# Patient Record
Sex: MALE | Race: WHITE | NOT HISPANIC OR LATINO | Employment: OTHER | URBAN - METROPOLITAN AREA
[De-identification: names, ages, dates, MRNs, and addresses within clinical notes are randomized per-mention and may not be internally consistent; named-entity substitution may affect disease eponyms.]

---

## 2023-03-01 ENCOUNTER — DOCUMENTATION (OUTPATIENT)
Dept: PRIMARY CARE | Facility: CLINIC | Age: 68
End: 2023-03-01
Payer: COMMERCIAL

## 2023-03-29 ENCOUNTER — DOCUMENTATION (OUTPATIENT)
Dept: PRIMARY CARE | Facility: CLINIC | Age: 68
End: 2023-03-29
Payer: COMMERCIAL

## 2023-03-29 NOTE — PROGRESS NOTES
Patient has met target of no readmission for 30 days post (hospital, SNF, rehab) discharge and is graduated from Transitional Care Management program at this time.

## 2023-05-24 DIAGNOSIS — I10 ESSENTIAL HYPERTENSION, BENIGN: ICD-10-CM

## 2023-05-24 RX ORDER — METOPROLOL SUCCINATE 25 MG/1
25 TABLET, EXTENDED RELEASE ORAL DAILY
COMMUNITY
End: 2023-05-24 | Stop reason: SDUPTHER

## 2023-05-24 RX ORDER — TRIAMTERENE/HYDROCHLOROTHIAZID 37.5-25 MG
1 TABLET ORAL DAILY
Qty: 90 TABLET | Refills: 1 | Status: SHIPPED | OUTPATIENT
Start: 2023-05-24 | End: 2023-08-21 | Stop reason: DRUGHIGH

## 2023-05-24 RX ORDER — TRIAMTERENE/HYDROCHLOROTHIAZID 37.5-25 MG
1 TABLET ORAL DAILY
COMMUNITY
Start: 2022-09-20 | End: 2023-05-24 | Stop reason: SDUPTHER

## 2023-05-24 RX ORDER — METOPROLOL SUCCINATE 25 MG/1
25 TABLET, EXTENDED RELEASE ORAL DAILY
Qty: 90 TABLET | Refills: 1 | Status: SHIPPED | OUTPATIENT
Start: 2023-05-24 | End: 2023-06-02

## 2023-05-31 DIAGNOSIS — I10 ESSENTIAL HYPERTENSION, BENIGN: ICD-10-CM

## 2023-06-02 RX ORDER — METOPROLOL SUCCINATE 25 MG/1
TABLET, EXTENDED RELEASE ORAL
Qty: 90 TABLET | Refills: 3 | Status: SHIPPED | OUTPATIENT
Start: 2023-06-02 | End: 2024-02-28 | Stop reason: SDUPTHER

## 2023-06-20 ENCOUNTER — APPOINTMENT (OUTPATIENT)
Dept: PRIMARY CARE | Facility: CLINIC | Age: 68
End: 2023-06-20
Payer: MEDICARE

## 2023-06-27 DIAGNOSIS — I10 ESSENTIAL (PRIMARY) HYPERTENSION: ICD-10-CM

## 2023-06-27 RX ORDER — ICOSAPENT ETHYL 1 G/1
CAPSULE ORAL
Qty: 360 CAPSULE | Refills: 1 | Status: SHIPPED | OUTPATIENT
Start: 2023-06-27 | End: 2023-12-11 | Stop reason: SDUPTHER

## 2023-07-06 LAB
APPEARANCE, URINE: CLEAR
BILIRUBIN, URINE: NEGATIVE
BLOOD, URINE: NEGATIVE
CHOLESTEROL (MG/DL) IN SER/PLAS: 135 MG/DL (ref 0–199)
CHOLESTEROL IN HDL (MG/DL) IN SER/PLAS: 28.3 MG/DL
CHOLESTEROL/HDL RATIO: 4.8
COLOR, URINE: YELLOW
CREATININE (MG/DL) IN SER/PLAS: 1.56 MG/DL (ref 0.5–1.3)
ESTIMATED AVERAGE GLUCOSE FOR HBA1C: 131 MG/DL
GFR MALE: 48 ML/MIN/1.73M2
GLUCOSE, URINE: NEGATIVE MG/DL
HEMOGLOBIN A1C/HEMOGLOBIN TOTAL IN BLOOD: 6.2 %
KETONES, URINE: NEGATIVE MG/DL
LDL: 27 MG/DL (ref 0–99)
LEUKOCYTE ESTERASE, URINE: NEGATIVE
MUCUS, URINE: NORMAL /LPF
NITRITE, URINE: NEGATIVE
NON HDL CHOLESTEROL: 107 MG/DL
PH, URINE: 5 (ref 5–8)
PROSTATE SPECIFIC AG (NG/ML) IN SER/PLAS: 0.37 NG/ML (ref 0–4)
PROTEIN, URINE: ABNORMAL MG/DL
RBC, URINE: <1 /HPF (ref 0–5)
SPECIFIC GRAVITY, URINE: 1.02 (ref 1–1.03)
SQUAMOUS EPITHELIAL CELLS, URINE: 1 /HPF
TRIGLYCERIDE (MG/DL) IN SER/PLAS: 400 MG/DL (ref 0–149)
UROBILINOGEN, URINE: <2 MG/DL (ref 0–1.9)
VLDL: 80 MG/DL (ref 0–40)
WBC, URINE: 2 /HPF (ref 0–5)

## 2023-07-07 LAB — URINE CULTURE: NORMAL

## 2023-08-21 ENCOUNTER — OFFICE VISIT (OUTPATIENT)
Dept: PRIMARY CARE | Facility: CLINIC | Age: 68
End: 2023-08-21
Payer: MEDICARE

## 2023-08-21 VITALS
HEIGHT: 73 IN | WEIGHT: 257 LBS | SYSTOLIC BLOOD PRESSURE: 131 MMHG | BODY MASS INDEX: 34.06 KG/M2 | HEART RATE: 67 BPM | DIASTOLIC BLOOD PRESSURE: 73 MMHG | TEMPERATURE: 96 F

## 2023-08-21 DIAGNOSIS — M79.604 PAIN OF RIGHT LOWER EXTREMITY: Primary | ICD-10-CM

## 2023-08-21 DIAGNOSIS — I10 ESSENTIAL HYPERTENSION, BENIGN: ICD-10-CM

## 2023-08-21 PROBLEM — G47.33 OBSTRUCTIVE SLEEP APNEA SYNDROME: Status: ACTIVE | Noted: 2023-08-21

## 2023-08-21 PROBLEM — N18.30 STAGE 3 CHRONIC KIDNEY DISEASE (MULTI): Status: ACTIVE | Noted: 2023-08-21

## 2023-08-21 PROBLEM — Z98.61 CAD S/P PERCUTANEOUS CORONARY ANGIOPLASTY: Status: ACTIVE | Noted: 2023-08-21

## 2023-08-21 PROBLEM — E11.9 DIABETES MELLITUS (MULTI): Status: ACTIVE | Noted: 2023-08-21

## 2023-08-21 PROBLEM — N13.8 BENIGN PROSTATIC HYPERPLASIA WITH URINARY OBSTRUCTION: Status: ACTIVE | Noted: 2023-08-21

## 2023-08-21 PROBLEM — I25.10 CAD S/P PERCUTANEOUS CORONARY ANGIOPLASTY: Status: ACTIVE | Noted: 2023-08-21

## 2023-08-21 PROBLEM — E78.1 ESSENTIAL HYPERTRIGLYCERIDEMIA: Status: ACTIVE | Noted: 2023-08-21

## 2023-08-21 PROBLEM — N40.1 BENIGN PROSTATIC HYPERPLASIA WITH URINARY OBSTRUCTION: Status: ACTIVE | Noted: 2023-08-21

## 2023-08-21 PROCEDURE — 99213 OFFICE O/P EST LOW 20 MIN: CPT | Performed by: INTERNAL MEDICINE

## 2023-08-21 PROCEDURE — 1160F RVW MEDS BY RX/DR IN RCRD: CPT | Performed by: INTERNAL MEDICINE

## 2023-08-21 PROCEDURE — 1036F TOBACCO NON-USER: CPT | Performed by: INTERNAL MEDICINE

## 2023-08-21 PROCEDURE — 3044F HG A1C LEVEL LT 7.0%: CPT | Performed by: INTERNAL MEDICINE

## 2023-08-21 PROCEDURE — 3008F BODY MASS INDEX DOCD: CPT | Performed by: INTERNAL MEDICINE

## 2023-08-21 PROCEDURE — 3066F NEPHROPATHY DOC TX: CPT | Performed by: INTERNAL MEDICINE

## 2023-08-21 PROCEDURE — 3078F DIAST BP <80 MM HG: CPT | Performed by: INTERNAL MEDICINE

## 2023-08-21 PROCEDURE — 1159F MED LIST DOCD IN RCRD: CPT | Performed by: INTERNAL MEDICINE

## 2023-08-21 PROCEDURE — 1125F AMNT PAIN NOTED PAIN PRSNT: CPT | Performed by: INTERNAL MEDICINE

## 2023-08-21 PROCEDURE — 3075F SYST BP GE 130 - 139MM HG: CPT | Performed by: INTERNAL MEDICINE

## 2023-08-21 RX ORDER — INSULIN DEGLUDEC 200 U/ML
INJECTION, SOLUTION SUBCUTANEOUS
COMMUNITY
Start: 2019-05-19 | End: 2024-05-29 | Stop reason: DRUGHIGH

## 2023-08-21 RX ORDER — FINASTERIDE 5 MG/1
5 TABLET, FILM COATED ORAL DAILY
COMMUNITY

## 2023-08-21 RX ORDER — NAPROXEN SODIUM 220 MG/1
TABLET, FILM COATED ORAL
COMMUNITY

## 2023-08-21 RX ORDER — NITROGLYCERIN 0.4 MG/1
0.4 TABLET SUBLINGUAL EVERY 5 MIN PRN
COMMUNITY
End: 2024-03-13 | Stop reason: SDUPTHER

## 2023-08-21 RX ORDER — ALBUTEROL SULFATE 90 UG/1
2 AEROSOL, METERED RESPIRATORY (INHALATION) EVERY 4 HOURS PRN
COMMUNITY
Start: 2019-12-30 | End: 2024-03-13 | Stop reason: WASHOUT

## 2023-08-21 RX ORDER — TAMSULOSIN HYDROCHLORIDE 0.4 MG/1
0.4 CAPSULE ORAL DAILY
COMMUNITY

## 2023-08-21 RX ORDER — RANOLAZINE 500 MG/1
500 TABLET, EXTENDED RELEASE ORAL EVERY 12 HOURS
COMMUNITY
End: 2023-10-12

## 2023-08-21 RX ORDER — METOPROLOL TARTRATE 25 MG/1
TABLET, FILM COATED ORAL
COMMUNITY
End: 2023-08-21 | Stop reason: SDUPTHER

## 2023-08-21 RX ORDER — AMLODIPINE BESYLATE 5 MG/1
5 TABLET ORAL DAILY
COMMUNITY
End: 2023-09-12

## 2023-08-21 RX ORDER — POTASSIUM CHLORIDE 1500 MG/1
TABLET, EXTENDED RELEASE ORAL 2 TIMES DAILY
COMMUNITY
End: 2023-10-09 | Stop reason: ALTCHOICE

## 2023-08-21 RX ORDER — TRIAMTERENE/HYDROCHLOROTHIAZID 37.5-25 MG
TABLET ORAL
Qty: 90 TABLET | Refills: 1 | Status: SHIPPED
Start: 2023-08-21 | End: 2023-09-12

## 2023-08-21 RX ORDER — ATORVASTATIN CALCIUM 40 MG/1
40 TABLET, FILM COATED ORAL NIGHTLY
COMMUNITY
End: 2024-05-21

## 2023-08-21 RX ORDER — INSULIN ASPART 100 [IU]/ML
INJECTION, SOLUTION INTRAVENOUS; SUBCUTANEOUS
COMMUNITY
Start: 2021-02-22

## 2023-08-21 RX ORDER — CLOPIDOGREL BISULFATE 75 MG/1
75 TABLET ORAL DAILY
COMMUNITY
End: 2024-03-06

## 2023-08-21 ASSESSMENT — ENCOUNTER SYMPTOMS
HYPERTENSION: 1
EYES NEGATIVE: 1
BOWEL INCONTINENCE: 0
DEPRESSION: 0
LEG PAIN: 1
LOSS OF SENSATION IN FEET: 0
CONSTITUTIONAL NEGATIVE: 1
RESPIRATORY NEGATIVE: 1
BACK PAIN: 1
PARESTHESIAS: 0
ABDOMINAL PAIN: 0
OCCASIONAL FEELINGS OF UNSTEADINESS: 0
CARDIOVASCULAR NEGATIVE: 1
GASTROINTESTINAL NEGATIVE: 1
NUMBNESS: 0
NEUROLOGICAL NEGATIVE: 1

## 2023-08-21 NOTE — PROGRESS NOTES
"Subjective   Patient ID: Manny Reveles is a 68 y.o. male who presents for Back Pain (Back and right leg pain since Saturday).    Back Pain  This is a new problem. The current episode started in the past 7 days. The problem occurs constantly. The problem is unchanged. The pain is present in the gluteal. The pain radiates to the right thigh. The pain is at a severity of 4/10. The pain is moderate. The symptoms are aggravated by bending, position and standing. Stiffness is present All day. Associated symptoms include leg pain. Pertinent negatives include no abdominal pain, bowel incontinence, numbness or paresthesias. He has tried nothing for the symptoms. The treatment provided no relief.   Hypertension  This is a chronic problem. The current episode started more than 1 year ago. The problem has been waxing and waning since onset. The problem is controlled. Pertinent negatives include no anxiety. Risk factors for coronary artery disease include diabetes mellitus, obesity and male gender. The current treatment provides significant improvement. Hypertensive end-organ damage includes kidney disease. Identifiable causes of hypertension include chronic renal disease.        Review of Systems   Constitutional: Negative.    HENT: Negative.     Eyes: Negative.    Respiratory: Negative.     Cardiovascular: Negative.    Gastrointestinal: Negative.  Negative for abdominal pain and bowel incontinence.   Musculoskeletal:  Positive for back pain. Negative for gait problem.   Skin: Negative.    Neurological: Negative.  Negative for numbness and paresthesias.       Objective   /73 (BP Location: Right arm, Patient Position: Sitting, BP Cuff Size: Adult)   Pulse 67   Temp 35.6 °C (96 °F) (Temporal)   Ht 1.842 m (6' 0.5\")   Wt 117 kg (257 lb)   BMI 34.38 kg/m²     Physical Exam  Vitals reviewed.   Constitutional:       Appearance: Normal appearance. He is normal weight.   HENT:      Head: Normocephalic and atraumatic. "   Eyes:      Conjunctiva/sclera: Conjunctivae normal.   Cardiovascular:      Rate and Rhythm: Normal rate and regular rhythm.      Pulses: Normal pulses.   Pulmonary:      Effort: Pulmonary effort is normal.      Breath sounds: Normal breath sounds.   Abdominal:      Palpations: Abdomen is soft.   Musculoskeletal:         General: Tenderness present. No swelling.      Cervical back: Normal range of motion.      Right lower leg: No edema.      Left lower leg: No edema.   Skin:     General: Skin is warm and dry.   Neurological:      General: No focal deficit present.       Assessment/Plan   Problem List Items Addressed This Visit    None  Visit Diagnoses       Pain of right lower extremity    -  Primary    Essential hypertension, benign        Relevant Medications    triamterene-hydrochlorothiazid (Maxzide-25) 37.5-25 mg tablet        He was lifting some heavy weight on Saturday, started having pain and discomfort from the right gluteal region to the posterior aspect of right thigh up to the knee.  He was tender in the hamstrings in the lower gluteal region, straight leg raising was slightly limited on right side, there was no motor deficit, we will start conservative treatment with physical therapy with this pain and discomfort of acute onset and less likely to be lumbar to colopathy to me.  Meanwhile he missed his appointment although he canceled it because of some family situation, his laboratories were reviewed with him, he could have came with his spouse, triglycerides are fluctuating, no angina, no new kidney stone, no change in the weight, creatinine has been monitored by nephrologist, recent endocrine evaluation was reviewed, he will be seen at a new schedule appointment and if this pain does not go away then we will consider imaging studies.

## 2023-08-24 PROBLEM — I87.2 CHRONIC VENOUS INSUFFICIENCY: Status: ACTIVE | Noted: 2023-07-26

## 2023-08-24 PROBLEM — E78.2 MIXED HYPERLIPIDEMIA: Status: ACTIVE | Noted: 2023-08-24

## 2023-08-24 PROBLEM — E66.811 CLASS 1 OBESITY WITH BODY MASS INDEX (BMI) OF 34.0 TO 34.9 IN ADULT: Status: ACTIVE | Noted: 2023-08-24

## 2023-08-24 PROBLEM — M79.606 LOWER EXTREMITY PAIN: Status: ACTIVE | Noted: 2023-08-24

## 2023-08-24 PROBLEM — L57.9 SKIN CHANGES DUE TO CHRONIC EXPOSURE TO NONIONIZING RADIATION, UNSPECIFIED: Status: ACTIVE | Noted: 2023-07-26

## 2023-08-24 PROBLEM — N20.1 URETERAL CALCULUS: Status: ACTIVE | Noted: 2023-08-24

## 2023-08-24 PROBLEM — Z85.828 PERSONAL HISTORY OF OTHER MALIGNANT NEOPLASM OF SKIN: Status: ACTIVE | Noted: 2023-07-26

## 2023-08-24 PROBLEM — D48.5 NEOPLASM OF UNCERTAIN BEHAVIOR OF SKIN: Status: ACTIVE | Noted: 2023-07-26

## 2023-08-24 PROBLEM — M54.9 BACK PAIN: Status: ACTIVE | Noted: 2023-08-24

## 2023-08-24 PROBLEM — E87.6 HYPOKALEMIA: Status: ACTIVE | Noted: 2023-08-24

## 2023-08-24 PROBLEM — E66.9 CLASS 1 OBESITY WITH BODY MASS INDEX (BMI) OF 34.0 TO 34.9 IN ADULT: Status: ACTIVE | Noted: 2023-08-24

## 2023-08-24 PROBLEM — L82.0 INFLAMED SEBORRHEIC KERATOSIS: Status: ACTIVE | Noted: 2023-07-26

## 2023-08-24 PROBLEM — B35.3 TINEA PEDIS: Status: ACTIVE | Noted: 2023-07-26

## 2023-08-24 PROBLEM — I12.9 CHRONIC KIDNEY DISEASE DUE TO HYPERTENSION: Status: ACTIVE | Noted: 2023-08-24

## 2023-08-24 PROBLEM — E55.9 VITAMIN D DEFICIENCY: Status: ACTIVE | Noted: 2023-08-24

## 2023-08-24 PROBLEM — L21.9 SEBORRHEIC DERMATITIS, UNSPECIFIED: Status: ACTIVE | Noted: 2023-07-26

## 2023-08-24 PROBLEM — N20.0 RENAL CALCULUS, RIGHT: Status: ACTIVE | Noted: 2023-08-24

## 2023-08-24 PROBLEM — L81.4 OTHER MELANIN HYPERPIGMENTATION: Status: ACTIVE | Noted: 2023-07-26

## 2023-08-24 PROBLEM — E11.21 DIABETIC RENAL DISEASE (MULTI): Status: ACTIVE | Noted: 2023-08-24

## 2023-08-24 PROBLEM — C44.329 SQUAMOUS CELL CARCINOMA OF SKIN OF OTHER PARTS OF FACE: Status: ACTIVE | Noted: 2023-07-26

## 2023-08-24 PROBLEM — D18.01 HEMANGIOMA OF SKIN AND SUBCUTANEOUS TISSUE: Status: ACTIVE | Noted: 2023-07-26

## 2023-08-24 PROBLEM — L57.0 ACTINIC KERATOSIS: Status: ACTIVE | Noted: 2023-07-26

## 2023-08-24 PROBLEM — L90.5 SCAR CONDITION AND FIBROSIS OF SKIN: Status: ACTIVE | Noted: 2023-07-26

## 2023-08-24 RX ORDER — METFORMIN HYDROCHLORIDE 500 MG/1
TABLET ORAL
COMMUNITY
End: 2023-10-09 | Stop reason: ALTCHOICE

## 2023-08-24 RX ORDER — INSULIN DEGLUDEC 100 U/ML
90 INJECTION, SOLUTION SUBCUTANEOUS
COMMUNITY
End: 2023-10-09 | Stop reason: ALTCHOICE

## 2023-08-24 RX ORDER — HYDROCHLOROTHIAZIDE 25 MG/1
TABLET ORAL EVERY 24 HOURS
COMMUNITY
End: 2023-11-29 | Stop reason: DRUGHIGH

## 2023-08-24 RX ORDER — DOCUSATE SODIUM 100 MG/1
100 CAPSULE, LIQUID FILLED ORAL 2 TIMES DAILY
COMMUNITY
Start: 2010-04-05

## 2023-08-24 RX ORDER — ACETAMINOPHEN AND CODEINE PHOSPHATE 300; 30 MG/1; MG/1
1 TABLET ORAL
COMMUNITY
Start: 2023-02-09 | End: 2023-10-09 | Stop reason: ALTCHOICE

## 2023-08-24 RX ORDER — TRIAMCINOLONE ACETONIDE 0.25 MG/G
1 CREAM TOPICAL
COMMUNITY
Start: 2022-08-26 | End: 2023-10-09 | Stop reason: ALTCHOICE

## 2023-08-24 RX ORDER — LORATADINE 10 MG/1
TABLET ORAL
COMMUNITY
End: 2023-10-09 | Stop reason: ALTCHOICE

## 2023-08-24 RX ORDER — KETOCONAZOLE 20 MG/G
1 CREAM TOPICAL
COMMUNITY
Start: 2021-10-14 | End: 2023-11-29 | Stop reason: ALTCHOICE

## 2023-08-24 RX ORDER — POTASSIUM CHLORIDE 20 MEQ/1
20 TABLET, EXTENDED RELEASE ORAL DAILY
COMMUNITY
End: 2023-10-08

## 2023-08-24 RX ORDER — MUPIROCIN CALCIUM 20 MG/G
15 CREAM TOPICAL EVERY 12 HOURS
COMMUNITY
Start: 2020-05-19 | End: 2023-11-29 | Stop reason: ALTCHOICE

## 2023-08-24 RX ORDER — TRIAMCINOLONE ACETONIDE 5 MG/G
1 OINTMENT TOPICAL 2 TIMES DAILY
COMMUNITY
Start: 2021-08-18 | End: 2023-11-29 | Stop reason: ALTCHOICE

## 2023-08-24 RX ORDER — FENOFIBRATE 160 MG/1
160 TABLET ORAL DAILY
COMMUNITY
Start: 2010-04-05 | End: 2023-12-11 | Stop reason: ALTCHOICE

## 2023-08-24 RX ORDER — CETIRIZINE HYDROCHLORIDE 10 MG/1
10 TABLET ORAL DAILY
COMMUNITY
Start: 2022-01-31

## 2023-08-24 RX ORDER — VALSARTAN 80 MG/1
80 TABLET ORAL EVERY 24 HOURS
COMMUNITY
Start: 2019-08-21 | End: 2023-11-29 | Stop reason: ALTCHOICE

## 2023-08-24 RX ORDER — ERGOCALCIFEROL 1.25 MG/1
CAPSULE ORAL
COMMUNITY
End: 2023-10-09 | Stop reason: ALTCHOICE

## 2023-08-24 RX ORDER — HYDROCORTISONE 25 MG/G
CREAM TOPICAL
COMMUNITY
Start: 2010-04-05 | End: 2023-11-29 | Stop reason: ALTCHOICE

## 2023-08-24 RX ORDER — ASPIRIN 325 MG
1 TABLET, DELAYED RELEASE (ENTERIC COATED) ORAL
COMMUNITY
Start: 2022-11-28 | End: 2023-10-09 | Stop reason: SDUPTHER

## 2023-08-24 RX ORDER — ACETAMINOPHEN 500 MG
TABLET ORAL
COMMUNITY
End: 2023-10-09 | Stop reason: ALTCHOICE

## 2023-08-24 RX ORDER — OMEGA-3-ACID ETHYL ESTERS 1 G/1
2 CAPSULE, LIQUID FILLED ORAL 2 TIMES DAILY
COMMUNITY
End: 2023-10-09 | Stop reason: ALTCHOICE

## 2023-08-24 RX ORDER — TELMISARTAN 20 MG/1
TABLET ORAL EVERY 24 HOURS
COMMUNITY
Start: 2020-05-21 | End: 2023-12-11 | Stop reason: SDUPTHER

## 2023-10-04 ENCOUNTER — APPOINTMENT (OUTPATIENT)
Dept: ENDOCRINOLOGY | Facility: CLINIC | Age: 68
End: 2023-10-04
Payer: MEDICARE

## 2023-10-08 DIAGNOSIS — Z98.61 CORONARY ANGIOPLASTY STATUS: ICD-10-CM

## 2023-10-08 DIAGNOSIS — E78.2 MIXED HYPERLIPIDEMIA: ICD-10-CM

## 2023-10-08 DIAGNOSIS — I25.10 ATHEROSCLEROTIC HEART DISEASE OF NATIVE CORONARY ARTERY WITHOUT ANGINA PECTORIS: ICD-10-CM

## 2023-10-08 DIAGNOSIS — E87.6 HYPOKALEMIA: ICD-10-CM

## 2023-10-08 DIAGNOSIS — E78.1 ESSENTIAL HYPERTRIGLYCERIDEMIA: ICD-10-CM

## 2023-10-08 RX ORDER — POTASSIUM CHLORIDE 20 MEQ/1
20 TABLET, EXTENDED RELEASE ORAL DAILY
Qty: 90 TABLET | Refills: 3 | Status: SHIPPED | OUTPATIENT
Start: 2023-10-08 | End: 2023-10-09 | Stop reason: SDUPTHER

## 2023-10-09 ENCOUNTER — OFFICE VISIT (OUTPATIENT)
Dept: ENDOCRINOLOGY | Facility: CLINIC | Age: 68
End: 2023-10-09
Payer: MEDICARE

## 2023-10-09 VITALS
DIASTOLIC BLOOD PRESSURE: 68 MMHG | SYSTOLIC BLOOD PRESSURE: 114 MMHG | HEIGHT: 72 IN | BODY MASS INDEX: 35.76 KG/M2 | WEIGHT: 264 LBS

## 2023-10-09 DIAGNOSIS — E11.9 TYPE 2 DIABETES MELLITUS WITHOUT RETINOPATHY (MULTI): ICD-10-CM

## 2023-10-09 DIAGNOSIS — Z97.8 USES SELF-APPLIED CONTINUOUS GLUCOSE MONITORING DEVICE: ICD-10-CM

## 2023-10-09 DIAGNOSIS — E78.2 MIXED HYPERLIPIDEMIA: ICD-10-CM

## 2023-10-09 DIAGNOSIS — E55.9 VITAMIN D DEFICIENCY: Primary | ICD-10-CM

## 2023-10-09 LAB
POC FINGERSTICK BLOOD GLUCOSE: 110 MG/DL (ref 70–100)
POC HEMOGLOBIN A1C: 6.7 % (ref 4.2–6.5)

## 2023-10-09 PROCEDURE — 3044F HG A1C LEVEL LT 7.0%: CPT | Performed by: HOSPITALIST

## 2023-10-09 PROCEDURE — 3078F DIAST BP <80 MM HG: CPT | Performed by: HOSPITALIST

## 2023-10-09 PROCEDURE — 83036 HEMOGLOBIN GLYCOSYLATED A1C: CPT | Performed by: HOSPITALIST

## 2023-10-09 PROCEDURE — 1160F RVW MEDS BY RX/DR IN RCRD: CPT | Performed by: HOSPITALIST

## 2023-10-09 PROCEDURE — 4010F ACE/ARB THERAPY RXD/TAKEN: CPT | Performed by: HOSPITALIST

## 2023-10-09 PROCEDURE — 3008F BODY MASS INDEX DOCD: CPT | Performed by: HOSPITALIST

## 2023-10-09 PROCEDURE — 99214 OFFICE O/P EST MOD 30 MIN: CPT | Performed by: HOSPITALIST

## 2023-10-09 PROCEDURE — 1159F MED LIST DOCD IN RCRD: CPT | Performed by: HOSPITALIST

## 2023-10-09 PROCEDURE — 3074F SYST BP LT 130 MM HG: CPT | Performed by: HOSPITALIST

## 2023-10-09 PROCEDURE — 82962 GLUCOSE BLOOD TEST: CPT | Performed by: HOSPITALIST

## 2023-10-09 PROCEDURE — 3066F NEPHROPATHY DOC TX: CPT | Performed by: HOSPITALIST

## 2023-10-09 PROCEDURE — 1125F AMNT PAIN NOTED PAIN PRSNT: CPT | Performed by: HOSPITALIST

## 2023-10-09 PROCEDURE — 1036F TOBACCO NON-USER: CPT | Performed by: HOSPITALIST

## 2023-10-09 PROCEDURE — 95251 CONT GLUC MNTR ANALYSIS I&R: CPT | Performed by: HOSPITALIST

## 2023-10-09 RX ORDER — ASPIRIN 325 MG
50000 TABLET, DELAYED RELEASE (ENTERIC COATED) ORAL
Qty: 12 CAPSULE | Refills: 2 | Status: SHIPPED | OUTPATIENT
Start: 2023-10-09 | End: 2024-05-24

## 2023-10-09 ASSESSMENT — ENCOUNTER SYMPTOMS
CONSTITUTIONAL NEGATIVE: 1
EYE ITCHING: 0
SHORTNESS OF BREATH: 0
TROUBLE SWALLOWING: 0
LIGHT-HEADEDNESS: 0
ABDOMINAL DISTENTION: 0
PHOTOPHOBIA: 0
VOMITING: 0
TREMORS: 0
CHEST TIGHTNESS: 0
SORE THROAT: 0
AGITATION: 0
FREQUENCY: 0
SLEEP DISTURBANCE: 0
NAUSEA: 0
NERVOUS/ANXIOUS: 0
HEADACHES: 0
VOICE CHANGE: 0
ARTHRALGIAS: 0
DYSURIA: 0
PALPITATIONS: 0
CONSTIPATION: 0
ABDOMINAL PAIN: 0
DIARRHEA: 0

## 2023-10-09 NOTE — PROGRESS NOTES
Subjective   Patient ID: Manny Reveles is a 68 y.o. male who presents for Diabetes (Patient here with wife/PCP: Dr. Mayo/podiatry: does not see one/eye exam: yearly /Patient testing glucose 4 times daily with freestyle hugh 2 READER/Due to fluctuating blood glucose, hypoglycemia and 4 insulin injections daily /).  HPI  Please see HPI details in AP     Review of Systems   Constitutional: Negative.    HENT:  Negative for sore throat, trouble swallowing and voice change.    Eyes:  Negative for photophobia, itching and visual disturbance.   Respiratory:  Negative for chest tightness and shortness of breath.    Cardiovascular:  Negative for chest pain and palpitations.   Gastrointestinal:  Negative for abdominal distention, abdominal pain, constipation, diarrhea, nausea and vomiting.   Endocrine: Negative for cold intolerance, heat intolerance and polyuria.   Genitourinary:  Negative for dysuria and frequency.   Musculoskeletal:  Negative for arthralgias.   Skin:  Negative for pallor.   Allergic/Immunologic: Negative for environmental allergies.   Neurological:  Negative for tremors, light-headedness and headaches.   Psychiatric/Behavioral:  Negative for agitation and sleep disturbance. The patient is not nervous/anxious.        Objective   Physical Exam  Constitutional:       Appearance: Normal appearance.   HENT:      Head: Normocephalic.      Nose: Nose normal.      Mouth/Throat:      Mouth: Mucous membranes are moist.   Eyes:      Extraocular Movements: Extraocular movements intact.   Cardiovascular:      Rate and Rhythm: Normal rate.   Pulmonary:      Effort: Pulmonary effort is normal. No respiratory distress.   Abdominal:      General: There is no distension.   Musculoskeletal:         General: Normal range of motion.      Cervical back: Normal range of motion and neck supple.      Right lower leg: Edema present.      Left lower leg: Edema present.   Skin:     General: Skin is warm and dry.   Neurological:       Mental Status: He is alert and oriented to person, place, and time.   Psychiatric:         Mood and Affect: Mood normal.         Assessment/Plan   Diagnoses and all orders for this visit:  Vitamin D deficiency  -     cholecalciferol (Vitamin D-3) 1,250 mcg (50,000 unit) capsule; Take 1 capsule (50,000 Units) by mouth 1 (one) time per week.  Type 2 diabetes mellitus without retinopathy (CMS/HCC)  -     POCT glucose manually resulted  -     POCT glycosylated hemoglobin (Hb A1C) manually resulted  Mixed hyperlipidemia  Uses self-applied continuous glucose monitoring device    69 yo man with h/o CAd s/p stents, CKD came for follow up   he does have h/o CAD s/p PCI in 2019  Dx ~ 10 yrs ago. Used to follow Dr. Doherty as OP.     Current regimen :   Tresiba 98-0-0-0   Novolog 20-20-20-0 ,ssi 2: 50 > 150 ( takes 2-4 times a day )  for snacks takes 18 units occ.   Rybelsus 14 mg  ( denies any SE)     HE has Freestyle Maira 2 which was  downloaded - reviewed it and last 14days active time 85 %, TIR 41 % low < 1 %   Occ ovn and pre dinner low BG   Take insulin occ after eating      avoids artificial sweetners given his CKD    A1c  slightly worse but still at goal    He mentions in the past he as on metformin which was stopped due to his CKD  He has h/o multiple UTIs in past, no h/o pancreatitis in past     PLAN :    - continue tresiba to 98-0-0-0   - change novolog to 20-18-25-0 , same SSI   - snack with carbs > 30 g- take 10 units novolog before  snack.   - take novolog 15 min before eating.   - continue rybelsus  - his GFR > 45- it was 48 , will hold off adding metformin at this time     -given he has multiple UTIs in past and urine incontinence rarely with hesitancy occ- would avoid SGLT2 inh  - continue statin vascepa, TG improved, LDL at goal   - continue ARB, BP at goal   - annual eye exam    off note rybelsus covered with [facility] patient assisstance. he will benefit from continuing the GLP 1 agonist given his h/o CAD      RTC 4m    SH- daughter and GS moved in,. GS he is~ 2 yr old NETTE and he has been helping taking care of him . he is more active now.  He takes care of him 2 times a week.   he is in [place]. daughter had kidney failure? also on remicaid

## 2023-10-11 ENCOUNTER — TREATMENT (OUTPATIENT)
Dept: PHYSICAL THERAPY | Facility: CLINIC | Age: 68
End: 2023-10-11
Payer: MEDICARE

## 2023-10-11 DIAGNOSIS — M54.50 LOW BACK PAIN, UNSPECIFIED BACK PAIN LATERALITY, UNSPECIFIED CHRONICITY, UNSPECIFIED WHETHER SCIATICA PRESENT: ICD-10-CM

## 2023-10-11 DIAGNOSIS — M79.604 LOWER EXTREMITY PAIN, POSTERIOR, RIGHT: Primary | ICD-10-CM

## 2023-10-11 PROCEDURE — 97110 THERAPEUTIC EXERCISES: CPT | Mod: GP | Performed by: PHYSICAL THERAPIST

## 2023-10-11 ASSESSMENT — PAIN - FUNCTIONAL ASSESSMENT: PAIN_FUNCTIONAL_ASSESSMENT: 0-10

## 2023-10-11 ASSESSMENT — PAIN SCALES - GENERAL: PAINLEVEL_OUTOF10: 1

## 2023-10-11 NOTE — PATIENT INSTRUCTIONS
Access Code: [email or web address]  URL: [email or web address]  Date: 10/11/2023  Prepared by: Hugo Baker    Exercises  - Seated Piriformis Stretch  - 2 x daily - 7 x weekly - 1 sets - 3 reps - 30 sec hold  - Seated Piriformis Stretch  - 2 x daily - 7 x weekly - 1 sets - 3 reps - 30 sec hold  - Long Sitting Calf Stretch with Strap  - 2 x daily - 7 x weekly - 1 sets - 3 reps - 30 sec hold  - Seated Table Hamstring Stretch  - 2 x daily - 7 x weekly - 1 sets - 3 reps - 30 sec hold

## 2023-10-11 NOTE — PROGRESS NOTES
Physical Therapy    Physical Therapy Discharge    Patient Name: Manny Reveles  MRN: [id number]  Today's Date: 10/11/2023  Time Calculation  Start Time: 0845  Stop Time: 0927  Time Calculation (min): 42 min  Visit number: 9   Approved number of visits: 10   Medicare/[employer]ARP  100% UCR  Unlimited visits/year; KX p 20th         Assessment:  Patient has completed 9 therapy visits up to this point, and have met 7/8 established therapy goals. The patient has progressed well, and has shown improvements in muscle flexibility, pain intensity, strength, and overall mobility. At this time, the patient remains below functional baseline with intermittent pain in the low back and right lower extremity. PT anticipates additional progression towards this area with continuation of HEP. Patient is discharged from formal physical therapy at this time, with instructions to continue with HEP, and follow up with physician should their status change.    PT Assessment  Evaluation/Treatment Tolerance: Patient tolerated treatment well  Assessment Comment: Discharge to HEP    Plan:  OP PT Plan  PT Plan: No Additional PT interventions required at this time  Plan of Care Agreement: Patient  Discharge to HEP  Current Problem  1. Lower extremity pain, posterior, right        2. Low back pain, unspecified back pain laterality, unspecified chronicity, unspecified whether sciatica present            Subjective   Pt reports that the pain in the back of the leg has been feeling good over the past two weeks. He reports that he has been dealing with some soreness in the right calf, but also notes that he was a little more active over the past few days around the house. Pain is 1/10 at rest, and 2/10 with activity. He notes that he can now walk for approximately 1 hour before having to take a break.   Precautions  Precautions  STEADI Fall Risk Score (The score of 4 or more indicates an increased risk of falling): 0    Pain  Pain Assessment: 0-10  Pain  "Score: 1  Pain Location: Leg  Pain Orientation: Right    Objective   Hip flexion R 4+/5, L 5/5  Hip abd R 4+/5, L 4+/5  Hip extension R 4+/5, L 4+/5  Knee flexion R 5/5, L 5/5  Knee extension R 5/5, L 5/5    HS length (90/90):  R -19  L -19    Stairs: able to reciprocally negotiate one flight of stairs without increased pain  Lifting: able to lift 10lb from the floor 5x consecutively with mild increase in pain in the gluteal region 2/10  Sit to stands: able to perform 15 sit to stand transfers without UE support and pain 0/10  Outcome Measures:  Other Measures  Lower Extremity Funtional Score (LEFS): 72/80    Treatments:  Obj measures and goals review   B HS stretch with stool 3x30\"   Calf stretch with strap 3x30\"  B hip flexor stretch in door low pec 3x30\" NT  Cross body glut stretch 3x30\" NT  ppt 10x5\" NT  DLS march L2 x20  PPT with SLR x20 B   clamshells RTB x20  ALT pull downs BTB x20  Paloff press BTB x15 ea   Hip hikes off step x12 ea B    (39 min)    OP EDUCATION:  Education  Individual(s) Educated: Patient  Education Provided: Home Exercise Program  HEP:  - Seated Piriformis Stretch  - 2 x daily - 7 x weekly - 1 sets - 3 reps - 30 sec hold  - Seated Piriformis Stretch  - 2 x daily - 7 x weekly - 1 sets - 3 reps - 30 sec hold  - Long Sitting Calf Stretch with Strap  - 2 x daily - 7 x weekly - 1 sets - 3 reps - 30 sec hold  - Seated Table Hamstring Stretch  - 2 x daily - 7 x weekly - 1 sets - 3 reps - 30 sec hold    Goals:  By discharge:  1. Patient will report and demonstrate independence with current HEP MET  2. Patient will demonstrate the ability to bend and lift 10lb from the floor to waist height 5x consecutively without any increase in pain PARTIALLY MET  3. Patient will demonstrate the ability to ascend/descend one flight of stairs reciprocally and without an increase in pain to improve function within the home and the community MET  4. Patient will demonstrate gross hip and knee strength >/= 4+/5 to " improve the ability to ambulate, squat, and lift without restrictions MET  5. Patient will demonstrate an increase in Lower Extremity Functional Scale score by 15 points to 45/80 to meet established MCID (baseline 8/23/23: 30/80) MET  6. Patient will report ability to ambulate x45 minutes without having to take a break d/t pain to improve mobility within the home and the community MET  7. Patient will demonstrate the ability to perform 15 sit to stand transfers from a normal chair height without upper extremity support and without pain exceeding 2/10 MET  8. Patient will report pain </= 1/10 at rest and no greater than 3/10 with any activity including standing, walking, stairs, and squats MET

## 2023-10-12 RX ORDER — RANOLAZINE 500 MG/1
500 TABLET, EXTENDED RELEASE ORAL EVERY 12 HOURS
Qty: 180 TABLET | Refills: 3 | Status: SHIPPED | OUTPATIENT
Start: 2023-10-12

## 2023-11-22 ENCOUNTER — TELEPHONE (OUTPATIENT)
Dept: PRIMARY CARE | Facility: CLINIC | Age: 68
End: 2023-11-22
Payer: COMMERCIAL

## 2023-11-29 ENCOUNTER — OFFICE VISIT (OUTPATIENT)
Dept: PRIMARY CARE | Facility: CLINIC | Age: 68
End: 2023-11-29
Payer: MEDICARE

## 2023-11-29 VITALS
HEIGHT: 72 IN | BODY MASS INDEX: 35.76 KG/M2 | TEMPERATURE: 96.9 F | DIASTOLIC BLOOD PRESSURE: 80 MMHG | WEIGHT: 264 LBS | HEART RATE: 70 BPM | SYSTOLIC BLOOD PRESSURE: 142 MMHG

## 2023-11-29 DIAGNOSIS — Z00.00 ROUTINE GENERAL MEDICAL EXAMINATION AT HEALTH CARE FACILITY: Primary | ICD-10-CM

## 2023-11-29 DIAGNOSIS — E11.9 TYPE 2 DIABETES MELLITUS WITHOUT RETINOPATHY (MULTI): ICD-10-CM

## 2023-11-29 DIAGNOSIS — E66.01 CLASS 2 SEVERE OBESITY DUE TO EXCESS CALORIES WITH SERIOUS COMORBIDITY AND BODY MASS INDEX (BMI) OF 35.0 TO 35.9 IN ADULT (MULTI): ICD-10-CM

## 2023-11-29 PROCEDURE — 3079F DIAST BP 80-89 MM HG: CPT | Performed by: INTERNAL MEDICINE

## 2023-11-29 PROCEDURE — 3008F BODY MASS INDEX DOCD: CPT | Performed by: INTERNAL MEDICINE

## 2023-11-29 PROCEDURE — 1036F TOBACCO NON-USER: CPT | Performed by: INTERNAL MEDICINE

## 2023-11-29 PROCEDURE — 1160F RVW MEDS BY RX/DR IN RCRD: CPT | Performed by: INTERNAL MEDICINE

## 2023-11-29 PROCEDURE — G0447 BEHAVIOR COUNSEL OBESITY 15M: HCPCS | Performed by: INTERNAL MEDICINE

## 2023-11-29 PROCEDURE — 3066F NEPHROPATHY DOC TX: CPT | Performed by: INTERNAL MEDICINE

## 2023-11-29 PROCEDURE — 1170F FXNL STATUS ASSESSED: CPT | Performed by: INTERNAL MEDICINE

## 2023-11-29 PROCEDURE — G0439 PPPS, SUBSEQ VISIT: HCPCS | Performed by: INTERNAL MEDICINE

## 2023-11-29 PROCEDURE — 4010F ACE/ARB THERAPY RXD/TAKEN: CPT | Performed by: INTERNAL MEDICINE

## 2023-11-29 PROCEDURE — 3077F SYST BP >= 140 MM HG: CPT | Performed by: INTERNAL MEDICINE

## 2023-11-29 PROCEDURE — 1159F MED LIST DOCD IN RCRD: CPT | Performed by: INTERNAL MEDICINE

## 2023-11-29 PROCEDURE — 1125F AMNT PAIN NOTED PAIN PRSNT: CPT | Performed by: INTERNAL MEDICINE

## 2023-11-29 PROCEDURE — 3044F HG A1C LEVEL LT 7.0%: CPT | Performed by: INTERNAL MEDICINE

## 2023-11-29 RX ORDER — HYDROCHLOROTHIAZIDE 25 MG/1
25 TABLET ORAL EVERY 24 HOURS
Status: SHIPPED
Start: 2023-11-29 | End: 2023-12-11 | Stop reason: ALTCHOICE

## 2023-11-29 ASSESSMENT — ANXIETY QUESTIONNAIRES
1. FEELING NERVOUS, ANXIOUS, OR ON EDGE: NOT AT ALL
3. WORRYING TOO MUCH ABOUT DIFFERENT THINGS: NOT AT ALL
2. NOT BEING ABLE TO STOP OR CONTROL WORRYING: NOT AT ALL
4. TROUBLE RELAXING: NOT AT ALL
6. BECOMING EASILY ANNOYED OR IRRITABLE: NOT AT ALL
5. BEING SO RESTLESS THAT IT IS HARD TO SIT STILL: NOT AT ALL
GAD7 TOTAL SCORE: 0
7. FEELING AFRAID AS IF SOMETHING AWFUL MIGHT HAPPEN: NOT AT ALL

## 2023-11-29 ASSESSMENT — ACTIVITIES OF DAILY LIVING (ADL)
PATIENT'S MEMORY ADEQUATE TO SAFELY COMPLETE DAILY ACTIVITIES?: YES
HEARING - LEFT EAR: FUNCTIONAL
TOILETING: INDEPENDENT
BATHING: INDEPENDENT
DRESSING YOURSELF: INDEPENDENT
FEEDING YOURSELF: INDEPENDENT
HEARING - RIGHT EAR: FUNCTIONAL
GROOMING: INDEPENDENT
WALKS IN HOME: INDEPENDENT
JUDGMENT_ADEQUATE_SAFELY_COMPLETE_DAILY_ACTIVITIES: YES
ADEQUATE_TO_COMPLETE_ADL: YES

## 2023-11-29 ASSESSMENT — COLUMBIA-SUICIDE SEVERITY RATING SCALE - C-SSRS
2. HAVE YOU ACTUALLY HAD ANY THOUGHTS OF KILLING YOURSELF?: NO
1. IN THE PAST MONTH, HAVE YOU WISHED YOU WERE DEAD OR WISHED YOU COULD GO TO SLEEP AND NOT WAKE UP?: NO

## 2023-11-29 ASSESSMENT — PATIENT HEALTH QUESTIONNAIRE - PHQ9
1. LITTLE INTEREST OR PLEASURE IN DOING THINGS: NOT AT ALL
2. FEELING DOWN, DEPRESSED OR HOPELESS: NOT AT ALL
SUM OF ALL RESPONSES TO PHQ9 QUESTIONS 1 AND 2: 0

## 2023-11-29 ASSESSMENT — ENCOUNTER SYMPTOMS
LOSS OF SENSATION IN FEET: 0
OCCASIONAL FEELINGS OF UNSTEADINESS: 0
DEPRESSION: 0

## 2023-11-29 NOTE — PROGRESS NOTES
Subjective   Reason for Visit: Manny Reveles is an 68 y.o. male here for a Medicare Wellness visit.     Past Medical, Surgical, and Family History reviewed and updated in chart.    Reviewed all medications by prescribing practitioner or clinical pharmacist (such as prescriptions, OTCs, herbal therapies and supplements) and documented in the medical record.    68-year-old male patient was seen for wellness exam, patient is very particular about his physical health, he has a longstanding history of diabetes well managed, remains on Tresiba, Premeal insulin, oral semaglutide.  He has a chronic kidney disease creatinine was reviewed, he has a diabetic nephropathy.  He has at least 7-8 PCI's, he has not have any obvious significant angina lately.  He remains on Ranexa, beta-blockers.  He has a flu vaccine, he is up to date living will, he has been seen by ophthalmology, he has a hypertriglyceridemia, he is compliant with CPAP mask, his triglycerides are fluctuating around 300s, recently he has a episode of nephrolithiasis, stone was passed by itself, he can go on a hydrochlorothiazide to 25 mg once a day he is already on potassium.  He has a cystoscopy done, in the beginning of the year he has a colonoscopy done in preparation was poor it was not happy phenomenon for patient and myself both.        Patient Care Team:  Sherwin Mayo MD as PCP - General  Sherwin Mayo MD as PCP - [facility]  Marielle Rudolph MA as Care Manager (Case Management)     Review of Systems  Constitutional: Negative.    HENT: Negative.     Eyes: Negative.    Respiratory: Negative.     Cardiovascular: Negative.    Gastrointestinal: Negative.  Negative for abdominal pain and bowel incontinence.   Musculoskeletal:  Positive for back pain. Negative for gait problem.   Skin: Negative.    Neurological: Negative.  Negative for numbness and paresthesias.   Objective   Vitals:  /80 (BP Location: Left arm, Patient Position:  Sitting, BP Cuff Size: Adult)   Pulse 70   Temp 36.1 °C (96.9 °F) (Temporal)   Ht 1.829 m (6')   Wt 120 kg (264 lb)   BMI 35.80 kg/m²       Physical Exam  Constitutional:       Appearance: Normal appearance. He is obese.   HENT:      Head: Normocephalic.   Eyes:      Conjunctiva/sclera: Conjunctivae normal.   Cardiovascular:      Rate and Rhythm: Normal rate and regular rhythm.      Pulses: Normal pulses.      Heart sounds: Normal heart sounds.   Pulmonary:      Effort: Pulmonary effort is normal.      Breath sounds: Normal breath sounds.   Abdominal:      Palpations: Abdomen is soft.   Musculoskeletal:         General: Tenderness present. No swelling.      Cervical back: Neck supple.   Skin:     General: Skin is warm and dry.   Neurological:      General: No focal deficit present.      Mental Status: He is oriented to person, place, and time. Mental status is at baseline.   Psychiatric:         Mood and Affect: Mood normal.         Judgment: Judgment normal.         Assessment/Plan   Problem List Items Addressed This Visit    None  Visit Diagnoses       Routine general medical examination at health care facility    -  Primary    Class 2 severe obesity due to excess calories with serious comorbidity and body mass index (BMI) of 35.0 to 35.9 in adult (CMS/HCC)            I spent <15 minutes face to face with individual providing recommendations for nutrition choices and exercise plan to help achieve weight reduction goals. Obesity is systemic disorder and it can bring devastating morbidities in furture. It is a matter of calorie gain and loss, keeping bodybank in negative calorie balance mode is the way to sustain weight loss.Diet has a big role in reducing excess body wt. Scheduled and well planned meals and food intake with watchfulness and understanding of calorie portion and distribution is key to understand. more comorbidities with morbid obesity. Weigh yourself twice a week to understand and follow wt loss  goals.   Wellness exam was completed, medications are reviewed and updated list of medication was provided, skin cancer is a squamous cell carcinoma it is in the surveillance.  Laboratories will be done next time, vaccinations are reviewed and COVID-19 monovalent vaccine was suggested rest of the vaccinations are up to date.  Foot care was reviewed, periodically has been followed by nephrology, weight is unchanged or does not fluctuate remains morbidly obese.  Very particular about his health and medications so we will continue medications with the increased dose of HCTZ because of slightly high systolic blood pressure, laboratories  , Follow-up in 3 months.  He is compliant with CPAP mask.

## 2023-12-11 ENCOUNTER — OFFICE VISIT (OUTPATIENT)
Dept: PRIMARY CARE | Facility: CLINIC | Age: 68
End: 2023-12-11
Payer: MEDICARE

## 2023-12-11 ENCOUNTER — LAB (OUTPATIENT)
Dept: LAB | Facility: LAB | Age: 68
End: 2023-12-11
Payer: MEDICARE

## 2023-12-11 VITALS
TEMPERATURE: 97.5 F | HEART RATE: 79 BPM | DIASTOLIC BLOOD PRESSURE: 78 MMHG | HEIGHT: 72 IN | WEIGHT: 265 LBS | BODY MASS INDEX: 35.89 KG/M2 | SYSTOLIC BLOOD PRESSURE: 142 MMHG

## 2023-12-11 DIAGNOSIS — I10 ESSENTIAL (PRIMARY) HYPERTENSION: ICD-10-CM

## 2023-12-11 DIAGNOSIS — L72.3 INFECTED SEBACEOUS CYST: Primary | ICD-10-CM

## 2023-12-11 DIAGNOSIS — L08.9 INFECTED SEBACEOUS CYST: Primary | ICD-10-CM

## 2023-12-11 LAB
ALBUMIN SERPL BCP-MCNC: 4.4 G/DL (ref 3.4–5)
ALP SERPL-CCNC: 72 U/L (ref 33–136)
ALT SERPL W P-5'-P-CCNC: 16 U/L (ref 10–52)
ANION GAP SERPL CALC-SCNC: 12 MMOL/L (ref 10–20)
AST SERPL W P-5'-P-CCNC: 13 U/L (ref 9–39)
BILIRUB SERPL-MCNC: 2.3 MG/DL (ref 0–1.2)
BUN SERPL-MCNC: 16 MG/DL (ref 6–23)
CALCIUM SERPL-MCNC: 9.6 MG/DL (ref 8.6–10.3)
CHLORIDE SERPL-SCNC: 102 MMOL/L (ref 98–107)
CO2 SERPL-SCNC: 30 MMOL/L (ref 21–32)
CREAT SERPL-MCNC: 1.36 MG/DL (ref 0.5–1.3)
GFR SERPL CREATININE-BSD FRML MDRD: 57 ML/MIN/1.73M*2
GLUCOSE SERPL-MCNC: 142 MG/DL (ref 74–99)
POTASSIUM SERPL-SCNC: 4.1 MMOL/L (ref 3.5–5.3)
PROT SERPL-MCNC: 7.2 G/DL (ref 6.4–8.2)
SODIUM SERPL-SCNC: 140 MMOL/L (ref 136–145)

## 2023-12-11 PROCEDURE — 3078F DIAST BP <80 MM HG: CPT | Performed by: INTERNAL MEDICINE

## 2023-12-11 PROCEDURE — 4010F ACE/ARB THERAPY RXD/TAKEN: CPT | Performed by: INTERNAL MEDICINE

## 2023-12-11 PROCEDURE — 1125F AMNT PAIN NOTED PAIN PRSNT: CPT | Performed by: INTERNAL MEDICINE

## 2023-12-11 PROCEDURE — 3008F BODY MASS INDEX DOCD: CPT | Performed by: INTERNAL MEDICINE

## 2023-12-11 PROCEDURE — 3066F NEPHROPATHY DOC TX: CPT | Performed by: INTERNAL MEDICINE

## 2023-12-11 PROCEDURE — 1159F MED LIST DOCD IN RCRD: CPT | Performed by: INTERNAL MEDICINE

## 2023-12-11 PROCEDURE — 80053 COMPREHEN METABOLIC PANEL: CPT

## 2023-12-11 PROCEDURE — 1036F TOBACCO NON-USER: CPT | Performed by: INTERNAL MEDICINE

## 2023-12-11 PROCEDURE — 3077F SYST BP >= 140 MM HG: CPT | Performed by: INTERNAL MEDICINE

## 2023-12-11 PROCEDURE — 99213 OFFICE O/P EST LOW 20 MIN: CPT | Performed by: INTERNAL MEDICINE

## 2023-12-11 PROCEDURE — 1160F RVW MEDS BY RX/DR IN RCRD: CPT | Performed by: INTERNAL MEDICINE

## 2023-12-11 PROCEDURE — 36415 COLL VENOUS BLD VENIPUNCTURE: CPT

## 2023-12-11 PROCEDURE — 3044F HG A1C LEVEL LT 7.0%: CPT | Performed by: INTERNAL MEDICINE

## 2023-12-11 RX ORDER — DOXYCYCLINE 100 MG/1
100 CAPSULE ORAL 2 TIMES DAILY
Qty: 14 CAPSULE | Refills: 0 | Status: SHIPPED | OUTPATIENT
Start: 2023-12-11 | End: 2023-12-18

## 2023-12-11 RX ORDER — TRIAMTERENE/HYDROCHLOROTHIAZID 37.5-25 MG
TABLET ORAL
Qty: 30 TABLET | Refills: 5
Start: 2023-12-11 | End: 2024-03-15 | Stop reason: SDUPTHER

## 2023-12-11 RX ORDER — ICOSAPENT ETHYL 1 G/1
2 CAPSULE ORAL 2 TIMES DAILY
Qty: 360 CAPSULE | Refills: 3 | Status: SHIPPED | OUTPATIENT
Start: 2023-12-11

## 2023-12-11 RX ORDER — TELMISARTAN 20 MG/1
20 TABLET ORAL EVERY 24 HOURS
Qty: 90 TABLET | Refills: 2 | Status: SHIPPED | OUTPATIENT
Start: 2023-12-11 | End: 2024-02-28 | Stop reason: SDUPTHER

## 2023-12-11 ASSESSMENT — ENCOUNTER SYMPTOMS
NEUROLOGICAL NEGATIVE: 1
GASTROINTESTINAL NEGATIVE: 1
ROS SKIN COMMENTS: CYST
CONSTITUTIONAL NEGATIVE: 1
CARDIOVASCULAR NEGATIVE: 1
WOUND: 1
SHORTNESS OF BREATH: 0
COUGH: 0

## 2023-12-11 NOTE — PROGRESS NOTES
Subjective   Patient ID: Manny Reveles is a 68 y.o. male who presents for Recurrent Skin Infections (Boil on back x 1 week).    HPI   Abscess: Patient presents for evaluation of a cutaneous abscess. Lesion is located in the  back area . Onset was 7 days ago. Symptoms have stabilized. Abscess has associated symptoms of spontaneous drainage, pain. Patient does not have previous history of cutaneous abscesses. Patient doesHypertension  This is a chronic problem. The current episode started more than 1 year ago. The problem has been waxing and waning since onset. The problem is controlled. Pertinent negatives include no anxiety. Risk factors for coronary artery disease include diabetes mellitus, obesity and male gender. The current treatment provides significant improvement. Hypertensive end-organ damage includes kidney disease. Identifiable causes of hypertension include chronic renal disease.  have diabetes.  Review of Systems   Constitutional: Negative.    Respiratory:  Negative for cough and shortness of breath.    Cardiovascular: Negative.    Gastrointestinal: Negative.    Skin:  Positive for wound.        cyst   Neurological: Negative.      Objective   /78 (BP Location: Left arm, Patient Position: Sitting, BP Cuff Size: Adult)   Pulse 79   Temp 36.4 °C (97.5 °F) (Temporal)   Ht 1.829 m (6')   Wt 120 kg (265 lb)   BMI 35.94 kg/m²     Physical Exam  Vitals reviewed.   Constitutional:       Appearance: Normal appearance. He is normal weight.   HENT:      Head: Normocephalic and atraumatic.   Eyes:      Conjunctiva/sclera: Conjunctivae normal.   Cardiovascular:      Rate and Rhythm: Normal rate and regular rhythm.      Pulses: Normal pulses.   Pulmonary:      Effort: Pulmonary effort is normal.      Breath sounds: Normal breath sounds.   Abdominal:      Palpations: Abdomen is soft.   Musculoskeletal:         General: . No swelling.      Cervical back: Normal range of motion.      Right lower leg: No  edema.      Left lower leg: No edema.   Skin:     General: Skin is warm and dry. Thick secretiobns from sebaceous cyst from back area    Objective   /78 (BP Location: Left arm, Patient P  Assessment/Plan   Problem List Items Addressed This Visit    None  Visit Diagnoses         Codes    Infected sebaceous cyst    -  Primary L72.3, L08.9    Essential (primary) hypertension     I10    Relevant Medications    icosapent ethyL (Vascepa) 1 gram capsule    triamterene-hydrochlorothiazid (Maxzide-25) 37.5-25 mg tablet    telmisartan (MIcarDIS) 20 mg tablet        Came because he has this infected sebaceous cyst on the back, patient's spouse tried to squeeze out the exudative material thick chalky exudative material came out, today I expressed the remainder of thick chalky exudative material, this is a common phenomenon for obese patients I told him, sebaceous cyst has been drained completely, being diabetic doxycycline will be given for 7 days, he will be taking triamterene hydrochlorothiazide half tablet every day no need for HCTZ on top of that and he should be on telmisartan 20 mg every day because of chronic kidney disease and being diabetic, rest of the problems remains unchanged he will do his potassium and creatinine today, he will keep his next appointment.  No need to apply any topical ointment just to keep area clean and dry.

## 2024-01-05 DIAGNOSIS — E87.6 HYPOKALEMIA: ICD-10-CM

## 2024-01-05 RX ORDER — POTASSIUM CHLORIDE 20 MEQ/1
20 TABLET, EXTENDED RELEASE ORAL DAILY
Qty: 90 TABLET | Refills: 3 | Status: SHIPPED | OUTPATIENT
Start: 2024-01-05 | End: 2024-02-28 | Stop reason: SDUPTHER

## 2024-01-31 ENCOUNTER — OFFICE VISIT (OUTPATIENT)
Dept: DERMATOLOGY | Facility: CLINIC | Age: 69
End: 2024-01-31
Payer: MEDICARE

## 2024-01-31 DIAGNOSIS — L57.0 ACTINIC KERATOSIS: ICD-10-CM

## 2024-01-31 DIAGNOSIS — L73.8 SEBACEOUS HYPERPLASIA OF FACE: ICD-10-CM

## 2024-01-31 DIAGNOSIS — Z85.828 PERSONAL HISTORY OF SKIN CANCER: ICD-10-CM

## 2024-01-31 DIAGNOSIS — L81.4 LENTIGO: ICD-10-CM

## 2024-01-31 DIAGNOSIS — L82.1 SEBORRHEIC KERATOSIS: ICD-10-CM

## 2024-01-31 DIAGNOSIS — L21.9 SEBORRHEIC DERMATITIS: Primary | ICD-10-CM

## 2024-01-31 DIAGNOSIS — D22.9 MULTIPLE BENIGN NEVI: ICD-10-CM

## 2024-01-31 DIAGNOSIS — D18.01 HEMANGIOMA OF SKIN: ICD-10-CM

## 2024-01-31 PROCEDURE — 3066F NEPHROPATHY DOC TX: CPT | Performed by: STUDENT IN AN ORGANIZED HEALTH CARE EDUCATION/TRAINING PROGRAM

## 2024-01-31 PROCEDURE — 17000 DESTRUCT PREMALG LESION: CPT | Performed by: STUDENT IN AN ORGANIZED HEALTH CARE EDUCATION/TRAINING PROGRAM

## 2024-01-31 PROCEDURE — 4010F ACE/ARB THERAPY RXD/TAKEN: CPT | Performed by: STUDENT IN AN ORGANIZED HEALTH CARE EDUCATION/TRAINING PROGRAM

## 2024-01-31 PROCEDURE — 1036F TOBACCO NON-USER: CPT | Performed by: STUDENT IN AN ORGANIZED HEALTH CARE EDUCATION/TRAINING PROGRAM

## 2024-01-31 PROCEDURE — 17003 DESTRUCT PREMALG LES 2-14: CPT | Performed by: STUDENT IN AN ORGANIZED HEALTH CARE EDUCATION/TRAINING PROGRAM

## 2024-01-31 PROCEDURE — 3008F BODY MASS INDEX DOCD: CPT | Performed by: STUDENT IN AN ORGANIZED HEALTH CARE EDUCATION/TRAINING PROGRAM

## 2024-01-31 PROCEDURE — 1125F AMNT PAIN NOTED PAIN PRSNT: CPT | Performed by: STUDENT IN AN ORGANIZED HEALTH CARE EDUCATION/TRAINING PROGRAM

## 2024-01-31 PROCEDURE — 1159F MED LIST DOCD IN RCRD: CPT | Performed by: STUDENT IN AN ORGANIZED HEALTH CARE EDUCATION/TRAINING PROGRAM

## 2024-01-31 PROCEDURE — 99214 OFFICE O/P EST MOD 30 MIN: CPT | Performed by: STUDENT IN AN ORGANIZED HEALTH CARE EDUCATION/TRAINING PROGRAM

## 2024-01-31 RX ORDER — KETOCONAZOLE 20 MG/ML
SHAMPOO, SUSPENSION TOPICAL 2 TIMES WEEKLY
Qty: 120 ML | Refills: 3 | Status: SHIPPED | OUTPATIENT
Start: 2024-02-01 | End: 2025-01-31

## 2024-01-31 NOTE — PROGRESS NOTES
Subjective   Manny Reveles is a 68 y.o. male who presents for the following: Skin Check (LV: 7/26/23 FBSE. No lesions of concern today.), Seborrheic Dermatitis (Notes flaking in scalp for ~1 year.  No itching or burning notes.  Currently using an anti-dandruff shampoo daily with little to no improvement.), and Chronic Venous Insufficiency (Using Eucerin moisturizer daily and compression stockings most days.)    Skin Cancer History  SCC - left central cheek s/p Mohs 10/27/21  AK's    Family History of Skin Cancer  None       The following portions of the chart were reviewed this encounter and updated as appropriate:         Review of Systems: No other skin or systemic complaints.    Objective   Well appearing patient in no apparent distress; mood and affect are within normal limits.    A full examination was performed including scalp, head, eyes, ears, nose, lips, neck, chest, axillae, abdomen, back, buttocks, bilateral upper extremities, bilateral lower extremities, hands, feet, fingers, toes, fingernails, and toenails. All findings within normal limits unless otherwise noted below.    Well healed scar(s) at site(s) of prior treatment    Scattered cherry-red papule(s).    Scattered tan macules in sun-exposed areas.    Stuck on verrucous, tan-brown papules and plaques.      Scattered, uniform and benign-appearing, regular brown melanocytic papules and macules.    Small yellow, lobulated papules with a central dell.    Scalp  Erythema with overlying greasy scale.    Head - Anterior (Face), Left Breast, Neck - Anterior, Right Breast  Erythematous macules with gritty scale.      Assessment/Plan   Seborrheic dermatitis  Scalp    Reassured of benign nature of condition  Discussed relationship with seborrhea and overgrowth/reaction to yeast that normally lives on the skin   Advised treatment with ketconazole 2% shampoo 2 times per week until clear, then may decrease to 1 time per week for maintenance. Advised to rub  directly onto scalp, lather, leave on for 5 min then rinse off.      Related Medications  ketoconazole (NIZOral) 2 % shampoo  Apply topically 2 times a week. Apply to scalp in shower. Lather, leave on 5 minutes then rinse    Actinic keratosis (4)  Head - Anterior (Face); Neck - Anterior; Left Breast; Right Breast    Discussed precancerous nature of condition and relationship with sun-exposure.   Recommended treatment today with LN2. Discussed r/b of procedure including risk of pain, temporary redness, and dyspigmentation. Patient verbalized understanding and agreement with plan for treatment today.    Destr of lesion - Head - Anterior (Face), Left Breast, Neck - Anterior, Right Breast  Complexity: simple    Destruction method: cryotherapy    Informed consent: discussed and consent obtained    Lesion destroyed using liquid nitrogen: Yes    Cryotherapy cycles:  1  Outcome: patient tolerated procedure well with no complications    Post-procedure details: wound care instructions given      Personal history of skin cancer    No evidence of recurrence at site(s) of prior treatment  Continue photoprotection with sun-protective clothing and sunscreen SPF 30+ daily  Continue routine self-skin examination  Continue to follow up every 6 months for routine FBSE or earlier prn for new/changing/concerning lesions       Hemangioma of skin    Reassured of benign nature of lesions    Lentigo    Reassured of benign nature of lesions    Seborrheic keratosis    Reassured of benign nature of lesions    Multiple benign nevi    Reassured of benign nature of lesions    Sebaceous hyperplasia of face    Reassured of benign nature of lesions        Scribe Attestation  By signing my name below, IRachelle LPN , Scribe   attest that this documentation has been prepared under the direction and in the presence of Tan Mchugh MD.

## 2024-02-21 ENCOUNTER — LAB (OUTPATIENT)
Dept: LAB | Facility: LAB | Age: 69
End: 2024-02-21
Payer: MEDICARE

## 2024-02-21 DIAGNOSIS — Z00.00 ROUTINE GENERAL MEDICAL EXAMINATION AT HEALTH CARE FACILITY: ICD-10-CM

## 2024-02-21 LAB
ALBUMIN SERPL BCP-MCNC: 4.3 G/DL (ref 3.4–5)
ALP SERPL-CCNC: 62 U/L (ref 33–136)
ALT SERPL W P-5'-P-CCNC: 14 U/L (ref 10–52)
ANION GAP SERPL CALC-SCNC: 13 MMOL/L (ref 10–20)
AST SERPL W P-5'-P-CCNC: 13 U/L (ref 9–39)
BILIRUB SERPL-MCNC: 1.9 MG/DL (ref 0–1.2)
BUN SERPL-MCNC: 25 MG/DL (ref 6–23)
CALCIUM SERPL-MCNC: 9.8 MG/DL (ref 8.6–10.3)
CHLORIDE SERPL-SCNC: 103 MMOL/L (ref 98–107)
CHOLEST SERPL-MCNC: 132 MG/DL (ref 0–199)
CHOLESTEROL/HDL RATIO: 4.6
CO2 SERPL-SCNC: 26 MMOL/L (ref 21–32)
CREAT SERPL-MCNC: 1.6 MG/DL (ref 0.5–1.3)
EGFRCR SERPLBLD CKD-EPI 2021: 47 ML/MIN/1.73M*2
EST. AVERAGE GLUCOSE BLD GHB EST-MCNC: 166 MG/DL
GLUCOSE SERPL-MCNC: 197 MG/DL (ref 74–99)
HBA1C MFR BLD: 7.4 %
HCV AB SER QL: NONREACTIVE
HDLC SERPL-MCNC: 28.9 MG/DL
LDLC SERPL CALC-MCNC: 47 MG/DL
NON HDL CHOLESTEROL: 103 MG/DL (ref 0–149)
POTASSIUM SERPL-SCNC: 4.6 MMOL/L (ref 3.5–5.3)
PROT SERPL-MCNC: 6.9 G/DL (ref 6.4–8.2)
SODIUM SERPL-SCNC: 137 MMOL/L (ref 136–145)
TRIGL SERPL-MCNC: 279 MG/DL (ref 0–149)
VLDL: 56 MG/DL (ref 0–40)

## 2024-02-21 PROCEDURE — 80053 COMPREHEN METABOLIC PANEL: CPT

## 2024-02-21 PROCEDURE — 80061 LIPID PANEL: CPT

## 2024-02-21 PROCEDURE — 83036 HEMOGLOBIN GLYCOSYLATED A1C: CPT

## 2024-02-21 PROCEDURE — 86803 HEPATITIS C AB TEST: CPT

## 2024-02-21 PROCEDURE — 36415 COLL VENOUS BLD VENIPUNCTURE: CPT

## 2024-02-28 ENCOUNTER — OFFICE VISIT (OUTPATIENT)
Dept: PRIMARY CARE | Facility: CLINIC | Age: 69
End: 2024-02-28
Payer: MEDICARE

## 2024-02-28 VITALS
HEART RATE: 68 BPM | SYSTOLIC BLOOD PRESSURE: 127 MMHG | TEMPERATURE: 98 F | HEIGHT: 73 IN | BODY MASS INDEX: 35.39 KG/M2 | WEIGHT: 267 LBS | DIASTOLIC BLOOD PRESSURE: 78 MMHG

## 2024-02-28 DIAGNOSIS — I10 ESSENTIAL HYPERTENSION, BENIGN: ICD-10-CM

## 2024-02-28 DIAGNOSIS — E78.1 ESSENTIAL HYPERTRIGLYCERIDEMIA: Primary | ICD-10-CM

## 2024-02-28 DIAGNOSIS — E66.01 CLASS 2 SEVERE OBESITY DUE TO EXCESS CALORIES WITH SERIOUS COMORBIDITY AND BODY MASS INDEX (BMI) OF 35.0 TO 35.9 IN ADULT (MULTI): ICD-10-CM

## 2024-02-28 DIAGNOSIS — Z98.61 CAD S/P PERCUTANEOUS CORONARY ANGIOPLASTY: ICD-10-CM

## 2024-02-28 DIAGNOSIS — E11.9 TYPE 2 DIABETES MELLITUS WITHOUT COMPLICATIONS (MULTI): ICD-10-CM

## 2024-02-28 DIAGNOSIS — E11.9 TYPE 2 DIABETES MELLITUS WITHOUT RETINOPATHY (MULTI): ICD-10-CM

## 2024-02-28 DIAGNOSIS — I25.10 CAD S/P PERCUTANEOUS CORONARY ANGIOPLASTY: ICD-10-CM

## 2024-02-28 DIAGNOSIS — G47.33 OBSTRUCTIVE SLEEP APNEA SYNDROME: ICD-10-CM

## 2024-02-28 DIAGNOSIS — I10 ESSENTIAL (PRIMARY) HYPERTENSION: ICD-10-CM

## 2024-02-28 DIAGNOSIS — N18.31 STAGE 3A CHRONIC KIDNEY DISEASE (MULTI): ICD-10-CM

## 2024-02-28 DIAGNOSIS — E87.6 HYPOKALEMIA: ICD-10-CM

## 2024-02-28 PROBLEM — E66.812 CLASS 2 SEVERE OBESITY DUE TO EXCESS CALORIES WITH SERIOUS COMORBIDITY AND BODY MASS INDEX (BMI) OF 35.0 TO 35.9 IN ADULT: Status: ACTIVE | Noted: 2023-08-24

## 2024-02-28 PROCEDURE — 3008F BODY MASS INDEX DOCD: CPT | Performed by: INTERNAL MEDICINE

## 2024-02-28 PROCEDURE — 1125F AMNT PAIN NOTED PAIN PRSNT: CPT | Performed by: INTERNAL MEDICINE

## 2024-02-28 PROCEDURE — 3074F SYST BP LT 130 MM HG: CPT | Performed by: INTERNAL MEDICINE

## 2024-02-28 PROCEDURE — 1159F MED LIST DOCD IN RCRD: CPT | Performed by: INTERNAL MEDICINE

## 2024-02-28 PROCEDURE — 3078F DIAST BP <80 MM HG: CPT | Performed by: INTERNAL MEDICINE

## 2024-02-28 PROCEDURE — 1036F TOBACCO NON-USER: CPT | Performed by: INTERNAL MEDICINE

## 2024-02-28 PROCEDURE — 1160F RVW MEDS BY RX/DR IN RCRD: CPT | Performed by: INTERNAL MEDICINE

## 2024-02-28 PROCEDURE — 99213 OFFICE O/P EST LOW 20 MIN: CPT | Performed by: INTERNAL MEDICINE

## 2024-02-28 PROCEDURE — 3051F HG A1C>EQUAL 7.0%<8.0%: CPT | Performed by: INTERNAL MEDICINE

## 2024-02-28 PROCEDURE — 4010F ACE/ARB THERAPY RXD/TAKEN: CPT | Performed by: INTERNAL MEDICINE

## 2024-02-28 PROCEDURE — 3048F LDL-C <100 MG/DL: CPT | Performed by: INTERNAL MEDICINE

## 2024-02-28 RX ORDER — TELMISARTAN 20 MG/1
20 TABLET ORAL EVERY 24 HOURS
Qty: 90 TABLET | Refills: 2 | Status: SHIPPED | OUTPATIENT
Start: 2024-02-28

## 2024-02-28 RX ORDER — POTASSIUM CHLORIDE 20 MEQ/1
20 TABLET, EXTENDED RELEASE ORAL DAILY
Qty: 90 TABLET | Refills: 3 | Status: SHIPPED | OUTPATIENT
Start: 2024-02-28

## 2024-02-28 RX ORDER — AMLODIPINE BESYLATE 5 MG/1
5 TABLET ORAL DAILY
Qty: 90 TABLET | Refills: 0 | Status: SHIPPED | OUTPATIENT
Start: 2024-02-28 | End: 2024-05-21

## 2024-02-28 RX ORDER — METOPROLOL SUCCINATE 25 MG/1
25 TABLET, EXTENDED RELEASE ORAL DAILY
Qty: 90 TABLET | Refills: 3 | Status: SHIPPED | OUTPATIENT
Start: 2024-02-28 | End: 2024-04-02 | Stop reason: SDUPTHER

## 2024-02-28 ASSESSMENT — ENCOUNTER SYMPTOMS
MYALGIAS: 0
CARDIOVASCULAR NEGATIVE: 1
WEAKNESS: 0
NUMBNESS: 0
MUSCULOSKELETAL NEGATIVE: 1
NEUROLOGICAL NEGATIVE: 1
HYPERTENSION: 1
VERTIGO: 0
RESPIRATORY NEGATIVE: 1
BLURRED VISION: 0
CONSTITUTIONAL NEGATIVE: 1
FATIGUE: 0
CHILLS: 0
WOUND: 0
GASTROINTESTINAL NEGATIVE: 1

## 2024-02-28 NOTE — PROGRESS NOTES
Subjective   Patient ID: Manny Reveles is a 68 y.o. male who presents for Follow-up.  Diabetes  He presents for his follow-up diabetic visit. He has type 2 diabetes mellitus. His disease course has been stable. There are no hypoglycemic associated symptoms. Pertinent negatives for diabetes include no blurred vision, no chest pain, no fatigue and no weakness. There are no hypoglycemic complications. Symptoms are stable. Diabetic complications include nephropathy. Pertinent negatives for diabetic complications include no autonomic neuropathy or heart disease. Risk factors for coronary artery disease include diabetes mellitus, male sex, obesity, stress and sedentary lifestyle. He is compliant with treatment all of the time. He is following a diabetic and generally healthy diet. He rarely participates in exercise. His home blood glucose trend is fluctuating minimally. An ACE inhibitor/angiotensin II receptor blocker is being taken. He does not see a podiatrist.Eye exam is current.   Heart Problem  This is a chronic problem. The current episode started more than 1 year ago. The problem occurs rarely. Pertinent negatives include no chest pain, chills, congestion, fatigue, myalgias, numbness, vertigo or weakness. The treatment provided significant relief.   Hypertension  This is a chronic problem. The current episode started more than 1 year ago. The problem has been resolved since onset. The problem is controlled. Pertinent negatives include no blurred vision or chest pain. Risk factors for coronary artery disease include sedentary lifestyle, dyslipidemia and diabetes mellitus. Past treatments include angiotensin blockers, beta blockers, calcium channel blockers and diuretics. The current treatment provides significant improvement. Hypertensive end-organ damage includes kidney disease and CAD/MI. Identifiable causes of hypertension include chronic renal disease.       Past Medical History  History reviewed. No  pertinent past medical history.    Social History  Social History     Tobacco Use    Smoking status: Never    Smokeless tobacco: Never   Vaping Use    Vaping Use: Never used   Substance Use Topics    Alcohol use: Not Currently    Drug use: Never       Family History     Family History   Problem Relation Name Age of Onset    Other (bone/cartilage disorder) Mother      Diabetes Mother      Gallbladder disease Mother      Diabetes Father      Other (cardiac disorder) Father      Nephrolithiasis Father      Prostate cancer Father      Other (bladder cancer) Father      Heart attack Father      Rheum arthritis Father      Skin cancer Father      Kidney nephrosis Father      Diabetes Paternal Grandfather      Skin cancer Paternal Grandfather         Allergies:  Allergies   Allergen Reactions    Adhesive Tape-Silicones Hives and Itching    Latex Other    Meperidine Other    Penicillins Other    Promethazine Other    Sulfa (Sulfonamide Antibiotics) Other        Outpatient Medications:  Current Outpatient Medications   Medication Instructions    albuterol (Ventolin HFA) 90 mcg/actuation inhaler 2 puffs, inhalation, Every 4 hours PRN    amLODIPine (NORVASC) 5 mg, oral, Daily, as directed    aspirin 81 mg chewable tablet oral    atorvastatin (LIPITOR) 40 mg, oral, Nightly    cetirizine (ZYRTEC) 10 mg, oral, Daily    cholecalciferol (VITAMIN D-3) 50,000 Units, oral, Weekly    clopidogrel (PLAVIX) 75 mg, oral, Daily    CRANBERRY ORAL 2 tablets, oral, Daily    docusate sodium (COLACE) 100 mg, oral, Daily    finasteride (PROSCAR) 5 mg, oral, Daily    icosapent ethyL (VASCEPA) 2 g, oral, 2 times daily    insulin aspart (NovoLOG FlexPen U-100 Insulin) 100 unit/mL (3 mL) pen subcutaneous, Inject 20 units plus coverage for snacks     insulin degludec (Tresiba FlexTouch U-200) 200 unit/mL (3 mL) injection subcutaneous    ketoconazole (NIZOral) 2 % shampoo Topical, 2 times weekly, Apply to scalp in shower. Lather, leave on 5 minutes  "then rinse    metoprolol succinate XL (Toprol-XL) 25 mg 24 hr tablet TAKE 1 TABLET BY MOUTH EVERY DAY    nitroglycerin (NITROSTAT) 0.4 mg, sublingual, Every 5 min PRN    potassium chloride CR (Klor-Con M20) 20 mEq ER tablet 20 mEq, oral, Daily    ranolazine (RANEXA) 500 mg, oral, Every 12 hours    semaglutide (Rybelsus) 14 mg tablet tablet 1 tablet, oral, Daily    tamsulosin (FLOMAX) 0.4 mg, oral, Daily    telmisartan (MICARDIS) 20 mg, oral, Every 24 hours    triamterene-hydrochlorothiazid (Maxzide-25) 37.5-25 mg tablet Take half tablet po daily        Review of Systems   Constitutional: Negative.  Negative for chills and fatigue.   HENT:  Negative for congestion.    Eyes:  Negative for blurred vision.   Respiratory: Negative.     Cardiovascular: Negative.  Negative for chest pain.   Gastrointestinal: Negative.    Musculoskeletal: Negative.  Negative for myalgias.   Skin:  Negative for wound.   Neurological: Negative.  Negative for vertigo, weakness and numbness.         Objective       Physical Exam  Vitals reviewed.   Constitutional:       Appearance: Normal appearance. He is obese.   HENT:      Head: Normocephalic and atraumatic.   Eyes:      Conjunctiva/sclera: Conjunctivae normal.   Cardiovascular:      Rate and Rhythm: Normal rate and regular rhythm.      Pulses: Normal pulses.   Pulmonary:      Effort: Pulmonary effort is normal.      Breath sounds: Normal breath sounds.   Abdominal:      General: Abdomen is protuberant.      Palpations: Abdomen is soft.   Musculoskeletal:         General: Normal range of motion.      Cervical back: Normal range of motion.   Skin:     General: Skin is warm and dry.   Neurological:      General: No focal deficit present.   Psychiatric:         Mood and Affect: Mood normal.     /78 (BP Location: Right arm, Patient Position: Sitting, BP Cuff Size: Adult)   Pulse 68   Temp 36.7 °C (98 °F) (Temporal)   Ht 1.842 m (6' 0.5\")   Wt 121 kg (267 lb)   BMI 35.71 kg/m²  "     Assessment/Plan   Problem List Items Addressed This Visit       Stage 3 chronic kidney disease (CMS/[facility])    Obstructive sleep apnea syndrome    Essential hypertriglyceridemia - Primary    CAD S/P percutaneous coronary angioplasty    Benign essential hypertension    Type 2 diabetes mellitus without retinopathy (CMS/[facility])    Class 2 severe obesity due to excess calories with serious comorbidity and body mass index (BMI) of 35.0 to 35.9 in adult (CMS/[facility])   Pt has PAZ, usage and compliance of CPAP mask reviewed, pt was advised to use it and stay acclimatized to usage of CPAP mask, appropriate and proper sleep related hygiene reviewed and discussed, avoid sedatives and alcohol, avoid supine sleeping. Weight loo always helps to reduce risk and dangers of untreated sleep apnea. PAZ is wide spread and undiagnosed. If not able to tolerate mask then inform us and may try alternate masks or proper fitting masks or nasal pillows. Always have humidity for air flow.    Diabetes is chronic disease, it does not get cured but it can be controlled, in modern medicine there are variety of measures taken to control DM. Usually we want to preserve beta cell functions. Please understand the disease and how our life style can affect control of glycemia. Diabetes leads to macro and microvascular complications and they could be devastating. It is important to have annual eye check and frequent foot inspection and foot inspection. Kidney dysfunction including dialysis, foot amputations, neuropathy, foot ulcers and accelerated atherosclerotic vascular disease are known complications of uncontrolled DM, pt was educated and explained.  Things are same, time in range was only 42% on continuous glucose monitoring device and I told that that was not the correct number that has to be improved even though hemoglobin A1c is not that out of ordinary time in range is very important.  He is to understand what foodstuffs or food substances raise his  blood sugar and he has to modify diet accordingly.  You already have a coronary artery disease with for PCI's, the already on nephropathy.  He is followed by endocrinologist though I do not alter his insulin regimen he is followed by nephrology CKD persist creatinine is largely unchanged triglycerides are somewhat better, no angina, BP readings are stable.  He is compliant with his CPAP mask.  All the diagnosis has been listed twice HCC raft was done.  He remains on potassium supplement, rest of medications are reviewed, hemoglobin A1c will be checked next time, dermatologic follow-up was reviewed, follow-up in 3 months.

## 2024-03-05 DIAGNOSIS — I25.10 ATHEROSCLEROTIC HEART DISEASE OF NATIVE CORONARY ARTERY WITHOUT ANGINA PECTORIS: ICD-10-CM

## 2024-03-05 DIAGNOSIS — Z98.61 CORONARY ANGIOPLASTY STATUS: ICD-10-CM

## 2024-03-06 RX ORDER — CLOPIDOGREL BISULFATE 75 MG/1
75 TABLET ORAL DAILY
Qty: 90 TABLET | Refills: 3 | Status: SHIPPED | OUTPATIENT
Start: 2024-03-06 | End: 2024-03-13 | Stop reason: SDUPTHER

## 2024-03-13 ENCOUNTER — LAB (OUTPATIENT)
Dept: LAB | Facility: LAB | Age: 69
End: 2024-03-13
Payer: MEDICARE

## 2024-03-13 ENCOUNTER — HOSPITAL ENCOUNTER (OUTPATIENT)
Dept: RADIOLOGY | Facility: HOSPITAL | Age: 69
Discharge: HOME | End: 2024-03-13
Payer: MEDICARE

## 2024-03-13 ENCOUNTER — OFFICE VISIT (OUTPATIENT)
Dept: CARDIOLOGY | Facility: CLINIC | Age: 69
End: 2024-03-13
Payer: MEDICARE

## 2024-03-13 VITALS
DIASTOLIC BLOOD PRESSURE: 64 MMHG | HEART RATE: 68 BPM | BODY MASS INDEX: 35.33 KG/M2 | SYSTOLIC BLOOD PRESSURE: 118 MMHG | WEIGHT: 260.8 LBS | HEIGHT: 72 IN

## 2024-03-13 DIAGNOSIS — Z98.61 CORONARY ANGIOPLASTY STATUS: ICD-10-CM

## 2024-03-13 DIAGNOSIS — R05.1 ACUTE COUGH: ICD-10-CM

## 2024-03-13 DIAGNOSIS — E78.1 ESSENTIAL HYPERTRIGLYCERIDEMIA: ICD-10-CM

## 2024-03-13 DIAGNOSIS — I25.10 CAD S/P PERCUTANEOUS CORONARY ANGIOPLASTY: ICD-10-CM

## 2024-03-13 DIAGNOSIS — R06.09 DOE (DYSPNEA ON EXERTION): ICD-10-CM

## 2024-03-13 DIAGNOSIS — I10 BENIGN ESSENTIAL HYPERTENSION: ICD-10-CM

## 2024-03-13 DIAGNOSIS — Z98.61 CAD S/P PERCUTANEOUS CORONARY ANGIOPLASTY: ICD-10-CM

## 2024-03-13 DIAGNOSIS — N18.32 STAGE 3B CHRONIC KIDNEY DISEASE (MULTI): ICD-10-CM

## 2024-03-13 DIAGNOSIS — Z78.9 NEVER SMOKED TOBACCO: ICD-10-CM

## 2024-03-13 DIAGNOSIS — I25.10 ATHEROSCLEROTIC HEART DISEASE OF NATIVE CORONARY ARTERY WITHOUT ANGINA PECTORIS: ICD-10-CM

## 2024-03-13 DIAGNOSIS — E66.01 CLASS 2 SEVERE OBESITY DUE TO EXCESS CALORIES WITH SERIOUS COMORBIDITY AND BODY MASS INDEX (BMI) OF 35.0 TO 35.9 IN ADULT (MULTI): ICD-10-CM

## 2024-03-13 DIAGNOSIS — E11.9 TYPE 2 DIABETES MELLITUS WITHOUT RETINOPATHY (MULTI): ICD-10-CM

## 2024-03-13 LAB — BNP SERPL-MCNC: 7 PG/ML (ref 0–99)

## 2024-03-13 PROCEDURE — 1036F TOBACCO NON-USER: CPT | Performed by: INTERNAL MEDICINE

## 2024-03-13 PROCEDURE — 1160F RVW MEDS BY RX/DR IN RCRD: CPT | Performed by: INTERNAL MEDICINE

## 2024-03-13 PROCEDURE — 99214 OFFICE O/P EST MOD 30 MIN: CPT | Performed by: INTERNAL MEDICINE

## 2024-03-13 PROCEDURE — 3074F SYST BP LT 130 MM HG: CPT | Performed by: INTERNAL MEDICINE

## 2024-03-13 PROCEDURE — 36415 COLL VENOUS BLD VENIPUNCTURE: CPT

## 2024-03-13 PROCEDURE — 83880 ASSAY OF NATRIURETIC PEPTIDE: CPT

## 2024-03-13 PROCEDURE — 3008F BODY MASS INDEX DOCD: CPT | Performed by: INTERNAL MEDICINE

## 2024-03-13 PROCEDURE — 71046 X-RAY EXAM CHEST 2 VIEWS: CPT

## 2024-03-13 PROCEDURE — 4010F ACE/ARB THERAPY RXD/TAKEN: CPT | Performed by: INTERNAL MEDICINE

## 2024-03-13 PROCEDURE — 83036 HEMOGLOBIN GLYCOSYLATED A1C: CPT

## 2024-03-13 PROCEDURE — 3078F DIAST BP <80 MM HG: CPT | Performed by: INTERNAL MEDICINE

## 2024-03-13 PROCEDURE — 3048F LDL-C <100 MG/DL: CPT | Performed by: INTERNAL MEDICINE

## 2024-03-13 PROCEDURE — 3051F HG A1C>EQUAL 7.0%<8.0%: CPT | Performed by: INTERNAL MEDICINE

## 2024-03-13 PROCEDURE — 1159F MED LIST DOCD IN RCRD: CPT | Performed by: INTERNAL MEDICINE

## 2024-03-13 PROCEDURE — 71046 X-RAY EXAM CHEST 2 VIEWS: CPT | Performed by: RADIOLOGY

## 2024-03-13 RX ORDER — NITROGLYCERIN 0.4 MG/1
0.4 TABLET SUBLINGUAL EVERY 5 MIN PRN
Qty: 25 TABLET | Refills: 5 | Status: SHIPPED | OUTPATIENT
Start: 2024-03-13 | End: 2025-03-13

## 2024-03-13 RX ORDER — CLOPIDOGREL BISULFATE 75 MG/1
75 TABLET ORAL DAILY
Qty: 90 TABLET | Refills: 3 | Status: SHIPPED | OUTPATIENT
Start: 2024-03-13 | End: 2025-03-13

## 2024-03-13 NOTE — PATIENT INSTRUCTIONS
Patient to follow up in 1 year with Dr. Armando Castañeda MD      Patient to complete chest xray and lab work in near future- orders in system. Will call with results.     No other changes today.   Continue same medications and treatments.   Patient educated on proper medication use.   Patient educated on risk factor modification.   Please bring any lab results from other providers / physicians to your next appointment.     Please bring all medicines, vitamins, and herbal supplements with you when you come to the office.     Prescriptions will not be filled unless you are compliant with your follow up appointments or have a follow up appointment scheduled as per instruction of your physician. Refills should be requested at the time of your visit.    IFernando RN am scribing for and in the presence of Dr. Armando Antonio MD

## 2024-03-13 NOTE — PROGRESS NOTES
CARDIOLOGY OFFICE VISIT      CHIEF COMPLAINT      HISTORY OF PRESENT ILLNESS  The patient states she is basically doing well.  On rare occasion he does have some mild anterior chest discomfort.  On 1 occasion there was some Radiostol his left shoulder and left jaw.  The discomfort was not bad enough to take nitroglycerin.  He thinks it was probably just due to stress.  In any event these episodes are very mild and infrequent.  The patient does have some shortness of breath activities particular climbing up stairs.  He states he has not been doing much in the way of physical activities especially this winter.  I encouraged him and his wife to start walking regularly.  He denies palpitations and syncope.  His wife states that he seems to have a nonproductive cough or clearing his throat frequently.  She is concerned he might have congestive heart failure.  I told her we would obtain a BNP and chest x-ray and call her with the results.  He denies any problem with his current medications.    IMPRESSION:   1. Chronic kidney disease, stage III, followed by Dr Weller  2. Coronary artery disease, no angina.  3. Multivessel PCI, most recently drug-eluting stent of right coronary artery on 2/7/2019  4. Essential hypertension.  5. Mixed hyperlipidemia.  6. Diabetes mellitus type 2.  7. Obesity.     Please excuse any errors in grammar or translation related to this dictation. Voice recognition software was utilized to prepare this document.       Past Medical History  Past Medical History:   Diagnosis Date    Chronic kidney disease     Coronary artery disease     Diabetes mellitus (CMS/[facility])     Hyperlipidemia     Hypertension        Social History  Social History     Tobacco Use    Smoking status: Never    Smokeless tobacco: Never   Vaping Use    Vaping Use: Never used   Substance Use Topics    Alcohol use: Not Currently    Drug use: Never       Family History     Family History   Problem Relation Name Age of Onset    Other  (bone/cartilage disorder) Mother      Diabetes Mother      Gallbladder disease Mother      Diabetes Father      Other (cardiac disorder) Father      Nephrolithiasis Father      Prostate cancer Father      Other (bladder cancer) Father      Heart attack Father      Rheum arthritis Father      Skin cancer Father      Kidney nephrosis Father      Diabetes Paternal Grandfather      Skin cancer Paternal Grandfather          Allergies:  Allergies   Allergen Reactions    Adhesive Tape-Silicones Hives and Itching    Latex Other    Meperidine Other    Penicillins Other    Promethazine Other    Sulfa (Sulfonamide Antibiotics) Other        Outpatient Medications:  Current Outpatient Medications   Medication Instructions    amLODIPine (NORVASC) 5 mg, oral, Daily, as directed    aspirin 81 mg chewable tablet oral    atorvastatin (LIPITOR) 40 mg, oral, Nightly    cetirizine (ZYRTEC) 10 mg, oral, Daily    cholecalciferol (VITAMIN D-3) 50,000 Units, oral, Weekly    clopidogrel (PLAVIX) 75 mg, oral, Daily    CRANBERRY ORAL 2 tablets, oral, Daily    docusate sodium (COLACE) 100 mg, oral, 2 times daily    finasteride (PROSCAR) 5 mg, oral, Daily    icosapent ethyL (VASCEPA) 2 g, oral, 2 times daily    insulin aspart (NovoLOG FlexPen U-100 Insulin) 100 unit/mL (3 mL) pen subcutaneous, Inject 20 units plus coverage for snacks     insulin degludec (Tresiba FlexTouch U-200) 200 unit/mL (3 mL) injection subcutaneous    ketoconazole (NIZOral) 2 % shampoo Topical, 2 times weekly, Apply to scalp in shower. Lather, leave on 5 minutes then rinse    metoprolol succinate XL (TOPROL-XL) 25 mg, oral, Daily, Do not crush or chew.    nitroglycerin (NITROSTAT) 0.4 mg, sublingual, Every 5 min PRN    potassium chloride CR (Klor-Con M20) 20 mEq ER tablet 20 mEq, oral, Daily    ranolazine (RANEXA) 500 mg, oral, Every 12 hours    semaglutide (Rybelsus) 14 mg tablet tablet 1 tablet, oral, Daily    tamsulosin (FLOMAX) 0.4 mg, oral, Daily    telmisartan  (MICARDIS) 20 mg, oral, Every 24 hours    triamterene-hydrochlorothiazid (Maxzide-25) 37.5-25 mg tablet Take half tablet po daily          REVIEW OF SYSTEMS  Review of Systems   All other systems reviewed and are negative.        VITALS  Vitals:    03/13/24 1518   BP: 118/64   Pulse: 68       PHYSICAL EXAM  Vitals reviewed.   Constitutional:       Appearance: Normal and healthy appearance. Well-developed and not in distress.   Eyes:      Conjunctiva/sclera: Conjunctivae normal.      Pupils: Pupils are equal, round, and reactive to light.   Neck:      Vascular: No JVR. JVD normal.   Pulmonary:      Effort: Pulmonary effort is normal.      Breath sounds: Normal breath sounds. No wheezing. No rhonchi. No rales.   Chest:      Chest wall: Not tender to palpatation.   Cardiovascular:      PMI at left midclavicular line. Normal rate. Regular rhythm. Normal S1. Normal S2.       Murmurs: There is no murmur.      No gallop.  No click. No rub.   Pulses:     Intact distal pulses.   Edema:     Peripheral edema absent.   Abdominal:      Tenderness: There is no abdominal tenderness.   Musculoskeletal: Normal range of motion.         General: No tenderness.      Cervical back: Normal range of motion. Skin:     General: Skin is warm and dry.   Neurological:      General: No focal deficit present.      Mental Status: Alert and oriented to person, place and time.   Psychiatric:         Behavior: Behavior is cooperative.           ASSESSMENT AND PLAN  Diagnoses and all orders for this visit:  CAD S/P percutaneous coronary angioplasty  Stage 3b chronic kidney disease (CMS/[facility])  Benign essential hypertension  Essential hypertriglyceridemia  Type 2 diabetes mellitus without retinopathy (CMS/[facility])  Class 2 severe obesity due to excess calories with serious comorbidity and body mass index (BMI) of 35.0 to 35.9 in adult (CMS/[facility])  Never smoked tobacco  Acute cough  Atherosclerotic heart disease of native coronary artery without angina  pectoris  Coronary angioplasty status      [unfilled]

## 2024-03-14 ENCOUNTER — TELEPHONE (OUTPATIENT)
Dept: CARDIOLOGY | Facility: CLINIC | Age: 69
End: 2024-03-14
Payer: MEDICARE

## 2024-03-14 LAB
EST. AVERAGE GLUCOSE BLD GHB EST-MCNC: 148 MG/DL
HBA1C MFR BLD: 6.8 %

## 2024-03-14 NOTE — TELEPHONE ENCOUNTER
----- Message from Melani Quinn LPN sent at 3/14/2024 11:33 AM EDT -----    ----- Message -----  From: Armando Antonio MD  Sent: 3/14/2024  10:26 AM EDT  To: Melani Quinn LPN    BNP normal, no suggestion of CHF  ----- Message -----  From: Lab, Background User  Sent: 3/13/2024   9:49 PM EDT  To: Armando Antonio MD

## 2024-03-15 DIAGNOSIS — I10 ESSENTIAL (PRIMARY) HYPERTENSION: ICD-10-CM

## 2024-03-15 RX ORDER — TRIAMTERENE/HYDROCHLOROTHIAZID 37.5-25 MG
TABLET ORAL
Qty: 30 TABLET | Refills: 5 | Status: SHIPPED | OUTPATIENT
Start: 2024-03-15

## 2024-04-02 DIAGNOSIS — I10 ESSENTIAL HYPERTENSION, BENIGN: Primary | ICD-10-CM

## 2024-04-02 RX ORDER — METOPROLOL SUCCINATE 25 MG/1
25 TABLET, EXTENDED RELEASE ORAL DAILY
Qty: 90 TABLET | Refills: 1 | Status: SHIPPED | OUTPATIENT
Start: 2024-04-02

## 2024-04-03 ENCOUNTER — OFFICE VISIT (OUTPATIENT)
Dept: ENDOCRINOLOGY | Facility: CLINIC | Age: 69
End: 2024-04-03
Payer: MEDICARE

## 2024-04-03 VITALS
DIASTOLIC BLOOD PRESSURE: 60 MMHG | HEIGHT: 72 IN | BODY MASS INDEX: 36.03 KG/M2 | WEIGHT: 266 LBS | SYSTOLIC BLOOD PRESSURE: 120 MMHG

## 2024-04-03 DIAGNOSIS — E66.01 CLASS 2 SEVERE OBESITY DUE TO EXCESS CALORIES WITH SERIOUS COMORBIDITY AND BODY MASS INDEX (BMI) OF 35.0 TO 35.9 IN ADULT (MULTI): ICD-10-CM

## 2024-04-03 DIAGNOSIS — E11.9 TYPE 2 DIABETES MELLITUS WITHOUT RETINOPATHY (MULTI): Primary | ICD-10-CM

## 2024-04-03 DIAGNOSIS — I10 BENIGN ESSENTIAL HYPERTENSION: ICD-10-CM

## 2024-04-03 PROCEDURE — 99214 OFFICE O/P EST MOD 30 MIN: CPT | Performed by: HOSPITALIST

## 2024-04-03 PROCEDURE — 4010F ACE/ARB THERAPY RXD/TAKEN: CPT | Performed by: HOSPITALIST

## 2024-04-03 PROCEDURE — 1160F RVW MEDS BY RX/DR IN RCRD: CPT | Performed by: HOSPITALIST

## 2024-04-03 PROCEDURE — 3078F DIAST BP <80 MM HG: CPT | Performed by: HOSPITALIST

## 2024-04-03 PROCEDURE — 1159F MED LIST DOCD IN RCRD: CPT | Performed by: HOSPITALIST

## 2024-04-03 PROCEDURE — 3048F LDL-C <100 MG/DL: CPT | Performed by: HOSPITALIST

## 2024-04-03 PROCEDURE — 3008F BODY MASS INDEX DOCD: CPT | Performed by: HOSPITALIST

## 2024-04-03 PROCEDURE — 95251 CONT GLUC MNTR ANALYSIS I&R: CPT | Performed by: HOSPITALIST

## 2024-04-03 PROCEDURE — 3044F HG A1C LEVEL LT 7.0%: CPT | Performed by: HOSPITALIST

## 2024-04-03 PROCEDURE — 3074F SYST BP LT 130 MM HG: CPT | Performed by: HOSPITALIST

## 2024-04-03 ASSESSMENT — ENCOUNTER SYMPTOMS
NERVOUS/ANXIOUS: 0
NAUSEA: 0
CONSTITUTIONAL NEGATIVE: 1
AGITATION: 0
SHORTNESS OF BREATH: 0
LIGHT-HEADEDNESS: 0
HEADACHES: 0
CONSTIPATION: 1
PHOTOPHOBIA: 0
TROUBLE SWALLOWING: 0
ARTHRALGIAS: 0
SLEEP DISTURBANCE: 0
EYE ITCHING: 0
TREMORS: 0
SORE THROAT: 0
DYSURIA: 0
BACK PAIN: 1
ABDOMINAL PAIN: 0
CHEST TIGHTNESS: 0
ABDOMINAL DISTENTION: 0
PALPITATIONS: 0
FREQUENCY: 0
VOMITING: 0
VOICE CHANGE: 0
DIARRHEA: 0

## 2024-04-03 NOTE — PROGRESS NOTES
Subjective   Patient ID: Manny Reveles is a 68 y.o. male who presents for Diabetes (Patient ID: Manny Reveles is a 68 y.o. male who presents for Diabetes (Patient here with wife/PCP: Dr. Mayo/podiatry: does not see one/eye exam: 2 x/ year /Patient testing glucose 4 times daily with freestyle hugh 2 READER/Due to fluctuating blood glucose, hypoglycemia and 4 insulin injections daily /). 3/13/24 A1C= 6.8).  HPI    Review of Systems   Constitutional: Negative.    HENT:  Negative for sore throat, trouble swallowing and voice change.    Eyes:  Negative for photophobia, itching and visual disturbance.   Respiratory:  Negative for chest tightness and shortness of breath.    Cardiovascular:  Negative for chest pain and palpitations.   Gastrointestinal:  Positive for constipation. Negative for abdominal distention, abdominal pain, diarrhea, nausea and vomiting.   Endocrine: Negative for cold intolerance, heat intolerance and polyuria.   Genitourinary:  Negative for dysuria and frequency.   Musculoskeletal:  Positive for back pain. Negative for arthralgias.   Skin:  Negative for pallor.   Allergic/Immunologic: Negative for environmental allergies.   Neurological:  Negative for tremors, light-headedness and headaches.   Psychiatric/Behavioral:  Negative for agitation and sleep disturbance. The patient is not nervous/anxious.        Objective   Physical Exam  Constitutional:       Appearance: Normal appearance.   HENT:      Head: Normocephalic.      Nose: Nose normal.      Mouth/Throat:      Mouth: Mucous membranes are moist.   Eyes:      Extraocular Movements: Extraocular movements intact.   Cardiovascular:      Rate and Rhythm: Normal rate.   Pulmonary:      Effort: Pulmonary effort is normal. No respiratory distress.   Abdominal:      General: There is no distension.   Musculoskeletal:         General: Normal range of motion.      Cervical back: Normal range of motion and neck supple.   Skin:     General: Skin is  warm and dry.   Neurological:      Mental Status: He is alert and oriented to person, place, and time.   Psychiatric:         Mood and Affect: Mood normal.    Blood pressure 120/60, height 1.829 m (6'), weight 121 kg (266 lb).      Assessment/Plan   Diagnoses and all orders for this visit:  Type 2 diabetes mellitus without retinopathy (CMS/[facility])  Class 2 severe obesity due to excess calories with serious comorbidity and body mass index (BMI) of 35.0 to 35.9 in adult (CMS/[facility])  Benign essential hypertension     67 yo man with h/o CAd s/p stents, CKD came for follow up   he does have h/o CAD s/p PCI in 2019  Dx ~ 10 yrs ago. Used to follow Dr. Doherty as OP.      Current regimen :   Tresiba U 200 ) 98-0-0-0   Novolog 20-18-25-0 ,ssi 2: 50 > 150 ( takes 2-4 times a day )  for snacks takes 18 units occ.   Rybelsus 14 mg  (  constipation - had issues in past before rybelsus too, takes 2 colace and fiber pill a day as well       HE has Freestyle Maira 2 which was  downloaded - reviewed it and last 14days active time 82 %, TIR 38 % low < 1 %   Occ ovn and pre dinner low BG   Take insulin occ after eating    Ate candies around easter       avoids artificial sweetners given his CKD     A1c  slightly worse but still at goal    He mentions in the past he as on metformin which was stopped due to his CKD  He has h/o multiple UTIs in past, no h/o pancreatitis in past      PLAN :    Avoid candy    - change tresiba to 104-0-0-0 ( can give 52 units + 52 units at different site due to volume issues )   - continue novolog to 20-18-25-0 , same SSI   - in past discussed snack with carbs > 30 g- take 10 units novolog before  snack.   - take novolog 15 min before eating.   - continue rybelsus- has constipation - colace BID daily, fibre pills in morning - ADD miralax daily- if continues to have severe constipation then decrease rybelsus to 7 mg daily   - has CKD ,  his GFR > 45,  will hold off adding metformin at this time      -given he has  multiple UTIs in past and urine incontinence rarely with hesitancy occ- would avoid SGLT2 inh  - continue statin vascepa, TG improved, LDL at goal   - continue ARB, BP at goal   - annual eye exam     off note rybelsus covered with [facility] patient assisstance. he will benefit from continuing the GLP 1 agonist given his h/o CAD      RTC 4m     SH- daughter and GS moved in,. GS he is~ 2.5 yr old NETTE and he has been helping taking care of him . he is more active now.  He takes care of him 2 times a week.   he is in [place]. daughter had kidney failure? also on remicaid

## 2024-04-08 ENCOUNTER — APPOINTMENT (OUTPATIENT)
Dept: ENDOCRINOLOGY | Facility: CLINIC | Age: 69
End: 2024-04-08
Payer: MEDICARE

## 2024-05-20 ENCOUNTER — OFFICE VISIT (OUTPATIENT)
Dept: PRIMARY CARE | Facility: CLINIC | Age: 69
End: 2024-05-20
Payer: MEDICARE

## 2024-05-20 VITALS
SYSTOLIC BLOOD PRESSURE: 120 MMHG | TEMPERATURE: 96.4 F | HEART RATE: 77 BPM | DIASTOLIC BLOOD PRESSURE: 75 MMHG | BODY MASS INDEX: 36.03 KG/M2 | WEIGHT: 266 LBS | HEIGHT: 72 IN

## 2024-05-20 DIAGNOSIS — J40 BRONCHITIS: ICD-10-CM

## 2024-05-20 DIAGNOSIS — R05.1 ACUTE COUGH: Primary | ICD-10-CM

## 2024-05-20 LAB — RSV RNA RESP QL NAA+PROBE: NOT DETECTED

## 2024-05-20 PROCEDURE — 4010F ACE/ARB THERAPY RXD/TAKEN: CPT | Performed by: INTERNAL MEDICINE

## 2024-05-20 PROCEDURE — 1159F MED LIST DOCD IN RCRD: CPT | Performed by: INTERNAL MEDICINE

## 2024-05-20 PROCEDURE — 87635 SARS-COV-2 COVID-19 AMP PRB: CPT

## 2024-05-20 PROCEDURE — 3048F LDL-C <100 MG/DL: CPT | Performed by: INTERNAL MEDICINE

## 2024-05-20 PROCEDURE — 87634 RSV DNA/RNA AMP PROBE: CPT

## 2024-05-20 PROCEDURE — 3044F HG A1C LEVEL LT 7.0%: CPT | Performed by: INTERNAL MEDICINE

## 2024-05-20 PROCEDURE — 1036F TOBACCO NON-USER: CPT | Performed by: INTERNAL MEDICINE

## 2024-05-20 PROCEDURE — 3074F SYST BP LT 130 MM HG: CPT | Performed by: INTERNAL MEDICINE

## 2024-05-20 PROCEDURE — 99213 OFFICE O/P EST LOW 20 MIN: CPT | Performed by: INTERNAL MEDICINE

## 2024-05-20 PROCEDURE — 1160F RVW MEDS BY RX/DR IN RCRD: CPT | Performed by: INTERNAL MEDICINE

## 2024-05-20 PROCEDURE — 3008F BODY MASS INDEX DOCD: CPT | Performed by: INTERNAL MEDICINE

## 2024-05-20 PROCEDURE — 3078F DIAST BP <80 MM HG: CPT | Performed by: INTERNAL MEDICINE

## 2024-05-20 ASSESSMENT — ENCOUNTER SYMPTOMS
SHORTNESS OF BREATH: 0
FEVER: 0
CARDIOVASCULAR NEGATIVE: 1
MYALGIAS: 0
SORE THROAT: 1
CONSTITUTIONAL NEGATIVE: 1
SWEATS: 0
GASTROINTESTINAL NEGATIVE: 1
COUGH: 1
HEADACHES: 0
CHILLS: 0
EYES NEGATIVE: 1

## 2024-05-20 NOTE — PROGRESS NOTES
Subjective   Patient ID: Manny Reveles is a 68 y.o. male who presents for Cough (Cough, congestion, sore throat x 2-3 days, and spot on back).    Cough  This is a new problem. The current episode started in the past 7 days. The problem has been unchanged. The problem occurs hourly. The cough is Non-productive. Associated symptoms include ear congestion, nasal congestion and a sore throat. Pertinent negatives include no chest pain, chills, ear pain, fever, headaches, myalgias, shortness of breath or sweats. He has tried nothing for the symptoms. The treatment provided no relief.        Review of Systems   Constitutional: Negative.  Negative for chills and fever.   HENT:  Positive for sore throat. Negative for ear pain.    Eyes: Negative.    Respiratory:  Positive for cough. Negative for shortness of breath.    Cardiovascular: Negative.  Negative for chest pain.   Gastrointestinal: Negative.    Musculoskeletal:  Negative for myalgias.   Skin: Negative.    Neurological:  Negative for headaches.       Objective   /75 (BP Location: Left arm, Patient Position: Sitting, BP Cuff Size: Adult)   Pulse 77   Temp 35.8 °C (96.4 °F) (Temporal)   Ht 1.829 m (6')   Wt 121 kg (266 lb)   BMI 36.08 kg/m²     Physical Exam  Vitals reviewed.   Constitutional:       Appearance: Normal appearance. He is obese.   HENT:      Head: Normocephalic and atraumatic.   Eyes:      Conjunctiva/sclera: Conjunctivae normal.   Cardiovascular:      Rate and Rhythm: Normal rate and regular rhythm.   Pulmonary:      Effort: Pulmonary effort is normal.      Breath sounds: Normal breath sounds. No wheezing or rhonchi.   Abdominal:      Palpations: Abdomen is soft.   Musculoskeletal:         General: Tenderness present.      Cervical back: Neck supple.   Skin:     General: Skin is warm and dry.   Neurological:      General: No focal deficit present.         Assessment/Plan   Problem List Items Addressed This Visit    None  Visit Diagnoses          Codes    Acute cough    -  Primary R05.1    Bronchitis     J40        Came because since last Saturday that is 2 days from now he has been having the symptoms of cold, congestion, stuffiness, cough he did not have any chills or fevers, because of being diabetic and multiple comorbidities he was concerned, he called and seen by me today, little bit harsh breath sounds, there is no wheezing, slightly erythematous posterior pharynx, afebrile, not hypoxic.  We will do COVID and RSV smears, most likely allergies or viral but depends upon this viral PCR we can recommend something to him depends upon his symptoms and resolution or evaluation by tomorrow.  Viral smears will be sent and we will check with him tomorrow depends upon the reports that how he is doing and we can decide further.

## 2024-05-21 DIAGNOSIS — E55.9 VITAMIN D DEFICIENCY: ICD-10-CM

## 2024-05-21 DIAGNOSIS — E11.9 TYPE 2 DIABETES MELLITUS WITHOUT COMPLICATIONS (MULTI): ICD-10-CM

## 2024-05-21 DIAGNOSIS — E78.1 PURE HYPERGLYCERIDEMIA: ICD-10-CM

## 2024-05-21 DIAGNOSIS — J40 BRONCHITIS: Primary | ICD-10-CM

## 2024-05-21 LAB — SARS-COV-2 RNA RESP QL NAA+PROBE: NOT DETECTED

## 2024-05-21 RX ORDER — LEVOFLOXACIN 500 MG/1
500 TABLET, FILM COATED ORAL DAILY
Qty: 5 TABLET | Refills: 0 | Status: SHIPPED | OUTPATIENT
Start: 2024-05-21 | End: 2024-05-29 | Stop reason: WASHOUT

## 2024-05-21 RX ORDER — AMLODIPINE BESYLATE 5 MG/1
5 TABLET ORAL DAILY
Qty: 90 TABLET | Refills: 3 | Status: SHIPPED | OUTPATIENT
Start: 2024-05-21

## 2024-05-21 RX ORDER — CODEINE PHOSPHATE AND GUAIFENESIN 10; 100 MG/5ML; MG/5ML
5 SOLUTION ORAL EVERY 8 HOURS PRN
Qty: 118 ML | Refills: 0 | Status: SHIPPED | OUTPATIENT
Start: 2024-05-21 | End: 2024-05-29 | Stop reason: WASHOUT

## 2024-05-21 RX ORDER — ATORVASTATIN CALCIUM 40 MG/1
40 TABLET, FILM COATED ORAL NIGHTLY
Qty: 90 TABLET | Refills: 3 | Status: SHIPPED | OUTPATIENT
Start: 2024-05-21

## 2024-05-21 NOTE — TELEPHONE ENCOUNTER
Received request for prescription refills for patient.   Patient follows with Dr. Armando Antonio MD     Request is for refill  Is patient currently on medication yes    Last OV 3/13/24  Next OV 3/12/25    Pended for signing and sent to provider

## 2024-05-24 RX ORDER — ASPIRIN 325 MG
50000 TABLET, DELAYED RELEASE (ENTERIC COATED) ORAL
Qty: 12 CAPSULE | Refills: 2 | Status: SHIPPED | OUTPATIENT
Start: 2024-05-26

## 2024-05-29 ENCOUNTER — OFFICE VISIT (OUTPATIENT)
Dept: PRIMARY CARE | Facility: CLINIC | Age: 69
End: 2024-05-29
Payer: MEDICARE

## 2024-05-29 VITALS
WEIGHT: 262 LBS | HEIGHT: 72 IN | DIASTOLIC BLOOD PRESSURE: 73 MMHG | TEMPERATURE: 98 F | HEART RATE: 67 BPM | BODY MASS INDEX: 35.49 KG/M2 | SYSTOLIC BLOOD PRESSURE: 120 MMHG

## 2024-05-29 DIAGNOSIS — I10 BENIGN ESSENTIAL HYPERTENSION: ICD-10-CM

## 2024-05-29 DIAGNOSIS — Z98.61 CAD S/P PERCUTANEOUS CORONARY ANGIOPLASTY: ICD-10-CM

## 2024-05-29 DIAGNOSIS — G47.33 OBSTRUCTIVE SLEEP APNEA SYNDROME: ICD-10-CM

## 2024-05-29 DIAGNOSIS — N18.32 STAGE 3B CHRONIC KIDNEY DISEASE (MULTI): ICD-10-CM

## 2024-05-29 DIAGNOSIS — E11.9 TYPE 2 DIABETES MELLITUS WITHOUT RETINOPATHY (MULTI): ICD-10-CM

## 2024-05-29 DIAGNOSIS — E78.1 ESSENTIAL HYPERTRIGLYCERIDEMIA: Primary | ICD-10-CM

## 2024-05-29 DIAGNOSIS — E87.6 HYPOKALEMIA: ICD-10-CM

## 2024-05-29 DIAGNOSIS — E78.1 ESSENTIAL HYPERTRIGLYCERIDEMIA: ICD-10-CM

## 2024-05-29 DIAGNOSIS — I25.10 CAD S/P PERCUTANEOUS CORONARY ANGIOPLASTY: ICD-10-CM

## 2024-05-29 PROCEDURE — 3044F HG A1C LEVEL LT 7.0%: CPT | Performed by: INTERNAL MEDICINE

## 2024-05-29 PROCEDURE — 3078F DIAST BP <80 MM HG: CPT | Performed by: INTERNAL MEDICINE

## 2024-05-29 PROCEDURE — 3008F BODY MASS INDEX DOCD: CPT | Performed by: INTERNAL MEDICINE

## 2024-05-29 PROCEDURE — 4010F ACE/ARB THERAPY RXD/TAKEN: CPT | Performed by: INTERNAL MEDICINE

## 2024-05-29 PROCEDURE — 1036F TOBACCO NON-USER: CPT | Performed by: INTERNAL MEDICINE

## 2024-05-29 PROCEDURE — 1160F RVW MEDS BY RX/DR IN RCRD: CPT | Performed by: INTERNAL MEDICINE

## 2024-05-29 PROCEDURE — 99213 OFFICE O/P EST LOW 20 MIN: CPT | Performed by: INTERNAL MEDICINE

## 2024-05-29 PROCEDURE — 3074F SYST BP LT 130 MM HG: CPT | Performed by: INTERNAL MEDICINE

## 2024-05-29 PROCEDURE — 3048F LDL-C <100 MG/DL: CPT | Performed by: INTERNAL MEDICINE

## 2024-05-29 PROCEDURE — 1159F MED LIST DOCD IN RCRD: CPT | Performed by: INTERNAL MEDICINE

## 2024-05-29 RX ORDER — INSULIN DEGLUDEC 200 U/ML
104 INJECTION, SOLUTION SUBCUTANEOUS NIGHTLY
Status: SHIPPED
Start: 2024-05-29

## 2024-05-29 ASSESSMENT — ENCOUNTER SYMPTOMS
GASTROINTESTINAL NEGATIVE: 1
CONSTITUTIONAL NEGATIVE: 1
DIZZINESS: 0
CARDIOVASCULAR NEGATIVE: 1
WEAKNESS: 0
ARTHRALGIAS: 1
RESPIRATORY NEGATIVE: 1

## 2024-05-29 NOTE — PROGRESS NOTES
Subjective   Patient ID: Manny Reveles is a 68 y.o. male who presents for Follow-up (3 mo fu).  Hyperlipidemia  This is a chronic problem. The current episode started more than 1 year ago. The problem is resistant. Recent lipid tests were reviewed and are variable. Exacerbating diseases include chronic renal disease, diabetes and obesity. Current antihyperlipidemic treatment includes statins and fibric acid derivatives.     Diabetes  He presents for his follow-up diabetic visit. He has type 2 diabetes mellitus. His disease course has been stable. There are no hypoglycemic associated symptoms. Pertinent negatives for diabetes include no blurred vision, no chest pain, no fatigue and no weakness. There are no hypoglycemic complications. Symptoms are stable. Diabetic complications include nephropathy. Pertinent negatives for diabetic complications include no autonomic neuropathy or heart disease. Risk factors for coronary artery disease include diabetes mellitus, male sex, obesity, stress and sedentary lifestyle. He is compliant with treatment all of the time. He is following a diabetic and generally healthy diet. He rarely participates in exercise. His home blood glucose trend is fluctuating minimally. An ACE inhibitor/angiotensin II receptor blocker is being taken. He does not see a podiatrist.Eye exam is current.   Heart Problem  This is a chronic problem. The current episode started more than 1 year ago. The problem occurs rarely. Pertinent negatives include no chest pain, chills, congestion, fatigue, myalgias, numbness, vertigo or weakness. The treatment provided significant relief.   Hypertension  This is a chronic problem. The current episode started more than 1 year ago. The problem has been resolved since onset. The problem is controlled. Pertinent negatives include no blurred vision or chest pain. Risk factors for coronary artery disease include sedentary lifestyle, dyslipidemia and diabetes mellitus. Past  treatments include angiotensin blockers, beta blockers, calcium channel blockers and diuretics. The current treatment provides significant improvement. Hypertensive end-organ damage includes kidney disease and CAD/MI. Identifiable causes of hypertension include chronic renal disease.   Past Medical History  History reviewed. No pertinent past medical history.    Social History  Social History     Tobacco Use    Smoking status: Never    Smokeless tobacco: Never   Vaping Use    Vaping status: Never Used   Substance Use Topics    Alcohol use: Not Currently    Drug use: Never       Family History     Family History   Problem Relation Name Age of Onset    Other (bone/cartilage disorder) Mother      Diabetes Mother      Gallbladder disease Mother      Diabetes Father      Other (cardiac disorder) Father      Nephrolithiasis Father      Prostate cancer Father      Other (bladder cancer) Father      Heart attack Father      Rheum arthritis Father      Skin cancer Father      Kidney nephrosis Father      Diabetes Paternal Grandfather      Skin cancer Paternal Grandfather         Allergies:  Allergies   Allergen Reactions    Adhesive Tape-Silicones Hives and Itching    Latex Other    Meperidine Other    Penicillins Other    Promethazine Other    Sulfa (Sulfonamide Antibiotics) Other        Outpatient Medications:  Current Outpatient Medications   Medication Instructions    amLODIPine (NORVASC) 5 mg, oral, Daily, as directed    aspirin 81 mg chewable tablet oral    atorvastatin (LIPITOR) 40 mg, oral, Nightly    cetirizine (ZYRTEC) 10 mg, oral, Daily    cholecalciferol (VITAMIN D-3) 50,000 Units, oral, Once Weekly    clopidogrel (PLAVIX) 75 mg, oral, Daily    CRANBERRY ORAL 2 tablets, oral, Daily    docusate sodium (COLACE) 100 mg, oral, 2 times daily    finasteride (PROSCAR) 5 mg, oral, Daily    icosapent ethyL (VASCEPA) 2 g, oral, 2 times daily    insulin aspart (NovoLOG FlexPen U-100 Insulin) 100 unit/mL (3 mL) pen  subcutaneous, Inject 20 units plus coverage for snacks     insulin degludec (TRESIBA FLEXTOUCH U-200) 104 Units, subcutaneous, Nightly    ketoconazole (NIZOral) 2 % shampoo Topical, 2 times weekly, Apply to scalp in shower. Lather, leave on 5 minutes then rinse    metoprolol succinate XL (TOPROL-XL) 25 mg, oral, Daily    nitroglycerin (NITROSTAT) 0.4 mg, sublingual, Every 5 min PRN    potassium chloride CR (Klor-Con M20) 20 mEq ER tablet 20 mEq, oral, Daily    ranolazine (RANEXA) 500 mg, oral, Every 12 hours    semaglutide (Rybelsus) 14 mg tablet tablet 1 tablet, oral, Daily    tamsulosin (FLOMAX) 0.4 mg, oral, Daily    telmisartan (MICARDIS) 20 mg, oral, Every 24 hours    triamterene-hydrochlorothiazid (Maxzide-25) 37.5-25 mg tablet Take half tablet po daily        Review of Systems   Constitutional: Negative.    HENT: Negative.     Respiratory: Negative.     Cardiovascular: Negative.    Gastrointestinal: Negative.    Musculoskeletal:  Positive for arthralgias.   Neurological:  Negative for dizziness and weakness.         Objective       Physical Exam  Vitals reviewed.   Constitutional:       Appearance: Normal appearance. He is obese.   HENT:      Head: Normocephalic.   Eyes:      Conjunctiva/sclera: Conjunctivae normal.   Cardiovascular:      Rate and Rhythm: Normal rate and regular rhythm.   Pulmonary:      Effort: Pulmonary effort is normal.      Breath sounds: Normal breath sounds.   Abdominal:      General: Abdomen is protuberant.      Palpations: Abdomen is soft.   Musculoskeletal:         General: No tenderness. Normal range of motion.      Cervical back: Neck supple.   Skin:     General: Skin is warm and dry.   Neurological:      General: No focal deficit present.   Psychiatric:         Mood and Affect: Mood normal.     /73 (BP Location: Right arm, Patient Position: Sitting, BP Cuff Size: Adult)   Pulse 67   Temp 36.7 °C (98 °F) (Temporal)   Ht 1.829 m (6')   Wt 119 kg (262 lb)   BMI 35.53 kg/m²       Assessment/Plan   Problem List Items Addressed This Visit       Stage 3 chronic kidney disease (Multi)    Obstructive sleep apnea syndrome    Essential hypertriglyceridemia - Primary    CAD S/P percutaneous coronary angioplasty    Benign essential hypertension    Type 2 diabetes mellitus without retinopathy (Multi)   CKD and various stages of CKD and significance explained, CKD is very common and rising diagnosis among [place] adults. Main culprits for CKD etiology needs to be attended well. GFR values explained. Nephrotoxic agents has to be avoided, plenty of water consumption is always beneficial. Avoid NSAIDs, always check with MD about usage of OTC medications or any other Rx given by other providers. GFR less then 10 usually will need renal replacement therapy. Episodic check on renal functions needed. Always ask MD about GFR at next visit. Avoid dehydration.  Symptoms of bronchitis are improving.  Creatinine was reviewed, triglycerides are fluctuating, because of levofloxacin he has a spell of insomnia, it should subside, usually he does not have this much of insomnia.  No angina, blood sugars are fluctuating and target in range is very abnormal and that typically reflects his diet, carbohydrate needs to be restricted, refined sugars needs to be avoided, in spite of taking such a high dose of insulin we have a difficulty getting target sugar in range, continuous glucose monitoring device data were reviewed, creatinine fluctuates, he is compliant with CPAP mask.  Visceral obesity remains, stress remains, screening test up-to-date, PSA has been managed by urology, hemoglobin A1c next time, follow-up in 3 months.

## 2024-06-18 DIAGNOSIS — E78.1 PURE HYPERGLYCERIDEMIA: ICD-10-CM

## 2024-06-18 NOTE — TELEPHONE ENCOUNTER
Received vm request from pt. For for prescription refill to [facility]   Patient follows with Dr. Antonio     Request is for Atorvastatin 40mg QD  Is patient currently on medication yes    Last OV 3/13/24  Next OV 3/12/25    Pended for signing and sent to provider

## 2024-06-19 RX ORDER — ATORVASTATIN CALCIUM 40 MG/1
40 TABLET, FILM COATED ORAL NIGHTLY
Qty: 90 TABLET | Refills: 3 | Status: SHIPPED | OUTPATIENT
Start: 2024-06-19 | End: 2025-06-19

## 2024-06-24 DIAGNOSIS — R89.6 ABNORMAL BLADDER CYTOLOGY: ICD-10-CM

## 2024-06-24 DIAGNOSIS — Z12.5 SCREENING PSA (PROSTATE SPECIFIC ANTIGEN): ICD-10-CM

## 2024-06-24 DIAGNOSIS — N20.0 CALCULUS OF KIDNEY: ICD-10-CM

## 2024-07-02 ENCOUNTER — HOSPITAL ENCOUNTER (OUTPATIENT)
Dept: RADIOLOGY | Facility: CLINIC | Age: 69
Discharge: HOME | End: 2024-07-02
Payer: MEDICARE

## 2024-07-02 ENCOUNTER — OFFICE VISIT (OUTPATIENT)
Dept: ORTHOPEDIC SURGERY | Facility: CLINIC | Age: 69
End: 2024-07-02
Payer: MEDICARE

## 2024-07-02 DIAGNOSIS — S93.401A SPRAIN OF RIGHT ANKLE, UNSPECIFIED LIGAMENT, INITIAL ENCOUNTER: ICD-10-CM

## 2024-07-02 DIAGNOSIS — S93.401A SPRAIN OF RIGHT ANKLE, UNSPECIFIED LIGAMENT, INITIAL ENCOUNTER: Primary | ICD-10-CM

## 2024-07-02 PROCEDURE — 1036F TOBACCO NON-USER: CPT | Performed by: FAMILY MEDICINE

## 2024-07-02 PROCEDURE — L1902 AFO ANKLE GAUNTLET PRE OTS: HCPCS | Performed by: FAMILY MEDICINE

## 2024-07-02 PROCEDURE — 3008F BODY MASS INDEX DOCD: CPT | Performed by: FAMILY MEDICINE

## 2024-07-02 PROCEDURE — 3044F HG A1C LEVEL LT 7.0%: CPT | Performed by: FAMILY MEDICINE

## 2024-07-02 PROCEDURE — 4010F ACE/ARB THERAPY RXD/TAKEN: CPT | Performed by: FAMILY MEDICINE

## 2024-07-02 PROCEDURE — 1160F RVW MEDS BY RX/DR IN RCRD: CPT | Performed by: FAMILY MEDICINE

## 2024-07-02 PROCEDURE — 99203 OFFICE O/P NEW LOW 30 MIN: CPT | Performed by: FAMILY MEDICINE

## 2024-07-02 PROCEDURE — L4361 PNEUMA/VAC WALK BOOT PRE OTS: HCPCS | Performed by: FAMILY MEDICINE

## 2024-07-02 PROCEDURE — 1159F MED LIST DOCD IN RCRD: CPT | Performed by: FAMILY MEDICINE

## 2024-07-02 PROCEDURE — 73610 X-RAY EXAM OF ANKLE: CPT | Mod: RT

## 2024-07-02 PROCEDURE — 73610 X-RAY EXAM OF ANKLE: CPT | Mod: RIGHT SIDE | Performed by: FAMILY MEDICINE

## 2024-07-02 PROCEDURE — 99213 OFFICE O/P EST LOW 20 MIN: CPT | Performed by: FAMILY MEDICINE

## 2024-07-02 PROCEDURE — 3048F LDL-C <100 MG/DL: CPT | Performed by: FAMILY MEDICINE

## 2024-07-02 NOTE — PROGRESS NOTES
Acute Injury New Patient Visit    CC:   Chief Complaint   Patient presents with    Right Ankle - Pain     Rt ankle pain   Xrays today  Twisted ankle getting off truck 6/29       HPI: Manny is a 69 y.o.male who presents today with new complaints of pain and discomfort to the lateral side of the right foot and ankle.  He states he had twisted or rolled it while getting out of his truck bed a few days ago.  He did not think much of it at first however as time went on the discomfort and swelling progressed and worsened.  He has been limping and presents here today for further evaluation.  He denies any numbness tingling or burning.  No history of any significant prior injury or trauma to the right ankle in the past.  He is status post tendon repair of his knee for previous disruption.  He states no pain or discomfort associated with his knee here today.  He denies any calf pain.        Review of Systems   GENERAL: Negative for malaise, significant weight loss, fever  MUSCULOSKELETAL: See HPI  NEURO: Negative for numbness / tingling     Past Medical History  History reviewed. No pertinent past medical history.    Medication review  Medication Documentation Review Audit       Reviewed by Cole C Budinsky, MD (Physician) on 07/02/24 at 1223      Medication Order Taking? Sig Documenting Provider Last Dose Status   amLODIPine (Norvasc) 5 mg tablet [id number]  TAKE 1 TABLET (5 MG) BY MOUTH ONCE DAILY AS DIRECTED Sherwin Mayo MD  Active   aspirin 81 mg chewable tablet [id number] No Chew. Historical Provider, MD Taking Active   atorvastatin (Lipitor) 40 mg tablet [id number]  Take 1 tablet (40 mg) by mouth once daily at bedtime. Armando Antonio MD  Active   cetirizine (ZyrTEC) 10 mg tablet [id number] No Take 1 tablet (10 mg) by mouth once daily. Historical Provider, MD Taking Active   cholecalciferol (Vitamin D-3) 50,000 unit capsule [id number]  TAKE 1 CAPSULE (08084 UNITS) BY MOUTH ONE TIME PER WEEK Stephanie Carney MD   Active   clopidogrel (Plavix) 75 mg tablet [id number] No Take 1 tablet (75 mg) by mouth once daily. Armando Antonio MD Taking Active   CRANBERRY ORAL [id number] No Take 2 tablets by mouth once daily. Historical Provider, MD Taking Active   docusate sodium (Colace) 100 mg capsule [id number] No Take 1 capsule (100 mg) by mouth 2 times a day. Historical Provider, MD Taking Active   finasteride (Proscar) 5 mg tablet [id number] No Take 1 tablet (5 mg) by mouth once daily. Historical Provider, MD Taking Active   icosapent ethyL (Vascepa) 1 gram capsule [id number] No Take 2 capsules (2 g) by mouth 2 times a day. Sherwin Mayo MD Taking Active   insulin aspart (NovoLOG FlexPen U-100 Insulin) 100 unit/mL (3 mL) pen [id number] No Inject under the skin. Inject 20 units plus coverage for snacks Historical Provider, MD Taking Active   insulin degludec (Tresiba FlexTouch U-200) 200 unit/mL (3 mL) injection [id number]  Inject 104 Units under the skin once daily at bedtime. Sherwin Mayo MD  Active   ketoconazole (NIZOral) 2 % shampoo [id number] No Apply topically 2 times a week. Apply to scalp in shower. Lather, leave on 5 minutes then rinse Tan Mchugh MD Taking Active   metoprolol succinate XL (Toprol-XL) 25 mg 24 hr tablet [id number] No TAKE 1 TABLET BY MOUTH EVERY DAY Sherwin Mayo MD Taking Active   nitroglycerin (Nitrostat) 0.4 mg SL tablet [id number] No Place 1 tablet (0.4 mg) under the tongue every 5 minutes if needed for chest pain. Armando Antonio MD Taking Active   potassium chloride CR (Klor-Con M20) 20 mEq ER tablet [id number] No Take 1 tablet (20 mEq) by mouth once daily. Sherwin Mayo MD Taking Active   ranolazine (Ranexa) 500 mg 12 hr tablet [id number] No TAKE 1 TABLET BY MOUTH EVERY 12 HOURS Armando Antonio MD Taking Active   semaglutide (Rybelsus) 14 mg tablet tablet [id number] No Take 1 tablet (14 mg) by mouth once daily. Historical Provider, MD Taking Active   tamsulosin (Flomax) 0.4  mg 24 hr capsule [id number] No Take 1 capsule (0.4 mg) by mouth once daily. Historical Provider, MD Taking Active   telmisartan (MIcarDIS) 20 mg tablet [id number] No Take 1 tablet (20 mg) by mouth once every 24 hours. Sherwin Mayo MD Taking Active   triamterene-hydrochlorothiazid (Maxzide-25) 37.5-25 mg tablet [id number] No Take half tablet po daily Sherwin Mayo MD Taking Active                    Allergies  Allergies   Allergen Reactions    Adhesive Tape-Silicones Hives and Itching    Latex Other    Meperidine Other    Penicillins Other    Promethazine Other    Sulfa (Sulfonamide Antibiotics) Other       Social History  Social History     Socioeconomic History    Marital status:      Spouse name: Not on file    Number of children: Not on file    Years of education: Not on file    Highest education level: Not on file   Occupational History    Not on file   Tobacco Use    Smoking status: Never    Smokeless tobacco: Never   Vaping Use    Vaping status: Never Used   Substance and Sexual Activity    Alcohol use: Not Currently    Drug use: Never    Sexual activity: Not on file   Other Topics Concern    Not on file   Social History Narrative    Not on file     Social Determinants of Health     Financial Resource Strain: Not on file   Food Insecurity: Not on file   Transportation Needs: Not on file   Physical Activity: Not on file   Stress: Not on file   Social Connections: Not on file   Intimate Partner Violence: Not on file   Housing Stability: Not on file       Surgical History  Past Surgical History:   Procedure Laterality Date    OTHER SURGICAL HISTORY  12/12/2018    Cardiac catheterization with stent placement    OTHER SURGICAL HISTORY  12/12/2018    Renal lithotripsy    OTHER SURGICAL HISTORY  12/12/2018    Shoulder surgery    OTHER SURGICAL HISTORY  12/12/2018    Knee reconstruction    OTHER SURGICAL HISTORY  12/12/2018    Hernia repair    OTHER SURGICAL HISTORY  02/28/2022    Colonoscopy    OTHER SURGICAL  HISTORY  02/28/2022    Dental surgery    OTHER SURGICAL HISTORY  02/28/2022    Inguinal hernia repair    OTHER SURGICAL HISTORY  02/28/2022    Percutaneous transluminal coronary angioplasty    OTHER SURGICAL HISTORY  03/09/2022    Dermatological surgery       Physical Exam:  GENERAL:  Patient is awake, alert, and oriented to person place and time.  Patient appears well nourished and well kept.  Affect Calm, Not Acutely Distressed.  HEENT:  Normocephalic, Atraumatic, EOMI  CARDIOVASCULAR:  Hemodynamically stable.  RESPIRATORY:  Normal respirations with unlabored breathing.  NEURO: Gross sensation intact to the lower extremities bilaterally.  Extremity: Right ankle exam: The affected ankle was examined and inspected.  There was evidence of lateral sided soft tissue swelling and fullness with mild bruising noted.  The distal fibula had mild tenderness to palpation, the distal medial malleolus was non-tender.  Tenderness across the anterior joint space and over the soft tissues in the area of the ATFL and CFL ligaments.  Negative Heel Tap and Calcaneal Squeeze, Achilles is non-tender.  Negative Bee´s and Gastelum sign.  Negative Midfoot and distal metatarsal squeeze.  Distal pulses and sensation are intact with good distal cap refill.  Active and passive ROM and strength about the ankle is limited with Dorsiflexion, Plantarflexion, Eversion, and Inversion. Unable to tolerate full weight bearing secondary to pain.      Diagnostics: X-rays today demonstrate no obvious displaced fracture or ankle joint mortise disruption        Procedure: None  Procedures    Assessment:   Problem List Items Addressed This Visit    None  Visit Diagnoses       Sprain of right ankle, unspecified ligament, initial encounter    -  Primary    Relevant Orders    XR ankle right 3+ views    Walking Boot Tall    Ankle Brace, Lace Up or A60             Plan: At this time we discussed the possibility of a small nondisplaced fracture versus more  significant sprain.  Either way, discussed with the patient we treated the same way with a tall boot into a lace up ankle brace given its his driving foot.  He will utilize ice and Tylenol for pain control.  NSAIDs and oral steroids are contraindicated for him given his stage III-IV kidney disease and diabetes.  Plan to repeat x-rays 3 views of the right ankle at follow-up.  Should there be any significant or worsening issues we can consider further advanced imaging.  Will also likely provide him some home exercises depending on the stability of his ankle at follow-up which I anticipate will not be an issue.  Orders Placed This Encounter    Walking Boot Tall    Ankle Brace, Lace Up or A60    XR ankle right 3+ views      At the conclusion of the visit there were no further questions by the patient/family regarding their plan of care.  Patient was instructed to call or return with any issues, questions, or concerns regarding their injury and/or treatment plan described above.     07/02/24 at 12:24 PM - Cole C Budinsky, MD    Office: [phone]    This note was prepared using voice recognition software.  The details of this note are correct and have been reviewed, and corrected to the best of my ability.  Some grammatical errors may persist related to the Dragon software.

## 2024-07-23 ENCOUNTER — LAB (OUTPATIENT)
Dept: LAB | Facility: LAB | Age: 69
End: 2024-07-23
Payer: MEDICARE

## 2024-07-23 ENCOUNTER — HOSPITAL ENCOUNTER (OUTPATIENT)
Dept: RADIOLOGY | Facility: HOSPITAL | Age: 69
Discharge: HOME | End: 2024-07-23
Payer: MEDICARE

## 2024-07-23 DIAGNOSIS — N20.0 CALCULUS OF KIDNEY: ICD-10-CM

## 2024-07-23 DIAGNOSIS — R89.6 ABNORMAL BLADDER CYTOLOGY: ICD-10-CM

## 2024-07-23 DIAGNOSIS — Z12.5 SCREENING PSA (PROSTATE SPECIFIC ANTIGEN): ICD-10-CM

## 2024-07-23 LAB
APPEARANCE UR: CLEAR
BILIRUB UR STRIP.AUTO-MCNC: NEGATIVE MG/DL
COLOR UR: YELLOW
CREAT SERPL-MCNC: 1.7 MG/DL (ref 0.5–1.3)
EGFRCR SERPLBLD CKD-EPI 2021: 43 ML/MIN/1.73M*2
GLUCOSE UR STRIP.AUTO-MCNC: NORMAL MG/DL
KETONES UR STRIP.AUTO-MCNC: NEGATIVE MG/DL
LEUKOCYTE ESTERASE UR QL STRIP.AUTO: NEGATIVE
NITRITE UR QL STRIP.AUTO: NEGATIVE
PH UR STRIP.AUTO: 5.5 [PH]
PROT UR STRIP.AUTO-MCNC: NEGATIVE MG/DL
PSA SERPL-MCNC: 0.32 NG/ML
RBC # UR STRIP.AUTO: NEGATIVE /UL
SP GR UR STRIP.AUTO: 1.02
UROBILINOGEN UR STRIP.AUTO-MCNC: NORMAL MG/DL

## 2024-07-23 PROCEDURE — 88112 CYTOPATH CELL ENHANCE TECH: CPT

## 2024-07-23 PROCEDURE — 87086 URINE CULTURE/COLONY COUNT: CPT

## 2024-07-23 PROCEDURE — 74176 CT ABD & PELVIS W/O CONTRAST: CPT

## 2024-07-23 PROCEDURE — 36415 COLL VENOUS BLD VENIPUNCTURE: CPT

## 2024-07-23 PROCEDURE — 81003 URINALYSIS AUTO W/O SCOPE: CPT

## 2024-07-23 PROCEDURE — G0103 PSA SCREENING: HCPCS

## 2024-07-23 PROCEDURE — 82565 ASSAY OF CREATININE: CPT

## 2024-07-23 PROCEDURE — 74176 CT ABD & PELVIS W/O CONTRAST: CPT | Performed by: RADIOLOGY

## 2024-07-24 LAB
BACTERIA UR CULT: NORMAL
LABORATORY COMMENT REPORT: NORMAL
LABORATORY COMMENT REPORT: NORMAL
PATH REPORT.FINAL DX SPEC: NORMAL
PATH REPORT.GROSS SPEC: NORMAL
PATH REPORT.RELEVANT HX SPEC: NORMAL
PATH REPORT.TOTAL CANCER: NORMAL

## 2024-07-26 ENCOUNTER — OFFICE VISIT (OUTPATIENT)
Dept: ORTHOPEDIC SURGERY | Facility: CLINIC | Age: 69
End: 2024-07-26
Payer: MEDICARE

## 2024-07-26 ENCOUNTER — HOSPITAL ENCOUNTER (OUTPATIENT)
Dept: RADIOLOGY | Facility: CLINIC | Age: 69
Discharge: HOME | End: 2024-07-26
Payer: MEDICARE

## 2024-07-26 DIAGNOSIS — S93.401D SPRAIN OF RIGHT ANKLE, UNSPECIFIED LIGAMENT, SUBSEQUENT ENCOUNTER: ICD-10-CM

## 2024-07-26 PROCEDURE — 99213 OFFICE O/P EST LOW 20 MIN: CPT | Performed by: FAMILY MEDICINE

## 2024-07-26 PROCEDURE — 73610 X-RAY EXAM OF ANKLE: CPT | Mod: RT

## 2024-07-26 NOTE — PROGRESS NOTES
Established Patient Follow-Up Visit    CC:   Chief Complaint   Patient presents with    Right Ankle - Pain     Sprain, s/p walking boot and ankle lace-up brace  DOI: 6/29/24 (3 weeks out)  Repeat x-rays today       HPI:  Manny is a 69 y.o. male returns here today for follow-up visit regarding: Follow-up right ankle sprain contusion.  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  He still has his lace up ankle brace he denies any worsening issues or concerns.  He does feel that there is a little bit of rash or irritation around where the brace is he has been wearing a short sock putting the brace on and then putting a tube sock on top of the brace.          REVIEW OF SYSTEMS:  GENERAL: Negative for malaise, significant weight loss, fever  MUSCULOSKELETAL: See HPI  NEURO: Negative for numbness / tingling       PHYSICAL EXAM:  -Neuro: Gross sensation intact to the lower extremities bilaterally.  -Extremity: Right ankle exam demonstrates skin which is warm pink well-perfused there is some chronic degenerative changes about the skin likely secondary to venous insufficiency and some small punctate red micro hemorrhaging in the soft tissues.  No spreading cellulitis or erythema minimal soft tissue swelling no bony tenderness to the medial or lateral malleolus he has full plantarflexion dorsiflexion limited eversion inversion no laxity with anterior drawer.    IMAGING: Repeat x-rays today demonstrate no obvious presence for fracture or dislocation about the right ankle.      PROCEDURE: None  Procedures     ASSESSMENT:   Follow-up visit for:  Problem List Items Addressed This Visit    None  Visit Diagnoses       Sprain of right ankle, unspecified ligament, subsequent encounter        Relevant Orders    XR ankle right 3+ views             PLAN: At this time we will have patient continue with transitioning into the brace more full-time.  He can always  hop back into the boot if needed for acute pain or discomfort.  He should continue to wean out of the boot into the brace over the next 4 weeks.  By September he should be coming out of the brace entirely.  He was given home exercises to work on range of motion and strength recovery if worse or no better or return repeat injury we can see him back consider further advanced imaging or potential injection with the anti-inflammatory or steroid given his diabetes status.  He should call or return as above.  Orders Placed This Encounter    XR ankle right 3+ views           At the conclusion of the visit there were no further questions by the patient/family regarding their plan of care.  Patient was instructed to call or return with any issues, questions, or concerns regarding their injury and/or treatment plan described above.     07/26/24 at 10:51 AM - Cole C Budinsky, MD    Office: [phone]    This note was prepared using voice recognition software.  The details of this note are correct and have been reviewed, and corrected to the best of my ability.  Some grammatical errors may persist related to the Dragon software.

## 2024-07-31 ENCOUNTER — APPOINTMENT (OUTPATIENT)
Dept: DERMATOLOGY | Facility: CLINIC | Age: 69
End: 2024-07-31
Payer: MEDICARE

## 2024-07-31 DIAGNOSIS — L81.4 LENTIGO: ICD-10-CM

## 2024-07-31 DIAGNOSIS — L57.0 ACTINIC KERATOSIS: ICD-10-CM

## 2024-07-31 DIAGNOSIS — L73.8 SEBACEOUS HYPERPLASIA OF FACE: ICD-10-CM

## 2024-07-31 DIAGNOSIS — D48.5 NEOPLASM OF UNCERTAIN BEHAVIOR OF SKIN: ICD-10-CM

## 2024-07-31 DIAGNOSIS — L21.9 SEBORRHEIC DERMATITIS: ICD-10-CM

## 2024-07-31 DIAGNOSIS — L82.1 SEBORRHEIC KERATOSIS: ICD-10-CM

## 2024-07-31 DIAGNOSIS — Z85.828 PERSONAL HISTORY OF SKIN CANCER: Primary | ICD-10-CM

## 2024-07-31 DIAGNOSIS — D18.01 HEMANGIOMA OF SKIN: ICD-10-CM

## 2024-07-31 DIAGNOSIS — D22.9 MULTIPLE BENIGN NEVI: ICD-10-CM

## 2024-07-31 PROCEDURE — 1159F MED LIST DOCD IN RCRD: CPT | Performed by: STUDENT IN AN ORGANIZED HEALTH CARE EDUCATION/TRAINING PROGRAM

## 2024-07-31 PROCEDURE — 3048F LDL-C <100 MG/DL: CPT | Performed by: STUDENT IN AN ORGANIZED HEALTH CARE EDUCATION/TRAINING PROGRAM

## 2024-07-31 PROCEDURE — 4010F ACE/ARB THERAPY RXD/TAKEN: CPT | Performed by: STUDENT IN AN ORGANIZED HEALTH CARE EDUCATION/TRAINING PROGRAM

## 2024-07-31 PROCEDURE — 17003 DESTRUCT PREMALG LES 2-14: CPT | Performed by: STUDENT IN AN ORGANIZED HEALTH CARE EDUCATION/TRAINING PROGRAM

## 2024-07-31 PROCEDURE — 99213 OFFICE O/P EST LOW 20 MIN: CPT | Performed by: STUDENT IN AN ORGANIZED HEALTH CARE EDUCATION/TRAINING PROGRAM

## 2024-07-31 PROCEDURE — 3044F HG A1C LEVEL LT 7.0%: CPT | Performed by: STUDENT IN AN ORGANIZED HEALTH CARE EDUCATION/TRAINING PROGRAM

## 2024-07-31 PROCEDURE — 11102 TANGNTL BX SKIN SINGLE LES: CPT | Performed by: STUDENT IN AN ORGANIZED HEALTH CARE EDUCATION/TRAINING PROGRAM

## 2024-07-31 PROCEDURE — 17000 DESTRUCT PREMALG LESION: CPT | Performed by: STUDENT IN AN ORGANIZED HEALTH CARE EDUCATION/TRAINING PROGRAM

## 2024-07-31 NOTE — PROGRESS NOTES
Subjective   Manny Reveles is a 69 y.o. male who presents for the following: Skin Check (LV: 1/30/24: FBSE.  Notes lesion on right hand that has been present for months, unsure if there was previous trauma. Asymptomatic.) and Seborrheic Dermatitis (Using ketoconazole shampoo 2-3 times weekly to scalp with some improvement.)    Skin Cancer History  SCC - left central cheek s/p Mohs 10/27/21  AK's    Family History of Skin Cancer  None     The following portions of the chart were reviewed this encounter and updated as appropriate:         Review of Systems: No other skin or systemic complaints.    Objective   Well appearing patient in no apparent distress; mood and affect are within normal limits.    A full examination was performed including scalp, head, eyes, ears, nose, lips, neck, chest, axillae, abdomen, back, buttocks, bilateral upper extremities, bilateral lower extremities, hands, feet, fingers, toes, fingernails, and toenails. All findings within normal limits unless otherwise noted below.    Generalized, Left Central Cheek  Well healed scar(s) at site(s) of prior treatment    Scattered cherry-red papule(s).    Scattered tan macules in sun-exposed areas.    Stuck on verrucous, tan-brown papules and plaques.      Scattered, uniform and benign-appearing, regular brown melanocytic papules and macules.    Small yellow, lobulated papules with a central dell.    Head - Anterior (Face) (4), Left Breast, Left Upper Back (2), Neck - Anterior, Right Breast, Right Hand - Posterior  Erythematous macules with gritty scale.    Scalp  Erythema with overlying greasy scale.    Neck - Anterior  Pink pearly papule                   Assessment/Plan       Personal history of skin cancer (2)  Left Central Cheek; Generalized    No evidence of recurrence at site(s) of prior treatment  Continue photoprotection with sun-protective clothing and sunscreen SPF 30+ daily  Continue routine self-skin examination  Continue to follow up  every 6 months for routine FBSE or earlier prn for new/changing/concerning lesions       Related Procedures  Follow Up In Dermatology - Established Patient    Actinic keratosis (10)  Head - Anterior (Face) (4); Right Hand - Posterior; Neck - Anterior; Left Breast; Right Breast; Left Upper Back (2)    Discussed precancerous nature of condition and relationship with sun-exposure.   Recommended treatment today with LN2. Discussed r/b of procedure including risk of pain, temporary redness, and dyspigmentation. Patient verbalized understanding and agreement with plan for treatment today.    Destr of lesion - Head - Anterior (Face) (3), Left Upper Back (2), Right Hand - Posterior  Complexity: simple    Destruction method: cryotherapy    Informed consent: discussed and consent obtained    Lesion destroyed using liquid nitrogen: Yes    Cryotherapy cycles:  1  Outcome: patient tolerated procedure well with no complications    Post-procedure details: wound care instructions given      Seborrheic dermatitis  Scalp    Reassured of benign nature of condition  Discussed relationship with seborrhea and overgrowth/reaction to yeast that normally lives on the skin   Advised treatment with ketconazole 2% shampoo 2 times per week until clear, then may decrease to 1 time per week for maintenance. Advised to rub directly onto scalp, lather, leave on for 5 min then rinse off.      Related Medications  ketoconazole (NIZOral) 2 % shampoo  Apply topically 2 times a week. Apply to scalp in shower. Lather, leave on 5 minutes then rinse    Hemangioma of skin    Reassured of benign nature of lesions    Lentigo    Reassured of benign nature of lesions    Seborrheic keratosis    Reassured of benign nature of lesions    Multiple benign nevi    Reassured of benign nature of lesions    Sebaceous hyperplasia of face    Reassured of benign nature of lesions    Neoplasm of uncertain behavior of skin  Neck - Anterior    Lesion biopsy  Type of biopsy:  tangential    Informed consent: discussed and consent obtained    Timeout: patient name, date of birth, surgical site, and procedure verified    Procedure prep:  Patient was prepped and draped  Anesthesia: the lesion was anesthetized in a standard fashion    Anesthetic:  1% lidocaine w/ epinephrine 1-100,000 local infiltration  Instrument used: DermaBlade    Hemostasis achieved with: aluminum chloride    Outcome: patient tolerated procedure well    Post-procedure details: sterile dressing applied and wound care instructions given    Dressing type: petrolatum and bandage      Staff Communication: Dermatology Local Anesthesia: 1 % Lidocaine / Epinephrine - Amount: 0.2cc    Specimen 1 - Dermatopathology- DERM LAB  Differential Diagnosis: r/o BCC  Check Margins Yes/No?:    Comments:    Dermpath Lab: Routine Histopathology (formalin-fixed tissue)    -Discussed uncertain diagnosis of lesion and recommended biopsy to help clarify diagnosis. -Discussed R/B of procedure including risk of scar. Patient verbalized understanding and agreement with plan for biopsy today.    RTC for FBSE in 6 months or sooner PRN    Scribe Attestation  By signing my name below, IRachelle LPN , Scribe   attest that this documentation has been prepared under the direction and in the presence of Tan Mchugh MD.

## 2024-08-02 LAB
LABORATORY COMMENT REPORT: NORMAL
PATH REPORT.FINAL DX SPEC: NORMAL
PATH REPORT.GROSS SPEC: NORMAL
PATH REPORT.MICROSCOPIC SPEC OTHER STN: NORMAL
PATH REPORT.RELEVANT HX SPEC: NORMAL
PATH REPORT.TOTAL CANCER: NORMAL

## 2024-08-09 NOTE — RESULT ENCOUNTER NOTE
Spoke with pt and discussed results and plan.  Pt will call back on Monday to schedule ED&C as he is camping currently.  Rachelle Leon LPN

## 2024-08-23 ENCOUNTER — LAB (OUTPATIENT)
Dept: LAB | Facility: LAB | Age: 69
End: 2024-08-23
Payer: MEDICARE

## 2024-08-23 DIAGNOSIS — I10 BENIGN ESSENTIAL HYPERTENSION: ICD-10-CM

## 2024-08-23 DIAGNOSIS — E78.1 ESSENTIAL HYPERTRIGLYCERIDEMIA: ICD-10-CM

## 2024-08-23 LAB
ALBUMIN SERPL BCP-MCNC: 3.9 G/DL (ref 3.4–5)
ALP SERPL-CCNC: 55 U/L (ref 33–136)
ALT SERPL W P-5'-P-CCNC: 17 U/L (ref 10–52)
ANION GAP SERPL CALC-SCNC: 12 MMOL/L (ref 10–20)
AST SERPL W P-5'-P-CCNC: 14 U/L (ref 9–39)
BASOPHILS # BLD AUTO: 0.07 X10*3/UL (ref 0–0.1)
BASOPHILS NFR BLD AUTO: 0.9 %
BILIRUB SERPL-MCNC: 2.1 MG/DL (ref 0–1.2)
BUN SERPL-MCNC: 29 MG/DL (ref 6–23)
CALCIUM SERPL-MCNC: 9.1 MG/DL (ref 8.6–10.3)
CHLORIDE SERPL-SCNC: 106 MMOL/L (ref 98–107)
CHOLEST SERPL-MCNC: 147 MG/DL (ref 0–199)
CHOLESTEROL/HDL RATIO: 5.1
CO2 SERPL-SCNC: 26 MMOL/L (ref 21–32)
CREAT SERPL-MCNC: 1.76 MG/DL (ref 0.5–1.3)
EGFRCR SERPLBLD CKD-EPI 2021: 41 ML/MIN/1.73M*2
EOSINOPHIL # BLD AUTO: 0.15 X10*3/UL (ref 0–0.7)
EOSINOPHIL NFR BLD AUTO: 2 %
ERYTHROCYTE [DISTWIDTH] IN BLOOD BY AUTOMATED COUNT: 12.7 % (ref 11.5–14.5)
GLUCOSE SERPL-MCNC: 101 MG/DL (ref 74–99)
HCT VFR BLD AUTO: 42.3 % (ref 41–52)
HDLC SERPL-MCNC: 28.7 MG/DL
HGB BLD-MCNC: 13.9 G/DL (ref 13.5–17.5)
IMM GRANULOCYTES # BLD AUTO: 0.04 X10*3/UL (ref 0–0.7)
IMM GRANULOCYTES NFR BLD AUTO: 0.5 % (ref 0–0.9)
LDLC SERPL CALC-MCNC: 44 MG/DL
LYMPHOCYTES # BLD AUTO: 1.63 X10*3/UL (ref 1.2–4.8)
LYMPHOCYTES NFR BLD AUTO: 21.4 %
MCH RBC QN AUTO: 31.2 PG (ref 26–34)
MCHC RBC AUTO-ENTMCNC: 32.9 G/DL (ref 32–36)
MCV RBC AUTO: 95 FL (ref 80–100)
MONOCYTES # BLD AUTO: 0.8 X10*3/UL (ref 0.1–1)
MONOCYTES NFR BLD AUTO: 10.5 %
NEUTROPHILS # BLD AUTO: 4.93 X10*3/UL (ref 1.2–7.7)
NEUTROPHILS NFR BLD AUTO: 64.7 %
NON HDL CHOLESTEROL: 118 MG/DL (ref 0–149)
NRBC BLD-RTO: 0 /100 WBCS (ref 0–0)
PLATELET # BLD AUTO: 149 X10*3/UL (ref 150–450)
POTASSIUM SERPL-SCNC: 4.3 MMOL/L (ref 3.5–5.3)
PROT SERPL-MCNC: 6.4 G/DL (ref 6.4–8.2)
RBC # BLD AUTO: 4.45 X10*6/UL (ref 4.5–5.9)
SODIUM SERPL-SCNC: 140 MMOL/L (ref 136–145)
TRIGL SERPL-MCNC: 372 MG/DL (ref 0–149)
VLDL: 74 MG/DL (ref 0–40)
WBC # BLD AUTO: 7.6 X10*3/UL (ref 4.4–11.3)

## 2024-08-23 PROCEDURE — 85025 COMPLETE CBC W/AUTO DIFF WBC: CPT

## 2024-08-23 PROCEDURE — 80061 LIPID PANEL: CPT

## 2024-08-23 PROCEDURE — 80053 COMPREHEN METABOLIC PANEL: CPT

## 2024-08-23 PROCEDURE — 36415 COLL VENOUS BLD VENIPUNCTURE: CPT

## 2024-08-26 DIAGNOSIS — N13.8 BENIGN PROSTATIC HYPERPLASIA WITH URINARY OBSTRUCTION: ICD-10-CM

## 2024-08-26 DIAGNOSIS — N40.1 BENIGN PROSTATIC HYPERPLASIA WITH URINARY OBSTRUCTION: ICD-10-CM

## 2024-08-26 RX ORDER — TAMSULOSIN HYDROCHLORIDE 0.4 MG/1
0.4 CAPSULE ORAL DAILY
Qty: 90 CAPSULE | Refills: 3 | Status: SHIPPED | OUTPATIENT
Start: 2024-08-26

## 2024-08-26 RX ORDER — FINASTERIDE 5 MG/1
5 TABLET, FILM COATED ORAL DAILY
Qty: 90 TABLET | Refills: 3 | Status: SHIPPED | OUTPATIENT
Start: 2024-08-26

## 2024-08-28 ENCOUNTER — APPOINTMENT (OUTPATIENT)
Dept: PRIMARY CARE | Facility: CLINIC | Age: 69
End: 2024-08-28
Payer: MEDICARE

## 2024-08-28 ENCOUNTER — OFFICE VISIT (OUTPATIENT)
Dept: PRIMARY CARE | Facility: CLINIC | Age: 69
End: 2024-08-28
Payer: MEDICARE

## 2024-08-28 VITALS
SYSTOLIC BLOOD PRESSURE: 120 MMHG | TEMPERATURE: 97.3 F | DIASTOLIC BLOOD PRESSURE: 80 MMHG | BODY MASS INDEX: 36.84 KG/M2 | HEIGHT: 72 IN | WEIGHT: 272 LBS | HEART RATE: 56 BPM

## 2024-08-28 DIAGNOSIS — E11.9 TYPE 2 DIABETES MELLITUS WITHOUT RETINOPATHY (MULTI): ICD-10-CM

## 2024-08-28 DIAGNOSIS — N18.32 STAGE 3B CHRONIC KIDNEY DISEASE (MULTI): ICD-10-CM

## 2024-08-28 DIAGNOSIS — G47.33 OBSTRUCTIVE SLEEP APNEA SYNDROME: ICD-10-CM

## 2024-08-28 DIAGNOSIS — I25.10 CAD S/P PERCUTANEOUS CORONARY ANGIOPLASTY: ICD-10-CM

## 2024-08-28 DIAGNOSIS — I10 BENIGN ESSENTIAL HYPERTENSION: ICD-10-CM

## 2024-08-28 DIAGNOSIS — Z98.61 CAD S/P PERCUTANEOUS CORONARY ANGIOPLASTY: ICD-10-CM

## 2024-08-28 DIAGNOSIS — E78.1 ESSENTIAL HYPERTRIGLYCERIDEMIA: Primary | ICD-10-CM

## 2024-08-28 PROCEDURE — 3008F BODY MASS INDEX DOCD: CPT | Performed by: INTERNAL MEDICINE

## 2024-08-28 PROCEDURE — 4010F ACE/ARB THERAPY RXD/TAKEN: CPT | Performed by: INTERNAL MEDICINE

## 2024-08-28 PROCEDURE — 1036F TOBACCO NON-USER: CPT | Performed by: INTERNAL MEDICINE

## 2024-08-28 PROCEDURE — 1159F MED LIST DOCD IN RCRD: CPT | Performed by: INTERNAL MEDICINE

## 2024-08-28 PROCEDURE — 3048F LDL-C <100 MG/DL: CPT | Performed by: INTERNAL MEDICINE

## 2024-08-28 PROCEDURE — 3079F DIAST BP 80-89 MM HG: CPT | Performed by: INTERNAL MEDICINE

## 2024-08-28 PROCEDURE — 99213 OFFICE O/P EST LOW 20 MIN: CPT | Performed by: INTERNAL MEDICINE

## 2024-08-28 PROCEDURE — 3074F SYST BP LT 130 MM HG: CPT | Performed by: INTERNAL MEDICINE

## 2024-08-28 PROCEDURE — 1160F RVW MEDS BY RX/DR IN RCRD: CPT | Performed by: INTERNAL MEDICINE

## 2024-08-28 PROCEDURE — 3044F HG A1C LEVEL LT 7.0%: CPT | Performed by: INTERNAL MEDICINE

## 2024-08-28 ASSESSMENT — ENCOUNTER SYMPTOMS
WOUND: 0
CARDIOVASCULAR NEGATIVE: 1
GASTROINTESTINAL NEGATIVE: 1
RESPIRATORY NEGATIVE: 1
WEAKNESS: 0
CONSTITUTIONAL NEGATIVE: 1
MUSCULOSKELETAL NEGATIVE: 1
NEUROLOGICAL NEGATIVE: 1

## 2024-08-28 NOTE — PROGRESS NOTES
Subjective   Patient ID: Manny Reveles is a 69 y.o. male who presents for Follow-up (3 mo fu).  HPI  Hyperlipidemia  This is a chronic problem. The current episode started more than 1 year ago. The problem is resistant. Recent lipid tests were reviewed and are variable. Exacerbating diseases include chronic renal disease, diabetes and obesity. Current antihyperlipidemic treatment includes statins and fibric acid derivatives.     Diabetes  He presents for his follow-up diabetic visit. He has type 2 diabetes mellitus. His disease course has been stable. There are no hypoglycemic associated symptoms. Pertinent negatives for diabetes include no blurred vision, no chest pain, no fatigue and no weakness. There are no hypoglycemic complications. Symptoms are stable. Diabetic complications include nephropathy. Pertinent negatives for diabetic complications include no autonomic neuropathy or heart disease. Risk factors for coronary artery disease include diabetes mellitus, male sex, obesity, stress and sedentary lifestyle. He is compliant with treatment all of the time. He is following a diabetic and generally healthy diet. He rarely participates in exercise. His home blood glucose trend is fluctuating minimally. An ACE inhibitor/angiotensin II receptor blocker is being taken. He does not see a podiatrist.Eye exam is current.   Heart Problem  This is a chronic problem. The current episode started more than 1 year ago. The problem occurs rarely. Pertinent negatives include no chest pain, chills, congestion, fatigue, myalgias, numbness, vertigo or weakness. The treatment provided significant relief.   Hypertension  This is a chronic problem. The current episode started more than 1 year ago. The problem has been resolved since onset. The problem is controlled. Pertinent negatives include no blurred vision or chest pain. Risk factors for coronary artery disease include sedentary lifestyle, dyslipidemia and diabetes mellitus.  Past treatments include angiotensin blockers, beta blockers, calcium channel blockers and diuretics. The current treatment provides significant improvement. Hypertensive end-organ damage includes kidney disease and CAD/MI. Identifiable causes of hypertension include chronic renal disease.   Past Medical History  History reviewed. No pertinent past medical history.    Social History  Social History     Tobacco Use    Smoking status: Never    Smokeless tobacco: Never   Vaping Use    Vaping status: Never Used   Substance Use Topics    Alcohol use: Not Currently    Drug use: Never       Family History     Family History   Problem Relation Name Age of Onset    Other (bone/cartilage disorder) Mother      Diabetes Mother      Gallbladder disease Mother      Diabetes Father      Other (cardiac disorder) Father      Nephrolithiasis Father      Prostate cancer Father      Other (bladder cancer) Father      Heart attack Father      Rheum arthritis Father      Skin cancer Father      Kidney nephrosis Father      Diabetes Paternal Grandfather      Skin cancer Paternal Grandfather         Allergies:  Allergies   Allergen Reactions    Adhesive Tape-Silicones Hives and Itching    Latex Other    Meperidine Other    Penicillins Other    Promethazine Other    Sulfa (Sulfonamide Antibiotics) Other        Outpatient Medications:  Current Outpatient Medications   Medication Instructions    amLODIPine (NORVASC) 5 mg, oral, Daily, as directed    aspirin 81 mg chewable tablet oral    atorvastatin (LIPITOR) 40 mg, oral, Nightly    cetirizine (ZYRTEC) 10 mg, oral, Daily    cholecalciferol (VITAMIN D-3) 50,000 Units, oral, Once Weekly    clopidogrel (PLAVIX) 75 mg, oral, Daily    CRANBERRY ORAL 2 tablets, oral, Daily    docusate sodium (COLACE) 100 mg, oral, 2 times daily    finasteride (PROSCAR) 5 mg, oral, Daily    icosapent ethyL (VASCEPA) 2 g, oral, 2 times daily    insulin aspart (NovoLOG FlexPen U-100 Insulin) 100 unit/mL (3 mL) pen  subcutaneous, Inject 20 units plus coverage for snacks     insulin degludec (TRESIBA FLEXTOUCH U-200) 104 Units, subcutaneous, Nightly    ketoconazole (NIZOral) 2 % shampoo Topical, 2 times weekly, Apply to scalp in shower. Lather, leave on 5 minutes then rinse    metoprolol succinate XL (TOPROL-XL) 25 mg, oral, Daily    nitroglycerin (NITROSTAT) 0.4 mg, sublingual, Every 5 min PRN    potassium chloride CR (Klor-Con M20) 20 mEq ER tablet 20 mEq, oral, Daily    ranolazine (RANEXA) 500 mg, oral, Every 12 hours    semaglutide (Rybelsus) 14 mg tablet tablet 1 tablet, oral, Daily    tamsulosin (FLOMAX) 0.4 mg, oral, Daily    telmisartan (MICARDIS) 20 mg, oral, Every 24 hours    triamterene-hydrochlorothiazid (Maxzide-25) 37.5-25 mg tablet Take half tablet po daily        Review of Systems   Constitutional: Negative.    Respiratory: Negative.     Cardiovascular: Negative.    Gastrointestinal: Negative.    Musculoskeletal: Negative.    Skin:  Negative for rash and wound.   Neurological: Negative.  Negative for weakness.         Objective       Physical Exam  Vitals reviewed.   Constitutional:       Appearance: Normal appearance. He is obese.   HENT:      Head: Normocephalic and atraumatic.   Eyes:      Conjunctiva/sclera: Conjunctivae normal.   Cardiovascular:      Rate and Rhythm: Normal rate and regular rhythm.      Pulses: Normal pulses.   Pulmonary:      Effort: Pulmonary effort is normal.      Breath sounds: Normal breath sounds.   Abdominal:      Palpations: Abdomen is soft.   Musculoskeletal:         General: Normal range of motion.      Cervical back: Normal range of motion.   Skin:     General: Skin is warm and dry.      Comments: Healing abscess at rt  groin   Neurological:      General: No focal deficit present.   Psychiatric:         Mood and Affect: Mood normal.     /80 (BP Location: Right arm, Patient Position: Sitting, BP Cuff Size: Adult)   Pulse 56   Temp 36.3 °C (97.3 °F) (Temporal)   Ht 1.829 m  (6')   Wt 123 kg (272 lb)   BMI 36.89 kg/m²      Assessment/Plan   Problem List Items Addressed This Visit       Stage 3 chronic kidney disease (Multi)    Obstructive sleep apnea syndrome    Essential hypertriglyceridemia - Primary    CAD S/P percutaneous coronary angioplasty    Benign essential hypertension    Type 2 diabetes mellitus without retinopathy (Multi)   Diabetes is chronic disease, it does not get cured but it can be controlled, in modern medicine there are variety of measures taken to control DM. Usually we want to preserve beta cell functions. Please understand the disease and how our life style can affect control of glycemia. Diabetes leads to macro and microvascular complications and they could be devastating. It is important to have annual eye check and frequent foot inspection and foot inspection. Kidney dysfunction including dialysis, foot amputations, neuropathy, foot ulcers and accelerated atherosclerotic vascular disease are known complications of uncontrolled DM, pt was educated and explained. CKD and various stages of CKD and significance explained, CKD is very common and rising diagnosis among [place] adults. Main culprits for CKD etiology needs to be attended well. GFR values explained. Nephrotoxic agents has to be avoided, plenty of water consumption is always beneficial. Avoid NSAIDs, always check with MD about usage of OTC medications or any other Rx given by other providers. GFR less then 10 usually will need renal replacement therapy. Episodic check on renal functions needed. Always ask MD about GFR at next visit. Avoid dehydration.  He is doing well, he has not seen nephrologist lately, he continued to have a high triglycerides, no angina, CT was reviewed, nonobstructing nephrolithiasis was visible.  Blood sugars are fluctuating, it is a brittle diabetes, endocrine has been managing diabetes with continuous glucose monitoring device.  He is compliant with his CPAP mask, we will continue  to monitor his hemoglobin A1c and triglycerides, no change in therapy, follow-up in 3 months, no ER visits or hospitalization.

## 2024-09-03 NOTE — PROGRESS NOTES
Subjective   Manny Reveles is a 69 y.o. male who presents for the following: ED&C (Electrodesiccation & Curettage) (BCC on the anterior neck (right).)    The following portions of the chart were reviewed this encounter and updated as appropriate:         Review of Systems: No other skin or systemic complaints.    Objective   Well appearing patient in no apparent distress; mood and affect are within normal limits.    A focused examination was performed including head, including the scalp, face, neck, nose, ears, eyelids, and lips and chest, axillae, abdomen, back, and buttocks. All findings within normal limits unless otherwise noted below.    Neck - Anterior  Scar at site of biopsy      Assessment/Plan   Basal cell carcinoma (BCC) of skin of neck  Neck - Anterior    Destr of lesion  Complexity: simple    Destruction method: electrodesiccation and curettage    Informed consent: discussed and consent obtained    Timeout:  patient name, date of birth, surgical site, and procedure verified  Procedure prep:  Patient prepped in sterile fashion  Anesthesia: the lesion was anesthetized in a standard fashion    Anesthetic:  1% lidocaine plain local infiltration  Curettage performed in three different directions: Yes    Electrodesiccation performed over the curetted area: Yes    Curettage cycles:  3  Lesion length (cm):  0.8  Lesion width (cm):  0.8  Margin per side (cm):  0.3  Final wound size (cm):  1.4  Hemostasis achieved with:  electrodesiccation  Outcome: patient tolerated procedure well with no complications    Post-procedure details: sterile dressing applied and wound care instructions given    Dressing type: petrolatum and Telfa pad      Staff Communication: Dermatology Local Anesthesia: 1 % Lidocaine / Epinephrine - Amount: 9 cc    RTC as scheduled for [facility] 2/4/25 or sooner PRN    Scribe Attestation  By signing my name below, IRachelle LPN , Scribe   attest that this documentation has been prepared under  the direction and in the presence of Tan Mchugh MD.

## 2024-09-04 ENCOUNTER — OFFICE VISIT (OUTPATIENT)
Dept: DERMATOLOGY | Facility: CLINIC | Age: 69
End: 2024-09-04
Payer: MEDICARE

## 2024-09-04 DIAGNOSIS — C44.41 BASAL CELL CARCINOMA (BCC) OF SKIN OF NECK: ICD-10-CM

## 2024-09-04 PROCEDURE — 17272 DSTR MAL LES S/N/H/F/G 1.1-2: CPT | Performed by: STUDENT IN AN ORGANIZED HEALTH CARE EDUCATION/TRAINING PROGRAM

## 2024-09-04 PROCEDURE — 4010F ACE/ARB THERAPY RXD/TAKEN: CPT | Performed by: STUDENT IN AN ORGANIZED HEALTH CARE EDUCATION/TRAINING PROGRAM

## 2024-09-04 PROCEDURE — 3044F HG A1C LEVEL LT 7.0%: CPT | Performed by: STUDENT IN AN ORGANIZED HEALTH CARE EDUCATION/TRAINING PROGRAM

## 2024-09-04 PROCEDURE — 1159F MED LIST DOCD IN RCRD: CPT | Performed by: STUDENT IN AN ORGANIZED HEALTH CARE EDUCATION/TRAINING PROGRAM

## 2024-09-04 PROCEDURE — 3048F LDL-C <100 MG/DL: CPT | Performed by: STUDENT IN AN ORGANIZED HEALTH CARE EDUCATION/TRAINING PROGRAM

## 2024-09-04 NOTE — PATIENT INSTRUCTIONS
Department of Dermatology    Daily Care of your Biopsy Site/Excision/ ED&C      If a specimen was sent to pathology, when your biopsy result is ready it will be sent to you and your medical care team at the exact same time. Please give your medical care team up to 3 business days to review the results and communicate the plan to you. If additional scheduling is needed, it may take the  an additional 2-3 weeks to contact you with the appropriate scheduling.     Leave the initial bandage on for 24 hours.  Keep the area dry.  After 24 hours, remove bandage, clean the area gently with mild soap and water in the shower.  Try to remove any crust that may have formed.  Pat dry.    Apply a thin layer of Vaseline ointment and cover with a Band-Aid.   Continue daily until stitches are removed or until the area has healed.     Discomfort: If you experience discomfort, you may take Tylenol (Acetaminophen) or Extra Strength Tylenol. If you don't have any allergies and are able to take Tylenol, take one to two tablets as directed on the bottle.   Bleeding: If bleeding occurs, do not remove the bandage.  Apply continuous firm pressure with gauze for 15 to 20 minutes with no peeking. If the bleeding persists, repeat the pressure for an additional 20 minutes.  If bleeding continues, you should notify us immediately.  If the office is closed, call [phone] and ask to page the dermatology resident doctor on call.  The resident on call will advise you with instructions or determine if you need to go to your nearest emergency room.  PLEASE do not go the emergency room without attempting to contact us first. Please notify us of any bleeding, as you will most likely need to have a bandage change.    Infection: Some soreness and redness is expected. It is normal to develop a red ring around the area and yellow leakage. Do not be alarmed. If the  area becomes very sore, red, and/or hot to the touch, or you have a fever, please notify us.  It is possible the area may be infected.     *If you have any questions or difficulties, please contact us.   Weekdays call [phone]  Evenings and Weekends call [phone] and ask to page the dermatology resident doctor on call.

## 2024-09-09 ENCOUNTER — APPOINTMENT (OUTPATIENT)
Dept: ENDOCRINOLOGY | Facility: CLINIC | Age: 69
End: 2024-09-09
Payer: COMMERCIAL

## 2024-09-09 VITALS
SYSTOLIC BLOOD PRESSURE: 138 MMHG | WEIGHT: 272 LBS | DIASTOLIC BLOOD PRESSURE: 80 MMHG | HEIGHT: 73 IN | BODY MASS INDEX: 36.05 KG/M2

## 2024-09-09 DIAGNOSIS — E11.9 TYPE 2 DIABETES MELLITUS WITHOUT RETINOPATHY (MULTI): Primary | ICD-10-CM

## 2024-09-09 DIAGNOSIS — E78.1 ESSENTIAL HYPERTRIGLYCERIDEMIA: ICD-10-CM

## 2024-09-09 DIAGNOSIS — I10 BENIGN ESSENTIAL HYPERTENSION: ICD-10-CM

## 2024-09-09 LAB
POC FINGERSTICK BLOOD GLUCOSE: 183 MG/DL (ref 70–100)
POC HEMOGLOBIN A1C: 6.7 % (ref 4.2–6.5)

## 2024-09-09 PROCEDURE — 82962 GLUCOSE BLOOD TEST: CPT | Performed by: HOSPITALIST

## 2024-09-09 PROCEDURE — 3048F LDL-C <100 MG/DL: CPT | Performed by: HOSPITALIST

## 2024-09-09 PROCEDURE — 99214 OFFICE O/P EST MOD 30 MIN: CPT | Performed by: HOSPITALIST

## 2024-09-09 PROCEDURE — 4010F ACE/ARB THERAPY RXD/TAKEN: CPT | Performed by: HOSPITALIST

## 2024-09-09 PROCEDURE — 3075F SYST BP GE 130 - 139MM HG: CPT | Performed by: HOSPITALIST

## 2024-09-09 PROCEDURE — 3044F HG A1C LEVEL LT 7.0%: CPT | Performed by: HOSPITALIST

## 2024-09-09 PROCEDURE — 83036 HEMOGLOBIN GLYCOSYLATED A1C: CPT | Performed by: HOSPITALIST

## 2024-09-09 PROCEDURE — 3079F DIAST BP 80-89 MM HG: CPT | Performed by: HOSPITALIST

## 2024-09-09 PROCEDURE — 3008F BODY MASS INDEX DOCD: CPT | Performed by: HOSPITALIST

## 2024-09-09 PROCEDURE — 1159F MED LIST DOCD IN RCRD: CPT | Performed by: HOSPITALIST

## 2024-09-09 PROCEDURE — 95251 CONT GLUC MNTR ANALYSIS I&R: CPT | Performed by: HOSPITALIST

## 2024-09-09 ASSESSMENT — ENCOUNTER SYMPTOMS
SORE THROAT: 0
ABDOMINAL PAIN: 0
VOICE CHANGE: 0
LIGHT-HEADEDNESS: 0
PALPITATIONS: 0
CHEST TIGHTNESS: 0
CONSTITUTIONAL NEGATIVE: 1
PHOTOPHOBIA: 0
CONSTIPATION: 0
SLEEP DISTURBANCE: 0
EYE ITCHING: 0
FREQUENCY: 0
AGITATION: 0
ARTHRALGIAS: 0
SHORTNESS OF BREATH: 0
NERVOUS/ANXIOUS: 0
HEADACHES: 0
DYSURIA: 0
VOMITING: 0
TROUBLE SWALLOWING: 0
DIARRHEA: 0
TREMORS: 0
ABDOMINAL DISTENTION: 0
NAUSEA: 0

## 2024-09-09 NOTE — PROGRESS NOTES
Subjective   Patient ID: Manny Reveles is a 69 y.o. male who presents for Diabetes (PCP: Dr. Mayo/podiatry: does not see one/eye exam: twice a year /Patient testing glucose 4 times daily with freestyle hugh 2 READER/Due to fluctuating blood glucose, hypoglycemia and 4 insulin injections daily. Pt adhering and benefiting from cgm treatment. /3/13/2024 hga1c 6.8%).  Lab Results   Component Value Date    HGBA1C 6.7 (A) 09/09/2024      HPI  See assessment and plan  Review of Systems   Constitutional: Negative.    HENT:  Negative for sore throat, trouble swallowing and voice change.    Eyes:  Negative for photophobia, itching and visual disturbance.   Respiratory:  Negative for chest tightness and shortness of breath.    Cardiovascular:  Negative for chest pain and palpitations.   Gastrointestinal:  Negative for abdominal distention, abdominal pain, constipation, diarrhea, nausea and vomiting.   Endocrine: Negative for cold intolerance, heat intolerance and polyuria.   Genitourinary:  Negative for dysuria and frequency.   Musculoskeletal:  Negative for arthralgias.   Skin:  Negative for pallor.   Allergic/Immunologic: Negative for environmental allergies.   Neurological:  Negative for tremors, light-headedness and headaches.   Psychiatric/Behavioral:  Negative for agitation and sleep disturbance. The patient is not nervous/anxious.        Objective   Physical Exam  Constitutional:       Appearance: Normal appearance.   HENT:      Head: Normocephalic.      Nose: Nose normal.      Mouth/Throat:      Mouth: Mucous membranes are moist.   Eyes:      Extraocular Movements: Extraocular movements intact.   Cardiovascular:      Rate and Rhythm: Normal rate.   Pulmonary:      Effort: Pulmonary effort is normal. No respiratory distress.   Abdominal:      General: There is no distension.   Musculoskeletal:         General: Normal range of motion.      Cervical back: Normal range of motion and neck supple.   Skin:     General:  "Skin is warm and dry.   Neurological:      Mental Status: He is alert and oriented to person, place, and time.   Psychiatric:         Mood and Affect: Mood normal.      Visit Vitals  /80   Ht 1.854 m (6' 1\")   Wt 123 kg (272 lb)   BMI 35.89 kg/m²   Smoking Status Never   BSA 2.52 m²        Assessment/Plan   Diagnoses and all orders for this visit:  Type 2 diabetes mellitus without retinopathy (Multi)  -     POCT glycosylated hemoglobin (Hb A1C) manually resulted  -     POCT glucose manually resulted  Benign essential hypertension  Essential hypertriglyceridemia             68yo man with h/o CAd s/p stents, CKD came for follow up   he does have h/o CAD s/p PCI in 2019  Dx ~ 10 yrs ago. Used to follow Dr. Doherty as OP.      Current regimen :   Tresiba U 200 ) 102-0-0-0   Novolog 20-18-25-0 ,ssi 2: 50 > 150 ( takes 2-4 times a day )  for snacks takes 18 units occ.   Rybelsus 14 mg  (constipation improved after taking Colace twice a day)       HE has Freestyle Maira 2 which was  downloaded - reviewed it and last 14days active time 8952 %, TIR 38 % low < 1 %   Occ ovn and pre dinner low BG   Tries to take NovoLog before every   avoids artificial sweetners given his CKD     A1c at goal   He mentions in the past he as on metformin which was stopped due to his CKD  He has h/o multiple UTIs in past, no h/o pancreatitis in past      PLAN :     Can continue to avoid candy  Continue tresiba to 104-0-0-0 ( can give 52 units + 52 units at different site due to volume issues )   - continue novolog to 20-18-25-0 , same SSI   - in past discussed snack with carbs > 30 g- take 10 units novolog before  snack.   - take novolog 15 min before eating.   - continue rybelsus-continue Colace twice a day  - has CKD ,  his GFR~41,  will hold off adding metformin at this time      -given he has multiple UTIs in past and urine incontinence rarely with hesitancy occ- would avoid SGLT2 inh  - continue statin vascepa, TG improved, LDL at goal "   - continue ARB, BP at goal   - annual eye exam     off note rybelsus covered with [facility] patient assisstance. he will benefit from continuing the GLP 1 agonist given his h/o CAD      RTC 4m     SH- daughter and GS moved in,. GS he is~ 3yr old NETTE.  He started  recently. and he has been helping taking care of him . he is more active now.  He takes care of him 2 times a week.   he is in [place]. daughter had kidney failure?   Wife started on insulin as well

## 2024-09-17 ENCOUNTER — APPOINTMENT (OUTPATIENT)
Dept: DERMATOLOGY | Facility: CLINIC | Age: 69
End: 2024-09-17
Payer: MEDICARE

## 2024-09-19 DIAGNOSIS — I10 ESSENTIAL (PRIMARY) HYPERTENSION: ICD-10-CM

## 2024-09-19 RX ORDER — TELMISARTAN 20 MG/1
20 TABLET ORAL EVERY 24 HOURS
Qty: 90 TABLET | Refills: 2 | Status: SHIPPED | OUTPATIENT
Start: 2024-09-19

## 2024-09-25 ENCOUNTER — APPOINTMENT (OUTPATIENT)
Dept: UROLOGY | Facility: CLINIC | Age: 69
End: 2024-09-25
Payer: MEDICARE

## 2024-09-25 VITALS
HEIGHT: 73 IN | RESPIRATION RATE: 18 BRPM | SYSTOLIC BLOOD PRESSURE: 121 MMHG | DIASTOLIC BLOOD PRESSURE: 72 MMHG | BODY MASS INDEX: 35.53 KG/M2 | HEART RATE: 61 BPM | WEIGHT: 268.08 LBS

## 2024-09-25 DIAGNOSIS — N40.1 BENIGN PROSTATIC HYPERPLASIA WITH URINARY OBSTRUCTION: ICD-10-CM

## 2024-09-25 DIAGNOSIS — N13.8 BENIGN PROSTATIC HYPERPLASIA WITH URINARY OBSTRUCTION: ICD-10-CM

## 2024-09-25 DIAGNOSIS — N20.0 CALCULUS OF KIDNEY: Primary | ICD-10-CM

## 2024-09-25 PROCEDURE — 4010F ACE/ARB THERAPY RXD/TAKEN: CPT | Performed by: UROLOGY

## 2024-09-25 PROCEDURE — 3048F LDL-C <100 MG/DL: CPT | Performed by: UROLOGY

## 2024-09-25 PROCEDURE — 3078F DIAST BP <80 MM HG: CPT | Performed by: UROLOGY

## 2024-09-25 PROCEDURE — 99214 OFFICE O/P EST MOD 30 MIN: CPT | Performed by: UROLOGY

## 2024-09-25 PROCEDURE — 1036F TOBACCO NON-USER: CPT | Performed by: UROLOGY

## 2024-09-25 PROCEDURE — 1159F MED LIST DOCD IN RCRD: CPT | Performed by: UROLOGY

## 2024-09-25 PROCEDURE — 1160F RVW MEDS BY RX/DR IN RCRD: CPT | Performed by: UROLOGY

## 2024-09-25 PROCEDURE — 3074F SYST BP LT 130 MM HG: CPT | Performed by: UROLOGY

## 2024-09-25 PROCEDURE — 3008F BODY MASS INDEX DOCD: CPT | Performed by: UROLOGY

## 2024-09-25 PROCEDURE — 1126F AMNT PAIN NOTED NONE PRSNT: CPT | Performed by: UROLOGY

## 2024-09-25 PROCEDURE — G2211 COMPLEX E/M VISIT ADD ON: HCPCS | Performed by: UROLOGY

## 2024-09-25 PROCEDURE — 3044F HG A1C LEVEL LT 7.0%: CPT | Performed by: UROLOGY

## 2024-09-25 RX ORDER — FINASTERIDE 5 MG/1
5 TABLET, FILM COATED ORAL DAILY
Qty: 90 TABLET | Refills: 3 | Status: ON HOLD | OUTPATIENT
Start: 2024-09-25

## 2024-09-25 RX ORDER — TAMSULOSIN HYDROCHLORIDE 0.4 MG/1
0.4 CAPSULE ORAL DAILY
Qty: 90 CAPSULE | Refills: 3 | Status: ON HOLD | OUTPATIENT
Start: 2024-09-25

## 2024-09-25 ASSESSMENT — ENCOUNTER SYMPTOMS
FREQUENCY: 0
DIFFICULTY URINATING: 0
HEMATURIA: 0
DYSURIA: 0

## 2024-09-25 ASSESSMENT — PAIN SCALES - GENERAL: PAINLEVEL: 0-NO PAIN

## 2024-09-25 NOTE — PATIENT INSTRUCTIONS
Patient Discussion/Summary     It was very nice to see you once again. You stated you are continue to have a good stream and good emptying with your combination of Flomax and Proscar.. Urine cytology is atypical and cannot rule out neoplasm. Therefore today you will undergo a cystoscopy for further evaluation.  PSA, corrected, is normal at 0.64.  Your creatinine continues to rise and it is 1.70.  You have not seen Dr. Weller your nephrologist in over a year.  I have employed you to see him soon.  I personally reviewed your CAT scan images with you and your wife on my computer monitor in the examination room.  You were able to identify several 4 mm left kidney stones and 3 separate 5 mm stones in the right kidney.  He would like to proceed with shockwave lithotripsy, however without stents, following evaluation of your cystoscopy and your appointment with Dr. Weller.  You understand that it most probably will require more than 1 session since you have a large stone burden and you refused the stent.  You will continue on Flomax and Proscar.      This note was created with voice-recognition software and was not corrected for typographical or grammatical errors

## 2024-09-25 NOTE — LETTER
September 25, 2024     Sherwin Mayo MD  [address]  [facility] [address]    Patient: Manny Reveles   YOB: 1955   Date of Visit: 9/25/2024       Dear Dr. Sherwin Mayo MD:    Thank you for referring Manny Reveles to me for evaluation. Below are my notes for this consultation.  If you have questions, please do not hesitate to call me. I look forward to following your patient along with you.       Sincerely,     Brandon Perdue MD      CC: No Recipients  ______________________________________________________________________________________         Provider Impressions     69 year-old white male originally seen 11/10/17 as an inpatient at [facility]. He had a long history of STONE DISEASE and had been treated by Dr. D'Amico in the past. He had a 7 mm STONE at the JUNCTION of the MID and DISTAL URETER. He has NIDDM. He is a manager at [employer]. He has a positive family history for prostate cancer. He chews tobacco.     11/12/17, OR, CYSTOSCOPY, right RETROGRADE, right URETEROSCOPY with STONE REMOVAL.     12/12/18, patient states that he has noticed passing small STONES in over the past year. 24-hour urine does show high OXALATE. He does cheat with peanut butter and raw peanuts. However, he states that this is necessary for his diabetes. PSA is fine at 0.3. Parathyroid hormone normal at 26.6. CREATININE is ELEVATED at 1.38, however it is lower than the value of 1.781 year ago. Renal colic CAT scan shows 6 STONES in the left KIDNEY all less than 3 mm and 4 STONES in the right KIDNEY all less than 3 mm. He will return in 1 year.     February 2019, patient had 2 more CARDIAC STENTS placed by Dr. Antonio     07/01/19, patient has no new complaints. Creatinine is the lowest it has ever been under my care and is now 0.84. PTH is very low at 12.3. PSA 0.5Ã--2 for Proscar equals 1.0. Cytology is normal. CALCIUM OXALATE in the 24-hour urine is high at 2.03. Renal colic CAT scan identifies  STONES in the left kidney up to 3 mm and STONES in the right kidney up to 4 mm. Urinalysis is negative. Urine culture has no growth. He will continue on Flomax and Proscar. He will be scheduled to see Dr. Weller regarding his persistently high oxalate in his 24-hour urine test.     08/11/19, telephone call, positive urine culture for Escherichia coli, treated with Macrobid     06/22/20, emergency room visit. HEMATURIA AND FLANK PAIN. Renal ultrasound identifies bilateral RENAL CALCULI measuring up to 5 mm     06/26/20, telephone call, patient could not obtain an IVP due to a creatinine of 1.59. KUB and upright was performed.      PLAVIX      07/14/20, patient arrives alone. He brings 3 small stones, measuring from 0.2-0.9 cm. These will be sent for analysis. The stones were passed prior to his KUB. He does not have any specific complaints at this time. Corrected PSA is 0.8. Creatinine has risen to 1.59 and was previously 0.841 year. He is seeing his nephrologist Dr. Weller for further evaluation. He does not have obstructing uropathy. His flow rate today was 5 cc/s with a PVR of only 29 cc. Therefore he does not have outflow obstruction and he has intrinsic disease. He has lost significant weight and his blood pressure and diuretic medications have been changed abruptly. Hematocrit is 39%. Cytology was negative. 24-hour urine was excellent with a dramatically lower calcium oxalate Crystal of 0.9 to and an increased volume now of 3025 cc. We will not repeat that test. Urinalysis is negative for blood. Urine culture no growth. KUB does show 3 remaining stones in the kidney on the right side measuring 1.6, 1.0 and 0.3 cm each. We discussed the benefits and risks of pursuing ESWL at this time. He would have to be off the Plavix and we would need Dr. Antonio's permission. He will discuss this with his wife in either be scheduled for July 30 or weight for follow-up visit in 3 months with reevaluation with a KUB,  "creatinine and flow rate PVR. He is very concerned about being in the hospital during our COVID-19 pandemic.     10/10/20, patient arrives alone. He has no new complaints. KUB had suboptimal evaluation of renal stones due to colonic contents. Flow rate 14 cc/s with a PVR of 38 cc. Creatinine down to 1.44. Since he is without pain and is very anxious about having any procedures being performed during the COVID-19 19 pandemic, we will defer for another year when he will return with a renal colic CAT scan.     June 1, 2021, patient arrives alone. He is quite anxious again. He states that he passed 2 small stones approximately 3 to 4 mm each. He passed these over the past couple of weeks. He has no new urologic complaints. He continues on daily Flomax and Proscar. Peak flow rate of 23 cc/s, total volume 407 cc, PVR 11 cc. Renal colic CAT scan shows multiple stones bilaterally in the kidneys which are nonobstructing and no larger than 4 mm. Urine culture no growth. Urinalysis shows 2 red blood cells. Urine cytology was not performed due to technician's discretion. PSA corrected is 0.60.     May 23, 2022, telephone call, renal colic CAT scan identifies multiple right renal calculi measuring up to 6 mm. KUB and upright ordered.     PROSCAR     Emely 10, 2022, patient arrives alone. He does have occasional \"twinges in his right kidney. We discussed his CAT scan results showing stones in each kidney measuring up to 6 mm. The KUB identified stones measuring up to 3 mm. We reviewed the benefits and risks. Obviously if they are smaller stones he has an increased chance of spontaneous passage and if they are larger stones he has a high risk of obstruction. At this time, he would like to wait another year and reevaluate. Presently has no obstruction. Creatinine 1.41 and his nephrologist Dr. Weller is following. Urinalysis shows no blood, urine culture no growth, urine cytology is negative for malignant cells. Corrected PSA 0.80. " He will return in 1 year. He continues on daily Flomax and Proscar.     August 30, 2023, patient arrives alone. He has injured his back and now is suffering from right leg pain. He is undergoing physical therapy. States that he has been passing some stones over the last 8 weeks. Renal colic CAT scan identifies bilateral 3 mm stones in the kidneys which are nonobstructing. Creatinine is 1.44 and he continues to follow with his nephrologist Dr. Ricky Weller. Urinalysis no blood. Urine culture no growth. Urine cytology however it is atypical and cannot rule out neoplasm. Therefore he will be scheduled for a cystoscopy. Corrected PSA is 0.74. He continues on daily Flomax and Proscar.     September 23, 2023, cystoscopy today does not reveal any evidence of stones, lesions or tumors within the bladder to explain the atypical urine cytology, cannot rule out neoplasm. Patient will return in June. He will continue on daily Flomax and Proscar.    [facility]    September 25, 2024, patient arrives with his wife, Nithya.  He has no new urologic complaints.  Urinalysis is negative for blood.  Creatinine has risen to 1.70.  He has not seen Dr. Weller his nephrologist in over a year.  I implored him to make an appointment now.  Corrected PSA 0.64.  Renal colic CAT scan shows several left-sided renal calculi measuring 4 mm and 3 separate 5 mm right renal calculi.  I personally reviewed the CAT scan images with the patient and his wife on my computer monitor in the examination room.  Urine cytology shows atypical cells, origin from urothelial neoplasm cannot be excluded.  He wishes to perform an office cystoscopy first, see Dr. Weller again, and then discuss proceeding with ESWL of his right renal calculi.  He refuses ureteral stents and therefore understands that it most probably would require more than 1 session for his 3 separate 5 mm stones.  O'Kleber would be required to provide cardiac risk stratification.  PVR 16  cc.  He continues on daily Flomax and Proscar.     PLAN:     #1 patient will return for a cystoscopy and a discussion of ESWL    In June 2025 with urine studies, PSA, and creatinine, renal colic CAT scan and PVR     #2 continue with Dr. Weller regarding elevated creatinine     #3 continue Flomax 0.4 mg by mouth daily as well as Proscar 5 mg by mouth daily.    4.  Patient REFUSES ureteral stents      This note was created with voice-recognition software and was not corrected for typographical or grammatical errors

## 2024-09-25 NOTE — PROGRESS NOTES
Patient present with wife, Nithya. Patient denies any pain today. Patient has not had any recent surgeries or hospital admits. Patient denies any concerns about falling or safety. Patient has had occasional left flank pain. Patient taking flomax and Proscar as directed. CV    Review of Systems   Genitourinary:  Positive for enuresis. Negative for decreased urine volume, difficulty urinating, dysuria, frequency, hematuria and urgency.   All other systems reviewed and are negative.

## 2024-09-25 NOTE — PROGRESS NOTES
Provider Impressions     69 year-old white male originally seen 11/10/17 as an inpatient at [facility]. He had a long history of STONE DISEASE and had been treated by Dr. D'Amico in the past. He had a 7 mm STONE at the JUNCTION of the MID and DISTAL URETER. He has NIDDM. 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 He chews tobacco.     11/12/17, OR, CYSTOSCOPY, right RETROGRADE, right URETEROSCOPY with STONE REMOVAL.     12/12/18, patient states that he has noticed passing small STONES in over the past year. 24-hour urine does show high OXALATE. He does cheat with peanut butter and raw peanuts. However, he states that this is necessary for his diabetes. PSA is fine at 0.3. Parathyroid hormone normal at 26.6. CREATININE is ELEVATED at 1.38, however it is lower than the value of 1.781 year ago. Renal colic CAT scan shows 6 STONES in the left KIDNEY all less than 3 mm and 4 STONES in the right KIDNEY all less than 3 mm. He will return in 1 year.     February 2019, patient had 2 more CARDIAC STENTS placed by Dr. Antonio     07/01/19, patient has no new complaints. Creatinine is the lowest it has ever been under my care and is now 0.84. PTH is very low at 12.3. PSA 0.5Ã--2 for Proscar equals 1.0. Cytology is normal. CALCIUM OXALATE in the 24-hour urine is high at 2.03. Renal colic CAT scan identifies STONES in the left kidney up to 3 mm and STONES in the right kidney up to 4 mm. Urinalysis is negative. Urine culture has no growth. He will continue on Flomax and Proscar. He will be scheduled to see Dr. Weller regarding his persistently high oxalate in his 24-hour urine test.     08/11/19, telephone call, positive urine culture for Escherichia coli, treated with Macrobid     06/22/20, emergency room visit. HEMATURIA AND FLANK PAIN. Renal ultrasound identifies bilateral RENAL CALCULI measuring up to 5 mm     06/26/20, telephone call, patient could not obtain an IVP due to a  creatinine of 1.59. KUB and upright was performed.      PLAVIX      07/14/20, patient arrives alone. He brings 3 small stones, measuring from 0.2-0.9 cm. These will be sent for analysis. The stones were passed prior to his KUB. He does not have any specific complaints at this time. Corrected PSA is 0.8. Creatinine has risen to 1.59 and was previously 0.841 year. He is seeing his nephrologist Dr. Weller for further evaluation. He does not have obstructing uropathy. His flow rate today was 5 cc/s with a PVR of only 29 cc. Therefore he does not have outflow obstruction and he has intrinsic disease. He has lost significant weight and his blood pressure and diuretic medications have been changed abruptly. Hematocrit is 39%. Cytology was negative. 24-hour urine was excellent with a dramatically lower calcium oxalate Crystal of 0.9 to and an increased volume now of 3025 cc. We will not repeat that test. Urinalysis is negative for blood. Urine culture no growth. KUB does show 3 remaining stones in the kidney on the right side measuring 1.6, 1.0 and 0.3 cm each. We discussed the benefits and risks of pursuing ESWL at this time. He would have to be off the Plavix and we would need Dr. Antonio's permission. He will discuss this with his wife in either be scheduled for July 30 or weight for follow-up visit in 3 months with reevaluation with a KUB, creatinine and flow rate PVR. He is very concerned about being in the hospital during our COVID-19 pandemic.     10/10/20, patient arrives alone. He has no new complaints. KUB had suboptimal evaluation of renal stones due to colonic contents. Flow rate 14 cc/s with a PVR of 38 cc. Creatinine down to 1.44. Since he is without pain and is very anxious about having any procedures being performed during the COVID-19 19 pandemic, we will defer for another year when he will return with a renal colic CAT scan.     June 1, 2021, patient arrives alone. He is quite anxious again. He  "states that he passed 2 small stones approximately 3 to 4 mm each. He passed these over the past couple of weeks. He has no new urologic complaints. He continues on daily Flomax and Proscar. Peak flow rate of 23 cc/s, total volume 407 cc, PVR 11 cc. Renal colic CAT scan shows multiple stones bilaterally in the kidneys which are nonobstructing and no larger than 4 mm. Urine culture no growth. Urinalysis shows 2 red blood cells. Urine cytology was not performed due to technician's discretion. PSA corrected is 0.60.     May 23, 2022, telephone call, renal colic CAT scan identifies multiple right renal calculi measuring up to 6 mm. KUB and upright ordered.     PROSCAR     Emely 10, 2022, patient arrives alone. He does have occasional \"twinges in his right kidney. We discussed his CAT scan results showing stones in each kidney measuring up to 6 mm. The KUB identified stones measuring up to 3 mm. We reviewed the benefits and risks. Obviously if they are smaller stones he has an increased chance of spontaneous passage and if they are larger stones he has a high risk of obstruction. At this time, he would like to wait another year and reevaluate. Presently has no obstruction. Creatinine 1.41 and his nephrologist Dr. Weller is following. Urinalysis shows no blood, urine culture no growth, urine cytology is negative for malignant cells. Corrected PSA 0.80. He will return in 1 year. He continues on daily Flomax and Proscar.     August 30, 2023, patient arrives alone. He has injured his back and now is suffering from right leg pain. He is undergoing physical therapy. States that he has been passing some stones over the last 8 weeks. Renal colic CAT scan identifies bilateral 3 mm stones in the kidneys which are nonobstructing. Creatinine is 1.44 and he continues to follow with his nephrologist Dr. Ricky Weller. Urinalysis no blood. Urine culture no growth. Urine cytology however it is atypical and cannot rule out neoplasm. " Therefore he will be scheduled for a cystoscopy. Corrected PSA is 0.74. He continues on daily Flomax and Proscar.     September 23, 2023, cystoscopy today does not reveal any evidence of stones, lesions or tumors within the bladder to explain the atypical urine cytology, cannot rule out neoplasm. Patient will return in June. He will continue on daily Flomax and Proscar.    [facility]    September 25, 2024, patient arrives with his wife, Nithya.  He has no new urologic complaints.  Urinalysis is negative for blood.  Creatinine has risen to 1.70.  He has not seen Dr. Weller his nephrologist in over a year.  I implored him to make an appointment now.  Corrected PSA 0.64.  Renal colic CAT scan shows several left-sided renal calculi measuring 4 mm and 3 separate 5 mm right renal calculi.  I personally reviewed the CAT scan images with the patient and his wife on my computer monitor in the examination room.  Urine cytology shows atypical cells, origin from urothelial neoplasm cannot be excluded.  He wishes to perform an office cystoscopy first, see Dr. Weller again, and then discuss proceeding with ESWL of his right renal calculi.  He refuses ureteral stents and therefore understands that it most probably would require more than 1 session for his 3 separate 5 mm stones.  O'Kleber would be required to provide cardiac risk stratification.  PVR 16 cc.  He continues on daily Flomax and Proscar.     PLAN:     #1 patient will return for a cystoscopy and a discussion of ESWL    In June 2025 with urine studies, PSA, and creatinine, renal colic CAT scan and PVR     #2 continue with Dr. Weller regarding elevated creatinine     #3 continue Flomax 0.4 mg by mouth daily as well as Proscar 5 mg by mouth daily.    4.  Patient REFUSES ureteral stents      This note was created with voice-recognition software and was not corrected for typographical or grammatical errors

## 2024-09-28 ENCOUNTER — APPOINTMENT (OUTPATIENT)
Dept: RADIOLOGY | Facility: HOSPITAL | Age: 69
DRG: 322 | End: 2024-09-28
Payer: MEDICARE

## 2024-09-28 ENCOUNTER — APPOINTMENT (OUTPATIENT)
Dept: CARDIOLOGY | Facility: HOSPITAL | Age: 69
DRG: 322 | End: 2024-09-28
Payer: MEDICARE

## 2024-09-28 ENCOUNTER — HOSPITAL ENCOUNTER (INPATIENT)
Facility: HOSPITAL | Age: 69
DRG: 322 | End: 2024-09-28
Attending: STUDENT IN AN ORGANIZED HEALTH CARE EDUCATION/TRAINING PROGRAM | Admitting: INTERNAL MEDICINE
Payer: MEDICARE

## 2024-09-28 DIAGNOSIS — I21.4 NSTEMI (NON-ST ELEVATED MYOCARDIAL INFARCTION) (MULTI): ICD-10-CM

## 2024-09-28 DIAGNOSIS — I20.0 UNSTABLE ANGINA (MULTI): Primary | ICD-10-CM

## 2024-09-28 LAB
ALBUMIN SERPL BCP-MCNC: 4.2 G/DL (ref 3.4–5)
ALP SERPL-CCNC: 60 U/L (ref 33–136)
ALT SERPL W P-5'-P-CCNC: 20 U/L (ref 10–52)
ANION GAP SERPL CALC-SCNC: 11 MMOL/L (ref 10–20)
APTT PPP: 31 SECONDS (ref 27–38)
AST SERPL W P-5'-P-CCNC: 16 U/L (ref 9–39)
ATRIAL RATE: 57 BPM
ATRIAL RATE: 62 BPM
BASOPHILS # BLD AUTO: 0.08 X10*3/UL (ref 0–0.1)
BASOPHILS NFR BLD AUTO: 1 %
BILIRUB SERPL-MCNC: 2 MG/DL (ref 0–1.2)
BUN SERPL-MCNC: 29 MG/DL (ref 6–23)
CALCIUM SERPL-MCNC: 8.9 MG/DL (ref 8.6–10.3)
CARDIAC TROPONIN I PNL SERPL HS: 5 NG/L (ref 0–20)
CARDIAC TROPONIN I PNL SERPL HS: 6 NG/L (ref 0–20)
CARDIAC TROPONIN I PNL SERPL HS: 7 NG/L (ref 0–20)
CHLORIDE SERPL-SCNC: 106 MMOL/L (ref 98–107)
CO2 SERPL-SCNC: 23 MMOL/L (ref 21–32)
CREAT SERPL-MCNC: 1.49 MG/DL (ref 0.5–1.3)
EGFRCR SERPLBLD CKD-EPI 2021: 50 ML/MIN/1.73M*2
EOSINOPHIL # BLD AUTO: 0.07 X10*3/UL (ref 0–0.7)
EOSINOPHIL NFR BLD AUTO: 0.9 %
ERYTHROCYTE [DISTWIDTH] IN BLOOD BY AUTOMATED COUNT: 12.5 % (ref 11.5–14.5)
GLUCOSE BLD MANUAL STRIP-MCNC: 153 MG/DL (ref 74–99)
GLUCOSE SERPL-MCNC: 163 MG/DL (ref 74–99)
HCT VFR BLD AUTO: 41.5 % (ref 41–52)
HGB BLD-MCNC: 14 G/DL (ref 13.5–17.5)
IMM GRANULOCYTES # BLD AUTO: 0.03 X10*3/UL (ref 0–0.7)
IMM GRANULOCYTES NFR BLD AUTO: 0.4 % (ref 0–0.9)
INR PPP: 1 (ref 0.9–1.1)
LIPASE SERPL-CCNC: 29 U/L (ref 9–82)
LYMPHOCYTES # BLD AUTO: 1.7 X10*3/UL (ref 1.2–4.8)
LYMPHOCYTES NFR BLD AUTO: 20.7 %
MCH RBC QN AUTO: 31 PG (ref 26–34)
MCHC RBC AUTO-ENTMCNC: 33.7 G/DL (ref 32–36)
MCV RBC AUTO: 92 FL (ref 80–100)
MONOCYTES # BLD AUTO: 0.73 X10*3/UL (ref 0.1–1)
MONOCYTES NFR BLD AUTO: 8.9 %
NEUTROPHILS # BLD AUTO: 5.59 X10*3/UL (ref 1.2–7.7)
NEUTROPHILS NFR BLD AUTO: 68.1 %
NRBC BLD-RTO: 0 /100 WBCS (ref 0–0)
P AXIS: -2 DEGREES
P AXIS: -7 DEGREES
P OFFSET: 191 MS
P OFFSET: 194 MS
P ONSET: 126 MS
P ONSET: 142 MS
PLATELET # BLD AUTO: 161 X10*3/UL (ref 150–450)
POTASSIUM SERPL-SCNC: 4.3 MMOL/L (ref 3.5–5.3)
PR INTERVAL: 160 MS
PR INTERVAL: 180 MS
PROT SERPL-MCNC: 6.6 G/DL (ref 6.4–8.2)
PROTHROMBIN TIME: 11.4 SECONDS (ref 9.8–12.8)
Q ONSET: 216 MS
Q ONSET: 222 MS
QRS COUNT: 11 BEATS
QRS COUNT: 9 BEATS
QRS DURATION: 84 MS
QRS DURATION: 98 MS
QT INTERVAL: 392 MS
QT INTERVAL: 414 MS
QTC CALCULATION(BAZETT): 397 MS
QTC CALCULATION(BAZETT): 402 MS
QTC FREDERICIA: 396 MS
QTC FREDERICIA: 407 MS
R AXIS: -4 DEGREES
R AXIS: 2 DEGREES
RBC # BLD AUTO: 4.51 X10*6/UL (ref 4.5–5.9)
SODIUM SERPL-SCNC: 136 MMOL/L (ref 136–145)
T AXIS: 13 DEGREES
T AXIS: 8 DEGREES
T OFFSET: 418 MS
T OFFSET: 423 MS
UFH PPP CHRO-ACNC: 0.2 IU/ML
VENTRICULAR RATE: 57 BPM
VENTRICULAR RATE: 62 BPM
WBC # BLD AUTO: 8.2 X10*3/UL (ref 4.4–11.3)

## 2024-09-28 PROCEDURE — 80053 COMPREHEN METABOLIC PANEL: CPT | Performed by: STUDENT IN AN ORGANIZED HEALTH CARE EDUCATION/TRAINING PROGRAM

## 2024-09-28 PROCEDURE — 85730 THROMBOPLASTIN TIME PARTIAL: CPT | Performed by: STUDENT IN AN ORGANIZED HEALTH CARE EDUCATION/TRAINING PROGRAM

## 2024-09-28 PROCEDURE — 2500000001 HC RX 250 WO HCPCS SELF ADMINISTERED DRUGS (ALT 637 FOR MEDICARE OP): Performed by: STUDENT IN AN ORGANIZED HEALTH CARE EDUCATION/TRAINING PROGRAM

## 2024-09-28 PROCEDURE — 99291 CRITICAL CARE FIRST HOUR: CPT | Performed by: STUDENT IN AN ORGANIZED HEALTH CARE EDUCATION/TRAINING PROGRAM

## 2024-09-28 PROCEDURE — 71045 X-RAY EXAM CHEST 1 VIEW: CPT

## 2024-09-28 PROCEDURE — 36415 COLL VENOUS BLD VENIPUNCTURE: CPT | Performed by: STUDENT IN AN ORGANIZED HEALTH CARE EDUCATION/TRAINING PROGRAM

## 2024-09-28 PROCEDURE — 85610 PROTHROMBIN TIME: CPT | Performed by: STUDENT IN AN ORGANIZED HEALTH CARE EDUCATION/TRAINING PROGRAM

## 2024-09-28 PROCEDURE — 85520 HEPARIN ASSAY: CPT | Performed by: INTERNAL MEDICINE

## 2024-09-28 PROCEDURE — 85025 COMPLETE CBC W/AUTO DIFF WBC: CPT | Performed by: STUDENT IN AN ORGANIZED HEALTH CARE EDUCATION/TRAINING PROGRAM

## 2024-09-28 PROCEDURE — 96374 THER/PROPH/DIAG INJ IV PUSH: CPT

## 2024-09-28 PROCEDURE — 2060000001 HC INTERMEDIATE ICU ROOM DAILY

## 2024-09-28 PROCEDURE — 84484 ASSAY OF TROPONIN QUANT: CPT | Performed by: STUDENT IN AN ORGANIZED HEALTH CARE EDUCATION/TRAINING PROGRAM

## 2024-09-28 PROCEDURE — 71045 X-RAY EXAM CHEST 1 VIEW: CPT | Performed by: RADIOLOGY

## 2024-09-28 PROCEDURE — 2500000004 HC RX 250 GENERAL PHARMACY W/ HCPCS (ALT 636 FOR OP/ED): Performed by: STUDENT IN AN ORGANIZED HEALTH CARE EDUCATION/TRAINING PROGRAM

## 2024-09-28 PROCEDURE — 84484 ASSAY OF TROPONIN QUANT: CPT | Performed by: INTERNAL MEDICINE

## 2024-09-28 PROCEDURE — 99223 1ST HOSP IP/OBS HIGH 75: CPT | Performed by: INTERNAL MEDICINE

## 2024-09-28 PROCEDURE — 82947 ASSAY GLUCOSE BLOOD QUANT: CPT

## 2024-09-28 PROCEDURE — 2500000001 HC RX 250 WO HCPCS SELF ADMINISTERED DRUGS (ALT 637 FOR MEDICARE OP): Performed by: INTERNAL MEDICINE

## 2024-09-28 PROCEDURE — 93005 ELECTROCARDIOGRAM TRACING: CPT

## 2024-09-28 PROCEDURE — 83690 ASSAY OF LIPASE: CPT | Performed by: STUDENT IN AN ORGANIZED HEALTH CARE EDUCATION/TRAINING PROGRAM

## 2024-09-28 RX ORDER — DEXTROSE 50 % IN WATER (D50W) INTRAVENOUS SYRINGE
12.5
Status: DISCONTINUED | OUTPATIENT
Start: 2024-09-28 | End: 2024-09-30 | Stop reason: HOSPADM

## 2024-09-28 RX ORDER — DEXTROSE 50 % IN WATER (D50W) INTRAVENOUS SYRINGE
25
Status: DISCONTINUED | OUTPATIENT
Start: 2024-09-28 | End: 2024-09-30 | Stop reason: HOSPADM

## 2024-09-28 RX ORDER — FINASTERIDE 5 MG/1
5 TABLET, FILM COATED ORAL DAILY
Status: DISCONTINUED | OUTPATIENT
Start: 2024-09-29 | End: 2024-09-30 | Stop reason: HOSPADM

## 2024-09-28 RX ORDER — TRIAMTERENE/HYDROCHLOROTHIAZID 37.5-25 MG
0.5 TABLET ORAL DAILY
Status: DISCONTINUED | OUTPATIENT
Start: 2024-09-29 | End: 2024-09-30 | Stop reason: HOSPADM

## 2024-09-28 RX ORDER — POLYETHYLENE GLYCOL 3350 17 G/17G
17 POWDER, FOR SOLUTION ORAL DAILY PRN
Status: DISCONTINUED | OUTPATIENT
Start: 2024-09-28 | End: 2024-09-30 | Stop reason: HOSPADM

## 2024-09-28 RX ORDER — NAPROXEN SODIUM 220 MG/1
81 TABLET, FILM COATED ORAL DAILY
Status: DISCONTINUED | OUTPATIENT
Start: 2024-09-28 | End: 2024-09-30 | Stop reason: HOSPADM

## 2024-09-28 RX ORDER — HEPARIN SODIUM 10000 [USP'U]/100ML
0-4000 INJECTION, SOLUTION INTRAVENOUS CONTINUOUS
Status: DISCONTINUED | OUTPATIENT
Start: 2024-09-28 | End: 2024-09-30

## 2024-09-28 RX ORDER — INSULIN GLARGINE 100 [IU]/ML
90 INJECTION, SOLUTION SUBCUTANEOUS
Status: DISCONTINUED | OUTPATIENT
Start: 2024-09-29 | End: 2024-09-30 | Stop reason: HOSPADM

## 2024-09-28 RX ORDER — ONDANSETRON 4 MG/1
4 TABLET, FILM COATED ORAL EVERY 6 HOURS PRN
Status: DISCONTINUED | OUTPATIENT
Start: 2024-09-28 | End: 2024-09-30 | Stop reason: HOSPADM

## 2024-09-28 RX ORDER — ATORVASTATIN CALCIUM 20 MG/1
40 TABLET, FILM COATED ORAL NIGHTLY
Status: DISCONTINUED | OUTPATIENT
Start: 2024-09-28 | End: 2024-09-30 | Stop reason: HOSPADM

## 2024-09-28 RX ORDER — VALSARTAN 40 MG/1
40 TABLET ORAL DAILY
Status: DISCONTINUED | OUTPATIENT
Start: 2024-09-29 | End: 2024-09-30 | Stop reason: HOSPADM

## 2024-09-28 RX ORDER — RANOLAZINE 500 MG/1
500 TABLET, EXTENDED RELEASE ORAL EVERY 12 HOURS
Status: DISCONTINUED | OUTPATIENT
Start: 2024-09-28 | End: 2024-09-30 | Stop reason: HOSPADM

## 2024-09-28 RX ORDER — ICOSAPENT ETHYL 1 G/1
2 CAPSULE ORAL 2 TIMES DAILY
Status: DISCONTINUED | OUTPATIENT
Start: 2024-09-28 | End: 2024-09-30 | Stop reason: HOSPADM

## 2024-09-28 RX ORDER — ACETAMINOPHEN 325 MG/1
650 TABLET ORAL EVERY 4 HOURS PRN
Status: DISCONTINUED | OUTPATIENT
Start: 2024-09-28 | End: 2024-09-30 | Stop reason: HOSPADM

## 2024-09-28 RX ORDER — AMLODIPINE BESYLATE 5 MG/1
5 TABLET ORAL DAILY
Status: DISCONTINUED | OUTPATIENT
Start: 2024-09-29 | End: 2024-09-30 | Stop reason: HOSPADM

## 2024-09-28 RX ORDER — NITROGLYCERIN 0.4 MG/1
0.4 TABLET SUBLINGUAL EVERY 5 MIN PRN
Status: DISCONTINUED | OUTPATIENT
Start: 2024-09-28 | End: 2024-09-30 | Stop reason: HOSPADM

## 2024-09-28 RX ORDER — TAMSULOSIN HYDROCHLORIDE 0.4 MG/1
0.4 CAPSULE ORAL DAILY
Status: DISCONTINUED | OUTPATIENT
Start: 2024-09-29 | End: 2024-09-30 | Stop reason: HOSPADM

## 2024-09-28 RX ORDER — MORPHINE SULFATE 2 MG/ML
2 INJECTION, SOLUTION INTRAMUSCULAR; INTRAVENOUS
Status: DISCONTINUED | OUTPATIENT
Start: 2024-09-28 | End: 2024-09-30 | Stop reason: HOSPADM

## 2024-09-28 RX ORDER — INSULIN LISPRO 100 [IU]/ML
0-20 INJECTION, SOLUTION INTRAVENOUS; SUBCUTANEOUS
Status: DISCONTINUED | OUTPATIENT
Start: 2024-09-29 | End: 2024-09-30 | Stop reason: HOSPADM

## 2024-09-28 RX ORDER — METOPROLOL SUCCINATE 25 MG/1
25 TABLET, EXTENDED RELEASE ORAL DAILY
Status: DISCONTINUED | OUTPATIENT
Start: 2024-09-29 | End: 2024-09-30 | Stop reason: HOSPADM

## 2024-09-28 RX ORDER — NITROGLYCERIN 0.4 MG/1
0.4 TABLET SUBLINGUAL ONCE
Status: COMPLETED | OUTPATIENT
Start: 2024-09-28 | End: 2024-09-28

## 2024-09-28 RX ORDER — CLOPIDOGREL BISULFATE 75 MG/1
75 TABLET ORAL DAILY
Status: DISCONTINUED | OUTPATIENT
Start: 2024-09-28 | End: 2024-09-30 | Stop reason: HOSPADM

## 2024-09-28 RX ADMIN — NITROGLYCERIN 0.4 MG: 0.4 TABLET SUBLINGUAL at 13:20

## 2024-09-28 RX ADMIN — ATORVASTATIN CALCIUM 40 MG: 20 TABLET, FILM COATED ORAL at 21:07

## 2024-09-28 RX ADMIN — SODIUM CHLORIDE, POTASSIUM CHLORIDE, SODIUM LACTATE AND CALCIUM CHLORIDE 1000 ML: 600; 310; 30; 20 INJECTION, SOLUTION INTRAVENOUS at 15:20

## 2024-09-28 RX ADMIN — HEPARIN SODIUM 1000 UNITS/HR: 10000 INJECTION, SOLUTION INTRAVENOUS at 15:04

## 2024-09-28 RX ADMIN — NITROGLYCERIN 0.4 MG: 0.4 TABLET SUBLINGUAL at 15:19

## 2024-09-28 RX ADMIN — RANOLAZINE 500 MG: 500 TABLET, FILM COATED, EXTENDED RELEASE ORAL at 21:07

## 2024-09-28 RX ADMIN — ICOSAPENT ETHYL 2 G: 1000 CAPSULE ORAL at 21:07

## 2024-09-28 RX ADMIN — ASPIRIN 81 MG CHEWABLE TABLET 81 MG: 81 TABLET CHEWABLE at 18:29

## 2024-09-28 SDOH — SOCIAL STABILITY: SOCIAL INSECURITY
WITHIN THE LAST YEAR, HAVE YOU BEEN KICKED, HIT, SLAPPED, OR OTHERWISE PHYSICALLY HURT BY YOUR PARTNER OR EX-PARTNER?: NO

## 2024-09-28 SDOH — SOCIAL STABILITY: SOCIAL INSECURITY: WITHIN THE LAST YEAR, HAVE YOU BEEN HUMILIATED OR EMOTIONALLY ABUSED IN OTHER WAYS BY YOUR PARTNER OR EX-PARTNER?: NO

## 2024-09-28 SDOH — SOCIAL STABILITY: SOCIAL INSECURITY: WITHIN THE LAST YEAR, HAVE YOU BEEN AFRAID OF YOUR PARTNER OR EX-PARTNER?: NO

## 2024-09-28 SDOH — SOCIAL STABILITY: SOCIAL INSECURITY
WITHIN THE LAST YEAR, HAVE TO BEEN RAPED OR FORCED TO HAVE ANY KIND OF SEXUAL ACTIVITY BY YOUR PARTNER OR EX-PARTNER?: NO

## 2024-09-28 SDOH — SOCIAL STABILITY: SOCIAL INSECURITY: HAVE YOU HAD THOUGHTS OF HARMING ANYONE ELSE?: NO

## 2024-09-28 SDOH — SOCIAL STABILITY: SOCIAL INSECURITY: HAVE YOU HAD ANY THOUGHTS OF HARMING ANYONE ELSE?: NO

## 2024-09-28 SDOH — SOCIAL STABILITY: SOCIAL INSECURITY: DO YOU FEEL UNSAFE GOING BACK TO THE PLACE WHERE YOU ARE LIVING?: NO

## 2024-09-28 SDOH — SOCIAL STABILITY: SOCIAL INSECURITY: ABUSE: ADULT

## 2024-09-28 SDOH — SOCIAL STABILITY: SOCIAL INSECURITY: DO YOU FEEL ANYONE HAS EXPLOITED OR TAKEN ADVANTAGE OF YOU FINANCIALLY OR OF YOUR PERSONAL PROPERTY?: NO

## 2024-09-28 SDOH — ECONOMIC STABILITY: FOOD INSECURITY: WITHIN THE PAST 12 MONTHS, THE FOOD YOU BOUGHT JUST DIDN'T LAST AND YOU DIDN'T HAVE MONEY TO GET MORE.: PATIENT DECLINED

## 2024-09-28 SDOH — ECONOMIC STABILITY: INCOME INSECURITY: IN THE PAST 12 MONTHS, HAS THE ELECTRIC, GAS, OIL, OR WATER COMPANY THREATENED TO SHUT OFF SERVICE IN YOUR HOME?: NO

## 2024-09-28 SDOH — SOCIAL STABILITY: SOCIAL INSECURITY: DOES ANYONE TRY TO KEEP YOU FROM HAVING/CONTACTING OTHER FRIENDS OR DOING THINGS OUTSIDE YOUR HOME?: NO

## 2024-09-28 SDOH — SOCIAL STABILITY: SOCIAL INSECURITY: ARE YOU OR HAVE YOU BEEN THREATENED OR ABUSED PHYSICALLY, EMOTIONALLY, OR SEXUALLY BY ANYONE?: NO

## 2024-09-28 SDOH — SOCIAL STABILITY: SOCIAL INSECURITY: HAS ANYONE EVER THREATENED TO HURT YOUR FAMILY OR YOUR PETS?: NO

## 2024-09-28 SDOH — SOCIAL STABILITY: SOCIAL INSECURITY: ARE THERE ANY APPARENT SIGNS OF INJURIES/BEHAVIORS THAT COULD BE RELATED TO ABUSE/NEGLECT?: NO

## 2024-09-28 SDOH — ECONOMIC STABILITY: FOOD INSECURITY: WITHIN THE PAST 12 MONTHS, YOU WORRIED THAT YOUR FOOD WOULD RUN OUT BEFORE YOU GOT MONEY TO BUY MORE.: PATIENT DECLINED

## 2024-09-28 ASSESSMENT — LIFESTYLE VARIABLES
HOW MANY STANDARD DRINKS CONTAINING ALCOHOL DO YOU HAVE ON A TYPICAL DAY: PATIENT DOES NOT DRINK
AUDIT-C TOTAL SCORE: 1
TOTAL SCORE: 0
HAVE YOU EVER FELT YOU SHOULD CUT DOWN ON YOUR DRINKING: NO
HAVE PEOPLE ANNOYED YOU BY CRITICIZING YOUR DRINKING: NO
HOW OFTEN DO YOU HAVE 6 OR MORE DRINKS ON ONE OCCASION: NEVER
HOW OFTEN DO YOU HAVE A DRINK CONTAINING ALCOHOL: MONTHLY OR LESS
EVER HAD A DRINK FIRST THING IN THE MORNING TO STEADY YOUR NERVES TO GET RID OF A HANGOVER: NO
AUDIT-C TOTAL SCORE: 1
SKIP TO QUESTIONS 9-10: 1
EVER FELT BAD OR GUILTY ABOUT YOUR DRINKING: NO

## 2024-09-28 ASSESSMENT — COGNITIVE AND FUNCTIONAL STATUS - GENERAL
DAILY ACTIVITIY SCORE: 24
MOBILITY SCORE: 24
PATIENT BASELINE BEDBOUND: NO

## 2024-09-28 ASSESSMENT — ACTIVITIES OF DAILY LIVING (ADL)
ADEQUATE_TO_COMPLETE_ADL: YES
GROOMING: INDEPENDENT
TOILETING: INDEPENDENT
LACK_OF_TRANSPORTATION: PATIENT DECLINED
HEARING - RIGHT EAR: FUNCTIONAL
PATIENT'S MEMORY ADEQUATE TO SAFELY COMPLETE DAILY ACTIVITIES?: YES
JUDGMENT_ADEQUATE_SAFELY_COMPLETE_DAILY_ACTIVITIES: YES
HEARING - LEFT EAR: FUNCTIONAL
BATHING: INDEPENDENT
FEEDING YOURSELF: INDEPENDENT
DRESSING YOURSELF: INDEPENDENT
WALKS IN HOME: INDEPENDENT

## 2024-09-28 ASSESSMENT — PAIN - FUNCTIONAL ASSESSMENT: PAIN_FUNCTIONAL_ASSESSMENT: 0-10

## 2024-09-28 ASSESSMENT — PAIN DESCRIPTION - LOCATION: LOCATION: CHEST

## 2024-09-28 ASSESSMENT — PAIN SCALES - GENERAL
PAINLEVEL_OUTOF10: 5 - MODERATE PAIN
PAINLEVEL_OUTOF10: 0 - NO PAIN

## 2024-09-28 ASSESSMENT — PAIN DESCRIPTION - PAIN TYPE: TYPE: ACUTE PAIN

## 2024-09-28 ASSESSMENT — COLUMBIA-SUICIDE SEVERITY RATING SCALE - C-SSRS
6. HAVE YOU EVER DONE ANYTHING, STARTED TO DO ANYTHING, OR PREPARED TO DO ANYTHING TO END YOUR LIFE?: NO
2. HAVE YOU ACTUALLY HAD ANY THOUGHTS OF KILLING YOURSELF?: NO
1. IN THE PAST MONTH, HAVE YOU WISHED YOU WERE DEAD OR WISHED YOU COULD GO TO SLEEP AND NOT WAKE UP?: NO

## 2024-09-28 ASSESSMENT — PAIN DESCRIPTION - DESCRIPTORS: DESCRIPTORS: SHARP;RADIATING

## 2024-09-28 ASSESSMENT — PATIENT HEALTH QUESTIONNAIRE - PHQ9
2. FEELING DOWN, DEPRESSED OR HOPELESS: NOT AT ALL
1. LITTLE INTEREST OR PLEASURE IN DOING THINGS: NOT AT ALL
SUM OF ALL RESPONSES TO PHQ9 QUESTIONS 1 & 2: 0

## 2024-09-28 ASSESSMENT — PAIN DESCRIPTION - ORIENTATION: ORIENTATION: LEFT

## 2024-09-28 NOTE — CARE PLAN
The patient's goals for the shift include      The clinical goals for the shift include Patient will be free of chest pain by end of shift

## 2024-09-28 NOTE — ED PROVIDER NOTES
"HPI   Chief Complaint   Patient presents with    Chest Pain     Chest pain since last night after \"wrestling with grandson\".  Radiates to left arm and jaw.  +SOB.  Hx of 8 cardiac stents, pt states he took 2 nitroglycerin pta.  Pt states relief with second dose       Patient is a 69-year-old male with history of type 2 diabetes, CKD, hypertension, hyperlipidemia, 8 stents who presents for chest pain.  Patient states he was playing with his grandson last evening, was having chest pain after that.  States it continued through this morning and is rating to his jaw and left arm.  States he took 2 nitro, got relief after the second nitro.  States he has been feeling tired lately.  No associated diaphoresis, nausea with his symptoms.  States he feels slightly short of breath.  No vomiting, diarrhea, abdominal pain, fevers, chills, cough, runny nose, sore throat.  No history of DVT or PE.  No tobacco or alcohol.              Patient History   No past medical history on file.  Past Surgical History:   Procedure Laterality Date    OTHER SURGICAL HISTORY  12/12/2018    Cardiac catheterization with stent placement    OTHER SURGICAL HISTORY  12/12/2018    Renal lithotripsy    OTHER SURGICAL HISTORY  12/12/2018    Shoulder surgery    OTHER SURGICAL HISTORY  12/12/2018    Knee reconstruction    OTHER SURGICAL HISTORY  12/12/2018    Hernia repair    OTHER SURGICAL HISTORY  02/28/2022    Colonoscopy    OTHER SURGICAL HISTORY  02/28/2022    Dental surgery    OTHER SURGICAL HISTORY  02/28/2022    Inguinal hernia repair    OTHER SURGICAL HISTORY  02/28/2022    Percutaneous transluminal coronary angioplasty    OTHER SURGICAL HISTORY  03/09/2022    Dermatological surgery     Family History   Problem Relation Name Age of Onset    Other (bone/cartilage disorder) Mother      Diabetes Mother      Gallbladder disease Mother      Diabetes Father      Other (cardiac disorder) Father      Nephrolithiasis Father      Prostate cancer Father      " Other (bladder cancer) Father      Heart attack Father      Rheum arthritis Father      Skin cancer Father      Kidney nephrosis Father      Diabetes Paternal Grandfather      Skin cancer Paternal Grandfather       Social History     Tobacco Use    Smoking status: Never    Smokeless tobacco: Never   Vaping Use    Vaping status: Never Used   Substance Use Topics    Alcohol use: Not Currently    Drug use: Never       Physical Exam   ED Triage Vitals [09/28/24 1250]   Temperature Heart Rate Respirations BP   36.6 °C (97.9 °F) 63 18 123/66      Pulse Ox Temp Source Heart Rate Source Patient Position   95 % Temporal Monitor Sitting      BP Location FiO2 (%)     Right arm --       Physical Exam  Constitutional:       General: He is not in acute distress.  HENT:      Head: Normocephalic.   Eyes:      Extraocular Movements: Extraocular movements intact.      Conjunctiva/sclera: Conjunctivae normal.      Pupils: Pupils are equal, round, and reactive to light.   Cardiovascular:      Rate and Rhythm: Normal rate and regular rhythm.      Pulses: Normal pulses.           Radial pulses are 2+ on the right side and 2+ on the left side.        Dorsalis pedis pulses are 2+ on the right side and 2+ on the left side.      Heart sounds: Normal heart sounds.   Pulmonary:      Effort: Pulmonary effort is normal.      Breath sounds: Normal breath sounds.   Abdominal:      General: There is no distension.      Palpations: Abdomen is soft. There is no mass.      Tenderness: There is no abdominal tenderness. There is no guarding.   Musculoskeletal:         General: No deformity.      Cervical back: Normal range of motion and neck supple.      Right lower leg: No edema.      Left lower leg: No edema.   Skin:     General: Skin is warm and dry.      Findings: No lesion or rash.   Neurological:      General: No focal deficit present.      Mental Status: He is alert and oriented to person, place, and time. Mental status is at baseline.       Cranial Nerves: No cranial nerve deficit.      Sensory: No sensory deficit.      Motor: No weakness.   Psychiatric:         Mood and Affect: Mood normal.           ED Course & MDM   Diagnoses as of 09/28/24 1513   NSTEMI (non-ST elevated myocardial infarction) (Multi)                 No data recorded     Rivka Coma Scale Score: 15 (09/28/24 1252 : Sade Solis RN)                           Medical Decision Making  EKG on my interpretation: Rate 62, rhythm regular, axis normal, , QRS 84, QTc 397, T waves: Unremarkable, ST segments: No elevations or depressions, interpretation: Normal sinus rhythm, no STEMI    EKG on my interpretation: Rate 62, rhythm regular, axis normal, , QRS 80, QTc 399, T waves: Unremarkable, ST segments: No elevations or depressions, interpretation: Normal sinus rhythm, no STEMI, similar to EKG from February 25, 2023 and January 23, 2023.    EKG on my interpretation: Rate 57, rhythm regular, axis normal, , QRS 98, QTc 4 2, T waves: Unremarkable, ST segments: No elevations or depressions, interpretation: Sinus bradycardia, no STEMI    EKG on my interpretation: Rate 49, rhythm regular, axis normal, , , QTc 397, T waves: Unremarkable, ST segments: No elevations or depressions, interpretation: Sinus bradycardia with PACs, no STEMI    Patient is a 69-year-old male with above-stated past medical history presents for chest pain.  Patient's EKGs are similar to his previous, troponins are negative, this lowers my suspicion for ACS, however patient does have significant risk factors for ACS, therefore I discussed his case with Dr. Mckeon of interventional cardiology who reviewed the patient's case and did not feel he required emergent catheterization, however did recommend heparinization and admission.  Heparin was started in the emergency department.  Patient was given additional dose of nitro as this was improving his pain.  Remainder of patient's laboratory  testing is grossly unremarkable.  His creatinine is improved versus previous value, no other significant derangements.  Lipase is not consistent with pancreatitis.  I have low clinical suspicion for pulmonary embolism, dissection, Boerhaave's, and bedside ultrasound did not show signs of pericardial effusion.  Patient is clinically well-appearing nontoxic.  I discussed this case with the medicine service accepted for admission.    Disclaimer: This note was dictated using speech recognition software. Minor errors in transcription may be present. Please call if questions.     Armond Betancourt MD  [facility] Emergency Medicine  Contact on Epic Ha[employer]            Procedure  Critical Care    Performed by: Armond Betancourt MD  Authorized by: Armond Betancourt MD    Critical care provider statement:     Critical care time (minutes):  33    Critical care time was exclusive of:  Separately billable procedures and treating other patients    Critical care was necessary to treat or prevent imminent or life-threatening deterioration of the following conditions: NSTEMI.    Critical care was time spent personally by me on the following activities:  Development of treatment plan with patient or surrogate, discussions with consultants, evaluation of patient's response to treatment, examination of patient, obtaining history from patient or surrogate, ordering and performing treatments and interventions, ordering and review of laboratory studies, ordering and review of radiographic studies, re-evaluation of patient's condition, pulse oximetry and review of old charts    Care discussed with: admitting provider         Armond Betancourt MD  09/28/24 3842       Armond Betancourt MD  09/28/24 1611

## 2024-09-28 NOTE — H&P
Medical Group History and Physical    ASSESSMENT & PLAN:     Unstable angina  CAD s/p PCI  -cardiology consult  -hep gtt per interventional cards recs  -home ASA 81, Plavix, high intensity statin, metoprolol, ARB, amlodipine, Ranexa  -trend third troponin, serial EKG  -check TTE  -tele monitoring  -PRN SL nitroglycerin, morphine for CP    DLD - home statin, vascepa  CKD3 - Scr at baseline. Monitor BMP.   HTN - home meds  DM2 - A1C 6.7 (9/9/24). Cont home insulin regimen at reduced dose, titrate prn  BPH - home flomax, proscar    Vte ppx already on hep gtt  Full code, discussed with pt    Sesar Adler MD    HISTORY OF PRESENT ILLNESS:   Chief Complaint: chest pain    History Of Present Illness:    69M with PMH of HTN, DLD, DM2, CAD s/p multiple PCI, PAZ, CKD3 who is presenting with chest pain. Started abruptly last night, L sided, dull aching. He initially thought it was a pulled muscle after wrestling with his grandson. However he woke up this morning and it was persistent, and spread to his L jaw, L arm, and L shoulder. He came to ED because of his significant history. He took SL nitroglycerin before coming and received in ED, and this improved the CP.     In the ED, afebrile, VSS, on room air. Labs showed stable CKD3. Trop neg x2. ECG neg for acute changes, does have some old ST segment changes. CXR neg for acute findings. Received SL nitroglycerin, hep gtt, IVF.     Review of systems: 10 point review of systems is otherwise negative except as mentioned above.    PAST HISTORIES:     Past Medical History:  He has no past medical history on file.    Past Surgical History:  He has a past surgical history that includes Other surgical history (12/12/2018); Other surgical history (12/12/2018); Other surgical history (12/12/2018); Other surgical history (12/12/2018); Other surgical history (12/12/2018); Other surgical history (02/28/2022); Other surgical history (02/28/2022); Other surgical history (02/28/2022);  "Other surgical history (02/28/2022); and Other surgical history (03/09/2022).      Social History:  He reports that he has never smoked. He has never used smokeless tobacco. He reports that he does not currently use alcohol. He reports that he does not use drugs.    Family History:  Family History   Problem Relation Name Age of Onset   • Other (bone/cartilage disorder) Mother     • Diabetes Mother     • Gallbladder disease Mother     • Diabetes Father     • Other (cardiac disorder) Father     • Nephrolithiasis Father     • Prostate cancer Father     • Other (bladder cancer) Father     • Heart attack Father     • Rheum arthritis Father     • Skin cancer Father     • Kidney nephrosis Father     • Diabetes Paternal Grandfather     • Skin cancer Paternal Grandfather          Allergies:  Adhesive tape-silicones, Epinephrine, Latex, Meperidine, Penicillins, Promethazine, and Sulfa (sulfonamide antibiotics)    OBJECTIVE:     Last Recorded Vitals:  Vitals:    09/28/24 1250 09/28/24 1430   BP: 123/66 107/60   BP Location: Right arm    Patient Position: Sitting    Pulse: 63 54   Resp: 18 17   Temp: 36.6 °C (97.9 °F)    TempSrc: Temporal    SpO2: 95% 95%   Weight: 119 kg (263 lb)    Height: 1.854 m (6' 1\")        Last I/O:  No intake/output data recorded.    Physical Exam:  GEN: healthy appearing, appears stated age, NAD  CV: RRR, no m/r/g, no LE edema  LUNGS: CTAB, no w/r/c  ABD: soft, NT, ND, NBS  SKIN: ble venous stasis changes  MSK; no gross deformities, normal joints  NEURO: A+Ox3, no FND  PSYCH: appropriate mood, affect    Scheduled Medications  lactated Ringer's, 1,000 mL, intravenous, Once        PRN Medications  PRN medications: heparin    Continuous Medications  heparin, 0-4,000 Units/hr, Last Rate: 1,000 Units/hr (09/28/24 1504)        Outpatient Medications:  Prior to Admission medications    Medication Sig Start Date End Date Taking? Authorizing Provider   amLODIPine (Norvasc) 5 mg tablet TAKE 1 TABLET (5 MG) BY " MOUTH ONCE DAILY AS DIRECTED 5/21/24   Sherwin Mayo MD   aspirin 81 mg chewable tablet Chew.    Historical Provider, MD   atorvastatin (Lipitor) 40 mg tablet Take 1 tablet (40 mg) by mouth once daily at bedtime. 6/19/24 6/19/25  Armando Antonio MD   cetirizine (ZyrTEC) 10 mg tablet Take 1 tablet (10 mg) by mouth once daily. 1/31/22   Historical Provider, MD   cholecalciferol (Vitamin D-3) 50,000 unit capsule TAKE 1 CAPSULE (77629 UNITS) BY MOUTH ONE TIME PER WEEK 5/26/24   Stephanie Carney MD   clopidogrel (Plavix) 75 mg tablet Take 1 tablet (75 mg) by mouth once daily. 3/13/24 3/13/25  Armando Antonio MD   CRANBERRY ORAL Take 2 tablets by mouth once daily.    Historical Provider, MD   docusate sodium (Colace) 100 mg capsule Take 1 capsule (100 mg) by mouth 2 times a day. 4/5/10   Historical Provider, MD   finasteride (Proscar) 5 mg tablet Take 1 tablet (5 mg) by mouth once daily. 9/25/24   Brandon Perdue MD   icosapent ethyL (Vascepa) 1 gram capsule Take 2 capsules (2 g) by mouth 2 times a day. 12/11/23   Sherwin Mayo MD   insulin aspart (NovoLOG FlexPen U-100 Insulin) 100 unit/mL (3 mL) pen Inject under the skin. Inject 20 units plus coverage for snacks 2/22/21   Historical Provider, MD   insulin degludec (Tresiba FlexTouch U-200) 200 unit/mL (3 mL) injection Inject 104 Units under the skin once daily at bedtime.  Patient taking differently: Inject 104 Units under the skin once daily in the morning. 5/29/24   Sherwin Mayo MD   ketoconazole (NIZOral) 2 % shampoo Apply topically 2 times a week. Apply to scalp in shower. Lather, leave on 5 minutes then rinse 2/1/24 1/31/25  Tan Mchugh MD   Lactobacillus acidophilus (PROBIOTIC ORAL) Take by mouth.    Historical Provider, MD   metoprolol succinate XL (Toprol-XL) 25 mg 24 hr tablet TAKE 1 TABLET BY MOUTH EVERY DAY 4/2/24   Sherwin Mayo MD   nitroglycerin (Nitrostat) 0.4 mg SL tablet Place 1 tablet (0.4 mg) under the tongue every 5  minutes if needed for chest pain. 3/13/24 3/13/25  Armando Antonio MD   potassium chloride CR (Klor-Con M20) 20 mEq ER tablet Take 1 tablet (20 mEq) by mouth once daily. 2/28/24   Sherwin Mayo MD   ranolazine (Ranexa) 500 mg 12 hr tablet TAKE 1 TABLET BY MOUTH EVERY 12 HOURS 10/12/23   Armando Antonio MD   semaglutide (Rybelsus) 14 mg tablet tablet Take 1 tablet (14 mg) by mouth once daily.    Historical MD Ihsan   tamsulosin (Flomax) 0.4 mg 24 hr capsule Take 1 capsule (0.4 mg) by mouth once daily. 9/25/24   Brandon Perdue MD   telmisartan (MIcarDIS) 20 mg tablet TAKE 1 TABLET (20 MG) BY MOUTH ONCE EVERY 24 HOURS. 9/19/24   Sherwin Mayo MD   triamterene-hydrochlorothiazid (Maxzide-25) 37.5-25 mg tablet Take half tablet po daily 3/15/24   Sherwin Mayo MD   finasteride (Proscar) 5 mg tablet Take 1 tablet (5 mg) by mouth once daily. 8/26/24 9/25/24  Brandon Perdue MD   tamsulosin (Flomax) 0.4 mg 24 hr capsule Take 1 capsule (0.4 mg) by mouth once daily. 8/26/24 9/25/24  Brandon Perdue MD       LABS AND IMAGING:     Labs:  Results for orders placed or performed during the hospital encounter of 09/28/24 (from the past 24 hour(s))   ECG 12 lead   Result Value Ref Range    Ventricular Rate 57 BPM    Atrial Rate 57 BPM    MA Interval 180 ms    QRS Duration 98 ms    QT Interval 414 ms    QTC Calculation(Bazett) 402 ms    P Axis -2 degrees    R Axis -4 degrees    T Axis 8 degrees    QRS Count 9 beats    Q Onset 216 ms    P Onset 126 ms    P Offset 191 ms    T Offset 423 ms    QTC Fredericia 407 ms   ECG 12 lead   Result Value Ref Range    Ventricular Rate 62 BPM    Atrial Rate 62 BPM    MA Interval 160 ms    QRS Duration 84 ms    QT Interval 392 ms    QTC Calculation(Bazett) 397 ms    P Axis -7 degrees    R Axis 2 degrees    T Axis 13 degrees    QRS Count 11 beats    Q Onset 222 ms    P Onset 142 ms    P Offset 194 ms    T Offset 418 ms    QTC Fredericia 396 ms   CBC and Auto  Differential   Result Value Ref Range    WBC 8.2 4.4 - 11.3 x10*3/uL    nRBC 0.0 0.0 - 0.0 /100 WBCs    RBC 4.51 4.50 - 5.90 x10*6/uL    Hemoglobin 14.0 13.5 - 17.5 g/dL    Hematocrit 41.5 41.0 - 52.0 %    MCV 92 80 - 100 fL    MCH 31.0 26.0 - 34.0 pg    MCHC 33.7 32.0 - 36.0 g/dL    RDW 12.5 11.5 - 14.5 %    Platelets 161 150 - 450 x10*3/uL    Neutrophils % 68.1 40.0 - 80.0 %    Immature Granulocytes %, Automated 0.4 0.0 - 0.9 %    Lymphocytes % 20.7 13.0 - 44.0 %    Monocytes % 8.9 2.0 - 10.0 %    Eosinophils % 0.9 0.0 - 6.0 %    Basophils % 1.0 0.0 - 2.0 %    Neutrophils Absolute 5.59 1.20 - 7.70 x10*3/uL    Immature Granulocytes Absolute, Automated 0.03 0.00 - 0.70 x10*3/uL    Lymphocytes Absolute 1.70 1.20 - 4.80 x10*3/uL    Monocytes Absolute 0.73 0.10 - 1.00 x10*3/uL    Eosinophils Absolute 0.07 0.00 - 0.70 x10*3/uL    Basophils Absolute 0.08 0.00 - 0.10 x10*3/uL   Comprehensive Metabolic Panel   Result Value Ref Range    Glucose 163 (H) 74 - 99 mg/dL    Sodium 136 136 - 145 mmol/L    Potassium 4.3 3.5 - 5.3 mmol/L    Chloride 106 98 - 107 mmol/L    Bicarbonate 23 21 - 32 mmol/L    Anion Gap 11 10 - 20 mmol/L    Urea Nitrogen 29 (H) 6 - 23 mg/dL    Creatinine 1.49 (H) 0.50 - 1.30 mg/dL    eGFR 50 (L) >60 mL/min/1.73m*2    Calcium 8.9 8.6 - 10.3 mg/dL    Albumin 4.2 3.4 - 5.0 g/dL    Alkaline Phosphatase 60 33 - 136 U/L    Total Protein 6.6 6.4 - 8.2 g/dL    AST 16 9 - 39 U/L    Bilirubin, Total 2.0 (H) 0.0 - 1.2 mg/dL    ALT 20 10 - 52 U/L   Lipase   Result Value Ref Range    Lipase 29 9 - 82 U/L   Troponin I, High Sensitivity, Initial   Result Value Ref Range    Troponin I, High Sensitivity 7 0 - 20 ng/L   Troponin, High Sensitivity, 1 Hour   Result Value Ref Range    Troponin I, High Sensitivity 5 0 - 20 ng/L   aPTT - baseline   Result Value Ref Range    aPTT 31 27 - 38 seconds   Protime-INR   Result Value Ref Range    Protime 11.4 9.8 - 12.8 seconds    INR 1.0 0.9 - 1.1        Imaging:  ECG 12 lead  Normal  sinus rhythm  Minimal voltage criteria for LVH, may be normal variant  Borderline ECG  When compared with ECG of 25-FEB-2023 12:48,  No significant change was found    See ED provider note for full interpretation and clinical correlation  ECG 12 lead  Sinus bradycardia  Moderate voltage criteria for LVH, may be normal variant  Borderline ECG  When compared with ECG of 28-SEP-2024 13:03, (unconfirmed)  No significant change was found    See ED provider note for full interpretation and clinical correlation  XR chest 1 view  Narrative: Interpreted By:  Roxy Oconnor,   STUDY:  XR CHEST 1 VIEW;; 9/28/2024 1:21 pm      INDICATION:  Signs/Symptoms:Chest Pain.      COMPARISON:  03/13/2024      ACCESSION NUMBER(S):  [id number]      ORDERING CLINICIAN:  MAZIN SILVA      FINDINGS:  The cardiac silhouette is stable for the portable technique. There is  no consolidations, effusions, infiltrates or pneumothorax.      Impression: No evidence for acute cardiopulmonary process. If there is clinical  concern for acute aortic pathology or pulmonary embolic disease,  further evaluation with chest CT should be considered for better  assessment.              Signed by: Roxy Oconnor 9/28/2024 1:38 PM  Dictation workstation:   [id number]

## 2024-09-28 NOTE — Clinical Note
Angioplasty of the left anterior descending lesion. Inflation 1: Pressure = 10 yanira; Duration = 25 sec.

## 2024-09-29 ENCOUNTER — APPOINTMENT (OUTPATIENT)
Dept: CARDIOLOGY | Facility: HOSPITAL | Age: 69
DRG: 322 | End: 2024-09-29
Payer: MEDICARE

## 2024-09-29 VITALS
DIASTOLIC BLOOD PRESSURE: 62 MMHG | BODY MASS INDEX: 35.59 KG/M2 | OXYGEN SATURATION: 95 % | TEMPERATURE: 98.1 F | HEART RATE: 54 BPM | SYSTOLIC BLOOD PRESSURE: 124 MMHG | RESPIRATION RATE: 16 BRPM | WEIGHT: 268.52 LBS | HEIGHT: 73 IN

## 2024-09-29 LAB
ANION GAP SERPL CALC-SCNC: 9 MMOL/L (ref 10–20)
ATRIAL RATE: 49 BPM
ATRIAL RATE: 58 BPM
BUN SERPL-MCNC: 26 MG/DL (ref 6–23)
CALCIUM SERPL-MCNC: 8.8 MG/DL (ref 8.6–10.3)
CARDIAC TROPONIN I PNL SERPL HS: 9 NG/L (ref 0–20)
CHLORIDE SERPL-SCNC: 108 MMOL/L (ref 98–107)
CO2 SERPL-SCNC: 25 MMOL/L (ref 21–32)
CREAT SERPL-MCNC: 1.56 MG/DL (ref 0.5–1.3)
EGFRCR SERPLBLD CKD-EPI 2021: 48 ML/MIN/1.73M*2
ERYTHROCYTE [DISTWIDTH] IN BLOOD BY AUTOMATED COUNT: 12.5 % (ref 11.5–14.5)
GLUCOSE BLD MANUAL STRIP-MCNC: 133 MG/DL (ref 74–99)
GLUCOSE BLD MANUAL STRIP-MCNC: 213 MG/DL (ref 74–99)
GLUCOSE BLD MANUAL STRIP-MCNC: 241 MG/DL (ref 74–99)
GLUCOSE BLD MANUAL STRIP-MCNC: 84 MG/DL (ref 74–99)
GLUCOSE SERPL-MCNC: 101 MG/DL (ref 74–99)
HCT VFR BLD AUTO: 37.7 % (ref 41–52)
HGB BLD-MCNC: 12.8 G/DL (ref 13.5–17.5)
HOLD SPECIMEN: NORMAL
MAGNESIUM SERPL-MCNC: 2.05 MG/DL (ref 1.6–2.4)
MCH RBC QN AUTO: 31.3 PG (ref 26–34)
MCHC RBC AUTO-ENTMCNC: 34 G/DL (ref 32–36)
MCV RBC AUTO: 92 FL (ref 80–100)
NRBC BLD-RTO: 0 /100 WBCS (ref 0–0)
P AXIS: -1 DEGREES
P AXIS: -13 DEGREES
P OFFSET: 196 MS
P OFFSET: 198 MS
P ONSET: 135 MS
P ONSET: 137 MS
PLATELET # BLD AUTO: 148 X10*3/UL (ref 150–450)
POTASSIUM SERPL-SCNC: 4 MMOL/L (ref 3.5–5.3)
PR INTERVAL: 160 MS
PR INTERVAL: 164 MS
Q ONSET: 215 MS
Q ONSET: 219 MS
QRS COUNT: 10 BEATS
QRS COUNT: 7 BEATS
QRS DURATION: 108 MS
QRS DURATION: 94 MS
QT INTERVAL: 414 MS
QT INTERVAL: 440 MS
QTC CALCULATION(BAZETT): 397 MS
QTC CALCULATION(BAZETT): 406 MS
QTC FREDERICIA: 409 MS
QTC FREDERICIA: 411 MS
R AXIS: 4 DEGREES
R AXIS: 8 DEGREES
RBC # BLD AUTO: 4.09 X10*6/UL (ref 4.5–5.9)
SODIUM SERPL-SCNC: 138 MMOL/L (ref 136–145)
T AXIS: -6 DEGREES
T AXIS: 21 DEGREES
T OFFSET: 426 MS
T OFFSET: 435 MS
UFH PPP CHRO-ACNC: 0.2 IU/ML
UFH PPP CHRO-ACNC: 0.3 IU/ML
VENTRICULAR RATE: 49 BPM
VENTRICULAR RATE: 58 BPM
WBC # BLD AUTO: 7 X10*3/UL (ref 4.4–11.3)

## 2024-09-29 PROCEDURE — 36415 COLL VENOUS BLD VENIPUNCTURE: CPT | Performed by: INTERNAL MEDICINE

## 2024-09-29 PROCEDURE — 2500000002 HC RX 250 W HCPCS SELF ADMINISTERED DRUGS (ALT 637 FOR MEDICARE OP, ALT 636 FOR OP/ED): Performed by: INTERNAL MEDICINE

## 2024-09-29 PROCEDURE — 93005 ELECTROCARDIOGRAM TRACING: CPT

## 2024-09-29 PROCEDURE — 2500000004 HC RX 250 GENERAL PHARMACY W/ HCPCS (ALT 636 FOR OP/ED): Performed by: INTERNAL MEDICINE

## 2024-09-29 PROCEDURE — 85027 COMPLETE CBC AUTOMATED: CPT | Performed by: INTERNAL MEDICINE

## 2024-09-29 PROCEDURE — 80048 BASIC METABOLIC PNL TOTAL CA: CPT | Performed by: INTERNAL MEDICINE

## 2024-09-29 PROCEDURE — 2500000002 HC RX 250 W HCPCS SELF ADMINISTERED DRUGS (ALT 637 FOR MEDICARE OP, ALT 636 FOR OP/ED): Performed by: NURSE PRACTITIONER

## 2024-09-29 PROCEDURE — 93010 ELECTROCARDIOGRAM REPORT: CPT | Performed by: INTERNAL MEDICINE

## 2024-09-29 PROCEDURE — 84484 ASSAY OF TROPONIN QUANT: CPT | Performed by: INTERNAL MEDICINE

## 2024-09-29 PROCEDURE — 85520 HEPARIN ASSAY: CPT | Performed by: INTERNAL MEDICINE

## 2024-09-29 PROCEDURE — 82947 ASSAY GLUCOSE BLOOD QUANT: CPT

## 2024-09-29 PROCEDURE — 99232 SBSQ HOSP IP/OBS MODERATE 35: CPT | Performed by: INTERNAL MEDICINE

## 2024-09-29 PROCEDURE — 2060000001 HC INTERMEDIATE ICU ROOM DAILY

## 2024-09-29 PROCEDURE — 99223 1ST HOSP IP/OBS HIGH 75: CPT | Performed by: INTERNAL MEDICINE

## 2024-09-29 PROCEDURE — 2500000001 HC RX 250 WO HCPCS SELF ADMINISTERED DRUGS (ALT 637 FOR MEDICARE OP): Performed by: INTERNAL MEDICINE

## 2024-09-29 PROCEDURE — 83735 ASSAY OF MAGNESIUM: CPT | Performed by: INTERNAL MEDICINE

## 2024-09-29 RX ORDER — INSULIN LISPRO 100 [IU]/ML
4 INJECTION, SOLUTION INTRAVENOUS; SUBCUTANEOUS ONCE
Status: COMPLETED | OUTPATIENT
Start: 2024-09-29 | End: 2024-09-29

## 2024-09-29 RX ORDER — SODIUM CHLORIDE, SODIUM LACTATE, POTASSIUM CHLORIDE, CALCIUM CHLORIDE 600; 310; 30; 20 MG/100ML; MG/100ML; MG/100ML; MG/100ML
75 INJECTION, SOLUTION INTRAVENOUS CONTINUOUS
Status: DISCONTINUED | OUTPATIENT
Start: 2024-09-30 | End: 2024-09-30

## 2024-09-29 RX ADMIN — VALSARTAN 40 MG: 40 TABLET, FILM COATED ORAL at 09:39

## 2024-09-29 RX ADMIN — ASPIRIN 81 MG CHEWABLE TABLET 81 MG: 81 TABLET CHEWABLE at 09:39

## 2024-09-29 RX ADMIN — ICOSAPENT ETHYL 2 G: 1000 CAPSULE ORAL at 20:38

## 2024-09-29 RX ADMIN — INSULIN LISPRO 4 UNITS: 100 INJECTION, SOLUTION INTRAVENOUS; SUBCUTANEOUS at 20:48

## 2024-09-29 RX ADMIN — INSULIN LISPRO 8 UNITS: 100 INJECTION, SOLUTION INTRAVENOUS; SUBCUTANEOUS at 11:16

## 2024-09-29 RX ADMIN — AMLODIPINE BESYLATE 5 MG: 5 TABLET ORAL at 09:39

## 2024-09-29 RX ADMIN — INSULIN GLARGINE 90 UNITS: 100 INJECTION, SOLUTION SUBCUTANEOUS at 11:20

## 2024-09-29 RX ADMIN — TAMSULOSIN HYDROCHLORIDE 0.4 MG: 0.4 CAPSULE ORAL at 09:39

## 2024-09-29 RX ADMIN — FINASTERIDE 5 MG: 5 TABLET, FILM COATED ORAL at 09:39

## 2024-09-29 RX ADMIN — HEPARIN SODIUM 1200 UNITS/HR: 10000 INJECTION, SOLUTION INTRAVENOUS at 09:52

## 2024-09-29 RX ADMIN — TRIAMTERENE AND HYDROCHLOROTHIAZIDE 0.5 TABLET: 37.5; 25 TABLET ORAL at 09:39

## 2024-09-29 RX ADMIN — HEPARIN SODIUM 1400 UNITS/HR: 10000 INJECTION, SOLUTION INTRAVENOUS at 09:56

## 2024-09-29 RX ADMIN — CLOPIDOGREL BISULFATE 75 MG: 75 TABLET, FILM COATED ORAL at 09:39

## 2024-09-29 RX ADMIN — ATORVASTATIN CALCIUM 40 MG: 20 TABLET, FILM COATED ORAL at 20:38

## 2024-09-29 RX ADMIN — ICOSAPENT ETHYL 2 G: 1000 CAPSULE ORAL at 09:39

## 2024-09-29 RX ADMIN — METOPROLOL SUCCINATE 25 MG: 25 TABLET, EXTENDED RELEASE ORAL at 09:39

## 2024-09-29 RX ADMIN — RANOLAZINE 500 MG: 500 TABLET, FILM COATED, EXTENDED RELEASE ORAL at 09:39

## 2024-09-29 RX ADMIN — RANOLAZINE 500 MG: 500 TABLET, FILM COATED, EXTENDED RELEASE ORAL at 20:38

## 2024-09-29 ASSESSMENT — COGNITIVE AND FUNCTIONAL STATUS - GENERAL
DAILY ACTIVITIY SCORE: 24
MOBILITY SCORE: 24

## 2024-09-29 ASSESSMENT — ACTIVITIES OF DAILY LIVING (ADL): LACK_OF_TRANSPORTATION: NO

## 2024-09-29 ASSESSMENT — PAIN SCALES - GENERAL: PAINLEVEL_OUTOF10: 0 - NO PAIN

## 2024-09-29 ASSESSMENT — PAIN - FUNCTIONAL ASSESSMENT: PAIN_FUNCTIONAL_ASSESSMENT: 0-10

## 2024-09-29 NOTE — PROGRESS NOTES
"ASSESSMENT & PLAN:     Unstable angina  CAD s/p PCI  -trops neg, ecg appears similar to prior. History was concerning however, and improved with nitroglycerin and heparin.   Plan:  -cardiology consulted, plan for [facility] tomorrow  -cont hep gtt  -home ASA 81, Plavix, high intensity statin, metoprolol, ARB, amlodipine, Ranexa  -check TTE  -tele monitoring  -PRN SL nitroglycerin, morphine for CP     DLD - home statin, vascepa  CKD3 - Scr at baseline. Monitor BMP.  HTN - home meds  DM2 - A1C 6.7 (9/9/24). Cont home insulin regimen at reduced dose, titrate prn  BPH - home flomax, proscar     Vte ppx already on hep gtt  Full code, discussed with pt    Sesar Adler MD    SUBJECTIVE     NAEON. CP better, still just feels sore upper outer L chest. Pain in L jaw and L arm totally resolved now. Has not needed nitroglycerin this morning. No dyspnea.       OBJECTIVE:       Last Recorded Vitals:  Vitals:    09/28/24 1744 09/28/24 2105 09/29/24 0255 09/29/24 0719   BP:  153/73 121/58 139/70   BP Location:  Right arm Right arm    Patient Position:  Sitting Lying    Pulse:  (!) 46 50 55   Resp:  18 16    Temp:  36.1 °C (97 °F) 36.2 °C (97.2 °F) 36.2 °C (97.2 °F)   TempSrc:  Temporal Temporal    SpO2:    99%   Weight: 122 kg (268 lb 8.3 oz)      Height: 1.854 m (6' 0.99\")          Last I/O:  I/O last 3 completed shifts:  In: 1203.8 (9.9 mL/kg) [I.V.:203.8 (1.7 mL/kg); IV Piggyback:1000]  Out: - (0 mL/kg)   Weight: 121.8 kg     Physical Exam:  GEN: healthy appearing, appears stated age, NAD  CV: RRR, no m/r/g, no LE edema  LUNGS: CTAB, no w/r/c  ABD: soft, NT, ND, NBS  SKIN: ble venous stasis changes  MSK; no gross deformities, normal joints  NEURO: A+Ox3, no FND  PSYCH: appropriate mood, affect    Inpatient Medications:  amLODIPine, 5 mg, oral, Daily  aspirin, 81 mg, oral, Daily  atorvastatin, 40 mg, oral, Nightly  clopidogrel, 75 mg, oral, Daily  finasteride, 5 mg, oral, Daily  influenza, 0.5 mL, intramuscular, During " hospitalization  icosapent ethyL, 2 g, oral, BID  insulin glargine, 90 Units, subcutaneous, Daily before breakfast  insulin lispro, 0-20 Units, subcutaneous, TID  metoprolol succinate XL, 25 mg, oral, Daily  perflutren lipid microspheres, 0.5-10 mL of dilution, intravenous, Once in imaging  perflutren protein A microsphere, 0.5 mL, intravenous, Once in imaging  ranolazine, 500 mg, oral, q12h  sulfur hexafluoride microsphr, 2 mL, intravenous, Once in imaging  tamsulosin, 0.4 mg, oral, Daily  triamterene-hydrochlorothiazid, 0.5 tablet, oral, Daily  valsartan, 40 mg, oral, Daily        PRN Medications  PRN medications: acetaminophen, dextrose, dextrose, glucagon, glucagon, heparin, morphine, nitroglycerin, ondansetron, polyethylene glycol    Continuous Medications:  heparin, 0-4,000 Units/hr, Last Rate: 1,200 Units/hr (09/29/24 0600)          LABS AND IMAGING:     Labs:  Results for orders placed or performed during the hospital encounter of 09/28/24 (from the past 24 hour(s))   ECG 12 lead   Result Value Ref Range    Ventricular Rate 57 BPM    Atrial Rate 57 BPM    DC Interval 180 ms    QRS Duration 98 ms    QT Interval 414 ms    QTC Calculation(Bazett) 402 ms    P Axis -2 degrees    R Axis -4 degrees    T Axis 8 degrees    QRS Count 9 beats    Q Onset 216 ms    P Onset 126 ms    P Offset 191 ms    T Offset 423 ms    QTC Fredericia 407 ms   ECG 12 lead   Result Value Ref Range    Ventricular Rate 62 BPM    Atrial Rate 62 BPM    DC Interval 160 ms    QRS Duration 84 ms    QT Interval 392 ms    QTC Calculation(Bazett) 397 ms    P Axis -7 degrees    R Axis 2 degrees    T Axis 13 degrees    QRS Count 11 beats    Q Onset 222 ms    P Onset 142 ms    P Offset 194 ms    T Offset 418 ms    QTC Fredericia 396 ms   CBC and Auto Differential   Result Value Ref Range    WBC 8.2 4.4 - 11.3 x10*3/uL    nRBC 0.0 0.0 - 0.0 /100 WBCs    RBC 4.51 4.50 - 5.90 x10*6/uL    Hemoglobin 14.0 13.5 - 17.5 g/dL    Hematocrit 41.5 41.0 - 52.0 %     MCV 92 80 - 100 fL    MCH 31.0 26.0 - 34.0 pg    MCHC 33.7 32.0 - 36.0 g/dL    RDW 12.5 11.5 - 14.5 %    Platelets 161 150 - 450 x10*3/uL    Neutrophils % 68.1 40.0 - 80.0 %    Immature Granulocytes %, Automated 0.4 0.0 - 0.9 %    Lymphocytes % 20.7 13.0 - 44.0 %    Monocytes % 8.9 2.0 - 10.0 %    Eosinophils % 0.9 0.0 - 6.0 %    Basophils % 1.0 0.0 - 2.0 %    Neutrophils Absolute 5.59 1.20 - 7.70 x10*3/uL    Immature Granulocytes Absolute, Automated 0.03 0.00 - 0.70 x10*3/uL    Lymphocytes Absolute 1.70 1.20 - 4.80 x10*3/uL    Monocytes Absolute 0.73 0.10 - 1.00 x10*3/uL    Eosinophils Absolute 0.07 0.00 - 0.70 x10*3/uL    Basophils Absolute 0.08 0.00 - 0.10 x10*3/uL   Comprehensive Metabolic Panel   Result Value Ref Range    Glucose 163 (H) 74 - 99 mg/dL    Sodium 136 136 - 145 mmol/L    Potassium 4.3 3.5 - 5.3 mmol/L    Chloride 106 98 - 107 mmol/L    Bicarbonate 23 21 - 32 mmol/L    Anion Gap 11 10 - 20 mmol/L    Urea Nitrogen 29 (H) 6 - 23 mg/dL    Creatinine 1.49 (H) 0.50 - 1.30 mg/dL    eGFR 50 (L) >60 mL/min/1.73m*2    Calcium 8.9 8.6 - 10.3 mg/dL    Albumin 4.2 3.4 - 5.0 g/dL    Alkaline Phosphatase 60 33 - 136 U/L    Total Protein 6.6 6.4 - 8.2 g/dL    AST 16 9 - 39 U/L    Bilirubin, Total 2.0 (H) 0.0 - 1.2 mg/dL    ALT 20 10 - 52 U/L   Lipase   Result Value Ref Range    Lipase 29 9 - 82 U/L   Troponin I, High Sensitivity, Initial   Result Value Ref Range    Troponin I, High Sensitivity 7 0 - 20 ng/L   Troponin, High Sensitivity, 1 Hour   Result Value Ref Range    Troponin I, High Sensitivity 5 0 - 20 ng/L   aPTT - baseline   Result Value Ref Range    aPTT 31 27 - 38 seconds   Protime-INR   Result Value Ref Range    Protime 11.4 9.8 - 12.8 seconds    INR 1.0 0.9 - 1.1   POCT GLUCOSE   Result Value Ref Range    POCT Glucose 153 (H) 74 - 99 mg/dL   Troponin I, High Sensitivity   Result Value Ref Range    Troponin I, High Sensitivity 6 0 - 20 ng/L   Heparin Assay   Result Value Ref Range    Heparin  Unfractionated 0.2 See Comment Below for Therapeutic Ranges IU/mL   Heparin Assay, UFH   Result Value Ref Range    Heparin Unfractionated 0.3 See Comment Below for Therapeutic Ranges IU/mL   Troponin I, High Sensitivity   Result Value Ref Range    Troponin I, High Sensitivity 9 0 - 20 ng/L   Magnesium   Result Value Ref Range    Magnesium 2.05 1.60 - 2.40 mg/dL   Basic Metabolic Panel   Result Value Ref Range    Glucose 101 (H) 74 - 99 mg/dL    Sodium 138 136 - 145 mmol/L    Potassium 4.0 3.5 - 5.3 mmol/L    Chloride 108 (H) 98 - 107 mmol/L    Bicarbonate 25 21 - 32 mmol/L    Anion Gap 9 (L) 10 - 20 mmol/L    Urea Nitrogen 26 (H) 6 - 23 mg/dL    Creatinine 1.56 (H) 0.50 - 1.30 mg/dL    eGFR 48 (L) >60 mL/min/1.73m*2    Calcium 8.8 8.6 - 10.3 mg/dL   CBC   Result Value Ref Range    WBC 7.0 4.4 - 11.3 x10*3/uL    nRBC 0.0 0.0 - 0.0 /100 WBCs    RBC 4.09 (L) 4.50 - 5.90 x10*6/uL    Hemoglobin 12.8 (L) 13.5 - 17.5 g/dL    Hematocrit 37.7 (L) 41.0 - 52.0 %    MCV 92 80 - 100 fL    MCH 31.3 26.0 - 34.0 pg    MCHC 34.0 32.0 - 36.0 g/dL    RDW 12.5 11.5 - 14.5 %    Platelets 148 (L) 150 - 450 x10*3/uL   Lavender Top   Result Value Ref Range    Extra Tube Hold for add-ons.    SST TOP   Result Value Ref Range    Extra Tube Hold for add-ons.    PST Top   Result Value Ref Range    Extra Tube Hold for add-ons.    POCT GLUCOSE   Result Value Ref Range    POCT Glucose 84 74 - 99 mg/dL   Heparin Assay, UFH   Result Value Ref Range    Heparin Unfractionated 0.2 See Comment Below for Therapeutic Ranges IU/mL   ECG 12 lead   Result Value Ref Range    Ventricular Rate 58 BPM    Atrial Rate 58 BPM    MD Interval 164 ms    QRS Duration 94 ms    QT Interval 414 ms    QTC Calculation(Bazett) 406 ms    P Axis -13 degrees    R Axis 8 degrees    T Axis -6 degrees    QRS Count 10 beats    Q Onset 219 ms    P Onset 137 ms    P Offset 198 ms    T Offset 426 ms    QTC Fredericia 409 ms        Imaging:  ECG 12 lead  Sinus bradycardia with  Premature supraventricular complexes  Moderate voltage criteria for LVH, may be normal variant  Borderline ECG  When compared with ECG of 28-SEP-2024 16:01, (unconfirmed)  No significant change was found

## 2024-09-29 NOTE — H&P (VIEW-ONLY)
Cardiology Consult Note      Date:   9/29/2024  Patient name:  Manny Reveles  Date of admission:  9/28/2024 12:57 PM  MRN:   [id number]  YOB: 1955  Time of Consult:  9:44 AM    Consulting Cardiologist: Dr. Romain Pedroza      Primary Cardiologist:  Dr. Armando Antonio    Referring Provider: Dr De La Paz      Admission Diagnosis:     Unstable angina (Multi)      History of Present Illness:      Manny Reveles is a 69 y.o.  male patient who is being at the request of Dr. De La Paz for inpatient consultation of chest pain, unstable angina, longstanding coronary heart disease.  He was admitted on 9/28/2024.  Previous [facility] and [facility] records have been reviewed in detail.      Patient has longstanding cardiovascular disease.  He is undergone multiple stents which he estimates between 8 and 9 over the years.  His father actually was one of my patients for many years and had undergone multiple cardiac procedures and surgeries.  Patient developed recurrent chest pain syndrome.  He came to the emergency room.  Enzymes have remained negative.  Symptoms have abated.  EKGs have shown no significant changes with sinus bradycardia.    Patient does have past history of diabetes hypertension hyperlipidemia and renal disease.  His most recent heart catheterization was done in February 2023 at which time all vessels were patent with previously placed stents and ejection fraction was 60%.  Allergies:     Allergies   Allergen Reactions    Adhesive Tape-Silicones Hives and Itching    Epinephrine Syncope    Latex Other    Meperidine Other    Penicillins Other    Promethazine Other    Sulfa (Sulfonamide Antibiotics) Other         Past Medical History:     No past medical history on file.    Past Surgical History:     Past Surgical History:   Procedure Laterality Date    OTHER SURGICAL HISTORY  12/12/2018    Cardiac catheterization with stent placement    OTHER SURGICAL HISTORY  12/12/2018    Renal lithotripsy     OTHER SURGICAL HISTORY  12/12/2018    Shoulder surgery    OTHER SURGICAL HISTORY  12/12/2018    Knee reconstruction    OTHER SURGICAL HISTORY  12/12/2018    Hernia repair    OTHER SURGICAL HISTORY  02/28/2022    Colonoscopy    OTHER SURGICAL HISTORY  02/28/2022    Dental surgery    OTHER SURGICAL HISTORY  02/28/2022    Inguinal hernia repair    OTHER SURGICAL HISTORY  02/28/2022    Percutaneous transluminal coronary angioplasty    OTHER SURGICAL HISTORY  03/09/2022    Dermatological surgery       Family History:     Family History   Problem Relation Name Age of Onset    Other (bone/cartilage disorder) Mother      Diabetes Mother      Gallbladder disease Mother      Diabetes Father      Other (cardiac disorder) Father      Nephrolithiasis Father      Prostate cancer Father      Other (bladder cancer) Father      Heart attack Father      Rheum arthritis Father      Skin cancer Father      Kidney nephrosis Father      Diabetes Paternal Grandfather      Skin cancer Paternal Grandfather         Social History:     Social History     Tobacco Use    Smoking status: Never    Smokeless tobacco: Never   Vaping Use    Vaping status: Never Used   Substance Use Topics    Alcohol use: Not Currently    Drug use: Never       CURRENT MEDICATIONS    amLODIPine, 5 mg, oral, Daily  aspirin, 81 mg, oral, Daily  atorvastatin, 40 mg, oral, Nightly  clopidogrel, 75 mg, oral, Daily  finasteride, 5 mg, oral, Daily  influenza, 0.5 mL, intramuscular, During hospitalization  icosapent ethyL, 2 g, oral, BID  insulin glargine, 90 Units, subcutaneous, Daily before breakfast  insulin lispro, 0-20 Units, subcutaneous, TID  metoprolol succinate XL, 25 mg, oral, Daily  perflutren lipid microspheres, 0.5-10 mL of dilution, intravenous, Once in imaging  perflutren protein A microsphere, 0.5 mL, intravenous, Once in imaging  ranolazine, 500 mg, oral, q12h  sulfur hexafluoride microsphr, 2 mL, intravenous, Once in imaging  tamsulosin, 0.4 mg, oral,  Daily  triamterene-hydrochlorothiazid, 0.5 tablet, oral, Daily  valsartan, 40 mg, oral, Daily      heparin, 0-4,000 Units/hr, Last Rate: 1,200 Units/hr (09/29/24 0600)      Current Outpatient Medications   Medication Instructions    amLODIPine (NORVASC) 5 mg, oral, Daily, as directed    aspirin 81 mg chewable tablet oral    atorvastatin (LIPITOR) 40 mg, oral, Nightly    cetirizine (ZYRTEC) 10 mg, oral, Daily    cholecalciferol (VITAMIN D-3) 50,000 Units, oral, Once Weekly    clopidogrel (PLAVIX) 75 mg, oral, Daily    CRANBERRY ORAL 2 tablets, oral, Daily    docusate sodium (COLACE) 100 mg, oral, 2 times daily    finasteride (PROSCAR) 5 mg, oral, Daily    icosapent ethyL (VASCEPA) 2 g, oral, 2 times daily    insulin aspart (NovoLOG FlexPen U-100 Insulin) 100 unit/mL (3 mL) pen subcutaneous, Inject 20 units plus coverage for snacks     insulin degludec (TRESIBA FLEXTOUCH U-200) 104 Units, subcutaneous, Nightly    ketoconazole (NIZOral) 2 % shampoo Topical, 2 times weekly, Apply to scalp in shower. Lather, leave on 5 minutes then rinse    Lactobacillus acidophilus (PROBIOTIC ORAL) oral    metoprolol succinate XL (TOPROL-XL) 25 mg, oral, Daily    nitroglycerin (NITROSTAT) 0.4 mg, sublingual, Every 5 min PRN    potassium chloride CR (Klor-Con M20) 20 mEq ER tablet 20 mEq, oral, Daily    ranolazine (RANEXA) 500 mg, oral, Every 12 hours    semaglutide (Rybelsus) 14 mg tablet tablet 1 tablet, oral, Daily    tamsulosin (FLOMAX) 0.4 mg, oral, Daily    telmisartan (MICARDIS) 20 mg, oral, Every 24 hours    triamterene-hydrochlorothiazid (Maxzide-25) 37.5-25 mg tablet Take half tablet po daily        Review of Systems:      12 point review of systems was obtained in detail and is negative other than that detailed above.    Vital Signs:     Vitals:    09/28/24 1744 09/28/24 2105 09/29/24 0255 09/29/24 0719   BP:  153/73 121/58 139/70   BP Location:  Right arm Right arm    Patient Position:  Sitting Lying    Pulse:  (!) 46 50 55  "  Resp:  18 16    Temp:  36.1 °C (97 °F) 36.2 °C (97.2 °F) 36.2 °C (97.2 °F)   TempSrc:  Temporal Temporal    SpO2:    99%   Weight: 122 kg (268 lb 8.3 oz)      Height: 1.854 m (6' 0.99\")          Intake/Output Summary (Last 24 hours) at 9/29/2024 0944  Last data filed at 9/29/2024 0600  Gross per 24 hour   Intake 1203.76 ml   Output --   Net 1203.76 ml       Wt Readings from Last 4 Encounters:   09/28/24 122 kg (268 lb 8.3 oz)   09/25/24 122 kg (268 lb 1.3 oz)   09/09/24 123 kg (272 lb)   08/28/24 123 kg (272 lb)       Physical Examination:     GENERAL APPEARANCE: Well developed, well nourished, in no acute distress.  CHEST: Symmetric and non-tender.  INTEGUMENT: Skin warm and dry, without gross excoriationis or lesions.  HEENT: No gross abnormalities of conjunctiva, teeth, gums, oral mucosa  NECK: Supple, no JVD, no bruit. Thyroid not palpable. Carotid upstrokes normal.  NEURO/PSHCY: Alert and oriented x3; appropriate behavior and responses and responses, grossly normal cerebellar function with normal balance and coordination  LUNGS: Clear to auscultation bilaterally; normal respiratory effort.  HEART: Rate and rhythm regular with no evident murmur; no gallop appreciated. There are no rubs, clicks or heaves. PMI nondisplaced.  ABDOMEN: Soft, nontender, no palpable hepatosplenomegaly, no mases, no bruits. Abdominal aorta not noted to be enlarged.  MUSCULOSKELETAL: Ambulatory with normal tandem gait.  EXTREMITIES: Warm with good color, no clubbing or cyanois. There is no edema noted.  PERIPHERAL VASCULAR: Pulses present and equally palpable; 2+ throughout. No femoral bruits.      Lab:     CBC:   Lab Results   Component Value Date    WBC 7.0 09/29/2024    RBC 4.09 (L) 09/29/2024    HGB 12.8 (L) 09/29/2024    HCT 37.7 (L) 09/29/2024     (L) 09/29/2024        CMP:    Lab Results   Component Value Date     09/29/2024    K 4.0 09/29/2024     (H) 09/29/2024    CO2 25 09/29/2024    BUN 26 (H) 09/29/2024 " "   CREATININE 1.56 (H) 09/29/2024    GLUCOSE 101 (H) 09/29/2024    CALCIUM 8.8 09/29/2024       Magnesium:    Lab Results   Component Value Date    MG 2.05 09/29/2024       Lipid Profile:    No results found for: \"CHLPL\", \"TRIG\", \"HDL\", \"LDLCALC\", \"LDLDIRECT\"    TSH:    No results found for: \"TSH\"    BNP:   Lab Results   Component Value Date    BNP 7 03/13/2024        PT/INR:    Lab Results   Component Value Date    PROTIME 11.4 09/28/2024    INR 1.0 09/28/2024       HgBA1c:    Lab Results   Component Value Date    HGBA1C 6.7 (A) 09/09/2024       BMP:  Lab Results   Component Value Date     09/29/2024     09/28/2024    K 4.0 09/29/2024    K 4.3 09/28/2024     (H) 09/29/2024     09/28/2024    CO2 25 09/29/2024    CO2 23 09/28/2024    BUN 26 (H) 09/29/2024    BUN 29 (H) 09/28/2024    CREATININE 1.56 (H) 09/29/2024    CREATININE 1.49 (H) 09/28/2024       CBC:  Lab Results   Component Value Date    WBC 7.0 09/29/2024    WBC 8.2 09/28/2024    RBC 4.09 (L) 09/29/2024    RBC 4.51 09/28/2024    HGB 12.8 (L) 09/29/2024    HGB 14.0 09/28/2024    HCT 37.7 (L) 09/29/2024    HCT 41.5 09/28/2024    MCV 92 09/29/2024    MCV 92 09/28/2024    MCH 31.3 09/29/2024    MCH 31.0 09/28/2024    MCHC 34.0 09/29/2024    MCHC 33.7 09/28/2024    RDW 12.5 09/29/2024    RDW 12.5 09/28/2024     (L) 09/29/2024     09/28/2024       Cardiac Enzymes:    Lab Results   Component Value Date    TROPHS 9 09/29/2024    TROPHS 6 09/28/2024    TROPHS 5 09/28/2024       Hepatic Function Panel:    Lab Results   Component Value Date    ALKPHOS 60 09/28/2024    ALT 20 09/28/2024    AST 16 09/28/2024    PROT 6.6 09/28/2024    BILITOT 2.0 (H) 09/28/2024       Diagnostic Studies:     ECG 12 lead    Result Date: 9/29/2024  Sinus bradycardia with Premature atrial complexes Minimal voltage criteria for LVH, may be normal variant Borderline ECG When compared with ECG of 28-SEP-2024 14:00, (unconfirmed) Premature atrial complexes " are now Present    ECG 12 lead    Result Date: 9/29/2024  Sinus bradycardia with Premature supraventricular complexes Moderate voltage criteria for LVH, may be normal variant Borderline ECG When compared with ECG of 28-SEP-2024 16:01, (unconfirmed) No significant change was found    ECG 12 lead    Result Date: 9/28/2024  Normal sinus rhythm Minimal voltage criteria for LVH, may be normal variant Borderline ECG When compared with ECG of 25-FEB-2023 12:48, No significant change was found See ED provider note for full interpretation and clinical correlation    ECG 12 lead    Result Date: 9/28/2024  Sinus bradycardia Moderate voltage criteria for LVH, may be normal variant Borderline ECG When compared with ECG of 28-SEP-2024 13:03, (unconfirmed) No significant change was found See ED provider note for full interpretation and clinical correlation    XR chest 1 view    Result Date: 9/28/2024  Interpreted By:  Roxy Oconnor, STUDY: XR CHEST 1 VIEW;; 9/28/2024 1:21 pm   INDICATION: Signs/Symptoms:Chest Pain.   COMPARISON: 03/13/2024   ACCESSION NUMBER(S): [id number]   ORDERING CLINICIAN: MAZIN SILVA   FINDINGS: The cardiac silhouette is stable for the portable technique. There is no consolidations, effusions, infiltrates or pneumothorax.       No evidence for acute cardiopulmonary process. If there is clinical concern for acute aortic pathology or pulmonary embolic disease, further evaluation with chest CT should be considered for better assessment.       Signed by: Roxy Oconnor 9/28/2024 1:38 PM Dictation workstation:   [id number]    Cardiac Cath Transition of Care Summary:  Post Procedure Diagnosis: Patent RCA and LCX stents, mild-to-moderate  non-obstructive CAD.  Blood Loss:               Estimated blood loss during the procedure was <20cc  mls.  Specimens Removed:        Number of specimen(s) removed: none.     ____________________________________________________________________________________  CONCLUSIONS:  1.  The entire Left Main: <10% stenosis.  2. Mid LAD Lesion: The percent stenosis is MidLAD lesion of 40% unchanged from cath in 2019%.  3. Circumflex Coronary Artery: contains patent previously placed stents and presents luminal irregularities.  4. Right Coronary Artery: presents luminal irregularities and contains patent previously placed stents.  5. The Left Ventricular Ejection Fraction is 60%.     ____________________________________________________________________________________  CPT Codes:  Left Heart Cath (visualization of coronaries) and LV-79054; Moderate Sedation Services initial 15 minutes patient >5 years-[id number]     ICD 10 Codes:  I20.0-Unstable angina     [id number] Yvonne Maza MD  Performing Physician  Electronically signed by [id number] Yvonne Maza MD on 2/25/2023 at 12:20:18 PM        cc Report to: KRISTINA CUEVA     cc Report to: [id number] Yvonne Maza MD  Radiology:     XR chest 1 view   Final Result   No evidence for acute cardiopulmonary process. If there is clinical   concern for acute aortic pathology or pulmonary embolic disease,   further evaluation with chest CT should be considered for better   assessment.                  Signed by: Roxy Oconnor 9/28/2024 1:38 PM   Dictation workstation:   [id number]      Transthoracic Echo (TTE) Complete    (Results Pending)       Problem List:     Patient Active Problem List   Diagnosis    Stage 3 chronic kidney disease (Multi)    Obstructive sleep apnea syndrome    Essential hypertriglyceridemia    CAD S/P percutaneous coronary angioplasty    Benign prostatic hyperplasia with urinary obstruction    Benign essential hypertension    Calculus of kidney    Incomplete bladder emptying    Squamous cell carcinoma of skin of other parts of face    Type 2 diabetes mellitus without retinopathy (Multi)    Class 2 severe obesity due to excess calories with serious comorbidity and body mass index (BMI) of 35.0 to 35.9 in adult (Multi)    Hypokalemia    Uses self-applied  continuous glucose monitoring device    Never smoked tobacco    Unstable angina (Multi)       Assessment:         69-year-old gentleman here at the hospital admitted for unstable anginal type symptomatology with known history of coronary disease prior PCI and stenting.    Meds, vitals, examination as noted.    Chart was reviewed in detail including prior procedures and studies and outpatient follow-up with Dr. Antonio and discussed with the patient at length.    Bedside examination and evaluation were performed by me.    Impression:  Chest pain/unstable angina  ASHD class II  Remote multivessel angioplasty and stenting  Stage III kidney disease  Sleep apnea  Type 2 diabetes  Moderate obesity  Renal calculus in the past  Dyslipidemia     Recommendation:  Continue usual meds  Schedule catheterization tomorrow Dr. Antonio  Remain on telemetry observation  Serial labs and EKGs will continue  Disposition to follow catheterization        Thank you for the opportunity to participate in the care of your patient.  Please do not hesitate to call if you have any questions.    Electronically signed by Romain Pedroza DO, on 9/29/2024 at 9:44 AM

## 2024-09-29 NOTE — CONSULTS
Cardiology Consult Note      Date:   9/29/2024  Patient name:  Manny Reveles  Date of admission:  9/28/2024 12:57 PM  MRN:   [id number]  YOB: 1955  Time of Consult:  9:44 AM    Consulting Cardiologist: Dr. Romain Pedroza      Primary Cardiologist:  Dr. Armando Antonio    Referring Provider: Dr De La Paz      Admission Diagnosis:     Unstable angina (Multi)      History of Present Illness:      Manny Reveles is a 69 y.o.  male patient who is being at the request of Dr. De La Paz for inpatient consultation of chest pain, unstable angina, longstanding coronary heart disease.  He was admitted on 9/28/2024.  Previous [facility] and [facility] records have been reviewed in detail.      Patient has longstanding cardiovascular disease.  He is undergone multiple stents which he estimates between 8 and 9 over the years.  His father actually was one of my patients for many years and had undergone multiple cardiac procedures and surgeries.  Patient developed recurrent chest pain syndrome.  He came to the emergency room.  Enzymes have remained negative.  Symptoms have abated.  EKGs have shown no significant changes with sinus bradycardia.    Patient does have past history of diabetes hypertension hyperlipidemia and renal disease.  His most recent heart catheterization was done in February 2023 at which time all vessels were patent with previously placed stents and ejection fraction was 60%.  Allergies:     Allergies   Allergen Reactions    Adhesive Tape-Silicones Hives and Itching    Epinephrine Syncope    Latex Other    Meperidine Other    Penicillins Other    Promethazine Other    Sulfa (Sulfonamide Antibiotics) Other         Past Medical History:     No past medical history on file.    Past Surgical History:     Past Surgical History:   Procedure Laterality Date    OTHER SURGICAL HISTORY  12/12/2018    Cardiac catheterization with stent placement    OTHER SURGICAL HISTORY  12/12/2018    Renal lithotripsy     OTHER SURGICAL HISTORY  12/12/2018    Shoulder surgery    OTHER SURGICAL HISTORY  12/12/2018    Knee reconstruction    OTHER SURGICAL HISTORY  12/12/2018    Hernia repair    OTHER SURGICAL HISTORY  02/28/2022    Colonoscopy    OTHER SURGICAL HISTORY  02/28/2022    Dental surgery    OTHER SURGICAL HISTORY  02/28/2022    Inguinal hernia repair    OTHER SURGICAL HISTORY  02/28/2022    Percutaneous transluminal coronary angioplasty    OTHER SURGICAL HISTORY  03/09/2022    Dermatological surgery       Family History:     Family History   Problem Relation Name Age of Onset    Other (bone/cartilage disorder) Mother      Diabetes Mother      Gallbladder disease Mother      Diabetes Father      Other (cardiac disorder) Father      Nephrolithiasis Father      Prostate cancer Father      Other (bladder cancer) Father      Heart attack Father      Rheum arthritis Father      Skin cancer Father      Kidney nephrosis Father      Diabetes Paternal Grandfather      Skin cancer Paternal Grandfather         Social History:     Social History     Tobacco Use    Smoking status: Never    Smokeless tobacco: Never   Vaping Use    Vaping status: Never Used   Substance Use Topics    Alcohol use: Not Currently    Drug use: Never       CURRENT MEDICATIONS    amLODIPine, 5 mg, oral, Daily  aspirin, 81 mg, oral, Daily  atorvastatin, 40 mg, oral, Nightly  clopidogrel, 75 mg, oral, Daily  finasteride, 5 mg, oral, Daily  influenza, 0.5 mL, intramuscular, During hospitalization  icosapent ethyL, 2 g, oral, BID  insulin glargine, 90 Units, subcutaneous, Daily before breakfast  insulin lispro, 0-20 Units, subcutaneous, TID  metoprolol succinate XL, 25 mg, oral, Daily  perflutren lipid microspheres, 0.5-10 mL of dilution, intravenous, Once in imaging  perflutren protein A microsphere, 0.5 mL, intravenous, Once in imaging  ranolazine, 500 mg, oral, q12h  sulfur hexafluoride microsphr, 2 mL, intravenous, Once in imaging  tamsulosin, 0.4 mg, oral,  Daily  triamterene-hydrochlorothiazid, 0.5 tablet, oral, Daily  valsartan, 40 mg, oral, Daily      heparin, 0-4,000 Units/hr, Last Rate: 1,200 Units/hr (09/29/24 0600)      Current Outpatient Medications   Medication Instructions    amLODIPine (NORVASC) 5 mg, oral, Daily, as directed    aspirin 81 mg chewable tablet oral    atorvastatin (LIPITOR) 40 mg, oral, Nightly    cetirizine (ZYRTEC) 10 mg, oral, Daily    cholecalciferol (VITAMIN D-3) 50,000 Units, oral, Once Weekly    clopidogrel (PLAVIX) 75 mg, oral, Daily    CRANBERRY ORAL 2 tablets, oral, Daily    docusate sodium (COLACE) 100 mg, oral, 2 times daily    finasteride (PROSCAR) 5 mg, oral, Daily    icosapent ethyL (VASCEPA) 2 g, oral, 2 times daily    insulin aspart (NovoLOG FlexPen U-100 Insulin) 100 unit/mL (3 mL) pen subcutaneous, Inject 20 units plus coverage for snacks     insulin degludec (TRESIBA FLEXTOUCH U-200) 104 Units, subcutaneous, Nightly    ketoconazole (NIZOral) 2 % shampoo Topical, 2 times weekly, Apply to scalp in shower. Lather, leave on 5 minutes then rinse    Lactobacillus acidophilus (PROBIOTIC ORAL) oral    metoprolol succinate XL (TOPROL-XL) 25 mg, oral, Daily    nitroglycerin (NITROSTAT) 0.4 mg, sublingual, Every 5 min PRN    potassium chloride CR (Klor-Con M20) 20 mEq ER tablet 20 mEq, oral, Daily    ranolazine (RANEXA) 500 mg, oral, Every 12 hours    semaglutide (Rybelsus) 14 mg tablet tablet 1 tablet, oral, Daily    tamsulosin (FLOMAX) 0.4 mg, oral, Daily    telmisartan (MICARDIS) 20 mg, oral, Every 24 hours    triamterene-hydrochlorothiazid (Maxzide-25) 37.5-25 mg tablet Take half tablet po daily        Review of Systems:      12 point review of systems was obtained in detail and is negative other than that detailed above.    Vital Signs:     Vitals:    09/28/24 1744 09/28/24 2105 09/29/24 0255 09/29/24 0719   BP:  153/73 121/58 139/70   BP Location:  Right arm Right arm    Patient Position:  Sitting Lying    Pulse:  (!) 46 50 55  "  Resp:  18 16    Temp:  36.1 °C (97 °F) 36.2 °C (97.2 °F) 36.2 °C (97.2 °F)   TempSrc:  Temporal Temporal    SpO2:    99%   Weight: 122 kg (268 lb 8.3 oz)      Height: 1.854 m (6' 0.99\")          Intake/Output Summary (Last 24 hours) at 9/29/2024 0944  Last data filed at 9/29/2024 0600  Gross per 24 hour   Intake 1203.76 ml   Output --   Net 1203.76 ml       Wt Readings from Last 4 Encounters:   09/28/24 122 kg (268 lb 8.3 oz)   09/25/24 122 kg (268 lb 1.3 oz)   09/09/24 123 kg (272 lb)   08/28/24 123 kg (272 lb)       Physical Examination:     GENERAL APPEARANCE: Well developed, well nourished, in no acute distress.  CHEST: Symmetric and non-tender.  INTEGUMENT: Skin warm and dry, without gross excoriationis or lesions.  HEENT: No gross abnormalities of conjunctiva, teeth, gums, oral mucosa  NECK: Supple, no JVD, no bruit. Thyroid not palpable. Carotid upstrokes normal.  NEURO/PSHCY: Alert and oriented x3; appropriate behavior and responses and responses, grossly normal cerebellar function with normal balance and coordination  LUNGS: Clear to auscultation bilaterally; normal respiratory effort.  HEART: Rate and rhythm regular with no evident murmur; no gallop appreciated. There are no rubs, clicks or heaves. PMI nondisplaced.  ABDOMEN: Soft, nontender, no palpable hepatosplenomegaly, no mases, no bruits. Abdominal aorta not noted to be enlarged.  MUSCULOSKELETAL: Ambulatory with normal tandem gait.  EXTREMITIES: Warm with good color, no clubbing or cyanois. There is no edema noted.  PERIPHERAL VASCULAR: Pulses present and equally palpable; 2+ throughout. No femoral bruits.      Lab:     CBC:   Lab Results   Component Value Date    WBC 7.0 09/29/2024    RBC 4.09 (L) 09/29/2024    HGB 12.8 (L) 09/29/2024    HCT 37.7 (L) 09/29/2024     (L) 09/29/2024        CMP:    Lab Results   Component Value Date     09/29/2024    K 4.0 09/29/2024     (H) 09/29/2024    CO2 25 09/29/2024    BUN 26 (H) 09/29/2024 " "   CREATININE 1.56 (H) 09/29/2024    GLUCOSE 101 (H) 09/29/2024    CALCIUM 8.8 09/29/2024       Magnesium:    Lab Results   Component Value Date    MG 2.05 09/29/2024       Lipid Profile:    No results found for: \"CHLPL\", \"TRIG\", \"HDL\", \"LDLCALC\", \"LDLDIRECT\"    TSH:    No results found for: \"TSH\"    BNP:   Lab Results   Component Value Date    BNP 7 03/13/2024        PT/INR:    Lab Results   Component Value Date    PROTIME 11.4 09/28/2024    INR 1.0 09/28/2024       HgBA1c:    Lab Results   Component Value Date    HGBA1C 6.7 (A) 09/09/2024       BMP:  Lab Results   Component Value Date     09/29/2024     09/28/2024    K 4.0 09/29/2024    K 4.3 09/28/2024     (H) 09/29/2024     09/28/2024    CO2 25 09/29/2024    CO2 23 09/28/2024    BUN 26 (H) 09/29/2024    BUN 29 (H) 09/28/2024    CREATININE 1.56 (H) 09/29/2024    CREATININE 1.49 (H) 09/28/2024       CBC:  Lab Results   Component Value Date    WBC 7.0 09/29/2024    WBC 8.2 09/28/2024    RBC 4.09 (L) 09/29/2024    RBC 4.51 09/28/2024    HGB 12.8 (L) 09/29/2024    HGB 14.0 09/28/2024    HCT 37.7 (L) 09/29/2024    HCT 41.5 09/28/2024    MCV 92 09/29/2024    MCV 92 09/28/2024    MCH 31.3 09/29/2024    MCH 31.0 09/28/2024    MCHC 34.0 09/29/2024    MCHC 33.7 09/28/2024    RDW 12.5 09/29/2024    RDW 12.5 09/28/2024     (L) 09/29/2024     09/28/2024       Cardiac Enzymes:    Lab Results   Component Value Date    TROPHS 9 09/29/2024    TROPHS 6 09/28/2024    TROPHS 5 09/28/2024       Hepatic Function Panel:    Lab Results   Component Value Date    ALKPHOS 60 09/28/2024    ALT 20 09/28/2024    AST 16 09/28/2024    PROT 6.6 09/28/2024    BILITOT 2.0 (H) 09/28/2024       Diagnostic Studies:     ECG 12 lead    Result Date: 9/29/2024  Sinus bradycardia with Premature atrial complexes Minimal voltage criteria for LVH, may be normal variant Borderline ECG When compared with ECG of 28-SEP-2024 14:00, (unconfirmed) Premature atrial complexes " are now Present    ECG 12 lead    Result Date: 9/29/2024  Sinus bradycardia with Premature supraventricular complexes Moderate voltage criteria for LVH, may be normal variant Borderline ECG When compared with ECG of 28-SEP-2024 16:01, (unconfirmed) No significant change was found    ECG 12 lead    Result Date: 9/28/2024  Normal sinus rhythm Minimal voltage criteria for LVH, may be normal variant Borderline ECG When compared with ECG of 25-FEB-2023 12:48, No significant change was found See ED provider note for full interpretation and clinical correlation    ECG 12 lead    Result Date: 9/28/2024  Sinus bradycardia Moderate voltage criteria for LVH, may be normal variant Borderline ECG When compared with ECG of 28-SEP-2024 13:03, (unconfirmed) No significant change was found See ED provider note for full interpretation and clinical correlation    XR chest 1 view    Result Date: 9/28/2024  Interpreted By:  Roxy Oconnor, STUDY: XR CHEST 1 VIEW;; 9/28/2024 1:21 pm   INDICATION: Signs/Symptoms:Chest Pain.   COMPARISON: 03/13/2024   ACCESSION NUMBER(S): [id number]   ORDERING CLINICIAN: MAZIN SILVA   FINDINGS: The cardiac silhouette is stable for the portable technique. There is no consolidations, effusions, infiltrates or pneumothorax.       No evidence for acute cardiopulmonary process. If there is clinical concern for acute aortic pathology or pulmonary embolic disease, further evaluation with chest CT should be considered for better assessment.       Signed by: Roxy Oconnor 9/28/2024 1:38 PM Dictation workstation:   [id number]    Cardiac Cath Transition of Care Summary:  Post Procedure Diagnosis: Patent RCA and LCX stents, mild-to-moderate  non-obstructive CAD.  Blood Loss:               Estimated blood loss during the procedure was <20cc  mls.  Specimens Removed:        Number of specimen(s) removed: none.     ____________________________________________________________________________________  CONCLUSIONS:  1.  The entire Left Main: <10% stenosis.  2. Mid LAD Lesion: The percent stenosis is MidLAD lesion of 40% unchanged from cath in 2019%.  3. Circumflex Coronary Artery: contains patent previously placed stents and presents luminal irregularities.  4. Right Coronary Artery: presents luminal irregularities and contains patent previously placed stents.  5. The Left Ventricular Ejection Fraction is 60%.     ____________________________________________________________________________________  CPT Codes:  Left Heart Cath (visualization of coronaries) and LV-00340; Moderate Sedation Services initial 15 minutes patient >5 years-[id number]     ICD 10 Codes:  I20.0-Unstable angina     [id number] Yvonne Maza MD  Performing Physician  Electronically signed by [id number] Yvonne Maza MD on 2/25/2023 at 12:20:18 PM        cc Report to: KRISTINA CUEVA     cc Report to: [id number] Yvonne Maza MD  Radiology:     XR chest 1 view   Final Result   No evidence for acute cardiopulmonary process. If there is clinical   concern for acute aortic pathology or pulmonary embolic disease,   further evaluation with chest CT should be considered for better   assessment.                  Signed by: Roxy Oconnor 9/28/2024 1:38 PM   Dictation workstation:   [id number]      Transthoracic Echo (TTE) Complete    (Results Pending)       Problem List:     Patient Active Problem List   Diagnosis    Stage 3 chronic kidney disease (Multi)    Obstructive sleep apnea syndrome    Essential hypertriglyceridemia    CAD S/P percutaneous coronary angioplasty    Benign prostatic hyperplasia with urinary obstruction    Benign essential hypertension    Calculus of kidney    Incomplete bladder emptying    Squamous cell carcinoma of skin of other parts of face    Type 2 diabetes mellitus without retinopathy (Multi)    Class 2 severe obesity due to excess calories with serious comorbidity and body mass index (BMI) of 35.0 to 35.9 in adult (Multi)    Hypokalemia    Uses self-applied  continuous glucose monitoring device    Never smoked tobacco    Unstable angina (Multi)       Assessment:         69-year-old gentleman here at the hospital admitted for unstable anginal type symptomatology with known history of coronary disease prior PCI and stenting.    Meds, vitals, examination as noted.    Chart was reviewed in detail including prior procedures and studies and outpatient follow-up with Dr. Antonio and discussed with the patient at length.    Bedside examination and evaluation were performed by me.    Impression:  Chest pain/unstable angina  ASHD class II  Remote multivessel angioplasty and stenting  Stage III kidney disease  Sleep apnea  Type 2 diabetes  Moderate obesity  Renal calculus in the past  Dyslipidemia     Recommendation:  Continue usual meds  Schedule catheterization tomorrow Dr. Antonio  Remain on telemetry observation  Serial labs and EKGs will continue  Disposition to follow catheterization        Thank you for the opportunity to participate in the care of your patient.  Please do not hesitate to call if you have any questions.    Electronically signed by Romain Pedroza DO, on 9/29/2024 at 9:44 AM

## 2024-09-29 NOTE — PROGRESS NOTES
09/29/24 0904   Discharge Planning   Living Arrangements Spouse/significant other   Support Systems Spouse/significant other;Children   Assistance Needed none, PTA ADLS and IADLS no AD, drives, retired but watches 3 yo grandson, denies falls   Type of Residence Private residence  (split leve with basement- 7 steps to each section)   Number of Stairs to Enter Residence 4  (with handrail)   Number of Stairs Within Residence 21   Do you have animals or pets at home? No   Home or Post Acute Services None   Expected Discharge Disposition Home   Financial Resource Strain   How hard is it for you to pay for the very basics like food, housing, medical care, and heating? Not hard   Housing Stability   In the last 12 months, was there a time when you were not able to pay the mortgage or rent on time? N   In the past 12 months, how many times have you moved where you were living? 0   At any time in the past 12 months, were you homeless or living in a shelter (including now)? N   Transportation Needs   In the past 12 months, has lack of transportation kept you from medical appointments or from getting medications? no   In the past 12 months, has lack of transportation kept you from meetings, work, or from getting things needed for daily living? No     Pt admitted with Dx unstable angina, plan for heart cath on Monday 9/29, currently on Heparin drip, takes Plavix at home, also insulin for Diabetes. PCP is Dr. Mayo, last OV 1 month ago, also sees endocrinologist, urology, dermatologist and nephrologist.  Supposed to see Dr. Perdue on 15th for bladder scope. Pharmacy is [facility] in [place]. Pt denies barriers to appointments or medications. Pt discharge preference is Home. CT team will monitor case for progression and DC planning.

## 2024-09-30 ENCOUNTER — APPOINTMENT (OUTPATIENT)
Dept: CARDIOLOGY | Facility: HOSPITAL | Age: 69
DRG: 322 | End: 2024-09-30
Payer: MEDICARE

## 2024-09-30 VITALS
TEMPERATURE: 97.9 F | RESPIRATION RATE: 16 BRPM | OXYGEN SATURATION: 96 % | HEART RATE: 60 BPM | DIASTOLIC BLOOD PRESSURE: 59 MMHG | WEIGHT: 268.52 LBS | HEIGHT: 73 IN | SYSTOLIC BLOOD PRESSURE: 112 MMHG | BODY MASS INDEX: 35.59 KG/M2

## 2024-09-30 LAB
ACT BLD: NORMAL S
ANION GAP SERPL CALC-SCNC: 11 MMOL/L (ref 10–20)
AORTIC VALVE MEAN GRADIENT: 5 MMHG
AORTIC VALVE PEAK VELOCITY: 1.58 M/S
ATRIAL RATE: 49 BPM
AV PEAK GRADIENT: 10 MMHG
AVA (PEAK VEL): 2.89 CM2
AVA (VTI): 3.03 CM2
BUN SERPL-MCNC: 22 MG/DL (ref 6–23)
CALCIUM SERPL-MCNC: 8.8 MG/DL (ref 8.6–10.3)
CARDIAC TROPONIN I PNL SERPL HS: 9 NG/L (ref 0–20)
CHLORIDE SERPL-SCNC: 106 MMOL/L (ref 98–107)
CO2 SERPL-SCNC: 24 MMOL/L (ref 21–32)
CREAT SERPL-MCNC: 1.49 MG/DL (ref 0.5–1.3)
EGFRCR SERPLBLD CKD-EPI 2021: 50 ML/MIN/1.73M*2
EJECTION FRACTION APICAL 4 CHAMBER: 60.1
EJECTION FRACTION: 60 %
ERYTHROCYTE [DISTWIDTH] IN BLOOD BY AUTOMATED COUNT: 12.3 % (ref 11.5–14.5)
GLUCOSE BLD MANUAL STRIP-MCNC: 141 MG/DL (ref 74–99)
GLUCOSE BLD MANUAL STRIP-MCNC: 168 MG/DL (ref 74–99)
GLUCOSE BLD MANUAL STRIP-MCNC: 190 MG/DL (ref 74–99)
GLUCOSE BLD MANUAL STRIP-MCNC: 207 MG/DL (ref 74–99)
GLUCOSE SERPL-MCNC: 156 MG/DL (ref 74–99)
HCT VFR BLD AUTO: 39.1 % (ref 41–52)
HGB BLD-MCNC: 13.2 G/DL (ref 13.5–17.5)
HOLD SPECIMEN: NORMAL
LEFT ATRIUM VOLUME AREA LENGTH INDEX BSA: 17.3 ML/M2
LEFT VENTRICLE INTERNAL DIMENSION DIASTOLE: 4.2 CM (ref 3.5–6)
LEFT VENTRICULAR OUTFLOW TRACT DIAMETER: 2.2 CM
LV EJECTION FRACTION BIPLANE: 61 %
MAGNESIUM SERPL-MCNC: 1.91 MG/DL (ref 1.6–2.4)
MCH RBC QN AUTO: 31.2 PG (ref 26–34)
MCHC RBC AUTO-ENTMCNC: 33.8 G/DL (ref 32–36)
MCV RBC AUTO: 92 FL (ref 80–100)
MITRAL VALVE E/A RATIO: 1.06
NRBC BLD-RTO: 0 /100 WBCS (ref 0–0)
P AXIS: -1 DEGREES
P OFFSET: 196 MS
P ONSET: 135 MS
PLATELET # BLD AUTO: 148 X10*3/UL (ref 150–450)
POTASSIUM SERPL-SCNC: 3.8 MMOL/L (ref 3.5–5.3)
PR INTERVAL: 160 MS
Q ONSET: 215 MS
QRS COUNT: 7 BEATS
QRS DURATION: 108 MS
QT INTERVAL: 440 MS
QTC CALCULATION(BAZETT): 397 MS
QTC FREDERICIA: 411 MS
R AXIS: 4 DEGREES
RBC # BLD AUTO: 4.23 X10*6/UL (ref 4.5–5.9)
RIGHT VENTRICLE PEAK SYSTOLIC PRESSURE: 25.3 MMHG
SODIUM SERPL-SCNC: 137 MMOL/L (ref 136–145)
T AXIS: 21 DEGREES
T OFFSET: 435 MS
UFH PPP CHRO-ACNC: 0.2 IU/ML
VENTRICULAR RATE: 49 BPM
WBC # BLD AUTO: 7.7 X10*3/UL (ref 4.4–11.3)

## 2024-09-30 PROCEDURE — G0008 ADMIN INFLUENZA VIRUS VAC: HCPCS | Performed by: INTERNAL MEDICINE

## 2024-09-30 PROCEDURE — 7100000001 HC RECOVERY ROOM TIME - INITIAL BASE CHARGE: Performed by: INTERNAL MEDICINE

## 2024-09-30 PROCEDURE — C9600 PERC DRUG-EL COR STENT SING: HCPCS | Mod: LD | Performed by: INTERNAL MEDICINE

## 2024-09-30 PROCEDURE — 84484 ASSAY OF TROPONIN QUANT: CPT | Performed by: INTERNAL MEDICINE

## 2024-09-30 PROCEDURE — 99239 HOSP IP/OBS DSCHRG MGMT >30: CPT | Performed by: INTERNAL MEDICINE

## 2024-09-30 PROCEDURE — 99024 POSTOP FOLLOW-UP VISIT: CPT | Performed by: NURSE PRACTITIONER

## 2024-09-30 PROCEDURE — 7100000009 HC PHASE TWO TIME - INITIAL BASE CHARGE: Performed by: INTERNAL MEDICINE

## 2024-09-30 PROCEDURE — 93458 L HRT ARTERY/VENTRICLE ANGIO: CPT | Performed by: INTERNAL MEDICINE

## 2024-09-30 PROCEDURE — 2500000004 HC RX 250 GENERAL PHARMACY W/ HCPCS (ALT 636 FOR OP/ED): Performed by: INTERNAL MEDICINE

## 2024-09-30 PROCEDURE — 99153 MOD SED SAME PHYS/QHP EA: CPT | Performed by: INTERNAL MEDICINE

## 2024-09-30 PROCEDURE — 85347 COAGULATION TIME ACTIVATED: CPT

## 2024-09-30 PROCEDURE — G0269 OCCLUSIVE DEVICE IN VEIN ART: HCPCS | Mod: 59 | Performed by: INTERNAL MEDICINE

## 2024-09-30 PROCEDURE — 99232 SBSQ HOSP IP/OBS MODERATE 35: CPT | Performed by: NURSE PRACTITIONER

## 2024-09-30 PROCEDURE — 85347 COAGULATION TIME ACTIVATED: CPT | Performed by: INTERNAL MEDICINE

## 2024-09-30 PROCEDURE — B2111ZZ FLUOROSCOPY OF MULTIPLE CORONARY ARTERIES USING LOW OSMOLAR CONTRAST: ICD-10-PCS | Performed by: INTERNAL MEDICINE

## 2024-09-30 PROCEDURE — C1725 CATH, TRANSLUMIN NON-LASER: HCPCS | Performed by: INTERNAL MEDICINE

## 2024-09-30 PROCEDURE — 82947 ASSAY GLUCOSE BLOOD QUANT: CPT

## 2024-09-30 PROCEDURE — 2720000007 HC OR 272 NO HCPCS: Performed by: INTERNAL MEDICINE

## 2024-09-30 PROCEDURE — 85027 COMPLETE CBC AUTOMATED: CPT | Performed by: INTERNAL MEDICINE

## 2024-09-30 PROCEDURE — 36415 COLL VENOUS BLD VENIPUNCTURE: CPT | Performed by: INTERNAL MEDICINE

## 2024-09-30 PROCEDURE — 93005 ELECTROCARDIOGRAM TRACING: CPT

## 2024-09-30 PROCEDURE — 7100000002 HC RECOVERY ROOM TIME - EACH INCREMENTAL 1 MINUTE: Performed by: INTERNAL MEDICINE

## 2024-09-30 PROCEDURE — 83735 ASSAY OF MAGNESIUM: CPT | Performed by: INTERNAL MEDICINE

## 2024-09-30 PROCEDURE — 2500000005 HC RX 250 GENERAL PHARMACY W/O HCPCS: Performed by: INTERNAL MEDICINE

## 2024-09-30 PROCEDURE — C1769 GUIDE WIRE: HCPCS | Performed by: INTERNAL MEDICINE

## 2024-09-30 PROCEDURE — 2500000004 HC RX 250 GENERAL PHARMACY W/ HCPCS (ALT 636 FOR OP/ED): Performed by: NURSE PRACTITIONER

## 2024-09-30 PROCEDURE — 2500000004 HC RX 250 GENERAL PHARMACY W/ HCPCS (ALT 636 FOR OP/ED): Mod: JZ | Performed by: INTERNAL MEDICINE

## 2024-09-30 PROCEDURE — 2500000001 HC RX 250 WO HCPCS SELF ADMINISTERED DRUGS (ALT 637 FOR MEDICARE OP): Performed by: INTERNAL MEDICINE

## 2024-09-30 PROCEDURE — C1874 STENT, COATED/COV W/DEL SYS: HCPCS | Performed by: INTERNAL MEDICINE

## 2024-09-30 PROCEDURE — 93306 TTE W/DOPPLER COMPLETE: CPT | Performed by: INTERNAL MEDICINE

## 2024-09-30 PROCEDURE — 85520 HEPARIN ASSAY: CPT | Performed by: INTERNAL MEDICINE

## 2024-09-30 PROCEDURE — 92928 PRQ TCAT PLMT NTRAC ST 1 LES: CPT | Performed by: INTERNAL MEDICINE

## 2024-09-30 PROCEDURE — 93306 TTE W/DOPPLER COMPLETE: CPT

## 2024-09-30 PROCEDURE — 7100000010 HC PHASE TWO TIME - EACH INCREMENTAL 1 MINUTE: Performed by: INTERNAL MEDICINE

## 2024-09-30 PROCEDURE — 82374 ASSAY BLOOD CARBON DIOXIDE: CPT | Performed by: INTERNAL MEDICINE

## 2024-09-30 PROCEDURE — C1887 CATHETER, GUIDING: HCPCS | Performed by: INTERNAL MEDICINE

## 2024-09-30 PROCEDURE — 99152 MOD SED SAME PHYS/QHP 5/>YRS: CPT | Performed by: INTERNAL MEDICINE

## 2024-09-30 PROCEDURE — 2550000001 HC RX 255 CONTRASTS: Performed by: INTERNAL MEDICINE

## 2024-09-30 PROCEDURE — 2780000003 HC OR 278 NO HCPCS: Performed by: INTERNAL MEDICINE

## 2024-09-30 PROCEDURE — 027034Z DILATION OF CORONARY ARTERY, ONE ARTERY WITH DRUG-ELUTING INTRALUMINAL DEVICE, PERCUTANEOUS APPROACH: ICD-10-PCS | Performed by: INTERNAL MEDICINE

## 2024-09-30 PROCEDURE — C1760 CLOSURE DEV, VASC: HCPCS | Performed by: INTERNAL MEDICINE

## 2024-09-30 PROCEDURE — 2500000002 HC RX 250 W HCPCS SELF ADMINISTERED DRUGS (ALT 637 FOR MEDICARE OP, ALT 636 FOR OP/ED): Performed by: INTERNAL MEDICINE

## 2024-09-30 PROCEDURE — B2151ZZ FLUOROSCOPY OF LEFT HEART USING LOW OSMOLAR CONTRAST: ICD-10-PCS | Performed by: INTERNAL MEDICINE

## 2024-09-30 PROCEDURE — 4A023N7 MEASUREMENT OF CARDIAC SAMPLING AND PRESSURE, LEFT HEART, PERCUTANEOUS APPROACH: ICD-10-PCS | Performed by: INTERNAL MEDICINE

## 2024-09-30 PROCEDURE — 90662 IIV NO PRSV INCREASED AG IM: CPT | Performed by: INTERNAL MEDICINE

## 2024-09-30 DEVICE — STENT, ONYX FRONTIER DES, 2.75 X 22RX: Type: IMPLANTABLE DEVICE | Site: CORONARY | Status: FUNCTIONAL

## 2024-09-30 RX ORDER — FENTANYL CITRATE 50 UG/ML
INJECTION, SOLUTION INTRAMUSCULAR; INTRAVENOUS AS NEEDED
Status: DISCONTINUED | OUTPATIENT
Start: 2024-09-30 | End: 2024-09-30 | Stop reason: HOSPADM

## 2024-09-30 RX ORDER — MIDAZOLAM HYDROCHLORIDE 1 MG/ML
INJECTION, SOLUTION INTRAMUSCULAR; INTRAVENOUS AS NEEDED
Status: DISCONTINUED | OUTPATIENT
Start: 2024-09-30 | End: 2024-09-30 | Stop reason: HOSPADM

## 2024-09-30 RX ORDER — SODIUM CHLORIDE 9 MG/ML
100 INJECTION, SOLUTION INTRAVENOUS CONTINUOUS
Status: DISCONTINUED | OUTPATIENT
Start: 2024-09-30 | End: 2024-09-30 | Stop reason: HOSPADM

## 2024-09-30 RX ORDER — NITROGLYCERIN 40 MG/100ML
INJECTION INTRAVENOUS AS NEEDED
Status: DISCONTINUED | OUTPATIENT
Start: 2024-09-30 | End: 2024-09-30 | Stop reason: HOSPADM

## 2024-09-30 RX ORDER — CLOPIDOGREL BISULFATE 300 MG/1
TABLET, FILM COATED ORAL AS NEEDED
Status: DISCONTINUED | OUTPATIENT
Start: 2024-09-30 | End: 2024-09-30 | Stop reason: HOSPADM

## 2024-09-30 RX ORDER — ALUMINUM HYDROXIDE, MAGNESIUM HYDROXIDE, AND SIMETHICONE 1200; 120; 1200 MG/30ML; MG/30ML; MG/30ML
30 SUSPENSION ORAL 4 TIMES DAILY PRN
Status: DISCONTINUED | OUTPATIENT
Start: 2024-09-30 | End: 2024-09-30 | Stop reason: HOSPADM

## 2024-09-30 RX ORDER — LIDOCAINE HYDROCHLORIDE 20 MG/ML
INJECTION, SOLUTION INFILTRATION; PERINEURAL AS NEEDED
Status: DISCONTINUED | OUTPATIENT
Start: 2024-09-30 | End: 2024-09-30 | Stop reason: HOSPADM

## 2024-09-30 RX ORDER — HEPARIN SODIUM 1000 [USP'U]/ML
INJECTION, SOLUTION INTRAVENOUS; SUBCUTANEOUS AS NEEDED
Status: DISCONTINUED | OUTPATIENT
Start: 2024-09-30 | End: 2024-09-30 | Stop reason: HOSPADM

## 2024-09-30 RX ADMIN — ICOSAPENT ETHYL 2 G: 1000 CAPSULE ORAL at 07:56

## 2024-09-30 RX ADMIN — HEPARIN SODIUM 1400 UNITS/HR: 10000 INJECTION, SOLUTION INTRAVENOUS at 05:33

## 2024-09-30 RX ADMIN — INFLUENZA A VIRUS A/VICTORIA/4897/2022 IVR-238 (H1N1) ANTIGEN (FORMALDEHYDE INACTIVATED), INFLUENZA A VIRUS A/CALIFORNIA/122/2022 SAN-022 (H3N2) ANTIGEN (FORMALDEHYDE INACTIVATED), AND INFLUENZA B VIRUS B/MICHIGAN/01/2021 ANTIGEN (FORMALDEHYDE INACTIVATED) 0.5 ML: 60; 60; 60 INJECTION, SUSPENSION INTRAMUSCULAR at 15:45

## 2024-09-30 RX ADMIN — AMLODIPINE BESYLATE 5 MG: 5 TABLET ORAL at 07:56

## 2024-09-30 RX ADMIN — CLOPIDOGREL BISULFATE 75 MG: 75 TABLET, FILM COATED ORAL at 07:56

## 2024-09-30 RX ADMIN — ASPIRIN 81 MG CHEWABLE TABLET 81 MG: 81 TABLET CHEWABLE at 07:56

## 2024-09-30 RX ADMIN — INSULIN LISPRO 4 UNITS: 100 INJECTION, SOLUTION INTRAVENOUS; SUBCUTANEOUS at 13:15

## 2024-09-30 RX ADMIN — RANOLAZINE 500 MG: 500 TABLET, FILM COATED, EXTENDED RELEASE ORAL at 07:56

## 2024-09-30 RX ADMIN — METOPROLOL SUCCINATE 25 MG: 25 TABLET, EXTENDED RELEASE ORAL at 07:56

## 2024-09-30 RX ADMIN — FINASTERIDE 5 MG: 5 TABLET, FILM COATED ORAL at 08:00

## 2024-09-30 RX ADMIN — VALSARTAN 40 MG: 40 TABLET, FILM COATED ORAL at 07:56

## 2024-09-30 RX ADMIN — SODIUM CHLORIDE 100 ML/HR: 9 INJECTION, SOLUTION INTRAVENOUS at 11:25

## 2024-09-30 RX ADMIN — TAMSULOSIN HYDROCHLORIDE 0.4 MG: 0.4 CAPSULE ORAL at 07:56

## 2024-09-30 RX ADMIN — TRIAMTERENE AND HYDROCHLOROTHIAZIDE 0.5 TABLET: 37.5; 25 TABLET ORAL at 07:56

## 2024-09-30 RX ADMIN — PERFLUTREN 1.5 ML OF DILUTION: 6.52 INJECTION, SUSPENSION INTRAVENOUS at 14:25

## 2024-09-30 RX ADMIN — INSULIN LISPRO 4 UNITS: 100 INJECTION, SOLUTION INTRAVENOUS; SUBCUTANEOUS at 07:38

## 2024-09-30 RX ADMIN — SODIUM CHLORIDE, SODIUM LACTATE, POTASSIUM CHLORIDE, AND CALCIUM CHLORIDE 75 ML/HR: 600; 310; 30; 20 INJECTION, SOLUTION INTRAVENOUS at 03:06

## 2024-09-30 ASSESSMENT — PAIN - FUNCTIONAL ASSESSMENT
PAIN_FUNCTIONAL_ASSESSMENT: 0-10

## 2024-09-30 ASSESSMENT — PAIN SCALES - GENERAL
PAINLEVEL_OUTOF10: 1
PAINLEVEL_OUTOF10: 0 - NO PAIN

## 2024-09-30 ASSESSMENT — COGNITIVE AND FUNCTIONAL STATUS - GENERAL
DAILY ACTIVITIY SCORE: 24
MOBILITY SCORE: 24

## 2024-09-30 NOTE — DISCHARGE INSTRUCTIONS
**You will need to wait a minimum of 3 months, ideally 6 months to undergo any non-emergent surgical procedures requiring you to be off of your aspirin and Plavix.  Interrupting aspirin and Plavix therapy after receiving a new coronary artery stent can cause a heart attack by developing a blood clot on your stent.**            CARDIAC CATHETERIZATION DISCHARGE INSTRUCTIONS     FOR SUDDEN AND SEVERE CHEST PAIN, SHORTNESS OF BREATH, EXCESSIVE BLEEDING, SIGNS OF STROKE, OR CHANGES IN MENTAL STATUS YOU SHOULD CALL 911 IMMEDIATELY.     If your provider has prescribed aspirin and/or clopidogrel (Plavix), or prasugrel (Effient), or ticagrelor (Brilinta), DO NOT STOP THESE MEDICATIONS for any reason without talking to your cardiologist first. If any of these were prescribed, you must take them every day without missing a single dose. If you are getting low on these medications, contact your provider immediately for a refill.     FOR NEXT 24 HOURS  - Upon discharge, you should return home and rest for the remainder of the day and evening. You do not have to stay on bed rest but should not be very active.  It is recommended a responsible adult be with you for the first 24 hours after the procedure.    - No driving for 24 hours after procedure. Please arrange for someone to drive you home from the hospital today.     - Do not drive, operate machinery, or use power tools for 24 hours after your procedure.     - Do not make any legal decisions for 24 hours after your procedure.     - Do not drink alcoholic beverages for 24 hours after your procedure.    WOUND CARE   *FOR FEMORAL (LEG) ACCESS*  ·      Avoid heavy lifting (over 10 pounds) for 7 days, squatting or excessive bending for 2 days, and strenuous exercise for 7 days.  ·      No submerged bathing, swimming, or hot tubs for the next 7 days, or until fully healed.  ·      Avoid sexual activity for 3-4 days until any groin discomfort has ceased.     *FOR RADIAL (WRIST)  ACCESS*  ·      No lifting more than 5 pounds or excessive use of the wrist for 24 hours - for example, treat your wrist as if it is sprained.  ·      Do not engage in vigorous activities (tennis, golf, bowling, weights) for at least 48 hours after the procedure.  ·      Do not submerge the wrist for 7 days after the procedure.  ·      You should expect mild tingling in your hand and tenderness at the puncture site for up to 3 days.    - The transparent dressing should be removed from the site 24 hours after the procedure.  Wash the site gently with soap and water. Rinse well and pat dry. Keep the area clean and dry. You may apply a Band-Aid to the site. Avoid lotions, ointments, or powders until fully healed.     - You may shower the day after your procedure.      - It is normal to notice a small bruise around the puncture site and/or a small grape sized or smaller lump. Any large bruising or large lump warrants a call to the office.     - If bleeding should occur, lay down and apply pressure to the affected area for 10 minutes.  If the bleeding stops notify your physician.  If there is a large amount of bleeding or spurting of blood CALL 911 immediately.  DO NOT drive yourself to the hospital.    - You may experience some tenderness, bruising or minimal inflammation.  If you have any concerns, you may contact the Cath Lab or if any of these symptoms become excessive, contact your cardiologist or go to the emergency room.     OTHER INSTRUCTIONS  - You may take acetaminophen (Tylenol) as directed for discomfort.  If pain is not relieved with acetaminophen (Tylenol), contact your doctor.    - If you notice or experience any of the following, you should notify your doctor or seek medical attention  Chest pain or discomfort  Change in mental status or weakness in extremities.  Dizziness, light headedness, or feeling faint.  Change in the site where the procedure was performed, such as bleeding or an increased area of  bruising or swelling.  Tingling, numbness, pain, or coolness in the leg/arm beyond the site where the procedure was performed.  Signs of infection (i.e. shaking chills, temperature > 100 degrees Fahrenheit, warmth, redness) in the leg/arm area where the procedure was performed.  Changes in urination   Bloody or black stools  Vomiting blood  Severe nose bleeds  Any excessive bleeding    - If you DO NOT have an appointment with your cardiologist within 2-4 weeks following your procedure, please contact their office.

## 2024-09-30 NOTE — CARE PLAN
The patient's goals for the shift include  discharge     The clinical goals for the shift include Patient will remain hemodynamically stable throughout shift

## 2024-09-30 NOTE — NURSING NOTE
Patient recovered post PTCA.  Right groin soft/stable, +2 pedal pulse.  Complaint of ache 1/10 in chest.  Post EKG performed and Naomi Pandey CNP in to see patient and family to discuss results.  Lactated ringers changed to normal saline post per orders.  Activity restrictions discussed with patient and groin interventions.

## 2024-09-30 NOTE — POST-PROCEDURE NOTE
Physician Transition of Care Summary  Invasive Cardiovascular Lab    Procedure Date: 9/30/2024  Attending:    Tiffani Antonio - Primary  Resident/Fellow/Other Assistant: Surgeons and Role:  * No surgeons found with a matching role *    Pre Procedure Diagnosis:   CAD, remote multivessel PCI, unstable angina    Post Procedure Diagnosis:   Patent stents of RCA and circumflex, 80% mid LAD lesion, successful ANTOINE of mid LAD, left ventricular ejection fraction 60%    Complications:   None    Stents/Implants:   ANTOINE of LAD    Anticoagulation/Antiplatelet Plan:   Aspirin 81 mg a day, Plavix 75 mg a day, no heparin moderate none    Estimated Blood Loss:   0 mL    Electronically signed by: Armando Antonio MD, 9/30/2024 10:05 AM    Anesthesia: Moderate                            anesthesia Staff: None

## 2024-09-30 NOTE — PROGRESS NOTES
Patient is stable status post LHC/PCI under the care of Dr. Antonio.  Discussed results of procedure with patient and his family members.  Pictures provided.  Findings of the LHC revealed 80% stenosis in the mid LAD, patent previous stents in the LAD circumflex and RCA, mild disease elsewhere and an LVEF of 60%..  Patient underwent successful PCI with 1 ANTOINE placed to the LAD reducing that lesion down to 0% stenosis.  He tolerated the procedure well.  Please see procedural report for complete details.  Patient will continue DAPT with aspirin 81 mg daily and Plavix 75 mg daily.  Patient's upcoming urological procedures will need to be postponed for a minimum of 3 months, ideally 6 months post PCI and ANTOINE placement as to not interrupt aspirin and Plavix therapy.  Echocardiogram pending at this time.  Continue to monitor on telemetry.  Further recommendations pending test results.  Possible discharge later today barring no complications.  Patient will be scheduled to follow-up with Dr. Antonio in 1 to 2 weeks.  Postprocedural activity, restrictions, potential complications, medications and future follow-up discussed at length.  Multiple questions answered.  All verbalized understanding.

## 2024-09-30 NOTE — PROGRESS NOTES
[facility] Progress Note           Rounding DIANE/Cardiologist:  Kim Arvizu, APRN-CNP,     Primary Cardiologist: Dr. Armando Antonio    Date:  9/30/2024  Patient:  Manny Reveles  YOB: 1955  MRN:  [id number]   Admit Date:  9/28/2024      SUBJECTIVE:    Manny Reveles was seen and examined today at bedside on intermediate care unit.  He had uneventful night and is awaiting cardiac catheterization before today    VITALS:     Vitals:    09/29/24 1936 09/29/24 2336 09/30/24 0315 09/30/24 0713   BP: 127/62 124/62 105/53 133/64   BP Location:       Patient Position:       Pulse: 58 54 52 54   Resp:       Temp: 36.8 °C (98.2 °F) 36.7 °C (98.1 °F) 36.3 °C (97.3 °F) 36.3 °C (97.3 °F)   TempSrc:       SpO2: 95% 95% 96% 95%   Weight:       Height:           Intake/Output Summary (Last 24 hours) at 9/30/2024 0802  Last data filed at 9/30/2024 0000  Gross per 24 hour   Intake 700 ml   Output --   Net 700 ml       Wt Readings from Last 4 Encounters:   09/28/24 122 kg (268 lb 8.3 oz)   09/25/24 122 kg (268 lb 1.3 oz)   09/09/24 123 kg (272 lb)   08/28/24 123 kg (272 lb)       CURRENT HOSPITAL MEDICATIONS:   amLODIPine, 5 mg, oral, Daily  aspirin, 81 mg, oral, Daily  atorvastatin, 40 mg, oral, Nightly  clopidogrel, 75 mg, oral, Daily  finasteride, 5 mg, oral, Daily  influenza, 0.5 mL, intramuscular, During hospitalization  icosapent ethyL, 2 g, oral, BID  insulin glargine, 90 Units, subcutaneous, Daily before breakfast  insulin lispro, 0-20 Units, subcutaneous, TID  metoprolol succinate XL, 25 mg, oral, Daily  perflutren lipid microspheres, 0.5-10 mL of dilution, intravenous, Once in imaging  perflutren protein A microsphere, 0.5 mL, intravenous, Once in imaging  ranolazine, 500 mg, oral, q12h  sulfur hexafluoride microsphr, 2 mL, intravenous, Once in imaging  tamsulosin, 0.4 mg, oral, Daily  triamterene-hydrochlorothiazid, 0.5 tablet, oral, Daily  valsartan, 40 mg, oral, Daily      heparin,  0-4,000 Units/hr, Last Rate: 1,600 Units/hr (09/30/24 0534)  lactated Ringer's, 75 mL/hr, Last Rate: 75 mL/hr (09/30/24 0306)      Current Outpatient Medications   Medication Instructions    amLODIPine (NORVASC) 5 mg, oral, Daily, as directed    aspirin 81 mg chewable tablet oral    atorvastatin (LIPITOR) 40 mg, oral, Nightly    cetirizine (ZYRTEC) 10 mg, oral, Daily    cholecalciferol (VITAMIN D-3) 50,000 Units, oral, Once Weekly    clopidogrel (PLAVIX) 75 mg, oral, Daily    CRANBERRY ORAL 2 tablets, oral, Daily    docusate sodium (COLACE) 100 mg, oral, 2 times daily    finasteride (PROSCAR) 5 mg, oral, Daily    icosapent ethyL (VASCEPA) 2 g, oral, 2 times daily    insulin aspart (NovoLOG FlexPen U-100 Insulin) 100 unit/mL (3 mL) pen subcutaneous, Inject 20 units plus coverage for snacks     insulin degludec (TRESIBA FLEXTOUCH U-200) 104 Units, subcutaneous, Nightly    ketoconazole (NIZOral) 2 % shampoo Topical, 2 times weekly, Apply to scalp in shower. Lather, leave on 5 minutes then rinse    Lactobacillus acidophilus (PROBIOTIC ORAL) oral    metoprolol succinate XL (TOPROL-XL) 25 mg, oral, Daily    nitroglycerin (NITROSTAT) 0.4 mg, sublingual, Every 5 min PRN    potassium chloride CR (Klor-Con M20) 20 mEq ER tablet 20 mEq, oral, Daily    ranolazine (RANEXA) 500 mg, oral, Every 12 hours    semaglutide (Rybelsus) 14 mg tablet tablet 1 tablet, oral, Daily    tamsulosin (FLOMAX) 0.4 mg, oral, Daily    telmisartan (MICARDIS) 20 mg, oral, Every 24 hours    triamterene-hydrochlorothiazid (Maxzide-25) 37.5-25 mg tablet Take half tablet po daily        PHYSICAL EXAMINATION:   GENERAL:  Well developed, well nourished, in no acute distress.  CHEST:  Symmetric and nontender.  NEURO/PSYCH:  Alert and oriented times three with approppriate behavior and responses.  NECK:  Supple, no JVD, no bruit.  LUNGS:  Clear to auscultation bilaterally, normal respiratory effort.  HEART:  Rate and rhythm regular no murmur, no gallop  appreciated.   EXTREMITIES:  Warm with good color, no clubbing or cyanosis.  No lower extremity edema noted.  PERIPHERAL VASCULAR:  Pulses present and equally palpable; 2+     LAB DATA:     CBC:   Results from last 7 days   Lab Units 09/30/24 0440 09/29/24 0305 09/28/24  1310   WBC AUTO x10*3/uL 7.7 7.0 8.2   RBC AUTO x10*6/uL 4.23* 4.09* 4.51   HEMOGLOBIN g/dL 13.2* 12.8* 14.0   HEMATOCRIT % 39.1* 37.7* 41.5   MCV fL 92 92 92   MCH pg 31.2 31.3 31.0   MCHC g/dL 33.8 34.0 33.7   RDW % 12.3 12.5 12.5   PLATELETS AUTO x10*3/uL 148* 148* 161     CMP:    Results from last 7 days   Lab Units 09/30/24 0440 09/29/24 0305 09/28/24  1310   SODIUM mmol/L 137 138 136   POTASSIUM mmol/L 3.8 4.0 4.3   CHLORIDE mmol/L 106 108* 106   CO2 mmol/L 24 25 23   BUN mg/dL 22 26* 29*   CREATININE mg/dL 1.49* 1.56* 1.49*   GLUCOSE mg/dL 156* 101* 163*   PROTEIN TOTAL g/dL  --   --  6.6   CALCIUM mg/dL 8.8 8.8 8.9   BILIRUBIN TOTAL mg/dL  --   --  2.0*   ALK PHOS U/L  --   --  60   AST U/L  --   --  16   ALT U/L  --   --  20     BMP:    Results from last 7 days   Lab Units 09/30/24 0440 09/29/24 0305 09/28/24  1310   SODIUM mmol/L 137 138 136   POTASSIUM mmol/L 3.8 4.0 4.3   CHLORIDE mmol/L 106 108* 106   CO2 mmol/L 24 25 23   BUN mg/dL 22 26* 29*   CREATININE mg/dL 1.49* 1.56* 1.49*   CALCIUM mg/dL 8.8 8.8 8.9   GLUCOSE mg/dL 156* 101* 163*     Magnesium:  Results from last 7 days   Lab Units 09/30/24 0440 09/29/24  0305   MAGNESIUM mg/dL 1.91 2.05     Troponin:    Results from last 7 days   Lab Units 09/30/24  0440 09/29/24  0305 09/28/24  2213   TROPHS ng/L 9 9 6     BNP:     Lipid Panel:         DIAGNOSTIC TESTING:   @No results found for this or any previous visit.    ECG 12 lead    Result Date: 9/29/2024  Sinus bradycardia with Premature supraventricular complexes Moderate voltage criteria for LVH, may be normal variant Borderline ECG When compared with ECG of 28-SEP-2024 16:01, (unconfirmed) No significant change was found  Confirmed by Romain Ann ([id number]) on 9/29/2024 8:42:41 PM    ECG 12 lead    Result Date: 9/29/2024  Sinus bradycardia with Premature atrial complexes Minimal voltage criteria for LVH, may be normal variant Borderline ECG When compared with ECG of 28-SEP-2024 14:00, (unconfirmed) Premature atrial complexes are now Present    ECG 12 lead    Result Date: 9/28/2024  Normal sinus rhythm Minimal voltage criteria for LVH, may be normal variant Borderline ECG When compared with ECG of 25-FEB-2023 12:48, No significant change was found See ED provider note for full interpretation and clinical correlation    ECG 12 lead    Result Date: 9/28/2024  Sinus bradycardia Moderate voltage criteria for LVH, may be normal variant Borderline ECG When compared with ECG of 28-SEP-2024 13:03, (unconfirmed) No significant change was found See ED provider note for full interpretation and clinical correlation    XR chest 1 view    Result Date: 9/28/2024  Interpreted By:  Roxy Oconnor, STUDY: XR CHEST 1 VIEW;; 9/28/2024 1:21 pm   INDICATION: Signs/Symptoms:Chest Pain.   COMPARISON: 03/13/2024   ACCESSION NUMBER(S): [id number]   ORDERING CLINICIAN: MAZIN SILVA   FINDINGS: The cardiac silhouette is stable for the portable technique. There is no consolidations, effusions, infiltrates or pneumothorax.       No evidence for acute cardiopulmonary process. If there is clinical concern for acute aortic pathology or pulmonary embolic disease, further evaluation with chest CT should be considered for better assessment.       Signed by: Roxy Oconnor 9/28/2024 1:38 PM Dictation workstation:   [id number]       XR chest 1 view   Final Result   No evidence for acute cardiopulmonary process. If there is clinical   concern for acute aortic pathology or pulmonary embolic disease,   further evaluation with chest CT should be considered for better   assessment.                  Signed by: Roxy Oconnor 9/28/2024 1:38 PM   Dictation workstation:    [id number]      Transthoracic Echo (TTE) Complete    (Results Pending)   Cardiac Catheterization Procedure    (Results Pending)       No echocardiogram results found for the past 14 days    RADIOLOGY:     XR chest 1 view   Final Result   No evidence for acute cardiopulmonary process. If there is clinical   concern for acute aortic pathology or pulmonary embolic disease,   further evaluation with chest CT should be considered for better   assessment.                  Signed by: Roxy Oconnor 9/28/2024 1:38 PM   Dictation workstation:   [id number]      Transthoracic Echo (TTE) Complete    (Results Pending)   Cardiac Catheterization Procedure    (Results Pending)       PROBLEM LIST     Patient Active Problem List   Diagnosis    Stage 3 chronic kidney disease (Multi)    Obstructive sleep apnea syndrome    Essential hypertriglyceridemia    CAD S/P percutaneous coronary angioplasty    Benign prostatic hyperplasia with urinary obstruction    Benign essential hypertension    Calculus of kidney    Incomplete bladder emptying    Squamous cell carcinoma of skin of other parts of face    Type 2 diabetes mellitus without retinopathy (Multi)    Class 2 severe obesity due to excess calories with serious comorbidity and body mass index (BMI) of 35.0 to 35.9 in adult (Multi)    Hypokalemia    Uses self-applied continuous glucose monitoring device    Never smoked tobacco    Unstable angina (Multi)       ASSESSMENT:     Chest pain/unstable angina  ASHD class II  Remote multivessel angioplasty and stenting  Stage III kidney disease  Sleep apnea  Type 2 diabetes  Moderate obesity  Renal calculus in the past  Dyslipidemia     PLAN:     Continue current medications  Monitor renal function  Further cardiology recommendations pending cardiac catheterization outcome  Outpatient follow-up with primary cardiologist Dr. Antonio after discharge to be arranged    Addendum  Patient underwent cardiac catheterization with subsequent PCI and  drug-eluting stent of left anterior descending artery.  He is now back in his room in the intermediate care unit and resting comfortably and had no complaint.  Reviewed procedure findings, need for dual antiplatelet therapy, and follow-up with Dr. Antonio with patient and his wife who is at the bedside.  Their questions were answered and they verbalized understanding of information   Kim NORTON  [facility]    Of note, this documentation is completed using the Dragon Dictation system (voice recognition software). There may be spelling and/or grammatical errors that were not corrected prior to final submission.    Electronically signed by ERROL Solis, on 9/30/2024 at 8:02 AM

## 2024-09-30 NOTE — DISCHARGE SUMMARY
DISCHARGE DIAGNOSIS     Unstable angina s/p PCI    HOSPITAL COURSE AND DETAILS     69M with PMH of HTN, DLD, DM2, CAD s/p multiple PCI, PAZ, CKD3 who is presenting with chest pain. Started abruptly last night, L sided, dull aching. He initially thought it was a pulled muscle after wrestling with his grandson. However he woke up this morning and it was persistent, and spread to his L jaw, L arm, and L shoulder. He came to ED because of his significant history. He took SL nitroglycerin before coming and received in ED, and this improved the CP.     Serial trops were neg, EKGs were unchanged from prior. Cardiology saw patient, took for LHC that showed 80% stenosis in mid LAD, patent previous stents in LAD, Lcx, RCA, mild disease elsewhere, LVEF 60%. Underwent PCI with ANTOINE x1 to mid LAD lesion. Post procedure, CP had resolved. He will cont on PTA meds, was already on DAPT. Will fu closely with cardiology. He has upcoming elective urological procedures that will need to be delayed to avoid interruptions in DAPT. Received flu shot here before discharge.     Stable for dc to home. Total time spent on discharge services 31 minutes.     DISCHARGE PHYSICAL EXAM     Last Recorded Vitals:  Vitals:    09/30/24 1100 09/30/24 1130 09/30/24 1200 09/30/24 1458   BP:    112/59   Pulse:    60   Resp:       Temp:    36.6 °C (97.9 °F)   TempSrc:       SpO2: 94% 95% 95% 96%   Weight:       Height:           Physical Exam:  GEN: healthy appearing, appears stated age, NAD  CV: RRR, no m/r/g, no LE edema  LUNGS: CTAB, no w/r/c  ABD: soft, NT, ND, NBS  SKIN: ble venous stasis changes  MSK; no gross deformities, normal joints  NEURO: A+Ox3, no FND  PSYCH: appropriate mood, affect      PERTINENT LABS AND IMAGING     Results for orders placed or performed during the hospital encounter of 09/28/24 (from the past 96 hour(s))   ECG 12 lead   Result Value Ref Range    Ventricular Rate 57 BPM    Atrial Rate 57 BPM    MS Interval 180 ms    QRS  Duration 98 ms    QT Interval 414 ms    QTC Calculation(Bazett) 402 ms    P Axis -2 degrees    R Axis -4 degrees    T Axis 8 degrees    QRS Count 9 beats    Q Onset 216 ms    P Onset 126 ms    P Offset 191 ms    T Offset 423 ms    QTC Fredericia 407 ms   ECG 12 lead   Result Value Ref Range    Ventricular Rate 62 BPM    Atrial Rate 62 BPM    WI Interval 160 ms    QRS Duration 84 ms    QT Interval 392 ms    QTC Calculation(Bazett) 397 ms    P Axis -7 degrees    R Axis 2 degrees    T Axis 13 degrees    QRS Count 11 beats    Q Onset 222 ms    P Onset 142 ms    P Offset 194 ms    T Offset 418 ms    QTC Fredericia 396 ms   CBC and Auto Differential   Result Value Ref Range    WBC 8.2 4.4 - 11.3 x10*3/uL    nRBC 0.0 0.0 - 0.0 /100 WBCs    RBC 4.51 4.50 - 5.90 x10*6/uL    Hemoglobin 14.0 13.5 - 17.5 g/dL    Hematocrit 41.5 41.0 - 52.0 %    MCV 92 80 - 100 fL    MCH 31.0 26.0 - 34.0 pg    MCHC 33.7 32.0 - 36.0 g/dL    RDW 12.5 11.5 - 14.5 %    Platelets 161 150 - 450 x10*3/uL    Neutrophils % 68.1 40.0 - 80.0 %    Immature Granulocytes %, Automated 0.4 0.0 - 0.9 %    Lymphocytes % 20.7 13.0 - 44.0 %    Monocytes % 8.9 2.0 - 10.0 %    Eosinophils % 0.9 0.0 - 6.0 %    Basophils % 1.0 0.0 - 2.0 %    Neutrophils Absolute 5.59 1.20 - 7.70 x10*3/uL    Immature Granulocytes Absolute, Automated 0.03 0.00 - 0.70 x10*3/uL    Lymphocytes Absolute 1.70 1.20 - 4.80 x10*3/uL    Monocytes Absolute 0.73 0.10 - 1.00 x10*3/uL    Eosinophils Absolute 0.07 0.00 - 0.70 x10*3/uL    Basophils Absolute 0.08 0.00 - 0.10 x10*3/uL   Comprehensive Metabolic Panel   Result Value Ref Range    Glucose 163 (H) 74 - 99 mg/dL    Sodium 136 136 - 145 mmol/L    Potassium 4.3 3.5 - 5.3 mmol/L    Chloride 106 98 - 107 mmol/L    Bicarbonate 23 21 - 32 mmol/L    Anion Gap 11 10 - 20 mmol/L    Urea Nitrogen 29 (H) 6 - 23 mg/dL    Creatinine 1.49 (H) 0.50 - 1.30 mg/dL    eGFR 50 (L) >60 mL/min/1.73m*2    Calcium 8.9 8.6 - 10.3 mg/dL    Albumin 4.2 3.4 - 5.0 g/dL     Alkaline Phosphatase 60 33 - 136 U/L    Total Protein 6.6 6.4 - 8.2 g/dL    AST 16 9 - 39 U/L    Bilirubin, Total 2.0 (H) 0.0 - 1.2 mg/dL    ALT 20 10 - 52 U/L   Lipase   Result Value Ref Range    Lipase 29 9 - 82 U/L   Troponin I, High Sensitivity, Initial   Result Value Ref Range    Troponin I, High Sensitivity 7 0 - 20 ng/L   Troponin, High Sensitivity, 1 Hour   Result Value Ref Range    Troponin I, High Sensitivity 5 0 - 20 ng/L   aPTT - baseline   Result Value Ref Range    aPTT 31 27 - 38 seconds   Protime-INR   Result Value Ref Range    Protime 11.4 9.8 - 12.8 seconds    INR 1.0 0.9 - 1.1   POCT GLUCOSE   Result Value Ref Range    POCT Glucose 153 (H) 74 - 99 mg/dL   Troponin I, High Sensitivity   Result Value Ref Range    Troponin I, High Sensitivity 6 0 - 20 ng/L   Heparin Assay   Result Value Ref Range    Heparin Unfractionated 0.2 See Comment Below for Therapeutic Ranges IU/mL   Heparin Assay, UFH   Result Value Ref Range    Heparin Unfractionated 0.3 See Comment Below for Therapeutic Ranges IU/mL   Troponin I, High Sensitivity   Result Value Ref Range    Troponin I, High Sensitivity 9 0 - 20 ng/L   Magnesium   Result Value Ref Range    Magnesium 2.05 1.60 - 2.40 mg/dL   Basic Metabolic Panel   Result Value Ref Range    Glucose 101 (H) 74 - 99 mg/dL    Sodium 138 136 - 145 mmol/L    Potassium 4.0 3.5 - 5.3 mmol/L    Chloride 108 (H) 98 - 107 mmol/L    Bicarbonate 25 21 - 32 mmol/L    Anion Gap 9 (L) 10 - 20 mmol/L    Urea Nitrogen 26 (H) 6 - 23 mg/dL    Creatinine 1.56 (H) 0.50 - 1.30 mg/dL    eGFR 48 (L) >60 mL/min/1.73m*2    Calcium 8.8 8.6 - 10.3 mg/dL   CBC   Result Value Ref Range    WBC 7.0 4.4 - 11.3 x10*3/uL    nRBC 0.0 0.0 - 0.0 /100 WBCs    RBC 4.09 (L) 4.50 - 5.90 x10*6/uL    Hemoglobin 12.8 (L) 13.5 - 17.5 g/dL    Hematocrit 37.7 (L) 41.0 - 52.0 %    MCV 92 80 - 100 fL    MCH 31.3 26.0 - 34.0 pg    MCHC 34.0 32.0 - 36.0 g/dL    RDW 12.5 11.5 - 14.5 %    Platelets 148 (L) 150 - 450 x10*3/uL    Lavender Top   Result Value Ref Range    Extra Tube Hold for add-ons.    SST TOP   Result Value Ref Range    Extra Tube Hold for add-ons.    PST Top   Result Value Ref Range    Extra Tube Hold for add-ons.    POCT GLUCOSE   Result Value Ref Range    POCT Glucose 84 74 - 99 mg/dL   Heparin Assay, UFH   Result Value Ref Range    Heparin Unfractionated 0.2 See Comment Below for Therapeutic Ranges IU/mL   Lavender Top   Result Value Ref Range    Extra Tube Hold for add-ons.    SST TOP   Result Value Ref Range    Extra Tube Hold for add-ons.    PST Top   Result Value Ref Range    Extra Tube Hold for add-ons.    ECG 12 lead   Result Value Ref Range    Ventricular Rate 49 BPM    Atrial Rate 49 BPM    AL Interval 160 ms    QRS Duration 108 ms    QT Interval 440 ms    QTC Calculation(Bazett) 397 ms    P Axis -1 degrees    R Axis 4 degrees    T Axis 21 degrees    QRS Count 7 beats    Q Onset 215 ms    P Onset 135 ms    P Offset 196 ms    T Offset 435 ms    QTC Fredericia 411 ms   ECG 12 lead   Result Value Ref Range    Ventricular Rate 58 BPM    Atrial Rate 58 BPM    AL Interval 164 ms    QRS Duration 94 ms    QT Interval 414 ms    QTC Calculation(Bazett) 406 ms    P Axis -13 degrees    R Axis 8 degrees    T Axis -6 degrees    QRS Count 10 beats    Q Onset 219 ms    P Onset 137 ms    P Offset 198 ms    T Offset 426 ms    QTC Fredericia 409 ms   POCT GLUCOSE   Result Value Ref Range    POCT Glucose 213 (H) 74 - 99 mg/dL   Heparin Assay   Result Value Ref Range    Heparin Unfractionated 0.3 See Comment Below for Therapeutic Ranges IU/mL   SST TOP   Result Value Ref Range    Extra Tube Hold for add-ons.    POCT GLUCOSE   Result Value Ref Range    POCT Glucose 133 (H) 74 - 99 mg/dL   Heparin Assay, UFH   Result Value Ref Range    Heparin Unfractionated 0.3 See Comment Below for Therapeutic Ranges IU/mL   POCT GLUCOSE   Result Value Ref Range    POCT Glucose 241 (H) 74 - 99 mg/dL   Troponin I, High Sensitivity   Result Value Ref  Range    Troponin I, High Sensitivity 9 0 - 20 ng/L   Magnesium   Result Value Ref Range    Magnesium 1.91 1.60 - 2.40 mg/dL   Basic Metabolic Panel   Result Value Ref Range    Glucose 156 (H) 74 - 99 mg/dL    Sodium 137 136 - 145 mmol/L    Potassium 3.8 3.5 - 5.3 mmol/L    Chloride 106 98 - 107 mmol/L    Bicarbonate 24 21 - 32 mmol/L    Anion Gap 11 10 - 20 mmol/L    Urea Nitrogen 22 6 - 23 mg/dL    Creatinine 1.49 (H) 0.50 - 1.30 mg/dL    eGFR 50 (L) >60 mL/min/1.73m*2    Calcium 8.8 8.6 - 10.3 mg/dL   CBC   Result Value Ref Range    WBC 7.7 4.4 - 11.3 x10*3/uL    nRBC 0.0 0.0 - 0.0 /100 WBCs    RBC 4.23 (L) 4.50 - 5.90 x10*6/uL    Hemoglobin 13.2 (L) 13.5 - 17.5 g/dL    Hematocrit 39.1 (L) 41.0 - 52.0 %    MCV 92 80 - 100 fL    MCH 31.2 26.0 - 34.0 pg    MCHC 33.8 32.0 - 36.0 g/dL    RDW 12.3 11.5 - 14.5 %    Platelets 148 (L) 150 - 450 x10*3/uL   Heparin Assay, UFH   Result Value Ref Range    Heparin Unfractionated 0.2 See Comment Below for Therapeutic Ranges IU/mL   SST TOP   Result Value Ref Range    Extra Tube Hold for add-ons.    POCT GLUCOSE   Result Value Ref Range    POCT Glucose 168 (H) 74 - 99 mg/dL   ECG 12 lead   Result Value Ref Range    Ventricular Rate 50 BPM    Atrial Rate 50 BPM    AR Interval 158 ms    QRS Duration 108 ms    QT Interval 446 ms    QTC Calculation(Bazett) 406 ms    P Axis -9 degrees    R Axis 8 degrees    T Axis 24 degrees    QRS Count 8 beats    Q Onset 215 ms    P Onset 136 ms    P Offset 195 ms    T Offset 438 ms    QTC Fredericia 419 ms   ACTIVATED CLOTTING TIME LOW   Result Value Ref Range    POCT Activated Clotting Time Low Range     POCT GLUCOSE   Result Value Ref Range    POCT Glucose 141 (H) 74 - 99 mg/dL   Electrocardiogram 12 Lead   Result Value Ref Range    Ventricular Rate 53 BPM    Atrial Rate 53 BPM    AR Interval 158 ms    QRS Duration 110 ms    QT Interval 438 ms    QTC Calculation(Bazett) 410 ms    P Axis 0 degrees    R Axis 17 degrees    T Axis 21 degrees    QRS  Count 8 beats    Q Onset 214 ms    P Onset 135 ms    P Offset 196 ms    T Offset 433 ms    QTC Fredericia 420 ms   POCT GLUCOSE   Result Value Ref Range    POCT Glucose 190 (H) 74 - 99 mg/dL   Transthoracic Echo (TTE) Complete   Result Value Ref Range    BSA 2.5 m2   POCT GLUCOSE   Result Value Ref Range    POCT Glucose 207 (H) 74 - 99 mg/dL        Transthoracic Echo (TTE) Complete         Cardiac Catheterization Procedure   Final Result      XR chest 1 view   Final Result   No evidence for acute cardiopulmonary process. If there is clinical   concern for acute aortic pathology or pulmonary embolic disease,   further evaluation with chest CT should be considered for better   assessment.                  Signed by: Roxy Oconnor 9/28/2024 1:38 PM   Dictation workstation:   [id number]          No echocardiogram results found for the past 14 days    DISCHARGE MEDICATIONS        Your medication list        CHANGE how you take these medications        Instructions Last Dose Given Next Dose Due   insulin degludec 200 unit/mL (3 mL) injection  Commonly known as: Tresiba FlexTouch U-200  What changed: when to take this      Inject 104 Units under the skin once daily at bedtime.              CONTINUE taking these medications        Instructions Last Dose Given Next Dose Due   amLODIPine 5 mg tablet  Commonly known as: Norvasc      TAKE 1 TABLET (5 MG) BY MOUTH ONCE DAILY AS DIRECTED       aspirin 81 mg chewable tablet           atorvastatin 40 mg tablet  Commonly known as: Lipitor      Take 1 tablet (40 mg) by mouth once daily at bedtime.       cetirizine 10 mg tablet  Commonly known as: ZyrTEC           cholecalciferol 50,000 unit capsule  Commonly known as: Vitamin D-3      TAKE 1 CAPSULE (87556 UNITS) BY MOUTH ONE TIME PER WEEK       clopidogrel 75 mg tablet  Commonly known as: Plavix      Take 1 tablet (75 mg) by mouth once daily.       Colace 100 mg capsule  Generic drug: docusate sodium           CRANBERRY ORAL            finasteride 5 mg tablet  Commonly known as: Proscar      Take 1 tablet (5 mg) by mouth once daily.       icosapent ethyL 1 gram capsule  Commonly known as: Vascepa      Take 2 capsules (2 g) by mouth 2 times a day.       ketoconazole 2 % shampoo  Commonly known as: NIZOral      Apply topically 2 times a week. Apply to scalp in shower. Lather, leave on 5 minutes then rinse       metoprolol succinate XL 25 mg 24 hr tablet  Commonly known as: Toprol-XL      TAKE 1 TABLET BY MOUTH EVERY DAY       nitroglycerin 0.4 mg SL tablet  Commonly known as: Nitrostat      Place 1 tablet (0.4 mg) under the tongue every 5 minutes if needed for chest pain.       NovoLOG Flexpen U-100 Insulin 100 unit/mL (3 mL) pen  Generic drug: insulin aspart           potassium chloride CR 20 mEq ER tablet  Commonly known as: Klor-Con M20      Take 1 tablet (20 mEq) by mouth once daily.       PROBIOTIC ORAL           ranolazine 500 mg 12 hr tablet  Commonly known as: Ranexa      TAKE 1 TABLET BY MOUTH EVERY 12 HOURS       Rybelsus 14 mg tablet tablet  Generic drug: semaglutide           tamsulosin 0.4 mg 24 hr capsule  Commonly known as: Flomax      Take 1 capsule (0.4 mg) by mouth once daily.       telmisartan 20 mg tablet  Commonly known as: MIcarDIS      TAKE 1 TABLET (20 MG) BY MOUTH ONCE EVERY 24 HOURS.       triamterene-hydrochlorothiazid 37.5-25 mg tablet  Commonly known as: Maxzide-25      Take half tablet po daily                OUTPATIENT FOLLOW-UP     Future Appointments   Date Time Provider Department [facility]   10/14/2024  2:30 PM Armando Antonio MD [id number] [facility]   10/15/2024  1:00 PM Brandon Perdue MD [id number] [facility]   11/27/2024  2:00 PM Sherwin Mayo MD [id number] [facility]   1/8/2025  2:00 PM Stephanie Carney MD [id number] [facility]   2/4/2025  2:15 PM Tan Mchugh MD [id number] [facility]   3/12/2025 10:00 AM Armando Antonio MD [id number] [facility]   5/1/2025  2:00 PM Stephanie Carney MD [id number] [facility]   9/9/2025  1:30  PM Stephanie Carney MD [id number] [facility]

## 2024-10-01 ENCOUNTER — APPOINTMENT (OUTPATIENT)
Dept: CARDIOLOGY | Facility: HOSPITAL | Age: 69
End: 2024-10-01
Payer: MEDICARE

## 2024-10-01 ENCOUNTER — APPOINTMENT (OUTPATIENT)
Dept: RADIOLOGY | Facility: HOSPITAL | Age: 69
End: 2024-10-01
Payer: MEDICARE

## 2024-10-01 ENCOUNTER — HOSPITAL ENCOUNTER (INPATIENT)
Facility: HOSPITAL | Age: 69
End: 2024-10-01
Attending: STUDENT IN AN ORGANIZED HEALTH CARE EDUCATION/TRAINING PROGRAM | Admitting: INTERNAL MEDICINE
Payer: MEDICARE

## 2024-10-01 DIAGNOSIS — R07.9 CHEST PAIN, UNSPECIFIED TYPE: ICD-10-CM

## 2024-10-01 DIAGNOSIS — A41.9 SEPSIS, DUE TO UNSPECIFIED ORGANISM, UNSPECIFIED WHETHER ACUTE ORGAN DYSFUNCTION PRESENT (MULTI): ICD-10-CM

## 2024-10-01 DIAGNOSIS — R07.9 CHEST PAIN: Primary | ICD-10-CM

## 2024-10-01 DIAGNOSIS — I48.0 PAROXYSMAL ATRIAL FIBRILLATION (MULTI): ICD-10-CM

## 2024-10-01 DIAGNOSIS — E83.42 HYPOMAGNESEMIA: ICD-10-CM

## 2024-10-01 DIAGNOSIS — N17.9 ACUTE KIDNEY INJURY SUPERIMPOSED ON CKD (CMS-HCC): ICD-10-CM

## 2024-10-01 DIAGNOSIS — L02.413 ABSCESS OF RIGHT UPPER EXTREMITY: ICD-10-CM

## 2024-10-01 DIAGNOSIS — N18.9 ACUTE KIDNEY INJURY SUPERIMPOSED ON CKD (CMS-HCC): ICD-10-CM

## 2024-10-01 DIAGNOSIS — I21.4 NSTEMI (NON-ST ELEVATED MYOCARDIAL INFARCTION) (MULTI): ICD-10-CM

## 2024-10-01 DIAGNOSIS — R22.31 LOCALIZED SWELLING OF RIGHT UPPER EXTREMITY: ICD-10-CM

## 2024-10-01 DIAGNOSIS — R78.81 BACTEREMIA: ICD-10-CM

## 2024-10-01 DIAGNOSIS — L03.113 CELLULITIS OF RIGHT UPPER EXTREMITY: ICD-10-CM

## 2024-10-01 LAB
ALBUMIN SERPL BCP-MCNC: 4.1 G/DL (ref 3.4–5)
ALP SERPL-CCNC: 53 U/L (ref 33–136)
ALT SERPL W P-5'-P-CCNC: 27 U/L (ref 10–52)
ANION GAP SERPL CALC-SCNC: 16 MMOL/L (ref 10–20)
AST SERPL W P-5'-P-CCNC: 22 U/L (ref 9–39)
ATRIAL RATE: 50 BPM
ATRIAL RATE: 53 BPM
ATRIAL RATE: 57 BPM
ATRIAL RATE: 62 BPM
BASOPHILS # BLD AUTO: 0.03 X10*3/UL (ref 0–0.1)
BASOPHILS NFR BLD AUTO: 0.2 %
BILIRUB SERPL-MCNC: 2.6 MG/DL (ref 0–1.2)
BUN SERPL-MCNC: 31 MG/DL (ref 6–23)
CALCIUM SERPL-MCNC: 9.2 MG/DL (ref 8.6–10.3)
CARDIAC TROPONIN I PNL SERPL HS: 81 NG/L (ref 0–20)
CARDIAC TROPONIN I PNL SERPL HS: 87 NG/L (ref 0–20)
CHLORIDE SERPL-SCNC: 102 MMOL/L (ref 98–107)
CO2 SERPL-SCNC: 19 MMOL/L (ref 21–32)
CREAT SERPL-MCNC: 2.33 MG/DL (ref 0.5–1.3)
EGFRCR SERPLBLD CKD-EPI 2021: 30 ML/MIN/1.73M*2
EOSINOPHIL # BLD AUTO: 0 X10*3/UL (ref 0–0.7)
EOSINOPHIL NFR BLD AUTO: 0 %
ERYTHROCYTE [DISTWIDTH] IN BLOOD BY AUTOMATED COUNT: 12.4 % (ref 11.5–14.5)
FLUAV RNA RESP QL NAA+PROBE: NOT DETECTED
FLUBV RNA RESP QL NAA+PROBE: NOT DETECTED
GLUCOSE SERPL-MCNC: 211 MG/DL (ref 74–99)
HCT VFR BLD AUTO: 39.1 % (ref 41–52)
HGB BLD-MCNC: 13.8 G/DL (ref 13.5–17.5)
IMM GRANULOCYTES # BLD AUTO: 0.08 X10*3/UL (ref 0–0.7)
IMM GRANULOCYTES NFR BLD AUTO: 0.5 % (ref 0–0.9)
LACTATE SERPL-SCNC: 3.1 MMOL/L (ref 0.4–2)
LYMPHOCYTES # BLD AUTO: 0.11 X10*3/UL (ref 1.2–4.8)
LYMPHOCYTES NFR BLD AUTO: 0.7 %
MAGNESIUM SERPL-MCNC: 1.37 MG/DL (ref 1.6–2.4)
MCH RBC QN AUTO: 31.6 PG (ref 26–34)
MCHC RBC AUTO-ENTMCNC: 35.3 G/DL (ref 32–36)
MCV RBC AUTO: 90 FL (ref 80–100)
MONOCYTES # BLD AUTO: 0.57 X10*3/UL (ref 0.1–1)
MONOCYTES NFR BLD AUTO: 3.6 %
NEUTROPHILS # BLD AUTO: 14.93 X10*3/UL (ref 1.2–7.7)
NEUTROPHILS NFR BLD AUTO: 95 %
NRBC BLD-RTO: 0 /100 WBCS (ref 0–0)
P AXIS: -2 DEGREES
P AXIS: -7 DEGREES
P AXIS: -9 DEGREES
P AXIS: 0 DEGREES
P OFFSET: 191 MS
P OFFSET: 194 MS
P OFFSET: 195 MS
P OFFSET: 196 MS
P ONSET: 126 MS
P ONSET: 135 MS
P ONSET: 136 MS
P ONSET: 142 MS
PLATELET # BLD AUTO: 114 X10*3/UL (ref 150–450)
POTASSIUM SERPL-SCNC: 3.9 MMOL/L (ref 3.5–5.3)
PR INTERVAL: 158 MS
PR INTERVAL: 158 MS
PR INTERVAL: 160 MS
PR INTERVAL: 180 MS
PROT SERPL-MCNC: 6.8 G/DL (ref 6.4–8.2)
Q ONSET: 214 MS
Q ONSET: 215 MS
Q ONSET: 216 MS
Q ONSET: 222 MS
QRS COUNT: 11 BEATS
QRS COUNT: 8 BEATS
QRS COUNT: 8 BEATS
QRS COUNT: 9 BEATS
QRS DURATION: 108 MS
QRS DURATION: 110 MS
QRS DURATION: 84 MS
QRS DURATION: 98 MS
QT INTERVAL: 392 MS
QT INTERVAL: 414 MS
QT INTERVAL: 438 MS
QT INTERVAL: 446 MS
QTC CALCULATION(BAZETT): 397 MS
QTC CALCULATION(BAZETT): 402 MS
QTC CALCULATION(BAZETT): 406 MS
QTC CALCULATION(BAZETT): 410 MS
QTC FREDERICIA: 396 MS
QTC FREDERICIA: 407 MS
QTC FREDERICIA: 419 MS
QTC FREDERICIA: 420 MS
R AXIS: -4 DEGREES
R AXIS: 17 DEGREES
R AXIS: 2 DEGREES
R AXIS: 8 DEGREES
RBC # BLD AUTO: 4.37 X10*6/UL (ref 4.5–5.9)
SARS-COV-2 RNA RESP QL NAA+PROBE: NOT DETECTED
SODIUM SERPL-SCNC: 133 MMOL/L (ref 136–145)
T AXIS: 13 DEGREES
T AXIS: 21 DEGREES
T AXIS: 24 DEGREES
T AXIS: 8 DEGREES
T OFFSET: 418 MS
T OFFSET: 423 MS
T OFFSET: 433 MS
T OFFSET: 438 MS
VENTRICULAR RATE: 50 BPM
VENTRICULAR RATE: 53 BPM
VENTRICULAR RATE: 57 BPM
VENTRICULAR RATE: 62 BPM
WBC # BLD AUTO: 15.7 X10*3/UL (ref 4.4–11.3)

## 2024-10-01 PROCEDURE — 93005 ELECTROCARDIOGRAM TRACING: CPT

## 2024-10-01 PROCEDURE — 84484 ASSAY OF TROPONIN QUANT: CPT | Performed by: STUDENT IN AN ORGANIZED HEALTH CARE EDUCATION/TRAINING PROGRAM

## 2024-10-01 PROCEDURE — 96375 TX/PRO/DX INJ NEW DRUG ADDON: CPT

## 2024-10-01 PROCEDURE — 36415 COLL VENOUS BLD VENIPUNCTURE: CPT | Performed by: STUDENT IN AN ORGANIZED HEALTH CARE EDUCATION/TRAINING PROGRAM

## 2024-10-01 PROCEDURE — 71045 X-RAY EXAM CHEST 1 VIEW: CPT | Performed by: RADIOLOGY

## 2024-10-01 PROCEDURE — 96361 HYDRATE IV INFUSION ADD-ON: CPT

## 2024-10-01 PROCEDURE — 71045 X-RAY EXAM CHEST 1 VIEW: CPT

## 2024-10-01 PROCEDURE — 87502 INFLUENZA DNA AMP PROBE: CPT | Performed by: STUDENT IN AN ORGANIZED HEALTH CARE EDUCATION/TRAINING PROGRAM

## 2024-10-01 PROCEDURE — 99223 1ST HOSP IP/OBS HIGH 75: CPT | Performed by: NURSE PRACTITIONER

## 2024-10-01 PROCEDURE — 1210000001 HC SEMI-PRIVATE ROOM DAILY

## 2024-10-01 PROCEDURE — 83735 ASSAY OF MAGNESIUM: CPT | Performed by: STUDENT IN AN ORGANIZED HEALTH CARE EDUCATION/TRAINING PROGRAM

## 2024-10-01 PROCEDURE — 2500000004 HC RX 250 GENERAL PHARMACY W/ HCPCS (ALT 636 FOR OP/ED): Performed by: STUDENT IN AN ORGANIZED HEALTH CARE EDUCATION/TRAINING PROGRAM

## 2024-10-01 PROCEDURE — 87077 CULTURE AEROBIC IDENTIFY: CPT | Mod: ELYLAB | Performed by: STUDENT IN AN ORGANIZED HEALTH CARE EDUCATION/TRAINING PROGRAM

## 2024-10-01 PROCEDURE — 85025 COMPLETE CBC W/AUTO DIFF WBC: CPT | Performed by: STUDENT IN AN ORGANIZED HEALTH CARE EDUCATION/TRAINING PROGRAM

## 2024-10-01 PROCEDURE — 83605 ASSAY OF LACTIC ACID: CPT | Performed by: STUDENT IN AN ORGANIZED HEALTH CARE EDUCATION/TRAINING PROGRAM

## 2024-10-01 PROCEDURE — 96365 THER/PROPH/DIAG IV INF INIT: CPT

## 2024-10-01 PROCEDURE — 2500000001 HC RX 250 WO HCPCS SELF ADMINISTERED DRUGS (ALT 637 FOR MEDICARE OP): Performed by: STUDENT IN AN ORGANIZED HEALTH CARE EDUCATION/TRAINING PROGRAM

## 2024-10-01 PROCEDURE — 99291 CRITICAL CARE FIRST HOUR: CPT | Performed by: STUDENT IN AN ORGANIZED HEALTH CARE EDUCATION/TRAINING PROGRAM

## 2024-10-01 PROCEDURE — 80053 COMPREHEN METABOLIC PANEL: CPT | Performed by: STUDENT IN AN ORGANIZED HEALTH CARE EDUCATION/TRAINING PROGRAM

## 2024-10-01 RX ORDER — ACETAMINOPHEN 325 MG/1
975 TABLET ORAL ONCE
Status: COMPLETED | OUTPATIENT
Start: 2024-10-01 | End: 2024-10-01

## 2024-10-01 RX ORDER — MAGNESIUM SULFATE HEPTAHYDRATE 40 MG/ML
2 INJECTION, SOLUTION INTRAVENOUS ONCE
Status: COMPLETED | OUTPATIENT
Start: 2024-10-01 | End: 2024-10-02

## 2024-10-01 RX ORDER — HEPARIN SODIUM 10000 [USP'U]/100ML
0-4000 INJECTION, SOLUTION INTRAVENOUS CONTINUOUS
Status: DISCONTINUED | OUTPATIENT
Start: 2024-10-01 | End: 2024-10-02

## 2024-10-01 ASSESSMENT — LIFESTYLE VARIABLES
EVER FELT BAD OR GUILTY ABOUT YOUR DRINKING: NO
EVER HAD A DRINK FIRST THING IN THE MORNING TO STEADY YOUR NERVES TO GET RID OF A HANGOVER: NO
HAVE PEOPLE ANNOYED YOU BY CRITICIZING YOUR DRINKING: NO
TOTAL SCORE: 0
HAVE YOU EVER FELT YOU SHOULD CUT DOWN ON YOUR DRINKING: NO

## 2024-10-01 ASSESSMENT — HEART SCORE
HEART SCORE: 7
ECG: NORMAL
TROPONIN: GREATER THAN OR EQUAL TO 3 TIMES NORMAL LIMIT
RISK FACTORS: >2 RISK FACTORS OR HX OF ATHEROSCLEROTIC DISEASE
HISTORY: MODERATELY SUSPICIOUS
AGE: 65+

## 2024-10-01 ASSESSMENT — PAIN - FUNCTIONAL ASSESSMENT: PAIN_FUNCTIONAL_ASSESSMENT: 0-10

## 2024-10-01 ASSESSMENT — COLUMBIA-SUICIDE SEVERITY RATING SCALE - C-SSRS
1. IN THE PAST MONTH, HAVE YOU WISHED YOU WERE DEAD OR WISHED YOU COULD GO TO SLEEP AND NOT WAKE UP?: NO
6. HAVE YOU EVER DONE ANYTHING, STARTED TO DO ANYTHING, OR PREPARED TO DO ANYTHING TO END YOUR LIFE?: NO
2. HAVE YOU ACTUALLY HAD ANY THOUGHTS OF KILLING YOURSELF?: NO

## 2024-10-01 ASSESSMENT — PAIN DESCRIPTION - PAIN TYPE: TYPE: ACUTE PAIN

## 2024-10-01 ASSESSMENT — PAIN DESCRIPTION - LOCATION: LOCATION: CHEST

## 2024-10-01 ASSESSMENT — PAIN SCALES - GENERAL: PAINLEVEL_OUTOF10: 3

## 2024-10-02 ENCOUNTER — APPOINTMENT (OUTPATIENT)
Dept: CARDIOLOGY | Facility: HOSPITAL | Age: 69
End: 2024-10-02
Payer: MEDICARE

## 2024-10-02 ENCOUNTER — APPOINTMENT (OUTPATIENT)
Dept: RADIOLOGY | Facility: HOSPITAL | Age: 69
End: 2024-10-02
Payer: MEDICARE

## 2024-10-02 LAB
ALBUMIN SERPL BCP-MCNC: 3.6 G/DL (ref 3.4–5)
ALP SERPL-CCNC: 46 U/L (ref 33–136)
ALT SERPL W P-5'-P-CCNC: 44 U/L (ref 10–52)
ANION GAP SERPL CALC-SCNC: 15 MMOL/L (ref 10–20)
APPEARANCE UR: ABNORMAL
APPEARANCE UR: CLEAR
AST SERPL W P-5'-P-CCNC: 39 U/L (ref 9–39)
ATRIAL RATE: 109 BPM
ATRIAL RATE: 74 BPM
ATRIAL RATE: 96 BPM
BACTERIA #/AREA URNS AUTO: ABNORMAL /HPF
BASOPHILS # BLD AUTO: 0.03 X10*3/UL (ref 0–0.1)
BASOPHILS NFR BLD AUTO: 0.2 %
BILIRUB SERPL-MCNC: 2.5 MG/DL (ref 0–1.2)
BILIRUB UR STRIP.AUTO-MCNC: NEGATIVE MG/DL
BILIRUB UR STRIP.AUTO-MCNC: NEGATIVE MG/DL
BUN SERPL-MCNC: 37 MG/DL (ref 6–23)
CALCIUM SERPL-MCNC: 8.4 MG/DL (ref 8.6–10.3)
CHLORIDE SERPL-SCNC: 100 MMOL/L (ref 98–107)
CO2 SERPL-SCNC: 21 MMOL/L (ref 21–32)
COLOR UR: YELLOW
COLOR UR: YELLOW
CREAT SERPL-MCNC: 2.57 MG/DL (ref 0.5–1.3)
CREAT UR-MCNC: 84.5 MG/DL (ref 20–370)
CRP SERPL-MCNC: 9.09 MG/DL
EGFRCR SERPLBLD CKD-EPI 2021: 26 ML/MIN/1.73M*2
EOSINOPHIL # BLD AUTO: 0 X10*3/UL (ref 0–0.7)
EOSINOPHIL NFR BLD AUTO: 0 %
ERYTHROCYTE [DISTWIDTH] IN BLOOD BY AUTOMATED COUNT: 12.8 % (ref 11.5–14.5)
GLUCOSE BLD MANUAL STRIP-MCNC: 181 MG/DL (ref 74–99)
GLUCOSE BLD MANUAL STRIP-MCNC: 201 MG/DL (ref 74–99)
GLUCOSE BLD MANUAL STRIP-MCNC: 214 MG/DL (ref 74–99)
GLUCOSE BLD MANUAL STRIP-MCNC: 218 MG/DL (ref 74–99)
GLUCOSE BLD MANUAL STRIP-MCNC: 221 MG/DL (ref 74–99)
GLUCOSE SERPL-MCNC: 274 MG/DL (ref 74–99)
GLUCOSE UR STRIP.AUTO-MCNC: NORMAL MG/DL
GLUCOSE UR STRIP.AUTO-MCNC: NORMAL MG/DL
HCT VFR BLD AUTO: 36.6 % (ref 41–52)
HGB BLD-MCNC: 12.1 G/DL (ref 13.5–17.5)
HOLD SPECIMEN: NORMAL
IMM GRANULOCYTES # BLD AUTO: 0.14 X10*3/UL (ref 0–0.7)
IMM GRANULOCYTES NFR BLD AUTO: 0.9 % (ref 0–0.9)
KETONES UR STRIP.AUTO-MCNC: NEGATIVE MG/DL
KETONES UR STRIP.AUTO-MCNC: NEGATIVE MG/DL
LACTATE SERPL-SCNC: 1.9 MMOL/L (ref 0.4–2)
LEUKOCYTE ESTERASE UR QL STRIP.AUTO: ABNORMAL
LEUKOCYTE ESTERASE UR QL STRIP.AUTO: ABNORMAL
LYMPHOCYTES # BLD AUTO: 0.12 X10*3/UL (ref 1.2–4.8)
LYMPHOCYTES NFR BLD AUTO: 0.7 %
MAGNESIUM SERPL-MCNC: 1.81 MG/DL (ref 1.6–2.4)
MCH RBC QN AUTO: 30.8 PG (ref 26–34)
MCHC RBC AUTO-ENTMCNC: 33.1 G/DL (ref 32–36)
MCV RBC AUTO: 93 FL (ref 80–100)
MICROALBUMIN UR-MCNC: 18.9 MG/L
MICROALBUMIN/CREAT UR: 22.4 UG/MG CREAT
MONOCYTES # BLD AUTO: 0.63 X10*3/UL (ref 0.1–1)
MONOCYTES NFR BLD AUTO: 3.9 %
MUCOUS THREADS #/AREA URNS AUTO: ABNORMAL /LPF
MUCOUS THREADS #/AREA URNS AUTO: ABNORMAL /LPF
NEUTROPHILS # BLD AUTO: 15.21 X10*3/UL (ref 1.2–7.7)
NEUTROPHILS NFR BLD AUTO: 94.3 %
NITRITE UR QL STRIP.AUTO: NEGATIVE
NITRITE UR QL STRIP.AUTO: NEGATIVE
NRBC BLD-RTO: 0 /100 WBCS (ref 0–0)
P AXIS: 32 DEGREES
P AXIS: 37 DEGREES
P AXIS: 40 DEGREES
P OFFSET: 193 MS
P OFFSET: 194 MS
P OFFSET: 194 MS
P ONSET: 129 MS
P ONSET: 134 MS
P ONSET: 137 MS
PH UR STRIP.AUTO: 5 [PH]
PH UR STRIP.AUTO: 5 [PH]
PLATELET # BLD AUTO: 91 X10*3/UL (ref 150–450)
POTASSIUM SERPL-SCNC: 3.9 MMOL/L (ref 3.5–5.3)
PR INTERVAL: 158 MS
PR INTERVAL: 166 MS
PR INTERVAL: 166 MS
PROCALCITONIN SERPL-MCNC: 12.81 NG/ML
PROT SERPL-MCNC: 5.9 G/DL (ref 6.4–8.2)
PROT UR STRIP.AUTO-MCNC: ABNORMAL MG/DL
PROT UR STRIP.AUTO-MCNC: NEGATIVE MG/DL
Q ONSET: 212 MS
Q ONSET: 213 MS
Q ONSET: 220 MS
QRS COUNT: 12 BEATS
QRS COUNT: 16 BEATS
QRS COUNT: 18 BEATS
QRS DURATION: 104 MS
QRS DURATION: 72 MS
QRS DURATION: 96 MS
QT INTERVAL: 316 MS
QT INTERVAL: 328 MS
QT INTERVAL: 378 MS
QTC CALCULATION(BAZETT): 414 MS
QTC CALCULATION(BAZETT): 419 MS
QTC CALCULATION(BAZETT): 425 MS
QTC FREDERICIA: 383 MS
QTC FREDERICIA: 385 MS
QTC FREDERICIA: 405 MS
R AXIS: 15 DEGREES
R AXIS: 20 DEGREES
R AXIS: 30 DEGREES
RBC # BLD AUTO: 3.93 X10*6/UL (ref 4.5–5.9)
RBC # UR STRIP.AUTO: NEGATIVE /UL
RBC # UR STRIP.AUTO: NEGATIVE /UL
RBC #/AREA URNS AUTO: ABNORMAL /HPF
RBC #/AREA URNS AUTO: ABNORMAL /HPF
SODIUM SERPL-SCNC: 132 MMOL/L (ref 136–145)
SP GR UR STRIP.AUTO: 1.01
SP GR UR STRIP.AUTO: 1.01
SQUAMOUS #/AREA URNS AUTO: ABNORMAL /HPF
SQUAMOUS #/AREA URNS AUTO: ABNORMAL /HPF
T AXIS: 33 DEGREES
T AXIS: 53 DEGREES
T AXIS: 67 DEGREES
T OFFSET: 377 MS
T OFFSET: 378 MS
T OFFSET: 401 MS
UFH PPP CHRO-ACNC: 0.2 IU/ML
UFH PPP CHRO-ACNC: 0.3 IU/ML
UROBILINOGEN UR STRIP.AUTO-MCNC: NORMAL MG/DL
UROBILINOGEN UR STRIP.AUTO-MCNC: NORMAL MG/DL
VANCOMYCIN SERPL-MCNC: 11.1 UG/ML (ref 5–20)
VANCOMYCIN SERPL-MCNC: 16 UG/ML (ref 5–20)
VENTRICULAR RATE: 109 BPM
VENTRICULAR RATE: 74 BPM
VENTRICULAR RATE: 96 BPM
WBC # BLD AUTO: 16.1 X10*3/UL (ref 4.4–11.3)
WBC #/AREA URNS AUTO: ABNORMAL /HPF
WBC #/AREA URNS AUTO: ABNORMAL /HPF

## 2024-10-02 PROCEDURE — 99223 1ST HOSP IP/OBS HIGH 75: CPT | Performed by: INTERNAL MEDICINE

## 2024-10-02 PROCEDURE — 84075 ASSAY ALKALINE PHOSPHATASE: CPT | Performed by: NURSE PRACTITIONER

## 2024-10-02 PROCEDURE — 87077 CULTURE AEROBIC IDENTIFY: CPT | Mod: ELYLAB | Performed by: INTERNAL MEDICINE

## 2024-10-02 PROCEDURE — 2500000004 HC RX 250 GENERAL PHARMACY W/ HCPCS (ALT 636 FOR OP/ED): Performed by: STUDENT IN AN ORGANIZED HEALTH CARE EDUCATION/TRAINING PROGRAM

## 2024-10-02 PROCEDURE — 93971 EXTREMITY STUDY: CPT

## 2024-10-02 PROCEDURE — 86022 PLATELET ANTIBODIES: CPT | Mod: ELYLAB | Performed by: INTERNAL MEDICINE

## 2024-10-02 PROCEDURE — 80202 ASSAY OF VANCOMYCIN: CPT

## 2024-10-02 PROCEDURE — 36415 COLL VENOUS BLD VENIPUNCTURE: CPT | Performed by: INTERNAL MEDICINE

## 2024-10-02 PROCEDURE — 82947 ASSAY GLUCOSE BLOOD QUANT: CPT

## 2024-10-02 PROCEDURE — 99222 1ST HOSP IP/OBS MODERATE 55: CPT | Performed by: INTERNAL MEDICINE

## 2024-10-02 PROCEDURE — 87040 BLOOD CULTURE FOR BACTERIA: CPT | Mod: ELYLAB | Performed by: INTERNAL MEDICINE

## 2024-10-02 PROCEDURE — 81001 URINALYSIS AUTO W/SCOPE: CPT | Performed by: STUDENT IN AN ORGANIZED HEALTH CARE EDUCATION/TRAINING PROGRAM

## 2024-10-02 PROCEDURE — 1200000002 HC GENERAL ROOM WITH TELEMETRY DAILY

## 2024-10-02 PROCEDURE — 93005 ELECTROCARDIOGRAM TRACING: CPT

## 2024-10-02 PROCEDURE — 36415 COLL VENOUS BLD VENIPUNCTURE: CPT | Performed by: STUDENT IN AN ORGANIZED HEALTH CARE EDUCATION/TRAINING PROGRAM

## 2024-10-02 PROCEDURE — 99233 SBSQ HOSP IP/OBS HIGH 50: CPT | Performed by: INTERNAL MEDICINE

## 2024-10-02 PROCEDURE — 2500000002 HC RX 250 W HCPCS SELF ADMINISTERED DRUGS (ALT 637 FOR MEDICARE OP, ALT 636 FOR OP/ED): Performed by: INTERNAL MEDICINE

## 2024-10-02 PROCEDURE — 2500000001 HC RX 250 WO HCPCS SELF ADMINISTERED DRUGS (ALT 637 FOR MEDICARE OP): Performed by: INTERNAL MEDICINE

## 2024-10-02 PROCEDURE — 81003 URINALYSIS AUTO W/O SCOPE: CPT | Performed by: INTERNAL MEDICINE

## 2024-10-02 PROCEDURE — 2500000004 HC RX 250 GENERAL PHARMACY W/ HCPCS (ALT 636 FOR OP/ED): Performed by: NURSE PRACTITIONER

## 2024-10-02 PROCEDURE — 85025 COMPLETE CBC W/AUTO DIFF WBC: CPT | Performed by: NURSE PRACTITIONER

## 2024-10-02 PROCEDURE — 83735 ASSAY OF MAGNESIUM: CPT | Performed by: NURSE PRACTITIONER

## 2024-10-02 PROCEDURE — 2500000004 HC RX 250 GENERAL PHARMACY W/ HCPCS (ALT 636 FOR OP/ED): Performed by: INTERNAL MEDICINE

## 2024-10-02 PROCEDURE — 2500000001 HC RX 250 WO HCPCS SELF ADMINISTERED DRUGS (ALT 637 FOR MEDICARE OP): Performed by: NURSE PRACTITIONER

## 2024-10-02 PROCEDURE — 86140 C-REACTIVE PROTEIN: CPT | Performed by: NURSE PRACTITIONER

## 2024-10-02 PROCEDURE — 85520 HEPARIN ASSAY: CPT | Performed by: INTERNAL MEDICINE

## 2024-10-02 PROCEDURE — 83605 ASSAY OF LACTIC ACID: CPT | Performed by: STUDENT IN AN ORGANIZED HEALTH CARE EDUCATION/TRAINING PROGRAM

## 2024-10-02 PROCEDURE — 84145 PROCALCITONIN (PCT): CPT | Mod: ELYLAB | Performed by: NURSE PRACTITIONER

## 2024-10-02 PROCEDURE — 93971 EXTREMITY STUDY: CPT | Performed by: RADIOLOGY

## 2024-10-02 PROCEDURE — 87086 URINE CULTURE/COLONY COUNT: CPT | Mod: ELYLAB | Performed by: STUDENT IN AN ORGANIZED HEALTH CARE EDUCATION/TRAINING PROGRAM

## 2024-10-02 PROCEDURE — 82570 ASSAY OF URINE CREATININE: CPT | Performed by: INTERNAL MEDICINE

## 2024-10-02 RX ORDER — CLOPIDOGREL BISULFATE 75 MG/1
75 TABLET ORAL DAILY
Status: DISPENSED | OUTPATIENT
Start: 2024-10-02

## 2024-10-02 RX ORDER — TALC
3 POWDER (GRAM) TOPICAL NIGHTLY PRN
Status: DISPENSED | OUTPATIENT
Start: 2024-10-02

## 2024-10-02 RX ORDER — VALSARTAN 80 MG/1
40 TABLET ORAL DAILY
Status: ACTIVE | OUTPATIENT
Start: 2024-10-02

## 2024-10-02 RX ORDER — ACETAMINOPHEN 325 MG/1
650 TABLET ORAL EVERY 4 HOURS PRN
Status: DISCONTINUED | OUTPATIENT
Start: 2024-10-02 | End: 2024-10-05

## 2024-10-02 RX ORDER — VANCOMYCIN HYDROCHLORIDE 1 G/20ML
INJECTION, POWDER, LYOPHILIZED, FOR SOLUTION INTRAVENOUS DAILY PRN
Status: DISCONTINUED | OUTPATIENT
Start: 2024-10-02 | End: 2024-10-03

## 2024-10-02 RX ORDER — VANCOMYCIN HYDROCHLORIDE 1 G/200ML
1000 INJECTION, SOLUTION INTRAVENOUS EVERY 24 HOURS
Status: DISCONTINUED | OUTPATIENT
Start: 2024-10-03 | End: 2024-10-03

## 2024-10-02 RX ORDER — RANOLAZINE 500 MG/1
500 TABLET, EXTENDED RELEASE ORAL EVERY 12 HOURS
Status: DISPENSED | OUTPATIENT
Start: 2024-10-02

## 2024-10-02 RX ORDER — DEXTROSE 50 % IN WATER (D50W) INTRAVENOUS SYRINGE
25
Status: ACTIVE | OUTPATIENT
Start: 2024-10-02

## 2024-10-02 RX ORDER — PANTOPRAZOLE SODIUM 40 MG/10ML
40 INJECTION, POWDER, LYOPHILIZED, FOR SOLUTION INTRAVENOUS DAILY
Status: DISCONTINUED | OUTPATIENT
Start: 2024-10-02 | End: 2024-10-02 | Stop reason: ALTCHOICE

## 2024-10-02 RX ORDER — PANTOPRAZOLE SODIUM 40 MG/1
40 TABLET, DELAYED RELEASE ORAL DAILY
Status: DISPENSED | OUTPATIENT
Start: 2024-10-02

## 2024-10-02 RX ORDER — ATORVASTATIN CALCIUM 20 MG/1
40 TABLET, FILM COATED ORAL NIGHTLY
Status: DISPENSED | OUTPATIENT
Start: 2024-10-02

## 2024-10-02 RX ORDER — FINASTERIDE 5 MG/1
5 TABLET, FILM COATED ORAL DAILY
Status: DISPENSED | OUTPATIENT
Start: 2024-10-02

## 2024-10-02 RX ORDER — ONDANSETRON 4 MG/1
4 TABLET, FILM COATED ORAL EVERY 8 HOURS PRN
Status: ACTIVE | OUTPATIENT
Start: 2024-10-02

## 2024-10-02 RX ORDER — ACETAMINOPHEN 650 MG/1
650 SUPPOSITORY RECTAL EVERY 4 HOURS PRN
Status: DISCONTINUED | OUTPATIENT
Start: 2024-10-02 | End: 2024-10-05

## 2024-10-02 RX ORDER — ONDANSETRON HYDROCHLORIDE 2 MG/ML
4 INJECTION, SOLUTION INTRAVENOUS EVERY 8 HOURS PRN
Status: ACTIVE | OUTPATIENT
Start: 2024-10-02

## 2024-10-02 RX ORDER — TAMSULOSIN HYDROCHLORIDE 0.4 MG/1
0.4 CAPSULE ORAL DAILY
Status: DISPENSED | OUTPATIENT
Start: 2024-10-02

## 2024-10-02 RX ORDER — ACETAMINOPHEN 160 MG/5ML
650 SOLUTION ORAL EVERY 4 HOURS PRN
Status: DISCONTINUED | OUTPATIENT
Start: 2024-10-02 | End: 2024-10-05

## 2024-10-02 RX ORDER — SODIUM CHLORIDE 9 MG/ML
100 INJECTION, SOLUTION INTRAVENOUS CONTINUOUS
Status: ACTIVE | OUTPATIENT
Start: 2024-10-02 | End: 2024-10-03

## 2024-10-02 RX ORDER — INSULIN LISPRO 100 [IU]/ML
0-15 INJECTION, SOLUTION INTRAVENOUS; SUBCUTANEOUS
Status: DISPENSED | OUTPATIENT
Start: 2024-10-02

## 2024-10-02 RX ORDER — DEXTROSE 50 % IN WATER (D50W) INTRAVENOUS SYRINGE
12.5
Status: ACTIVE | OUTPATIENT
Start: 2024-10-02

## 2024-10-02 RX ORDER — CEFTRIAXONE 1 G/50ML
1 INJECTION, SOLUTION INTRAVENOUS EVERY 24 HOURS
Status: DISCONTINUED | OUTPATIENT
Start: 2024-10-02 | End: 2024-10-03

## 2024-10-02 RX ORDER — NAPROXEN SODIUM 220 MG/1
81 TABLET, FILM COATED ORAL DAILY
Status: DISCONTINUED | OUTPATIENT
Start: 2024-10-02 | End: 2024-10-04

## 2024-10-02 RX ORDER — METOPROLOL SUCCINATE 25 MG/1
25 TABLET, EXTENDED RELEASE ORAL DAILY
Status: DISPENSED | OUTPATIENT
Start: 2024-10-02

## 2024-10-02 RX ORDER — INSULIN GLARGINE 100 [IU]/ML
25 INJECTION, SOLUTION SUBCUTANEOUS
Status: DISCONTINUED | OUTPATIENT
Start: 2024-10-02 | End: 2024-10-06

## 2024-10-02 SDOH — ECONOMIC STABILITY: FOOD INSECURITY: WITHIN THE PAST 12 MONTHS, THE FOOD YOU BOUGHT JUST DIDN'T LAST AND YOU DIDN'T HAVE MONEY TO GET MORE.: NEVER TRUE

## 2024-10-02 SDOH — SOCIAL STABILITY: SOCIAL INSECURITY: ARE THERE ANY APPARENT SIGNS OF INJURIES/BEHAVIORS THAT COULD BE RELATED TO ABUSE/NEGLECT?: NO

## 2024-10-02 SDOH — SOCIAL STABILITY: SOCIAL NETWORK: HOW OFTEN DO YOU ATTEND MEETINGS OF THE CLUBS OR ORGANIZATIONS YOU BELONG TO?: NEVER

## 2024-10-02 SDOH — SOCIAL STABILITY: SOCIAL NETWORK: HOW OFTEN DO YOU GET TOGETHER WITH FRIENDS OR RELATIVES?: MORE THAN THREE TIMES A WEEK

## 2024-10-02 SDOH — SOCIAL STABILITY: SOCIAL INSECURITY
WITHIN THE LAST YEAR, HAVE YOU BEEN RAPED OR FORCED TO HAVE ANY KIND OF SEXUAL ACTIVITY BY YOUR PARTNER OR EX-PARTNER?: NO

## 2024-10-02 SDOH — ECONOMIC STABILITY: INCOME INSECURITY: IN THE PAST 12 MONTHS, HAS THE ELECTRIC, GAS, OIL, OR WATER COMPANY THREATENED TO SHUT OFF SERVICE IN YOUR HOME?: NO

## 2024-10-02 SDOH — HEALTH STABILITY: MENTAL HEALTH
HOW OFTEN DO YOU NEED TO HAVE SOMEONE HELP YOU WHEN YOU READ INSTRUCTIONS, PAMPHLETS, OR OTHER WRITTEN MATERIAL FROM YOUR DOCTOR OR PHARMACY?: NEVER

## 2024-10-02 SDOH — ECONOMIC STABILITY: FOOD INSECURITY: WITHIN THE PAST 12 MONTHS, YOU WORRIED THAT YOUR FOOD WOULD RUN OUT BEFORE YOU GOT THE MONEY TO BUY MORE.: NEVER TRUE

## 2024-10-02 SDOH — ECONOMIC STABILITY: FOOD INSECURITY: WITHIN THE PAST 12 MONTHS, YOU WORRIED THAT YOUR FOOD WOULD RUN OUT BEFORE YOU GOT MONEY TO BUY MORE.: NEVER TRUE

## 2024-10-02 SDOH — SOCIAL STABILITY: SOCIAL INSECURITY: WITHIN THE LAST YEAR, HAVE YOU BEEN HUMILIATED OR EMOTIONALLY ABUSED IN OTHER WAYS BY YOUR PARTNER OR EX-PARTNER?: NO

## 2024-10-02 SDOH — SOCIAL STABILITY: SOCIAL INSECURITY: WITHIN THE LAST YEAR, HAVE YOU BEEN AFRAID OF YOUR PARTNER OR EX-PARTNER?: NO

## 2024-10-02 SDOH — SOCIAL STABILITY: SOCIAL NETWORK
IN A TYPICAL WEEK, HOW MANY TIMES DO YOU TALK ON THE PHONE WITH FAMILY, FRIENDS, OR NEIGHBORS?: MORE THAN THREE TIMES A WEEK

## 2024-10-02 SDOH — SOCIAL STABILITY: SOCIAL NETWORK
DO YOU BELONG TO ANY CLUBS OR ORGANIZATIONS SUCH AS CHURCH GROUPS, UNIONS, FRATERNAL OR ATHLETIC GROUPS, OR SCHOOL GROUPS?: NO

## 2024-10-02 SDOH — ECONOMIC STABILITY: INCOME INSECURITY: IN THE PAST 12 MONTHS HAS THE ELECTRIC, GAS, OIL, OR WATER COMPANY THREATENED TO SHUT OFF SERVICES IN YOUR HOME?: NO

## 2024-10-02 SDOH — SOCIAL STABILITY: SOCIAL NETWORK: HOW OFTEN DO YOU ATTENT MEETINGS OF THE CLUB OR ORGANIZATION YOU BELONG TO?: NEVER

## 2024-10-02 SDOH — SOCIAL STABILITY: SOCIAL INSECURITY: ARE YOU MARRIED, WIDOWED, DIVORCED, SEPARATED, NEVER MARRIED, OR LIVING WITH A PARTNER?: MARRIED

## 2024-10-02 SDOH — SOCIAL STABILITY: SOCIAL NETWORK: HOW OFTEN DO YOU ATTEND CHURCH OR RELIGIOUS SERVICES?: MORE THAN 4 TIMES PER YEAR

## 2024-10-02 SDOH — SOCIAL STABILITY: SOCIAL INSECURITY: WERE YOU ABLE TO COMPLETE ALL THE BEHAVIORAL HEALTH SCREENINGS?: YES

## 2024-10-02 SDOH — HEALTH STABILITY: MENTAL HEALTH
DO YOU FEEL STRESS - TENSE, RESTLESS, NERVOUS, OR ANXIOUS, OR UNABLE TO SLEEP AT NIGHT BECAUSE YOUR MIND IS TROUBLED ALL THE TIME - THESE DAYS?: NOT AT ALL

## 2024-10-02 SDOH — SOCIAL STABILITY: SOCIAL INSECURITY: DO YOU FEEL UNSAFE GOING BACK TO THE PLACE WHERE YOU ARE LIVING?: NO

## 2024-10-02 SDOH — HEALTH STABILITY: MENTAL HEALTH
STRESS IS WHEN SOMEONE FEELS TENSE, NERVOUS, ANXIOUS, OR CAN'T SLEEP AT NIGHT BECAUSE THEIR MIND IS TROUBLED. HOW STRESSED ARE YOU?: NOT AT ALL

## 2024-10-02 SDOH — SOCIAL STABILITY: SOCIAL NETWORK
DO YOU BELONG TO ANY CLUBS OR ORGANIZATIONS SUCH AS CHURCH GROUPS UNIONS, FRATERNAL OR ATHLETIC GROUPS, OR SCHOOL GROUPS?: NO

## 2024-10-02 SDOH — SOCIAL STABILITY: SOCIAL NETWORK: ARE YOU MARRIED, WIDOWED, DIVORCED, SEPARATED, NEVER MARRIED, OR LIVING WITH A PARTNER?: MARRIED

## 2024-10-02 SDOH — HEALTH STABILITY: PHYSICAL HEALTH: ON AVERAGE, HOW MANY MINUTES DO YOU ENGAGE IN EXERCISE AT THIS LEVEL?: 0 MIN

## 2024-10-02 SDOH — SOCIAL STABILITY: SOCIAL INSECURITY: DO YOU FEEL ANYONE HAS EXPLOITED OR TAKEN ADVANTAGE OF YOU FINANCIALLY OR OF YOUR PERSONAL PROPERTY?: NO

## 2024-10-02 SDOH — SOCIAL STABILITY: SOCIAL INSECURITY: HAVE YOU HAD ANY THOUGHTS OF HARMING ANYONE ELSE?: NO

## 2024-10-02 SDOH — SOCIAL STABILITY: SOCIAL INSECURITY: ARE YOU OR HAVE YOU BEEN THREATENED OR ABUSED PHYSICALLY, EMOTIONALLY, OR SEXUALLY BY ANYONE?: NO

## 2024-10-02 SDOH — HEALTH STABILITY: PHYSICAL HEALTH: ON AVERAGE, HOW MANY DAYS PER WEEK DO YOU ENGAGE IN MODERATE TO STRENUOUS EXERCISE (LIKE A BRISK WALK)?: 0 DAYS

## 2024-10-02 SDOH — SOCIAL STABILITY: SOCIAL INSECURITY: DOES ANYONE TRY TO KEEP YOU FROM HAVING/CONTACTING OTHER FRIENDS OR DOING THINGS OUTSIDE YOUR HOME?: NO

## 2024-10-02 SDOH — SOCIAL STABILITY: SOCIAL INSECURITY: HAS ANYONE EVER THREATENED TO HURT YOUR FAMILY OR YOUR PETS?: NO

## 2024-10-02 SDOH — SOCIAL STABILITY: SOCIAL INSECURITY: ABUSE: ADULT

## 2024-10-02 SDOH — SOCIAL STABILITY: SOCIAL INSECURITY: HAVE YOU HAD THOUGHTS OF HARMING ANYONE ELSE?: NO

## 2024-10-02 ASSESSMENT — COGNITIVE AND FUNCTIONAL STATUS - GENERAL
TOILETING: A LITTLE
DAILY ACTIVITIY SCORE: 24
MOBILITY SCORE: 24
HELP NEEDED FOR BATHING: A LITTLE
DRESSING REGULAR UPPER BODY CLOTHING: A LITTLE
DAILY ACTIVITIY SCORE: 24
DAILY ACTIVITIY SCORE: 24
PATIENT BASELINE BEDBOUND: NO
MOBILITY SCORE: 24
MOBILITY SCORE: 24

## 2024-10-02 ASSESSMENT — PAIN SCALES - GENERAL
PAINLEVEL_OUTOF10: 3
PAINLEVEL_OUTOF10: 3
PAINLEVEL_OUTOF10: 0 - NO PAIN
PAINLEVEL_OUTOF10: 0 - NO PAIN
PAINLEVEL_OUTOF10: 2

## 2024-10-02 ASSESSMENT — LIFESTYLE VARIABLES
SUBSTANCE_ABUSE_PAST_12_MONTHS: NO
AUDIT-C TOTAL SCORE: 0
AUDIT-C TOTAL SCORE: 0
HOW MANY STANDARD DRINKS CONTAINING ALCOHOL DO YOU HAVE ON A TYPICAL DAY: PATIENT DOES NOT DRINK
SKIP TO QUESTIONS 9-10: 1
PRESCIPTION_ABUSE_PAST_12_MONTHS: NO
HOW OFTEN DO YOU HAVE 6 OR MORE DRINKS ON ONE OCCASION: NEVER
HOW OFTEN DO YOU HAVE A DRINK CONTAINING ALCOHOL: NEVER

## 2024-10-02 ASSESSMENT — PATIENT HEALTH QUESTIONNAIRE - PHQ9
SUM OF ALL RESPONSES TO PHQ9 QUESTIONS 1 & 2: 0
2. FEELING DOWN, DEPRESSED OR HOPELESS: NOT AT ALL
1. LITTLE INTEREST OR PLEASURE IN DOING THINGS: NOT AT ALL

## 2024-10-02 ASSESSMENT — PAIN DESCRIPTION - DESCRIPTORS
DESCRIPTORS: ACHING
DESCRIPTORS: HEADACHE
DESCRIPTORS: ACHING;SORE

## 2024-10-02 ASSESSMENT — ACTIVITIES OF DAILY LIVING (ADL)
BATHING: INDEPENDENT
JUDGMENT_ADEQUATE_SAFELY_COMPLETE_DAILY_ACTIVITIES: YES
ADEQUATE_TO_COMPLETE_ADL: YES
LACK_OF_TRANSPORTATION: NO
PATIENT'S MEMORY ADEQUATE TO SAFELY COMPLETE DAILY ACTIVITIES?: YES
FEEDING YOURSELF: INDEPENDENT
WALKS IN HOME: INDEPENDENT
HEARING - LEFT EAR: FUNCTIONAL
LACK_OF_TRANSPORTATION: NO
TOILETING: INDEPENDENT
DRESSING YOURSELF: INDEPENDENT
HEARING - RIGHT EAR: FUNCTIONAL
GROOMING: INDEPENDENT

## 2024-10-02 ASSESSMENT — COLUMBIA-SUICIDE SEVERITY RATING SCALE - C-SSRS
1. IN THE PAST MONTH, HAVE YOU WISHED YOU WERE DEAD OR WISHED YOU COULD GO TO SLEEP AND NOT WAKE UP?: NO
2. HAVE YOU ACTUALLY HAD ANY THOUGHTS OF KILLING YOURSELF?: NO
6. HAVE YOU EVER DONE ANYTHING, STARTED TO DO ANYTHING, OR PREPARED TO DO ANYTHING TO END YOUR LIFE?: NO

## 2024-10-02 ASSESSMENT — PAIN - FUNCTIONAL ASSESSMENT
PAIN_FUNCTIONAL_ASSESSMENT: 0-10

## 2024-10-02 NOTE — CARE PLAN
The patient's goals for the shift include Labs WNL    The clinical goals for the shift include Labs WNL      Problem: Pain - Adult  Goal: Verbalizes/displays adequate comfort level or baseline comfort level  Outcome: Progressing     Problem: Safety - Adult  Goal: Free from fall injury  Outcome: Progressing     Problem: Discharge Planning  Goal: Discharge to home or other facility with appropriate resources  Outcome: Progressing     Problem: Chronic Conditions and Co-morbidities  Goal: Patient's chronic conditions and co-morbidity symptoms are monitored and maintained or improved  Outcome: Progressing     Problem: Fall/Injury  Goal: Not fall by end of shift  Outcome: Progressing  Goal: Be free from injury by end of the shift  Outcome: Progressing  Goal: Verbalize understanding of personal risk factors for fall in the hospital  Outcome: Progressing  Goal: Verbalize understanding of risk factor reduction measures to prevent injury from fall in the home  Outcome: Progressing  Goal: Use assistive devices by end of the shift  Outcome: Progressing  Goal: Pace activities to prevent fatigue by end of the shift  Outcome: Progressing     Problem: Diabetes  Goal: Achieve decreasing blood glucose levels by end of shift  Outcome: Progressing  Goal: Increase stability of blood glucose readings by end of shift  Outcome: Progressing  Goal: Decrease in ketones present in urine by end of shift  Outcome: Progressing  Goal: Maintain electrolyte levels within acceptable range throughout shift  Outcome: Progressing  Goal: Maintain glucose levels >70mg/dl to <250mg/dl throughout shift  Outcome: Progressing  Goal: No changes in neurological exam by end of shift  Outcome: Progressing  Goal: Learn about and adhere to nutrition recommendations by end of shift  Outcome: Progressing  Goal: Vital signs within normal range for age by end of shift  Outcome: Progressing  Goal: Increase self care and/or family involovement by end of shift  Outcome:  Progressing  Goal: Receive DSME education by end of shift  Outcome: Progressing     Problem: Skin  Goal: Decreased wound size/increased tissue granulation at next dressing change  10/2/2024 0639 by Magda Keyes RN  Outcome: Progressing  10/2/2024 0117 by Magda Keeys RN  Flowsheets (Taken 10/2/2024 0117)  Decreased wound size/increased tissue granulation at next dressing change:   Utilize specialty bed per algorithm   Promote sleep for wound healing   Protective dressings over bony prominences  Goal: Participates in plan/prevention/treatment measures  10/2/2024 0639 by Magda Keyes RN  Outcome: Progressing  10/2/2024 0117 by Magda Keyes RN  Flowsheets (Taken 10/2/2024 0117)  Participates in plan/prevention/treatment measures:   Increase activity/out of bed for meals   Discuss with provider PT/OT consult   Elevate heels  Goal: Prevent/manage excess moisture  10/2/2024 0639 by Magda Keyes RN  Outcome: Progressing  10/2/2024 0117 by Magda Keyes RN  Flowsheets (Taken 10/2/2024 0117)  Prevent/manage excess moisture:   Use wicking fabric (obtain order)   Moisturize dry skin   Cleanse incontinence/protect with barrier cream   Monitor for/manage infection if present   Follow provider orders for dressing changes  Goal: Prevent/minimize sheer/friction injuries  10/2/2024 0639 by Magda Keyes RN  Outcome: Progressing  10/2/2024 0117 by Magda Keyes RN  Flowsheets (Taken 10/2/2024 0117)  Prevent/minimize sheer/friction injuries:   Utilize specialty bed per algorithm   Use pull sheet   Turn/reposition every 2 hours/use positioning/transfer devices   Increase activity/out of bed for meals   HOB 30 degrees or less   Complete micro-shifts as needed if patient unable. Adjust patient position to relieve pressure points, not a full turn  Goal: Promote/optimize nutrition  10/2/2024 0639 by Magda Keyes RN  Outcome: Progressing  10/2/2024 0117 by Magda Keyes RN  Flowsheets  (Taken 10/2/2024 0117)  Promote/optimize nutrition:   Offer water/supplements/favorite foods   Discuss with provider if NPO > 2 days   Assist with feeding   Reassess MST if dietician not consulted   Monitor/record intake including meals   Consume > 50% meals/supplements  Goal: Promote skin healing  10/2/2024 0639 by Magda Keyes RN  Outcome: Progressing  10/2/2024 0117 by Magda Keyes RN  Flowsheets (Taken 10/2/2024 0117)  Promote skin healing:   Turn/reposition every 2 hours/use positioning/transfer devices   Rotate device position/do not position patient on device   Protective dressings over bony prominences   Ensure correct size (line/device) and apply per  instructions   Assess skin/pad under line(s)/device(s)     Problem: Pain  Goal: Takes deep breaths with improved pain control throughout the shift  Outcome: Progressing  Goal: Turns in bed with improved pain control throughout the shift  Outcome: Progressing  Goal: Walks with improved pain control throughout the shift  Outcome: Progressing  Goal: Performs ADL's with improved pain control throughout shift  Outcome: Progressing  Goal: Participates in PT with improved pain control throughout the shift  Outcome: Progressing  Goal: Free from opioid side effects throughout the shift  Outcome: Progressing  Goal: Free from acute confusion related to pain meds throughout the shift  Outcome: Progressing

## 2024-10-02 NOTE — CONSULTS
Vancomycin Dosing by Pharmacy- INITIAL    Manny Reveles is a 69 y.o. year old male who Pharmacy has been consulted for vancomycin dosing for cellulitis, skin and soft tissue. Based on the patient's indication and renal status this patient will be dosed based on a goal AUC of 400-600.     Renal function is currently stable.    Visit Vitals  BP 99/57 (BP Location: Left arm, Patient Position: Lying)   Pulse 74   Temp 36.9 °C (98.4 °F) (Temporal)   Resp 16        Lab Results   Component Value Date    CREATININE 2.33 (H) 10/01/2024    CREATININE 1.49 (H) 2024    CREATININE 1.56 (H) 2024    CREATININE 1.49 (H) 2024        Patient weight is as follows:   Vitals:    10/02/24 0056   Weight: 121 kg (266 lb 5.1 oz)       Cultures:  No results found for the encounter in last 14 days.        No intake/output data recorded.  I/O during current shift:  I/O this shift:  In: 1552.9 [I.V.:52.9; IV Piggyback:1500]  Out: -     Temp (24hrs), Av.3 °C (99.1 °F), Min:36.7 °C (98.1 °F), Max:38.5 °C (101.3 °F)         Assessment/Plan     Patient has already been given a loading dose of 2000 mg on 10/2/24 @0022  Will initiate vancomycin maintenance,  1000 mg every 24 hours.    This dosing regimen is predicted by [employer] to result in the following pharmacokinetic parameters:    Regimen: 1000 mg IV every 24 hours.  Start time: 00:22 on 10/03/2024  Exposure target: AUC24 (range)400-600 mg/L.hr   [id number]: 394 mg/L.hr  AUC24,ss: 434 mg/L.hr  Probability of AUC24 > 400: 59 %  Ctrough,ss: 14.5 mg/L  Probability of Ctrough,ss > 20: 21 %      Follow-up level will be ordered on 10/2/24 at 0800 and 1400 unless clinically indicated sooner.  Will continue to monitor renal function daily while on vancomycin and order serum creatinine at least every 48 hours if not already ordered.  Follow for continued vancomycin needs, clinical response, and signs/symptoms of toxicity.       DEEPTI NELSON, PharmD

## 2024-10-02 NOTE — CARE PLAN
The patient's goals for the shift include Labs WNL    The clinical goals for the shift include WNL labs

## 2024-10-02 NOTE — CONSULTS
Cardiology Consult Note      Date:   10/2/2024  Patient name:  Manny Reveles  Date of admission:  10/1/2024  8:26 PM  MRN:   [id number]  YOB: 1955  Time of Consult:  10:44 AM    Consulting Cardiologist: Dr. Romain Benavidez, APRN, CNP  Primary Cardiologist:  Dr. Armando Antonio    Referring Provider: Dr Wood      Admission Diagnosis:     Chest pain      History of Present Illness:      Manny Reveles is a 69 y.o.  male patient who is being at the request of Dr. Wood for inpatient consultation of   right arm cellulitis recent stenting of the left anterior descending artery . He was admitted on 10/1/2024.  Previous [facility] and [facility] records have been reviewed in detail.     patient with a history of CAD, hypertension, dyslipidemia, diabetes   patient came in on 9/29/2024 complaining of chest pain.  He was taken down on 9/30/2024 for heart catheterization that showed a 80% left anterior descending artery occlusion.  He has a drug-eluting stent 2.75 x 22 put in place.  He was discharged home.  He developed right arm cellulitis right in the antecubital  where he states the IV was put in place at that time.  He states that yesterday when he was at home he had fever, chills his right arm become extremely swollen hard 5x5 cm red raised area. given IV antibiotics brought to the 10th floor on IV heparin drip.  They are going to do an ultrasound right per extremity to rule out DVT.  He is currently chest pain-free at this time.  No shortness of breath, nausea, PND, orthopnea, claudications   positive for  right arm pain, fever, chills     EKG is normal sinus rhythm no acute ST elevations or depressions appreciated     magnesium 1.81   troponin 81, 87   cardiac history   9/30/2024 heart cath  ONCLUSIONS:   1. The entire Left Main: <10% stenosis.   2. Proximal LAD Lesion: The percent stenosis is 10-30%.   3. Circumflex Coronary Artery: contains patent previously placed  stents.   4. Mid and distal CX Lesion: The percent stenosis is 30%.   5. Right Coronary Artery: contains patent previously placed stents and fills via collaterals.   6. Mid and distal RCA Lesion: The percent stenosis is 10-30%.   7. Prox PDA RCA Lesion: The percent stenosis is 40%.   8. Prox PLVB RCA Lesion: The percent stenosis is 100%.   9. The Left Ventricular Ejection Fraction is 60%.  10. Mid LAD Lesion: The percent stenosis is 80%.  11. Mid LAD Lesion: pre-dilation Resolute [place] 2.75x22: 0% residual stenosis.       Allergies:     Allergies   Allergen Reactions    Adhesive Tape-Silicones Hives and Itching    Epinephrine Syncope    Latex Other    Meperidine Other    Penicillins Other    Promethazine Other    Sulfa (Sulfonamide Antibiotics) Other         Past Medical History:     History reviewed. No pertinent past medical history.    Past Surgical History:     Past Surgical History:   Procedure Laterality Date    CARDIAC CATHETERIZATION N/A 9/30/2024    Procedure: Left Heart Cath with Coronary Angiography and LV;  Surgeon: Armando Antonio MD;  Location: [facility];  Service: Cardiovascular;  Laterality: N/A;    CARDIAC CATHETERIZATION N/A 9/30/2024    Procedure: PCI ANTOINE Stent- Coronary;  Surgeon: Armando Antonio MD;  Location: [facility];  Service: Cardiovascular;  Laterality: N/A;    OTHER SURGICAL HISTORY  12/12/2018    Cardiac catheterization with stent placement    OTHER SURGICAL HISTORY  12/12/2018    Renal lithotripsy    OTHER SURGICAL HISTORY  12/12/2018    Shoulder surgery    OTHER SURGICAL HISTORY  12/12/2018    Knee reconstruction    OTHER SURGICAL HISTORY  12/12/2018    Hernia repair    OTHER SURGICAL HISTORY  02/28/2022    Colonoscopy    OTHER SURGICAL HISTORY  02/28/2022    Dental surgery    OTHER SURGICAL HISTORY  02/28/2022    Inguinal hernia repair    OTHER SURGICAL HISTORY  02/28/2022    Percutaneous transluminal coronary angioplasty    OTHER SURGICAL  HISTORY  03/09/2022    Dermatological surgery       Family History:     Family History   Problem Relation Name Age of Onset    Other (bone/cartilage disorder) Mother      Diabetes Mother      Gallbladder disease Mother      Diabetes Father      Other (cardiac disorder) Father      Nephrolithiasis Father      Prostate cancer Father      Other (bladder cancer) Father      Heart attack Father      Rheum arthritis Father      Skin cancer Father      Kidney nephrosis Father      Diabetes Paternal Grandfather      Skin cancer Paternal Grandfather         Social History:     Social History     Tobacco Use    Smoking status: Never     Passive exposure: Never    Smokeless tobacco: Never   Vaping Use    Vaping status: Never Used   Substance Use Topics    Alcohol use: Not Currently    Drug use: Never       CURRENT INPATIENT MEDICATIONS    aspirin, 81 mg, oral, Daily  atorvastatin, 40 mg, oral, Nightly  clopidogrel, 75 mg, oral, Daily  finasteride, 5 mg, oral, Daily  insulin glargine, 25 Units, subcutaneous, BID AC  insulin lispro, 0-15 Units, subcutaneous, TID  metoprolol succinate XL, 25 mg, oral, Daily  pantoprazole, 40 mg, oral, Daily   Or  pantoprazole, 40 mg, intravenous, Daily  ranolazine, 500 mg, oral, q12h  tamsulosin, 0.4 mg, oral, Daily  [Held by provider] valsartan, 40 mg, oral, Daily  [START ON 10/3/2024] vancomycin, 1,000 mg, intravenous, q24h      heparin, 0-4,000 Units/hr, Last Rate: 1,200 Units/hr (10/02/24 0971)  sodium chloride 0.9%, 100 mL/hr, Last Rate: 100 mL/hr (10/02/24 0147)      Current Outpatient Medications   Medication Instructions    amLODIPine (NORVASC) 5 mg, oral, Daily, as directed    aspirin 81 mg chewable tablet oral    atorvastatin (LIPITOR) 40 mg, oral, Nightly    cetirizine (ZYRTEC) 10 mg, oral, Daily    cholecalciferol (VITAMIN D-3) 50,000 Units, oral, Once Weekly    clopidogrel (PLAVIX) 75 mg, oral, Daily    CRANBERRY ORAL 2 tablets, oral, Daily    docusate sodium (COLACE) 100 mg, oral, 2  times daily    finasteride (PROSCAR) 5 mg, oral, Daily    icosapent ethyL (VASCEPA) 2 g, oral, 2 times daily    insulin aspart (NovoLOG FlexPen U-100 Insulin) 100 unit/mL (3 mL) pen subcutaneous, Inject 20 units plus coverage for snacks     insulin degludec (TRESIBA FLEXTOUCH U-200) 104 Units, subcutaneous, Nightly    ketoconazole (NIZOral) 2 % shampoo Topical, 2 times weekly, Apply to scalp in shower. Lather, leave on 5 minutes then rinse    Lactobacillus acidophilus (PROBIOTIC ORAL) oral    metoprolol succinate XL (TOPROL-XL) 25 mg, oral, Daily    nitroglycerin (NITROSTAT) 0.4 mg, sublingual, Every 5 min PRN    potassium chloride CR (Klor-Con M20) 20 mEq ER tablet 20 mEq, oral, Daily    ranolazine (RANEXA) 500 mg, oral, Every 12 hours    semaglutide (Rybelsus) 14 mg tablet tablet 1 tablet, oral, Daily    tamsulosin (FLOMAX) 0.4 mg, oral, Daily    telmisartan (MICARDIS) 20 mg, oral, Every 24 hours    triamterene-hydrochlorothiazid (Maxzide-25) 37.5-25 mg tablet Take half tablet po daily        Review of Systems:      12 point review of systems was obtained in detail and is negative other than that detailed above.    Vital Signs:     Vitals:    10/02/24 0056 10/02/24 0440 10/02/24 0545 10/02/24 0808   BP: 99/57 166/70  116/56   BP Location: Left arm Left arm     Patient Position: Lying Lying     Pulse: 74 79  87   Resp: 16 16     Temp: 36.9 °C (98.4 °F) 37.6 °C (99.7 °F) 37.2 °C (99 °F) 37.2 °C (99 °F)   TempSrc: Temporal Temporal Temporal    SpO2: 95% 97%  91%   Weight: 121 kg (266 lb 5.1 oz)      Height: 1.829 m (6')          Intake/Output Summary (Last 24 hours) at 10/2/2024 1044  Last data filed at 10/2/2024 1004  Gross per 24 hour   Intake 2515.32 ml   Output 650 ml   Net 1865.32 ml       Wt Readings from Last 4 Encounters:   10/02/24 121 kg (266 lb 5.1 oz)   09/28/24 122 kg (268 lb 8.3 oz)   09/25/24 122 kg (268 lb 1.3 oz)   09/09/24 123 kg (272 lb)       Physical Examination:     GENERAL APPEARANCE: Well  developed, well nourished, in no acute distress.  CHEST: Symmetric and non-tender.  INTEGUMENT: Skin warm and dry, without gross excoriationis or lesions.  HEENT: No gross abnormalities of conjunctiva, teeth, gums, oral mucosa  NECK: Supple, no JVD, no bruit. Thyroid not palpable. Carotid upstrokes normal.  NEURO/PSHCY: Alert and oriented x3; appropriate behavior and responses and responses, grossly normal cerebellar function with normal balance and coordination  LUNGS: Clear to auscultation bilaterally; normal respiratory effort.  HEART: Rate and rhythm regular with no evident murmur; no gallop appreciated. There are no rubs, clicks or heaves. PMI nondisplaced.  ABDOMEN: Soft, nontender, no palpable hepatosplenomegaly, no mases, no bruits. Abdominal aorta not noted to be enlarged.  MUSCULOSKELETAL: Ambulatory with normal tandem gait.  EXTREMITIES: Warm with good color, no clubbing or cyanois. There is no edema noted. right arm antecubital red raised warm to touch  PERIPHERAL VASCULAR: Pulses present and equally palpable; 2+ throughout. No femoral bruits.      Lab:     CBC:   Results from last 7 days   Lab Units 10/02/24  0326 10/01/24  2034 09/30/24  0440   WBC AUTO x10*3/uL 16.1* 15.7* 7.7   RBC AUTO x10*6/uL 3.93* 4.37* 4.23*   HEMOGLOBIN g/dL 12.1* 13.8 13.2*   HEMATOCRIT % 36.6* 39.1* 39.1*   MCV fL 93 90 92   MCH pg 30.8 31.6 31.2   MCHC g/dL 33.1 35.3 33.8   RDW % 12.8 12.4 12.3   PLATELETS AUTO x10*3/uL 91* 114* 148*     CMP:    Results from last 7 days   Lab Units 10/02/24  0325 10/01/24  2034 09/30/24  0440 09/29/24  0305 09/28/24  1310   SODIUM mmol/L 132* 133* 137   < > 136   POTASSIUM mmol/L 3.9 3.9 3.8   < > 4.3   CHLORIDE mmol/L 100 102 106   < > 106   CO2 mmol/L 21 19* 24   < > 23   BUN mg/dL 37* 31* 22   < > 29*   CREATININE mg/dL 2.57* 2.33* 1.49*   < > 1.49*   GLUCOSE mg/dL 274* 211* 156*   < > 163*   PROTEIN TOTAL g/dL 5.9* 6.8  --   --  6.6   CALCIUM mg/dL 8.4* 9.2 8.8   < > 8.9   BILIRUBIN  TOTAL mg/dL 2.5* 2.6*  --   --  2.0*   ALK PHOS U/L 46 53  --   --  60   AST U/L 39 22  --   --  16   ALT U/L 44 27  --   --  20    < > = values in this interval not displayed.     BMP:    Results from last 7 days   Lab Units 10/02/24  0325 10/01/24  2034 09/30/24  0440   SODIUM mmol/L 132* 133* 137   POTASSIUM mmol/L 3.9 3.9 3.8   CHLORIDE mmol/L 100 102 106   CO2 mmol/L 21 19* 24   BUN mg/dL 37* 31* 22   CREATININE mg/dL 2.57* 2.33* 1.49*   CALCIUM mg/dL 8.4* 9.2 8.8   GLUCOSE mg/dL 274* 211* 156*     Magnesium:  Results from last 7 days   Lab Units 10/02/24  0325 10/01/24  2034 09/30/24  0440   MAGNESIUM mg/dL 1.81 1.37* 1.91     Troponin:    Results from last 7 days   Lab Units 10/01/24  2134 10/01/24  2034 09/30/24  0440   TROPHS ng/L 87* 81* 9     BNP:     Lipid Panel:         Diagnostic Studies:   @No results found for this or any previous visit.    ECG 12 lead    Result Date: 10/2/2024  Sinus tachycardia Possible Left atrial enlargement Borderline ECG When compared with ECG of 30-SEP-2024 09:57, (unconfirmed) Vent. rate has increased BY  56 BPM Nonspecific T wave abnormality now evident in Lateral leads    ECG 12 lead    Result Date: 10/2/2024  Normal sinus rhythm Possible Left atrial enlargement Borderline ECG When compared with ECG of 01-OCT-2024 20:28, (unconfirmed) No significant change was found    XR chest 1 view    Result Date: 10/1/2024  Interpreted By:  Chon Gifford, STUDY: XR CHEST 1 VIEW;  10/1/2024 8:48 pm   INDICATION: Signs/Symptoms:Chest Pain.     COMPARISON: 09/28/2024   ACCESSION NUMBER(S): [id number]   ORDERING CLINICIAN: CALI ROSADO   FINDINGS:         CARDIOMEDIASTINAL SILHOUETTE: Cardiomediastinal silhouette is normal in size and configuration.   LUNGS: Lungs are clear. No consolidation or effusion seen.   ABDOMEN: No remarkable upper abdominal findings.   BONES: No acute osseous changes.       1.  No evidence of acute cardiopulmonary process.       MACRO: None   Signed by:  Chon Gifford 10/1/2024 9:15 PM Dictation workstation:   [id number]      [place] [address]   Tel [phone] Fax [phone]     TRANSTHORACIC ECHOCARDIOGRAM REPORT     Patient Name:      HILARIO KAISER GINA Roper Physician:    [id number] Romain Pedroza DO  Study Date:        9/30/2024            Ordering Provider:    [id number] KUMARI  MRN/PID:           [id number]             Fellow:  Accession#:        [id number]         Nurse:                Juanito Srinivasan RN  Date of Birth/Age: 1955 / 69 years  Sonographer:          Olga LUCAS  Gender:            M                    Additional Staff:  Height:            182.88 cm            Admit Date:           9/28/2024  Weight:            121.56 kg            Admission Status:     Inpatient -                                                                Priority discharge  BSA / BMI:         2.41 m2 / 36.35      Department Location:  [place] [address]                                      Echo Lab  Blood Pressure: 105 /53 mmHg     Study Type:    TRANSTHORACIC ECHO (TTE) COMPLETE  Diagnosis/ICD: Unstable angina-I20.0  Indication:    [place]  CPT Codes:     Echo Complete w Full Doppler-07987     Patient History:  Diabetes:          Yes  Pertinent History: HTN, CAD and Dyslipidemia.     Study Detail: The following Echo studies were performed: 2D, M-Mode, Doppler and                color flow. Technically challenging study due to patient lying in                supine position, prominent lung artifact and poor acoustic                windows. Definity used as a contrast agent for endocardial border                definition. Total contrast used for this procedure was 1.5 mL via                IV push. The patient  was awake.        PHYSICIAN INTERPRETATION:  Left Ventricle: The left ventricular systolic function is normal, with a visually estimated ejection fraction of 60%. There are no regional wall motion abnormalities. The left ventricular cavity size is normal. There is moderate concentric left ventricular hypertrophy. Spectral Doppler shows a normal pattern of left ventricular diastolic filling.  LV Wall Scoring:  All segments are normal.     Left Atrium: The left atrium is normal in size.  Right Ventricle: The right ventricle is normal in size. There is normal right ventricular global systolic function.  Right Atrium: The right atrium is normal in size.  Aortic Valve: The aortic valve appears structurally normal. There is no evidence of aortic valve stenosis.  The aortic valve dimensionless index is 0.80. There is no evidence of aortic valve regurgitation. The peak instantaneous gradient of the aortic valve is 10.0 mmHg. The mean gradient of the aortic valve is 5.0 mmHg.  Mitral Valve: The mitral valve is normal in structure. There is no evidence of mitral valve stenosis. There is normal mitral valve leaflet mobility. There is no evidence of mitral valve regurgitation.  Tricuspid Valve: The tricuspid valve is structurally normal. There is normal tricuspid valve leaflet mobility. There is mild tricuspid regurgitation.  Pulmonic Valve: The pulmonic valve is structurally normal. There is no indication of pulmonic valve regurgitation.  Pericardium: No pericardial effusion noted.  Aorta: The aortic root is normal.  Pulmonary Artery: The main pulmonary artery is normal in size, and position, with normal bifurcation into the left and right pulmonary arteries.  Systemic Veins: The inferior vena cava appears normal in size.  In comparison to the previous echocardiogram(s): There are no prior studies on this patient for comparison purposes.        CONCLUSIONS:   1. The left ventricular systolic function is normal, with a visually  estimated ejection fraction of 60%.   2. There is moderate concentric left ventricular hypertrophy.   3. There is normal right ventricular global systolic function.   4. There is no evidence of mitral valve stenosis.   5. No evidence of mitral valve regurgitation.   6. Aortic valve stenosis is not present.   7. The main pulmonary artery is normal in size, and position, with normal bifurcation into the left and right pulmonary arteries.     RECOMMENDATIONS:  Technically suboptimal and limited study, therefore accuracy of above interpretation could be substantially diminished. Clinical correlation is advised. Consider additional imaging modalities if clinically indicated.    Cardiac Cath Post Procedure Notes:  Post Procedure Diagnosis: ANTOINE of LAD.  Blood Loss:               Estimated blood loss during the procedure was 0 mls.  Specimens Removed:        Number of specimen(s) removed: none.     ____________________________________________________________________________________  CONCLUSIONS:   1. The entire Left Main: <10% stenosis.   2. Proximal LAD Lesion: The percent stenosis is 10-30%.   3. Circumflex Coronary Artery: contains patent previously placed stents.   4. Mid and distal CX Lesion: The percent stenosis is 30%.   5. Right Coronary Artery: contains patent previously placed stents and fills via collaterals.   6. Mid and distal RCA Lesion: The percent stenosis is 10-30%.   7. Prox PDA RCA Lesion: The percent stenosis is 40%.   8. Prox PLVB RCA Lesion: The percent stenosis is 100%.   9. The Left Ventricular Ejection Fraction is 60%.  10. Mid LAD Lesion: The percent stenosis is 80%.  11. Mid LAD Lesion: pre-dilation Resolute [place] 2.75x22: 0% residual stenosis.     ICD 10 Codes:  Unstable angina-I20.0     CPT Codes:  Left Heart Cath (visualization of coronaries) and LV-09434; Moderate Sedation Services initial 15 minutes patient >5 years-70080; Stent w angioplasty Left Anterior Descending single major Artery branch  (PCI)-07626.LD     [id number] Armando Castañeda MD  Performing Physician  Electronically signed by [id number] Armando Castañeda MD on 9/30/2024 at 11:01:44  AM  Radiology:     Vascular US Upper Extremity Venous Duplex Right   Final Result   No sonographic evidence of deep venous thrombosis.   Positive for superficial thrombosis of the right cephalic vein.        MACRO:   None.        Signed by: Roger Grajeda 10/2/2024 9:39 AM   Dictation workstation:   [id number]      XR chest 1 view   Final Result   1.  No evidence of acute cardiopulmonary process.                  MACRO:   None        Signed by: Chon Gifford 10/1/2024 9:15 PM   Dictation workstation:   [id number]          Problem List:     Patient Active Problem List   Diagnosis    Stage 3 chronic kidney disease (Multi)    Obstructive sleep apnea syndrome    Essential hypertriglyceridemia    CAD S/P percutaneous coronary angioplasty    Benign prostatic hyperplasia with urinary obstruction    Benign essential hypertension    Calculus of kidney    Incomplete bladder emptying    Squamous cell carcinoma of skin of other parts of face    Type 2 diabetes mellitus without retinopathy (Multi)    Class 2 severe obesity due to excess calories with serious comorbidity and body mass index (BMI) of 35.0 to 35.9 in adult    Hypokalemia    Uses self-applied continuous glucose monitoring device    Never smoked tobacco    Unstable angina (Multi)    Chest pain       Assessment:   69-year-old gentleman seen evaluate the bedside in the telemetry unit in conjunction with Jered Denis RN, CNP.    Bedside examination evaluation were performed by me.    Chart reviewed in detail discussed the patient at length.    Impression:  Right arm cellulitis  Recurrent fever and chills   ANTONIO   elevated biomarkers secondary to ANTONIO, recent procedure and stent placement   status post PCI of the LAD postop day 2   Hypertension   Diabetes   Dyslipidemia   EF normal      Plan:   Tele monitoring  Serial  enzymes  Daily EKG's   aspirin 81 daily   Plavix 75 mg daily   Lipitor 40 mg daily   Toprol-XL 25 mg daily   hold Diovan due to the ANTONIO   right upper extremity ultrasound rule out DVT   if ultrasound of the right upper extremity is negative for DVT okay to DC heparin drip    Discussion:  Patient is a 69-year-old gentleman who had a recent PCI and stent of the mid LAD which was a routine intervention after his presentation with angina over the weekend.  Patient has some renal insufficiency which is chronic.  He came back to the emergency room with febrile illness with temperatures up to nearly 102 and some delirium associated with this.  He had swelling and erythema in his right antecubital region from a intravenous placed during his past hospitalization.  This was likely the source of fever and symptomatology.  He did complain of some atypical chest pain which is resolved.  His troponins have equivocal elevation and should not be considered due to ischemia.  EKGs have remained completely normal.  No need for any additional cardiac studies at this time.  His final disposition be at discretion of the medical team.  He should continue all of his cardiac medications.  He will be following up with Dr. Shahid in the office next week.  We will follow until discharge.    Romain Pedroza DO,[facility]      [facility]      Of note, this documentation is completed using the Dragon Dictation system (voice recognition software). There may be spelling and/or grammatical errors that were not corrected prior to final submission.      Electronically signed by Romain Pedroza DO, on 10/2/2024 at 10:44 AM

## 2024-10-02 NOTE — PROGRESS NOTES
Vancomycin Dosing by Pharmacy- FOLLOW UP    Manny Reveles is a 69 y.o. year old male who Pharmacy has been consulted for vancomycin dosing for cellulitis, skin and soft tissue. Based on the patient's indication and renal status this patient is being dosed based on a goal AUC of 400-600.     Renal function is currently stable.    Current vancomycin dose: 1000 mg given every 24 hours    Estimated vancomycin AUC on current dose: 4541 mg/L.hr     Visit Vitals  /56   Pulse 87   Temp 37.2 °C (99 °F)   Resp 16        Lab Results   Component Value Date    CREATININE 2.57 (H) 10/02/2024    CREATININE 2.33 (H) 10/01/2024    CREATININE 1.49 (H) 2024    CREATININE 1.56 (H) 2024        Patient weight is as follows:   Vitals:    10/02/24 0056   Weight: 121 kg (266 lb 5.1 oz)       Cultures:  No results found for the encounter in last 14 days.       I/O last 3 completed shifts:  In: 2456.1 (20.3 mL/kg) [P.O.:480; I.V.:476.1 (3.9 mL/kg); IV Piggyback:1500]  Out: 650 (5.4 mL/kg) [Urine:650 (0.1 mL/kg/hr)]  Weight: 120.8 kg   I/O during current shift:  I/O this shift:  In: 59.2 [I.V.:59.2]  Out: -     Temp (24hrs), Av.3 °C (99.1 °F), Min:36.7 °C (98.1 °F), Max:38.5 °C (101.3 °F)      Assessment/Plan    Within goal AUC range. Continue current vancomycin regimen.    This dosing regimen is predicted by [employer] to result in the following pharmacokinetic parameters:  <Regimen: 1000 mg IV every 24 hours.  Start time: 00:00 on 10/03/2024  Exposure target: AUC24 (range)400-600 mg/L.hr   PNM21-64: 405 mg/L.hr  AUC24,ss: 451 mg/L.hr  Probability of AUC24 > 400: 69 %  Ctrough,ss: 15 mg/L  Probability of Ctrough,ss > 20: 16 %    The next level will be obtained on 10/04/2024 at 0500. May be obtained sooner if clinically indicated.   Will continue to monitor renal function daily while on vancomycin and order serum creatinine at least every 48 hours if not already ordered.  Follow for continued vancomycin needs, clinical  response, and signs/symptoms of toxicity.       BRODIE Barrett. Ph.

## 2024-10-02 NOTE — H&P
" Medical Group History and Physical    ASSESSMENT & PLAN:     Chest pain  Elevated Troponin [81_87]  - Heparin infusion started for possible in-stent restenosis - up trending  - Consult Cardiology in AM if symptoms not improving  - suspect this is d/t demand given SIRS/Sepsis with cellulitis  - tele overnight    Right arm cellulitis - old IV site  Febrile [Tmax 38.5]  - Started on cefepime and Vanco   - renal dose meds  - BC x2 - trend results  - Tylenol  - UA pending    ANTONIO on CKD3  hypomagnesemia  - baseline Cr ~1.3  - on admission 2.33, GFR 30  - replace Mag  - IVF hydration  - trend labs in AM  - UA results pending    DM2  - accu checks and SSI  - diet carb controlled   - no \"sugar free\" food -> causes GI upset     VTE Prophylaxis: defer    Magda Loja, APRN-CNP    HISTORY OF PRESENT ILLNESS:   Chief Complaint: Chest pain and altered mental status    History Of Present Illness:    Manny Reveles is a 69 y.o. male with a significant past medical history of HTN, DLD, DM2, CAD s/p multiple PCI, PAZ, CKD3 presenting to [facility] with wife for complaints of chest pain and altered mental status.     Noted to be febrile Tmax 101.3F treated with tylenol and improvement with mentation. He is now A+Ox4 now, labile BP after starting antibiotics, IVF responsive, trend labs in AM. Right arm has evidence of peripheral IV infiltration/new cellulitis.  Suspected cause of infection.    Troponin was elevated s/p PCI;  continue to trend troponin patient was started on heparin infusion for concern with possible in-stent restenosis, troponin 81_87, EKG without evidence of acute ischemic changes; chest pain resolved once fever was under control.  Patient denies any chest pain currently or shortness of breath, he says he feels much better.     VSS ready for admission under general medicine for chest pain, SIRS w suspected sepsis, and cellulitis of right arm.     Review of systems: 10 point review of systems is otherwise " negative except as mentioned above.    PAST HISTORIES:       Past Medical History:  Medical Problems       Problem List       Stage 3 chronic kidney disease (Multi)    Obstructive sleep apnea syndrome    Essential hypertriglyceridemia    CAD S/P percutaneous coronary angioplasty    Benign prostatic hyperplasia with urinary obstruction    Benign essential hypertension    Calculus of kidney    Incomplete bladder emptying    Squamous cell carcinoma of skin of other parts of face    Type 2 diabetes mellitus without retinopathy (Multi)    Class 2 severe obesity due to excess calories with serious comorbidity and body mass index (BMI) of 35.0 to 35.9 in adult    Hypokalemia    Uses self-applied continuous glucose monitoring device    Never smoked tobacco    Unstable angina (Multi)           Past Surgical History:  Past Surgical History:   Procedure Laterality Date   • CARDIAC CATHETERIZATION N/A 9/30/2024    Procedure: Left Heart Cath with Coronary Angiography and LV;  Surgeon: Armando Antonio MD;  Location: [facility];  Service: Cardiovascular;  Laterality: N/A;   • CARDIAC CATHETERIZATION N/A 9/30/2024    Procedure: PCI ANTOINE Stent- Coronary;  Surgeon: Armando Antonio MD;  Location: [facility];  Service: Cardiovascular;  Laterality: N/A;   • OTHER SURGICAL HISTORY  12/12/2018    Cardiac catheterization with stent placement   • OTHER SURGICAL HISTORY  12/12/2018    Renal lithotripsy   • OTHER SURGICAL HISTORY  12/12/2018    Shoulder surgery   • OTHER SURGICAL HISTORY  12/12/2018    Knee reconstruction   • OTHER SURGICAL HISTORY  12/12/2018    Hernia repair   • OTHER SURGICAL HISTORY  02/28/2022    Colonoscopy   • OTHER SURGICAL HISTORY  02/28/2022    Dental surgery   • OTHER SURGICAL HISTORY  02/28/2022    Inguinal hernia repair   • OTHER SURGICAL HISTORY  02/28/2022    Percutaneous transluminal coronary angioplasty   • OTHER SURGICAL HISTORY  03/09/2022    Dermatological surgery           Social History:  He reports that he has never smoked. He has never used smokeless tobacco. He reports that he does not currently use alcohol. He reports that he does not use drugs.    Family History:  Family History   Problem Relation Name Age of Onset   • Other (bone/cartilage disorder) Mother     • Diabetes Mother     • Gallbladder disease Mother     • Diabetes Father     • Other (cardiac disorder) Father     • Nephrolithiasis Father     • Prostate cancer Father     • Other (bladder cancer) Father     • Heart attack Father     • Rheum arthritis Father     • Skin cancer Father     • Kidney nephrosis Father     • Diabetes Paternal Grandfather     • Skin cancer Paternal Grandfather          Allergies:  Adhesive tape-silicones, Epinephrine, Latex, Meperidine, Penicillins, Promethazine, and Sulfa (sulfonamide antibiotics)    OBJECTIVE:       Last Recorded Vitals:  Vitals:    10/01/24 2028   BP: 149/65   BP Location: Right arm   Patient Position: Sitting   Pulse: (!) 108   Resp: 20   Temp: (!) 38.5 °C (101.3 °F)   TempSrc: Temporal   SpO2: 95%   Weight: 122 kg (268 lb 15.4 oz)   Height: 1.829 m (6')       Last I/O:  No intake/output data recorded.    Physical Exam  Vitals and nursing note reviewed.   Constitutional:       Appearance: He is normal weight. He is ill-appearing.   HENT:      Head: Normocephalic and atraumatic.      Nose: Nose normal.      Mouth/Throat:      Mouth: Mucous membranes are dry.      Pharynx: Oropharynx is clear.   Eyes:      Extraocular Movements: Extraocular movements intact.      Conjunctiva/sclera: Conjunctivae normal.      Pupils: Pupils are equal, round, and reactive to light.   Cardiovascular:      Rate and Rhythm: Normal rate and regular rhythm.      Pulses: Normal pulses.      Heart sounds: Normal heart sounds.   Pulmonary:      Effort: Pulmonary effort is normal.      Breath sounds: Normal breath sounds.   Abdominal:      General: Abdomen is flat. Bowel sounds are normal.       Palpations: Abdomen is soft.   Genitourinary:     Comments: Not assessed  Musculoskeletal:         General: Normal range of motion.      Cervical back: Normal range of motion and neck supple.      Comments: RUE with redness and warmth to touch - old IV site appears cellulitic   Skin:     General: Skin is warm and dry.      Capillary Refill: Capillary refill takes 2 to 3 seconds.   Neurological:      General: No focal deficit present.      Mental Status: He is alert and oriented to person, place, and time. Mental status is at baseline.      Motor: Weakness present.   Psychiatric:         Mood and Affect: Mood normal.         Behavior: Behavior normal.         Thought Content: Thought content normal.         Judgment: Judgment normal.           Scheduled Medications  cefepime, 2 g, intravenous, Once  magnesium sulfate, 2 g, intravenous, Once  vancomycin, 2 g, intravenous, Once      PRN Medications  PRN medications: heparin  Continuous Medications  heparin, 0-4,000 Units/hr, Last Rate: 1,000 Units/hr (10/01/24 8624)        Outpatient Medications:  Prior to Admission medications    Medication Sig Start Date End Date Taking? Authorizing Provider   amLODIPine (Norvasc) 5 mg tablet TAKE 1 TABLET (5 MG) BY MOUTH ONCE DAILY AS DIRECTED 5/21/24   Sherwin Mayo MD   aspirin 81 mg chewable tablet Chew.    Historical Provider, MD   atorvastatin (Lipitor) 40 mg tablet Take 1 tablet (40 mg) by mouth once daily at bedtime. 6/19/24 6/19/25  Armando Antonio MD   cetirizine (ZyrTEC) 10 mg tablet Take 1 tablet (10 mg) by mouth once daily. 1/31/22   Historical Provider, MD   cholecalciferol (Vitamin D-3) 50,000 unit capsule TAKE 1 CAPSULE (74633 UNITS) BY MOUTH ONE TIME PER WEEK 5/26/24   Stephanie Carney MD   clopidogrel (Plavix) 75 mg tablet Take 1 tablet (75 mg) by mouth once daily. 3/13/24 3/13/25  Armando Antonio MD   CRANBERRY ORAL Take 2 tablets by mouth once daily.    Historical Provider, MD   docusate sodium  (Colace) 100 mg capsule Take 1 capsule (100 mg) by mouth 2 times a day. 4/5/10   Historical Provider, MD   finasteride (Proscar) 5 mg tablet Take 1 tablet (5 mg) by mouth once daily. 9/25/24   Brandon Perdue MD   icosapent ethyL (Vascepa) 1 gram capsule Take 2 capsules (2 g) by mouth 2 times a day. 12/11/23   Sherwin Mayo MD   insulin aspart (NovoLOG FlexPen U-100 Insulin) 100 unit/mL (3 mL) pen Inject under the skin. Inject 20 units plus coverage for snacks 2/22/21   Historical Provider, MD   insulin degludec (Tresiba FlexTouch U-200) 200 unit/mL (3 mL) injection Inject 104 Units under the skin once daily at bedtime.  Patient taking differently: Inject 104 Units under the skin once daily in the morning. 5/29/24   Sherwin Mayo MD   ketoconazole (NIZOral) 2 % shampoo Apply topically 2 times a week. Apply to scalp in shower. Lather, leave on 5 minutes then rinse 2/1/24 1/31/25  Tan Mchugh MD   Lactobacillus acidophilus (PROBIOTIC ORAL) Take by mouth.    Historical Provider, MD   metoprolol succinate XL (Toprol-XL) 25 mg 24 hr tablet TAKE 1 TABLET BY MOUTH EVERY DAY 4/2/24   Sherwin Mayo MD   nitroglycerin (Nitrostat) 0.4 mg SL tablet Place 1 tablet (0.4 mg) under the tongue every 5 minutes if needed for chest pain. 3/13/24 3/13/25  Armando Antonio MD   potassium chloride CR (Klor-Con M20) 20 mEq ER tablet Take 1 tablet (20 mEq) by mouth once daily. 2/28/24   Sherwin Mayo MD   ranolazine (Ranexa) 500 mg 12 hr tablet TAKE 1 TABLET BY MOUTH EVERY 12 HOURS 10/12/23   Armando Antonio MD   semaglutide (Rybelsus) 14 mg tablet tablet Take 1 tablet (14 mg) by mouth once daily.    Historical Provider, MD   tamsulosin (Flomax) 0.4 mg 24 hr capsule Take 1 capsule (0.4 mg) by mouth once daily. 9/25/24   Brandon Perdue MD   telmisartan (MIcarDIS) 20 mg tablet TAKE 1 TABLET (20 MG) BY MOUTH ONCE EVERY 24 HOURS. 9/19/24   Sherwin Mayo MD   triamterene-hydrochlorothiazid (Maxzide-25)  37.5-25 mg tablet Take half tablet po daily 3/15/24   Sherwin Mayo MD   finasteride (Proscar) 5 mg tablet Take 1 tablet (5 mg) by mouth once daily. 8/26/24 9/25/24  Brandon Perdue MD   tamsulosin (Flomax) 0.4 mg 24 hr capsule Take 1 capsule (0.4 mg) by mouth once daily. 8/26/24 9/25/24  Brandon Perdue MD       LABS AND IMAGING:     Labs:  Results for orders placed or performed during the hospital encounter of 10/01/24 (from the past 24 hour(s))   CBC and Auto Differential   Result Value Ref Range    WBC 15.7 (H) 4.4 - 11.3 x10*3/uL    nRBC 0.0 0.0 - 0.0 /100 WBCs    RBC 4.37 (L) 4.50 - 5.90 x10*6/uL    Hemoglobin 13.8 13.5 - 17.5 g/dL    Hematocrit 39.1 (L) 41.0 - 52.0 %    MCV 90 80 - 100 fL    MCH 31.6 26.0 - 34.0 pg    MCHC 35.3 32.0 - 36.0 g/dL    RDW 12.4 11.5 - 14.5 %    Platelets 114 (L) 150 - 450 x10*3/uL    Neutrophils % 95.0 40.0 - 80.0 %    Immature Granulocytes %, Automated 0.5 0.0 - 0.9 %    Lymphocytes % 0.7 13.0 - 44.0 %    Monocytes % 3.6 2.0 - 10.0 %    Eosinophils % 0.0 0.0 - 6.0 %    Basophils % 0.2 0.0 - 2.0 %    Neutrophils Absolute 14.93 (H) 1.20 - 7.70 x10*3/uL    Immature Granulocytes Absolute, Automated 0.08 0.00 - 0.70 x10*3/uL    Lymphocytes Absolute 0.11 (L) 1.20 - 4.80 x10*3/uL    Monocytes Absolute 0.57 0.10 - 1.00 x10*3/uL    Eosinophils Absolute 0.00 0.00 - 0.70 x10*3/uL    Basophils Absolute 0.03 0.00 - 0.10 x10*3/uL   Comprehensive Metabolic Panel   Result Value Ref Range    Glucose 211 (H) 74 - 99 mg/dL    Sodium 133 (L) 136 - 145 mmol/L    Potassium 3.9 3.5 - 5.3 mmol/L    Chloride 102 98 - 107 mmol/L    Bicarbonate 19 (L) 21 - 32 mmol/L    Anion Gap 16 10 - 20 mmol/L    Urea Nitrogen 31 (H) 6 - 23 mg/dL    Creatinine 2.33 (H) 0.50 - 1.30 mg/dL    eGFR 30 (L) >60 mL/min/1.73m*2    Calcium 9.2 8.6 - 10.3 mg/dL    Albumin 4.1 3.4 - 5.0 g/dL    Alkaline Phosphatase 53 33 - 136 U/L    Total Protein 6.8 6.4 - 8.2 g/dL    AST 22 9 - 39 U/L    Bilirubin, Total 2.6 (H) 0.0 -  1.2 mg/dL    ALT 27 10 - 52 U/L   Magnesium   Result Value Ref Range    Magnesium 1.37 (L) 1.60 - 2.40 mg/dL   Troponin I, High Sensitivity, Initial   Result Value Ref Range    Troponin I, High Sensitivity 81 (HH) 0 - 20 ng/L   Troponin, High Sensitivity, 1 Hour   Result Value Ref Range    Troponin I, High Sensitivity 87 (HH) 0 - 20 ng/L   Lactate   Result Value Ref Range    Lactate 3.1 (H) 0.4 - 2.0 mmol/L   Influenza A, and B PCR   Result Value Ref Range    Flu A Result Not Detected Not Detected    Flu B Result Not Detected Not Detected   Sars-CoV-2 PCR   Result Value Ref Range    Coronavirus 2019, PCR Not Detected Not Detected        Imaging:  XR chest 1 view   Final Result   1.  No evidence of acute cardiopulmonary process.                  MACRO:   None        Signed by: Chon Gifford 10/1/2024 9:15 PM   Dictation workstation:   [id number]

## 2024-10-02 NOTE — ED PROVIDER NOTES
HPI   Chief Complaint   Patient presents with    Chest Pain     Patient c/o of chest pain following Stent placement 9/28.          History provided by:  Patient and medical records    69-year-old male with a history of hypertension, diabetes, CAD status post prior stenting, CKD.  Presented to ED for evaluation of generalized weakness, chest pain.  Patient was driven to the hospital by wife, pulled out of vehicle by medics due to significant generalized weakness.  Patient was awake and alert but mildly confused initially on assessment.  Review of records show that patient recently had heart cath with stenting, discharged yesterday.  Patient reports compliance with medications including Plavix.      Patient History   History reviewed. No pertinent past medical history.  Past Surgical History:   Procedure Laterality Date    CARDIAC CATHETERIZATION N/A 9/30/2024    Procedure: Left Heart Cath with Coronary Angiography and LV;  Surgeon: Armando Antonio MD;  Location: [facility];  Service: Cardiovascular;  Laterality: N/A;    CARDIAC CATHETERIZATION N/A 9/30/2024    Procedure: PCI ANTOINE Stent- Coronary;  Surgeon: Armando Antonio MD;  Location: [facility];  Service: Cardiovascular;  Laterality: N/A;    OTHER SURGICAL HISTORY  12/12/2018    Cardiac catheterization with stent placement    OTHER SURGICAL HISTORY  12/12/2018    Renal lithotripsy    OTHER SURGICAL HISTORY  12/12/2018    Shoulder surgery    OTHER SURGICAL HISTORY  12/12/2018    Knee reconstruction    OTHER SURGICAL HISTORY  12/12/2018    Hernia repair    OTHER SURGICAL HISTORY  02/28/2022    Colonoscopy    OTHER SURGICAL HISTORY  02/28/2022    Dental surgery    OTHER SURGICAL HISTORY  02/28/2022    Inguinal hernia repair    OTHER SURGICAL HISTORY  02/28/2022    Percutaneous transluminal coronary angioplasty    OTHER SURGICAL HISTORY  03/09/2022    Dermatological surgery     Family History   Problem Relation Name Age of Onset     Other (bone/cartilage disorder) Mother      Diabetes Mother      Gallbladder disease Mother      Diabetes Father      Other (cardiac disorder) Father      Nephrolithiasis Father      Prostate cancer Father      Other (bladder cancer) Father      Heart attack Father      Rheum arthritis Father      Skin cancer Father      Kidney nephrosis Father      Diabetes Paternal Grandfather      Skin cancer Paternal Grandfather       Social History     Tobacco Use    Smoking status: Never    Smokeless tobacco: Never   Vaping Use    Vaping status: Never Used   Substance Use Topics    Alcohol use: Not Currently    Drug use: Never       Physical Exam   ED Triage Vitals [10/01/24 2028]   Temperature Heart Rate Respirations BP   (!) 38.5 °C (101.3 °F) (!) 108 20 149/65      Pulse Ox Temp Source Heart Rate Source Patient Position   95 % Temporal Monitor Sitting      BP Location FiO2 (%)     Right arm --       Physical Exam  Vitals and nursing note reviewed.   Constitutional:       General: He is not in acute distress.  HENT:      Head: Atraumatic.      Mouth/Throat:      Mouth: Mucous membranes are moist.      Pharynx: Oropharynx is clear.   Eyes:      Extraocular Movements: Extraocular movements intact.      Conjunctiva/sclera: Conjunctivae normal.      Pupils: Pupils are equal, round, and reactive to light.   Cardiovascular:      Rate and Rhythm: Regular rhythm. Tachycardia present.      Pulses: Normal pulses.   Pulmonary:      Effort: Pulmonary effort is normal. No respiratory distress.      Breath sounds: Normal breath sounds.   Abdominal:      General: There is no distension.      Palpations: Abdomen is soft.      Tenderness: There is no abdominal tenderness. There is no guarding or rebound.   Musculoskeletal:         General: No deformity.      Cervical back: Neck supple.   Skin:     General: Skin is warm and dry.      Comments: Area of erythema and warmth in the right forearm near prior IV access site.   Neurological:       Mental Status: He is alert. He is disoriented.      Cranial Nerves: No cranial nerve deficit.      Sensory: No sensory deficit.      Motor: No weakness.   Psychiatric:         Mood and Affect: Mood normal.         Behavior: Behavior normal.           ED Course & MDM   Diagnoses as of 10/02/24 0032   Chest pain, unspecified type   Hypomagnesemia   NSTEMI (non-ST elevated myocardial infarction) (Multi)   Acute kidney injury superimposed on CKD (CMS-HCC)   Cellulitis of right upper extremity   Sepsis, due to unspecified organism, unspecified whether acute organ dysfunction present (Multi)   Chest pain                 No data recorded     Rivka Coma Scale Score: 14 (10/01/24 2029 : Elyssa Arteaga RN) HEART Score: 7 (10/01/24 2325 : Romain Kimball MD)                 Labs Reviewed   CBC WITH AUTO DIFFERENTIAL - Abnormal       Result Value    WBC 15.7 (*)     nRBC 0.0      RBC 4.37 (*)     Hemoglobin 13.8      Hematocrit 39.1 (*)     MCV 90      MCH 31.6      MCHC 35.3      RDW 12.4      Platelets 114 (*)     Neutrophils % 95.0      Immature Granulocytes %, Automated 0.5      Lymphocytes % 0.7      Monocytes % 3.6      Eosinophils % 0.0      Basophils % 0.2      Neutrophils Absolute 14.93 (*)     Immature Granulocytes Absolute, Automated 0.08      Lymphocytes Absolute 0.11 (*)     Monocytes Absolute 0.57      Eosinophils Absolute 0.00      Basophils Absolute 0.03     COMPREHENSIVE METABOLIC PANEL - Abnormal    Glucose 211 (*)     Sodium 133 (*)     Potassium 3.9      Chloride 102      Bicarbonate 19 (*)     Anion Gap 16      Urea Nitrogen 31 (*)     Creatinine 2.33 (*)     eGFR 30 (*)     Calcium 9.2      Albumin 4.1      Alkaline Phosphatase 53      Total Protein 6.8      AST 22      Bilirubin, Total 2.6 (*)     ALT 27     MAGNESIUM - Abnormal    Magnesium 1.37 (*)    SERIAL TROPONIN-INITIAL - Abnormal    Troponin I, High Sensitivity 81 (*)     Narrative:     Less than 99th percentile of normal range  cutoff-  Female and children under 18 years old <14 ng/L; Male <21 ng/L: Negative  Repeat testing should be performed if clinically indicated.     Female and children under 18 years old 14-50 ng/L; Male 21-50 ng/L:  Consistent with possible cardiac damage and possible increased clinical   risk. Serial measurements may help to assess extent of myocardial damage.     >50 ng/L: Consistent with cardiac damage, increased clinical risk and  myocardial infarction. Serial measurements may help assess extent of   myocardial damage.      NOTE: Children less than 1 year old may have higher baseline troponin   levels and results should be interpreted in conjunction with the overall   clinical context.     NOTE: Troponin I testing is performed using a different   testing methodology at [facility] than at other   [facility]. Direct result comparisons should only   be made within the same method.   SERIAL TROPONIN, 1 HOUR - Abnormal    Troponin I, High Sensitivity 87 (*)     Narrative:     Less than 99th percentile of normal range cutoff-  Female and children under 18 years old <14 ng/L; Male <21 ng/L: Negative  Repeat testing should be performed if clinically indicated.     Female and children under 18 years old 14-50 ng/L; Male 21-50 ng/L:  Consistent with possible cardiac damage and possible increased clinical   risk. Serial measurements may help to assess extent of myocardial damage.     >50 ng/L: Consistent with cardiac damage, increased clinical risk and  myocardial infarction. Serial measurements may help assess extent of   myocardial damage.      NOTE: Children less than 1 year old may have higher baseline troponin   levels and results should be interpreted in conjunction with the overall   clinical context.     NOTE: Troponin I testing is performed using a different   testing methodology at [facility] than at other   [facility]. Direct result comparisons should only   be made within  the same method.   LACTATE - Abnormal    Lactate 3.1 (*)     Narrative:     Venipuncture immediately after or during the administration of Metamizole may lead to falsely low results. Testing should be performed immediately prior to Metamizole dosing.   INFLUENZA A AND B PCR - Normal    Flu A Result Not Detected      Flu B Result Not Detected      Narrative:     This assay is an in vitro diagnostic multiplex nucleic acid amplification test for the detection and discrimination of Influenza A & B from nasopharyngeal specimens, and has been validated for use at [facility]. Negative results do not preclude Influenza A/B infections, and should not be used as the sole basis for diagnosis, treatment, or other management decisions. If Influenza A/B and RSV PCR results are negative, testing for Parainfluenza virus, Adenovirus and Metapneumovirus is routinely performed for [facility] [place] in[place], and is available on other patients by placing an add-on request.   SARS-COV-2 PCR - Normal    Coronavirus 2019, PCR Not Detected      Narrative:     This assay has received FDA Emergency Use Authorization (EUA) and is only authorized for the duration of time that circumstances exist to justify the authorization of the emergency use of in vitro diagnostic tests for the detection of SARS-CoV-2 virus and/or diagnosis of COVID-19 infection under section 564(b)(1) of the Act, 21 U.S.C. 360bbb-3(b)(1). This assay is an in vitro diagnostic nucleic acid amplification test for the qualitative detection of SARS-CoV-2 from nasopharyngeal specimens and has been validated for use at [facility]. Negative results do not preclude COVID-19 infections and should not be used as the sole basis for diagnosis, treatment, or other management decisions.     BLOOD CULTURE   BLOOD CULTURE   TROPONIN SERIES- (INITIAL, 1 HR)    Narrative:     The following orders were created for panel  order Troponin I Series, High Sensitivity (0, 1 HR).  Procedure                               Abnormality         Status                     ---------                               -----------         ------                     Troponin I, High Sensiti...[[id number]]  Abnormal            Final result               Troponin, High Sensitivi...[[id number]]  Abnormal            Final result                 Please view results for these tests on the individual orders.   URINALYSIS WITH REFLEX CULTURE AND MICROSCOPIC    Narrative:     The following orders were created for panel order Urinalysis with Reflex Culture and Microscopic.  Procedure                               Abnormality         Status                     ---------                               -----------         ------                     Urinalysis with Reflex C...[[id number]]                                                 Extra Urine Gray Tube[[id number]]                                                         Please view results for these tests on the individual orders.   URINALYSIS WITH REFLEX CULTURE AND MICROSCOPIC   EXTRA URINE GRAY TUBE   LACTATE     XR chest 1 view   Final Result   1.  No evidence of acute cardiopulmonary process.                  MACRO:   None        Signed by: Chon Gifford 10/1/2024 9:15 PM   Dictation workstation:   [id number]                Medical Decision Making  Amount and/or Complexity of Data Reviewed  ECG/medicine tests: ordered and independent interpretation performed. Decision-making details documented in ED Course.     Details: Twelve-lead ECG obtained at 2028 by my interpretation demonstrates sinus tachycardia with a rate of 109, no STEMI, no significant ST depressions    Repeat twelve-lead ECG obtained at 2135 by my interpretation demonstrates normal sinus rhythm with a rate of 96, no acute ST elevation or depression.      69-year-old male with history of recent left heart cath and stenting presenting with  generalized weakness and reported chest pain.  On evaluation patient slightly confused.  Able to answer basic orientation questions but slow to respond and some parts of history not making sense.  Vital signs obtained and patient found to be febrile with temp of 38.5 degrees Celsius.  Mildly tachycardic at 108.  Blood pressure 149/65.  Lungs are clear on exam.  Patient has area of cellulitis in the right forearm near the AC likely near recent IV access site.  Diagnostic workup performed to evaluate for infection including influenza and COVID.  Troponin levels obtained given patient's recent heart cath and reported chest pain.  Patient given acetaminophen for treatment of fever.  Patient given 1 L IV fluid bolus.  Blood cultures obtained.  Chest x-ray is clear without evidence of infiltrate.  Patient's CBC returns with leukocytosis of 15.7.  Metabolic panel with worsening renal function, creatinine now 2.33 with a GFR of 30.  Magnesium low at 1.37.  Given IV repletion.  COVID influenza testing negative.  Initial lactic acid elevated at 3.1.  Patient given empiric antibiotics including cefepime IV and vancomycin IV.  Initial troponin level elevated at 81.  Repeat uptrending at 87.  Unclear if this is expected post PCI versus stent occlusion.  Patient started on heparin drip for NSTEMI due to the possibilities of stent occlusion given the uptrending troponin level.  Patient chest pain-free on reevaluation.  Mental status completely normalized following acetaminophen and normalizing temperature.  Heart rate improved.  Patient's blood pressure dipped after standing to get changed into gown.  Maintaining normal mentation.  Additional IV fluids given, repeat blood pressure improving.  Case was discussed with hospitalist team for admission.      Procedure  Critical Care    Performed by: Romain Kimball MD  Authorized by: Romain Kimball MD    Critical care provider statement:     Critical care time (minutes):  34     Critical care time was exclusive of:  Separately billable procedures and treating other patients    Critical care was necessary to treat or prevent imminent or life-threatening deterioration of the following conditions:  Sepsis (nstemi)    Critical care was time spent personally by me on the following activities:  Ordering and performing treatments and interventions, ordering and review of laboratory studies, ordering and review of radiographic studies, re-evaluation of patient's condition, review of old charts, examination of patient, evaluation of patient's response to treatment and obtaining history from patient or surrogate    Care discussed with: admitting provider         Romain Kimball MD  10/02/24 0032

## 2024-10-02 NOTE — PROGRESS NOTES
Manny Reveles is a 69 y.o. male on day 1 of admission presenting with Chest pain.      Subjective   The patient is seen and evaluated this morning.  He states he still has some sweats and some chills.  He states his right arm cellulitis is improving.  Denies any chest pain today.  Ultrasound of the right upper extremity shows cephalic vein thrombosis no DVT.  Renal functions are worsening while on IV fluids.       Objective     Last Recorded Vitals  /56   Pulse 87   Temp 37.2 °C (99 °F)   Resp 16   Wt 121 kg (266 lb 5.1 oz)   SpO2 91%   Intake/Output last 3 Shifts:    Intake/Output Summary (Last 24 hours) at 10/2/2024 1111  Last data filed at 10/2/2024 1004  Gross per 24 hour   Intake 2515.32 ml   Output 650 ml   Net 1865.32 ml       Admission Weight  Weight: 122 kg (268 lb 15.4 oz) (10/01/24 2028)    Daily Weight  10/02/24 : 121 kg (266 lb 5.1 oz)    Image Results  ECG 12 lead  Normal sinus rhythm  Possible Left atrial enlargement  Borderline ECG  When compared with ECG of 01-OCT-2024 20:28, (unconfirmed)  No significant change was found  See ED provider note for full interpretation and clinical correlation  Confirmed by Marcia Morejon ([id number]) on 10/2/2024 10:30:08 AM  ECG 12 lead  Sinus tachycardia  Possible Left atrial enlargement  Borderline ECG  When compared with ECG of 30-SEP-2024 09:57, (unconfirmed)  Vent. rate has increased BY  56 BPM  Nonspecific T wave abnormality now evident in Lateral leads  See ED provider note for full interpretation and clinical correlation  Confirmed by Marcia Morejon ([id number]) on 10/2/2024 10:29:29 AM  ECG 12 Lead  Sinus rhythm with Premature atrial complexes in a pattern of bigeminy  Otherwise normal ECG  When compared with ECG of 01-OCT-2024 21:35, (unconfirmed)  Premature atrial complexes are now Present  Vascular US Upper Extremity Venous Duplex Right  Narrative: Interpreted By:  Roger Grajeda,   STUDY:  [facility] US UPPER EXTREMITY VENOUS DUPLEX RIGHT;   10/2/2024 9:29 am      INDICATION:  Signs/Symptoms:Right upper ext. cellulitis. Possible foregin  object/needle at the site of cellulittis..      ,L03.113 Cellulitis of right upper limb,R22.31 Localized swelling,  mass and lump, right upper limb      COMPARISON:  None.      ACCESSION NUMBER(S):  [id number]      ORDERING CLINICIAN:  ALFRED KITCHEN      TECHNIQUE:  Vascular ultrasound of the  right upper extremity was performed.  Evaluation was performed with grayscale, color, and spectral Doppler.  When possible, compression views of the evaluated veins was also  performed.      FINDINGS:  Evaluation of the visualized portions of the internal jugular,  subclavian, axillary, brachial, cephalic, and basilic veins was  performed.      There is occlusive thrombosis of the right cephalic vein extending  from the antecubital fossa to the mid forearm.      There is normal respiratory variation, normal compressibility, as  well as normal color doppler signal in the remaining visualized  vessels.      Impression: No sonographic evidence of deep venous thrombosis.  Positive for superficial thrombosis of the right cephalic vein.      MACRO:  None.      Signed by: Roger Grajeda 10/2/2024 9:39 AM  Dictation workstation:   [id number]      Physical Exam  HENT:      Head: Normocephalic and atraumatic.      Nose: Nose normal.      Mouth/Throat:      Mouth: Mucous membranes are dry.   Eyes:      Extraocular Movements: Extraocular movements intact.      Conjunctiva/sclera: Conjunctivae normal.   Cardiovascular:      Rate and Rhythm: Normal rate and regular rhythm.      Pulses: Normal pulses.      Heart sounds: Normal heart sounds.   Pulmonary:      Effort: Pulmonary effort is normal.      Breath sounds: Normal breath sounds.   Abdominal:      General: Bowel sounds are normal.      Palpations: Abdomen is soft.   Musculoskeletal:         General: Normal range of motion.      Cervical back: Normal range of motion and neck supple.    Skin:     General: Skin is warm and dry.      Comments: Right upper extremity distal to antecubital fossa there is erythema edema and tenderness which is improving   Neurological:      General: No focal deficit present.      Mental Status: He is alert. Mental status is at baseline.   Psychiatric:         Mood and Affect: Mood normal.         Relevant Results               Assessment/Plan          Manny Reveles is a 69 y.o. male with a significant past medical history of HTN, DLD, DM2, CAD s/p multiple PCI, PAZ, CKD3 presenting to [place] [facility] with wife for complaints of chest pain and altered mental status admitted with sepsis and right upper extremity cellulitis        Assessment & Plan  Chest pain    Right arm cellulitis - old IV site  Sepsis present on admission in setting of fever hypotension, altered mental status, likely source cellulitis  -Continue with ceftriaxone and vancomycin   - renal dose meds  - BC x2 -pending  - Tylenol  -Concerns for UTI based upon labs but the patient without any  symptoms.  Follow urine cultures.  -ID has been consulted  -Right upper extremity cephalic vein thrombosis based on ultrasound no DVT.    Metabolic encephalopathy-now resolved likely due to above    Elevated Troponin [81_87]  -Appreciate cardiology recommendations currently without any chest pain  -Troponin pattern has been flat likely in setting of CKD  -Continue with aspirin Plavix statin and beta-blockers  -Right upper extremity without any evidence of DVT will discontinue heparin.       ANTONIO on CKD3  - baseline Cr ~1.3  - on admission 2.33, GFR 30, renal functions slightly worse with IV fluid hydration, will ask nephrology recommendations     DM2  - accu checks and SSI, added basal insulin as well  - diet carb controlled     Hypomagnesemia-replaced and improved              Bobby Wood MD

## 2024-10-02 NOTE — CONSULTS
Consults        ID Consult:       HPI 69-year-old male with chest pain and altered mental status noted to be febrile at 101.3 in the emergency department with labile blood pressure.  Right arm has peripheral IV infiltration with cellulitis.  Notably elevated troponin status post PCI with initial concern for possible in-stent restenosis per medical record.  Cardiology on consult and following.  Nephrology also following for ANTONIO  Patient started on cefepime and vancomycin empirically for thrombophlebitis right upper extremity  Blood cultures x 2 new gram-positive cocci in clusters  Patient is a diabetic with diabetes mellitus type 2 diet controlled    No history of smoking or smokeless tobacco    Patient states that he still feels foggy headed.  His daughter is at bedside.  States that he is usually very active especially with 3-year-old grandson, gets down on the floor, very active but was very confused at home. He started having right arm pain when he left the hospital    All 14 ROS discussed with the patient and negative other than as stated as above     has a past surgical history that includes Other surgical history (12/12/2018); Other surgical history (12/12/2018); Other surgical history (12/12/2018); Other surgical history (12/12/2018); Other surgical history (12/12/2018); Other surgical history (02/28/2022); Other surgical history (02/28/2022); Other surgical history (02/28/2022); Other surgical history (02/28/2022); Other surgical history (03/09/2022); Cardiac catheterization (N/A, 9/30/2024); and Cardiac catheterization (N/A, 9/30/2024).     reports that he has never smoked. He has never been exposed to tobacco smoke. He has never used smokeless tobacco. He reports that he does not currently use alcohol. He reports that he does not use drugs.    Family History   Problem Relation Name Age of Onset    Other (bone/cartilage disorder) Mother      Diabetes Mother      Gallbladder disease Mother      Diabetes  Father      Other (cardiac disorder) Father      Nephrolithiasis Father      Prostate cancer Father      Other (bladder cancer) Father      Heart attack Father      Rheum arthritis Father      Skin cancer Father      Kidney nephrosis Father      Diabetes Paternal Grandfather      Skin cancer Paternal Grandfather           Physical exam  Vitals:    10/02/24 0808   BP: 116/56   Pulse: 87   Resp:    Temp: 37.2 °C (99 °F)   SpO2: 91%       Patient is awake and alert now, interactive talkative, no slurred speech  NAD  Neck supple  Heart S1S2  Chest: Equal expansion, bilaterally clear to auscultation  Abdomen: soft, ND, NTTP, no guarding  Extrem: Right antecubital area erythematous with area of induration  Skin: no rashes, no diaphoresis  Neuro: CNS intact  Affect appropriate     Admission on 10/01/2024   Component Date Value Ref Range Status    WBC 10/01/2024 15.7 (H)  4.4 - 11.3 x10*3/uL Final    nRBC 10/01/2024 0.0  0.0 - 0.0 /100 WBCs Final    RBC 10/01/2024 4.37 (L)  4.50 - 5.90 x10*6/uL Final    Hemoglobin 10/01/2024 13.8  13.5 - 17.5 g/dL Final    Hematocrit 10/01/2024 39.1 (L)  41.0 - 52.0 % Final    MCV 10/01/2024 90  80 - 100 fL Final    MCH 10/01/2024 31.6  26.0 - 34.0 pg Final    MCHC 10/01/2024 35.3  32.0 - 36.0 g/dL Final    RDW 10/01/2024 12.4  11.5 - 14.5 % Final    Platelets 10/01/2024 114 (L)  150 - 450 x10*3/uL Final    Neutrophils % 10/01/2024 95.0  40.0 - 80.0 % Final    Immature Granulocytes %, Automated 10/01/2024 0.5  0.0 - 0.9 % Final    Immature Granulocyte Count (IG) includes promyelocytes, myelocytes and metamyelocytes but does not include bands. Percent differential counts (%) should be interpreted in the context of the absolute cell counts (cells/UL).    Lymphocytes % 10/01/2024 0.7  13.0 - 44.0 % Final    Monocytes % 10/01/2024 3.6  2.0 - 10.0 % Final    Eosinophils % 10/01/2024 0.0  0.0 - 6.0 % Final    Basophils % 10/01/2024 0.2  0.0 - 2.0 % Final    Neutrophils Absolute 10/01/2024 14.93  (H)  1.20 - 7.70 x10*3/uL Final    Percent differential counts (%) should be interpreted in the context of the absolute cell counts (cells/uL).    Immature Granulocytes Absolute, Au* 10/01/2024 0.08  0.00 - 0.70 x10*3/uL Final    Lymphocytes Absolute 10/01/2024 0.11 (L)  1.20 - 4.80 x10*3/uL Final    Monocytes Absolute 10/01/2024 0.57  0.10 - 1.00 x10*3/uL Final    Eosinophils Absolute 10/01/2024 0.00  0.00 - 0.70 x10*3/uL Final    Basophils Absolute 10/01/2024 0.03  0.00 - 0.10 x10*3/uL Final    Glucose 10/01/2024 211 (H)  74 - 99 mg/dL Final    Sodium 10/01/2024 133 (L)  136 - 145 mmol/L Final    Potassium 10/01/2024 3.9  3.5 - 5.3 mmol/L Final    Chloride 10/01/2024 102  98 - 107 mmol/L Final    Bicarbonate 10/01/2024 19 (L)  21 - 32 mmol/L Final    Anion Gap 10/01/2024 16  10 - 20 mmol/L Final    Urea Nitrogen 10/01/2024 31 (H)  6 - 23 mg/dL Final    Creatinine 10/01/2024 2.33 (H)  0.50 - 1.30 mg/dL Final    eGFR 10/01/2024 30 (L)  >60 mL/min/1.73m*2 Final    Calculations of estimated GFR are performed using the 2021 CKD-EPI Study Refit equation without the race variable for the IDMS-Traceable creatinine methods.  https://jasn.asnjournals.org/content/early/2021/09/22/ASN.8964396440    Calcium 10/01/2024 9.2  8.6 - 10.3 mg/dL Final    Albumin 10/01/2024 4.1  3.4 - 5.0 g/dL Final    Alkaline Phosphatase 10/01/2024 53  33 - 136 U/L Final    Total Protein 10/01/2024 6.8  6.4 - 8.2 g/dL Final    AST 10/01/2024 22  9 - 39 U/L Final    Bilirubin, Total 10/01/2024 2.6 (H)  0.0 - 1.2 mg/dL Final    ALT 10/01/2024 27  10 - 52 U/L Final    Patients treated with Sulfasalazine may generate falsely decreased results for ALT.    Magnesium 10/01/2024 1.37 (L)  1.60 - 2.40 mg/dL Final    Ventricular Rate 10/01/2024 109  BPM Final    Atrial Rate 10/01/2024 109  BPM Final    WI Interval 10/01/2024 166  ms Final    QRS Duration 10/01/2024 72  ms Final    QT Interval 10/01/2024 316  ms Final    QTC Calculation(Bazett) 10/01/2024 425   ms Final    P Axis 10/01/2024 37  degrees Final    R Axis 10/01/2024 30  degrees Final    T Axis 10/01/2024 67  degrees Final    QRS Count 10/01/2024 18  beats Final    Q Onset 10/01/2024 220  ms Final    P Onset 10/01/2024 137  ms Final    P Offset 10/01/2024 194  ms Final    T Offset 10/01/2024 378  ms Final    QTC Fredericia 10/01/2024 385  ms Final    Ventricular Rate 10/01/2024 96  BPM Final    Atrial Rate 10/01/2024 96  BPM Final    DE Interval 10/01/2024 158  ms Final    QRS Duration 10/01/2024 96  ms Final    QT Interval 10/01/2024 328  ms Final    QTC Calculation(Bazett) 10/01/2024 414  ms Final    P Axis 10/01/2024 32  degrees Final    R Axis 10/01/2024 20  degrees Final    T Axis 10/01/2024 53  degrees Final    QRS Count 10/01/2024 16  beats Final    Q Onset 10/01/2024 213  ms Final    P Onset 10/01/2024 134  ms Final    P Offset 10/01/2024 194  ms Final    T Offset 10/01/2024 377  ms Final    QTC Fredericia 10/01/2024 383  ms Final    Troponin I, High Sensitivity 10/01/2024 81 (HH)  0 - 20 ng/L Final    Troponin I, High Sensitivity 10/01/2024 87 (HH)  0 - 20 ng/L Final    Previous result verified on 10/1/2024 2129 on specimen/case [id number] called with component [facility] for procedure Troponin I, High Sensitivity, Initial with value 81 ng/L.    Flu A Result 10/01/2024 Not Detected  Not Detected Final    Flu B Result 10/01/2024 Not Detected  Not Detected Final    Coronavirus 2019, PCR 10/01/2024 Not Detected  Not Detected Final    Lactate 10/01/2024 3.1 (H)  0.4 - 2.0 mmol/L Final    Blood Culture 10/01/2024 Loaded on Instrument - Culture in progress   Preliminary    Blood Culture 10/01/2024 Loaded on Instrument - Culture in progress   Preliminary    Color, Urine 10/02/2024 Yellow  Light-Yellow, Yellow, Dark-Yellow Final    Appearance, Urine 10/02/2024 Turbid (N)  Clear Final    Specific Gravity, Urine 10/02/2024 1.015  1.005 - 1.035 Final    pH, Urine 10/02/2024 5.0  5.0, 5.5, 6.0, 6.5, 7.0, 7.5, 8.0  Final    Protein, Urine 10/02/2024 10 (TRACE)  NEGATIVE, 10 (TRACE), 20 (TRACE) mg/dL Final    Glucose, Urine 10/02/2024 Normal  Normal mg/dL Final    Blood, Urine 10/02/2024 NEGATIVE  NEGATIVE Final    Ketones, Urine 10/02/2024 NEGATIVE  NEGATIVE mg/dL Final    Bilirubin, Urine 10/02/2024 NEGATIVE  NEGATIVE Final    Urobilinogen, Urine 10/02/2024 Normal  Normal mg/dL Final    Nitrite, Urine 10/02/2024 NEGATIVE  NEGATIVE Final    Leukocyte Esterase, Urine 10/02/2024 75 Cally/µL (A)  NEGATIVE Final    Lactate 10/02/2024 1.9  0.4 - 2.0 mmol/L Final    Glucose 10/02/2024 274 (H)  74 - 99 mg/dL Final    Sodium 10/02/2024 132 (L)  136 - 145 mmol/L Final    Potassium 10/02/2024 3.9  3.5 - 5.3 mmol/L Final    Chloride 10/02/2024 100  98 - 107 mmol/L Final    Bicarbonate 10/02/2024 21  21 - 32 mmol/L Final    Anion Gap 10/02/2024 15  10 - 20 mmol/L Final    Urea Nitrogen 10/02/2024 37 (H)  6 - 23 mg/dL Final    Creatinine 10/02/2024 2.57 (H)  0.50 - 1.30 mg/dL Final    eGFR 10/02/2024 26 (L)  >60 mL/min/1.73m*2 Final    Calculations of estimated GFR are performed using the 2021 CKD-EPI Study Refit equation without the race variable for the IDMS-Traceable creatinine methods.  https://jasn.asnjournals.org/content/early/2021/09/22/ASN.7528827434    Calcium 10/02/2024 8.4 (L)  8.6 - 10.3 mg/dL Final    Albumin 10/02/2024 3.6  3.4 - 5.0 g/dL Final    Alkaline Phosphatase 10/02/2024 46  33 - 136 U/L Final    Total Protein 10/02/2024 5.9 (L)  6.4 - 8.2 g/dL Final    AST 10/02/2024 39  9 - 39 U/L Final    Bilirubin, Total 10/02/2024 2.5 (H)  0.0 - 1.2 mg/dL Final    ALT 10/02/2024 44  10 - 52 U/L Final    Patients treated with Sulfasalazine may generate falsely decreased results for ALT.    WBC 10/02/2024 16.1 (H)  4.4 - 11.3 x10*3/uL Final    nRBC 10/02/2024 0.0  0.0 - 0.0 /100 WBCs Final    RBC 10/02/2024 3.93 (L)  4.50 - 5.90 x10*6/uL Final    Hemoglobin 10/02/2024 12.1 (L)  13.5 - 17.5 g/dL Final    Hematocrit 10/02/2024 36.6 (L)   41.0 - 52.0 % Final    MCV 10/02/2024 93  80 - 100 fL Final    MCH 10/02/2024 30.8  26.0 - 34.0 pg Final    MCHC 10/02/2024 33.1  32.0 - 36.0 g/dL Final    RDW 10/02/2024 12.8  11.5 - 14.5 % Final    Platelets 10/02/2024 91 (L)  150 - 450 x10*3/uL Final    Platelet count verified by smear review.    Neutrophils % 10/02/2024 94.3  40.0 - 80.0 % Final    Immature Granulocytes %, Automated 10/02/2024 0.9  0.0 - 0.9 % Final    Immature Granulocyte Count (IG) includes promyelocytes, myelocytes and metamyelocytes but does not include bands. Percent differential counts (%) should be interpreted in the context of the absolute cell counts (cells/UL).    Lymphocytes % 10/02/2024 0.7  13.0 - 44.0 % Final    Monocytes % 10/02/2024 3.9  2.0 - 10.0 % Final    Eosinophils % 10/02/2024 0.0  0.0 - 6.0 % Final    Basophils % 10/02/2024 0.2  0.0 - 2.0 % Final    Neutrophils Absolute 10/02/2024 15.21 (H)  1.20 - 7.70 x10*3/uL Final    Automated WBC differential has been confirmed by manual smear.  Percent differential counts (%) should be interpreted in the context of the absolute cell counts (cells/uL).    Immature Granulocytes Absolute, Au* 10/02/2024 0.14  0.00 - 0.70 x10*3/uL Final    Lymphocytes Absolute 10/02/2024 0.12 (L)  1.20 - 4.80 x10*3/uL Final    Monocytes Absolute 10/02/2024 0.63  0.10 - 1.00 x10*3/uL Final    Eosinophils Absolute 10/02/2024 0.00  0.00 - 0.70 x10*3/uL Final    Basophils Absolute 10/02/2024 0.03  0.00 - 0.10 x10*3/uL Final    Magnesium 10/02/2024 1.81  1.60 - 2.40 mg/dL Final    C-Reactive Protein 10/02/2024 9.09 (H)  <1.00 mg/dL Final    POCT Glucose 10/02/2024 181 (H)  74 - 99 mg/dL Final    Heparin Unfractionated 10/02/2024 0.2  See Comment Below for Therapeutic Ranges IU/mL Final    Extra Tube 10/02/2024 Hold for add-ons.   Final    Auto resulted.    WBC, Urine 10/02/2024 11-20 (A)  1-5, NONE /HPF Final    RBC, Urine 10/02/2024 3-5  NONE, 1-2, 3-5 /HPF Final    Squamous Epithelial Cells, Urine  10/02/2024 1-9 (SPARSE)  Reference range not established. /HPF Final    Mucus, Urine 10/02/2024 FEW  Reference range not established. /LPF Final    Vancomycin 10/02/2024 16.0  5.0 - 20.0 ug/mL Final    Heparin Unfractionated 10/02/2024 0.3  See Comment Below for Therapeutic Ranges IU/mL Final    POCT Glucose 10/02/2024 218 (H)  74 - 99 mg/dL Final    Extra Tube 10/02/2024 Hold for add-ons.   Final    Auto resulted.    Extra Tube 10/02/2024 Hold for add-ons.   Final    Auto resulted.    Ventricular Rate 10/02/2024 74  BPM Preliminary    Atrial Rate 10/02/2024 74  BPM Preliminary    MS Interval 10/02/2024 166  ms Preliminary    QRS Duration 10/02/2024 104  ms Preliminary    QT Interval 10/02/2024 378  ms Preliminary    QTC Calculation(Bazett) 10/02/2024 419  ms Preliminary    P Axis 10/02/2024 40  degrees Preliminary    R Axis 10/02/2024 15  degrees Preliminary    T Axis 10/02/2024 33  degrees Preliminary    QRS Count 10/02/2024 12  beats Preliminary    Q Onset 10/02/2024 212  ms Preliminary    P Onset 10/02/2024 129  ms Preliminary    P Offset 10/02/2024 193  ms Preliminary    T Offset 10/02/2024 401  ms Preliminary    QTC Fredericia 10/02/2024 405  ms Preliminary    POCT Glucose 10/02/2024 201 (H)  74 - 99 mg/dL Final   No results displayed because visit has over 200 results.          Assessment:   New bacteremia -gram-positive cocci clusters  Right upper extremity cellulitis/thrombophlebitis  Diabetes mellitus type 2  Chest pain  ANTONIO on CKD 3    Plan:   Discussed with nurse to place heating pad on right antecubital area  New bacteremia will need to be addressed and cleared prior to discharge  If this is Staphylococcus aureus, will likely need IV antibiotics on discharge.  No PICC line until blood cultures are clear at least 48 hours if not longer  Redraw blood cultures tomorrow morning  Patient is currently on ceftriaxone and vancomycin -monitoring renal function.  If worsens, then we will need to change to  daptomycin temporarily.  Patient has some pyuria but this is likely due to bacteremia  Optimal glycemic control    All communication directed to consulting provider.   Thank you very much for this consultation.     Time spent before, during, and after this consult reviewed data and coordinating care on the date of this consultation, including face to face visit with the patient > 60 min

## 2024-10-02 NOTE — CONSULTS
formerly [facility] Nephrology  Consult Note           Reason for Consult: Acute kidney injury on top of chronic kidney disease stage III  Requesting Physician:  Dr. Bobby Wood MD      Chief Complaint: Chest pain  History Obtained From:  patient, electronic medical record    History of Present Ilness:    69 y.o. year old male who presented to the emergency department for further evaluation and management chest pain preceded by right upper extremity cellulitis the site of IV line as patient was briefly in the hospital for heart catheterization   Patient does have preadmission chronic comorbidity of hypertension diabetes type 2 coronary artery disease recently during the heart catheterization the patient did receive stenting obstructive sleep apnea is a chronic kidney disease stage III with a baseline creatinine of 1.7 and estimated GFR of 40    past Medical History:    History reviewed. No pertinent past medical history.     Past Surgical History:    Past Surgical History:   Procedure Laterality Date    CARDIAC CATHETERIZATION N/A 9/30/2024    Procedure: Left Heart Cath with Coronary Angiography and LV;  Surgeon: Armando Antonio MD;  Location: [facility];  Service: Cardiovascular;  Laterality: N/A;    CARDIAC CATHETERIZATION N/A 9/30/2024    Procedure: PCI ANTOINE Stent- Coronary;  Surgeon: Armando Antonio MD;  Location: [facility];  Service: Cardiovascular;  Laterality: N/A;    OTHER SURGICAL HISTORY  12/12/2018    Cardiac catheterization with stent placement    OTHER SURGICAL HISTORY  12/12/2018    Renal lithotripsy    OTHER SURGICAL HISTORY  12/12/2018    Shoulder surgery    OTHER SURGICAL HISTORY  12/12/2018    Knee reconstruction    OTHER SURGICAL HISTORY  12/12/2018    Hernia repair    OTHER SURGICAL HISTORY  02/28/2022    Colonoscopy    OTHER SURGICAL HISTORY  02/28/2022    Dental surgery    OTHER SURGICAL HISTORY  02/28/2022    Inguinal hernia repair    OTHER SURGICAL HISTORY   02/28/2022    Percutaneous transluminal coronary angioplasty    OTHER SURGICAL HISTORY  03/09/2022    Dermatological surgery        Home Medications:    No current facility-administered medications on file prior to encounter.     Current Outpatient Medications on File Prior to Encounter   Medication Sig Dispense Refill    amLODIPine (Norvasc) 5 mg tablet TAKE 1 TABLET (5 MG) BY MOUTH ONCE DAILY AS DIRECTED 90 tablet 3    aspirin 81 mg chewable tablet Chew.      atorvastatin (Lipitor) 40 mg tablet Take 1 tablet (40 mg) by mouth once daily at bedtime. 90 tablet 3    cetirizine (ZyrTEC) 10 mg tablet Take 1 tablet (10 mg) by mouth once daily.      cholecalciferol (Vitamin D-3) 50,000 unit capsule TAKE 1 CAPSULE (10791 UNITS) BY MOUTH ONE TIME PER WEEK 12 capsule 2    clopidogrel (Plavix) 75 mg tablet Take 1 tablet (75 mg) by mouth once daily. 90 tablet 3    CRANBERRY ORAL Take 2 tablets by mouth once daily.      finasteride (Proscar) 5 mg tablet Take 1 tablet (5 mg) by mouth once daily. 90 tablet 3    icosapent ethyL (Vascepa) 1 gram capsule Take 2 capsules (2 g) by mouth 2 times a day. 360 capsule 3    insulin aspart (NovoLOG FlexPen U-100 Insulin) 100 unit/mL (3 mL) pen Inject under the skin. Inject 20 units plus coverage for snacks      insulin degludec (Tresiba FlexTouch U-200) 200 unit/mL (3 mL) injection Inject 104 Units under the skin once daily at bedtime. (Patient taking differently: Inject 104 Units under the skin once daily in the morning.)      ketoconazole (NIZOral) 2 % shampoo Apply topically 2 times a week. Apply to scalp in shower. Lather, leave on 5 minutes then rinse 120 mL 3    Lactobacillus acidophilus (PROBIOTIC ORAL) Take by mouth.      metoprolol succinate XL (Toprol-XL) 25 mg 24 hr tablet TAKE 1 TABLET BY MOUTH EVERY DAY 90 tablet 1    nitroglycerin (Nitrostat) 0.4 mg SL tablet Place 1 tablet (0.4 mg) under the tongue every 5 minutes if needed for chest pain. 25 tablet 5    potassium chloride CR  (Klor-Con M20) 20 mEq ER tablet Take 1 tablet (20 mEq) by mouth once daily. 90 tablet 3    ranolazine (Ranexa) 500 mg 12 hr tablet TAKE 1 TABLET BY MOUTH EVERY 12 HOURS 180 tablet 3    semaglutide (Rybelsus) 14 mg tablet tablet Take 1 tablet (14 mg) by mouth once daily.      tamsulosin (Flomax) 0.4 mg 24 hr capsule Take 1 capsule (0.4 mg) by mouth once daily. 90 capsule 3    telmisartan (MIcarDIS) 20 mg tablet TAKE 1 TABLET (20 MG) BY MOUTH ONCE EVERY 24 HOURS. 90 tablet 2    triamterene-hydrochlorothiazid (Maxzide-25) 37.5-25 mg tablet Take half tablet po daily 30 tablet 5    docusate sodium (Colace) 100 mg capsule Take 1 capsule (100 mg) by mouth 2 times a day.         Allergies:  Adhesive tape-silicones, Epinephrine, Latex, Meperidine, Penicillins, Promethazine, and Sulfa (sulfonamide antibiotics)    Social History:    Social History     Socioeconomic History    Marital status:      Spouse name: Not on file    Number of children: Not on file    Years of education: Not on file    Highest education level: Not on file   Occupational History    Not on file   Tobacco Use    Smoking status: Never     Passive exposure: Never    Smokeless tobacco: Never   Vaping Use    Vaping status: Never Used   Substance and Sexual Activity    Alcohol use: Not Currently    Drug use: Never    Sexual activity: Defer   Other Topics Concern    Not on file   Social History Narrative    Not on file     Social Determinants of Health     Financial Resource Strain: Low Risk  (10/2/2024)    Overall Financial Resource Strain (CARDIA)     Difficulty of Paying Living Expenses: Not hard at all   Food Insecurity: No Food Insecurity (10/2/2024)    Hunger Vital Sign     Worried About Running Out of Food in the Last Year: Never true     Ran Out of Food in the Last Year: Never true   Transportation Needs: No Transportation Needs (10/2/2024)    PRAPARE - Transportation     Lack of Transportation (Medical): No     Lack of Transportation  (Non-Medical): No   Physical Activity: Inactive (10/2/2024)    Exercise Vital Sign     Days of Exercise per Week: 0 days     Minutes of Exercise per Session: 0 min   Stress: No Stress Concern Present (10/2/2024)    [employer] - Occupational Stress Questionnaire     Feeling of Stress : Not at all   Social Connections: Moderately Integrated (10/2/2024)    Social Connection and Isolation Panel [NHANES]     Frequency of Communication with Friends and Family: More than three times a week     Frequency of Social Gatherings with Friends and Family: More than three times a week     Attends Oriental orthodox Services: More than 4 times per year     Active Member of Clubs or Organizations: No     Attends Club or Organization Meetings: Never     Marital Status:    Intimate Partner Violence: Not At Risk (10/2/2024)    Humiliation, Afraid, Rape, and Kick questionnaire     Fear of Current or Ex-Partner: No     Emotionally Abused: No     Physically Abused: No     Sexually Abused: No   Housing Stability: Low Risk  (10/2/2024)    Housing Stability Vital Sign     Unable to Pay for Housing in the Last Year: No     Number of Times Moved in the Last Year: 0     Homeless in the Last Year: No       Family History:   Family History   Problem Relation Name Age of Onset    Other (bone/cartilage disorder) Mother      Diabetes Mother      Gallbladder disease Mother      Diabetes Father      Other (cardiac disorder) Father      Nephrolithiasis Father      Prostate cancer Father      Other (bladder cancer) Father      Heart attack Father      Rheum arthritis Father      Skin cancer Father      Kidney nephrosis Father      Diabetes Paternal Grandfather      Skin cancer Paternal Grandfather         Review of Systems:   Patient denied any fever or chills no productive nonproductive cough no nausea emesis or diarrhea no dysuria no near syncope or syncope no any other organ system related symptoms    Physical exam:    Constitutional:  VITALS:  /56   Pulse 87   Temp 37.2 °C (99 °F)   Resp 16   Ht 1.829 m (6')   Wt 121 kg (266 lb 5.1 oz)   SpO2 91%   BMI 36.12 kg/m²      Wt Readings from Last 3 Encounters:   10/02/24 121 kg (266 lb 5.1 oz)   09/28/24 122 kg (268 lb 8.3 oz)   09/25/24 122 kg (268 lb 1.3 oz)      Gen: alert, awake, nad  Head: atraumatic, normocephalic  Skin: no rash, turgor wnl  Heent:  eomi, mmm  Neck: no bruits or jvd noted, thyroid normal  Lungs:  clear to auscultation  Heart:  regular rate and rhythm, no murmurs  Abdomen:  +bs, soft, nt, nd, no hepatosplenomegaly   Extremities: no edema  Psychiatric: mood and affect appropriate; judgement and insight intact  Musculoskeletal:  Rom, muscular strength intact; digits, nails normal    Data/  CBC:   Lab Results   Component Value Date    WBC 16.1 (H) 10/02/2024    RBC 3.93 (L) 10/02/2024    HGB 12.1 (L) 10/02/2024    HCT 36.6 (L) 10/02/2024    MCV 93 10/02/2024    MCH 30.8 10/02/2024    MCHC 33.1 10/02/2024    RDW 12.8 10/02/2024    PLT 91 (L) 10/02/2024     BMP:    Lab Results   Component Value Date     (L) 10/02/2024    K 3.9 10/02/2024     10/02/2024    CO2 21 10/02/2024    BUN 37 (H) 10/02/2024    CREATININE 2.57 (H) 10/02/2024    CALCIUM 8.4 (L) 10/02/2024    GLUCOSE 274 (H) 10/02/2024     ECG 12 lead  Normal sinus rhythm  Possible Left atrial enlargement  Borderline ECG  When compared with ECG of 01-OCT-2024 20:28, (unconfirmed)  No significant change was found  See ED provider note for full interpretation and clinical correlation  Confirmed by Marcia Morejon ([id number]) on 10/2/2024 10:30:08 AM  ECG 12 lead  Sinus tachycardia  Possible Left atrial enlargement  Borderline ECG  When compared with ECG of 30-SEP-2024 09:57, (unconfirmed)  Vent. rate has increased BY  56 BPM  Nonspecific T wave abnormality now evident in Lateral leads  See ED provider note for full interpretation and clinical correlation  Confirmed by Marcia Morejon  ([id number]) on 10/2/2024 10:29:29 AM  ECG 12 Lead  Sinus rhythm with Premature atrial complexes in a pattern of bigeminy  Otherwise normal ECG  When compared with ECG of 01-OCT-2024 21:35, (unconfirmed)  Premature atrial complexes are now Present  Vascular US Upper Extremity Venous Duplex Right  Narrative: Interpreted By:  Roger Grajeda,   STUDY:  [facility] US UPPER EXTREMITY VENOUS DUPLEX RIGHT;  10/2/2024 9:29 am      INDICATION:  Signs/Symptoms:Right upper ext. cellulitis. Possible foregin  object/needle at the site of cellulittis..      ,L03.113 Cellulitis of right upper limb,R22.31 Localized swelling,  mass and lump, right upper limb      COMPARISON:  None.      ACCESSION NUMBER(S):  [id number]      ORDERING CLINICIAN:  ALFRED KITCHEN      TECHNIQUE:  Vascular ultrasound of the  right upper extremity was performed.  Evaluation was performed with grayscale, color, and spectral Doppler.  When possible, compression views of the evaluated veins was also  performed.      FINDINGS:  Evaluation of the visualized portions of the internal jugular,  subclavian, axillary, brachial, cephalic, and basilic veins was  performed.      There is occlusive thrombosis of the right cephalic vein extending  from the antecubital fossa to the mid forearm.      There is normal respiratory variation, normal compressibility, as  well as normal color doppler signal in the remaining visualized  vessels.      Impression: No sonographic evidence of deep venous thrombosis.  Positive for superficial thrombosis of the right cephalic vein.      MACRO:  None.      Signed by: Roger Grajeda 10/2/2024 9:39 AM  Dictation workstation:   [id number]    LFT:   Lab Results   Component Value Date    AST 39 10/02/2024    ALT 44 10/02/2024    ALKPHOS 46 10/02/2024    BILITOT 2.5 (H) 10/02/2024      Urinalysis:   Lab Results   Component Value Date    ROMEO 5.0 10/02/2024    PROTUR 10 (TRACE) 10/02/2024    GLUCOSEU Normal 10/02/2024    BLOODU NEGATIVE 10/02/2024     KETONESU NEGATIVE 10/02/2024    BILIRUBINU NEGATIVE 10/02/2024    NITRITEU NEGATIVE 10/02/2024    LEUKOCYTESU 75 [place]/µL (A) 10/02/2024      Imaging: ECG 12 lead  Normal sinus rhythm  Possible Left atrial enlargement  Borderline ECG  When compared with ECG of 01-OCT-2024 20:28, (unconfirmed)  No significant change was found  See ED provider note for full interpretation and clinical correlation  Confirmed by Marcia Morejon ([id number]) on 10/2/2024 10:30:08 AM  ECG 12 lead  Sinus tachycardia  Possible Left atrial enlargement  Borderline ECG  When compared with ECG of 30-SEP-2024 09:57, (unconfirmed)  Vent. rate has increased BY  56 BPM  Nonspecific T wave abnormality now evident in Lateral leads  See ED provider note for full interpretation and clinical correlation  Confirmed by Marcia Morejon ([id number]) on 10/2/2024 10:29:29 AM  ECG 12 Lead  Sinus rhythm with Premature atrial complexes in a pattern of bigeminy  Otherwise normal ECG  When compared with ECG of 01-OCT-2024 21:35, (unconfirmed)  Premature atrial complexes are now Present  Vascular US Upper Extremity Venous Duplex Right  Narrative: Interpreted By:  Roger Grajeda,   STUDY:  [facility] US UPPER EXTREMITY VENOUS DUPLEX RIGHT;  10/2/2024 9:29 am      INDICATION:  Signs/Symptoms:Right upper ext. cellulitis. Possible foregin  object/needle at the site of cellulittis..      ,L03.113 Cellulitis of right upper limb,R22.31 Localized swelling,  mass and lump, right upper limb      COMPARISON:  None.      ACCESSION NUMBER(S):  [id number]      ORDERING CLINICIAN:  ALFRED KITCHEN      TECHNIQUE:  Vascular ultrasound of the  right upper extremity was performed.  Evaluation was performed with grayscale, color, and spectral Doppler.  When possible, compression views of the evaluated veins was also  performed.      FINDINGS:  Evaluation of the visualized portions of the internal jugular,  subclavian, axillary, brachial, cephalic, and basilic veins was  performed.      There  is occlusive thrombosis of the right cephalic vein extending  from the antecubital fossa to the mid forearm.      There is normal respiratory variation, normal compressibility, as  well as normal color doppler signal in the remaining visualized  vessels.      Impression: No sonographic evidence of deep venous thrombosis.  Positive for superficial thrombosis of the right cephalic vein.      MACRO:  None.      Signed by: Roger Grajeda 10/2/2024 9:39 AM  Dictation workstation:   [id number]           Assessment/  69 y.o. year old male who presented to the emergency department for further evaluation and management chest pain preceded by right upper extremity cellulitis the site of IV line as patient was briefly in the hospital for heart catheterization   Patient does have preadmission chronic comorbidity of hypertension diabetes type 2 coronary artery disease recently during the heart catheterization the patient did receive stenting obstructive sleep apnea is a chronic kidney disease stage III with a baseline creatinine of 1.7 and estimated GFR of 40    Stage II acute kidney injury multifactorial possible contrast-induced nephropathy patient is not on any nonsteroidal anti-inflammatory hemodynamically stable afebrile nonoliguric none polyuric no associated electrolyte or acid-base imbalance  In the presence of IV line site cellulitis with white blood cells of 16.1 and left shift kidney involvement in occult infection/bacteremia not essentially postinfectious GN could be a possibility as well  The patient is clinically euvolemic with no clinical evidence of increased extracellular fluid volume    Plan/  Daily clinical and laboratory assessment  Check blood culture if is not already checked  No therapeutic intervention for today fluid management whether in the form of IV fluid or diuretics will be determined based on clinical and laboratory assessment daily there is no anemia to further test thrombocytopenia however  platelets are adequate at 99 unlikely TTP or kidney involvement in thrombotic microangiopathy however will keep monitoring patient mental status kidney function and platelet with checking C3 and C4 in addition to regularly urine analysis to rule out nephritic or nephrotic type urine sediment  Outpatient follow up from renal standpoint: Dr. Weller    Thank you for the consultation.  Please do not hesitate to call with questions.    Deven Mathis MD

## 2024-10-03 ENCOUNTER — APPOINTMENT (OUTPATIENT)
Dept: CARDIOLOGY | Facility: HOSPITAL | Age: 69
End: 2024-10-03
Payer: MEDICARE

## 2024-10-03 LAB
ALBUMIN SERPL BCP-MCNC: 3.4 G/DL (ref 3.4–5)
ALP SERPL-CCNC: 50 U/L (ref 33–136)
ALT SERPL W P-5'-P-CCNC: 84 U/L (ref 10–52)
ANION GAP SERPL CALC-SCNC: 12 MMOL/L (ref 10–20)
AST SERPL W P-5'-P-CCNC: 38 U/L (ref 9–39)
ATRIAL RATE: 277 BPM
BACTERIA UR CULT: NO GROWTH
BASOPHILS # BLD AUTO: 0.04 X10*3/UL (ref 0–0.1)
BASOPHILS NFR BLD AUTO: 0.5 %
BILIRUB SERPL-MCNC: 1.7 MG/DL (ref 0–1.2)
BUN SERPL-MCNC: 38 MG/DL (ref 6–23)
C3 SERPL-MCNC: 128 MG/DL (ref 87–200)
C4 SERPL-MCNC: 29 MG/DL (ref 10–50)
CALCIUM SERPL-MCNC: 8.2 MG/DL (ref 8.6–10.3)
CHLORIDE SERPL-SCNC: 107 MMOL/L (ref 98–107)
CO2 SERPL-SCNC: 21 MMOL/L (ref 21–32)
CREAT SERPL-MCNC: 2.2 MG/DL (ref 0.5–1.3)
EGFRCR SERPLBLD CKD-EPI 2021: 32 ML/MIN/1.73M*2
EOSINOPHIL # BLD AUTO: 0.07 X10*3/UL (ref 0–0.7)
EOSINOPHIL NFR BLD AUTO: 0.8 %
ERYTHROCYTE [DISTWIDTH] IN BLOOD BY AUTOMATED COUNT: 13 % (ref 11.5–14.5)
GLUCOSE BLD MANUAL STRIP-MCNC: 100 MG/DL (ref 74–99)
GLUCOSE SERPL-MCNC: 104 MG/DL (ref 74–99)
HCT VFR BLD AUTO: 35.1 % (ref 41–52)
HGB BLD-MCNC: 11.8 G/DL (ref 13.5–17.5)
HOLD SPECIMEN: NORMAL
IMM GRANULOCYTES # BLD AUTO: 0.03 X10*3/UL (ref 0–0.7)
IMM GRANULOCYTES NFR BLD AUTO: 0.4 % (ref 0–0.9)
LYMPHOCYTES # BLD AUTO: 0.5 X10*3/UL (ref 1.2–4.8)
LYMPHOCYTES NFR BLD AUTO: 6 %
MAGNESIUM SERPL-MCNC: 1.9 MG/DL (ref 1.6–2.4)
MCH RBC QN AUTO: 31 PG (ref 26–34)
MCHC RBC AUTO-ENTMCNC: 33.6 G/DL (ref 32–36)
MCV RBC AUTO: 92 FL (ref 80–100)
MONOCYTES # BLD AUTO: 0.85 X10*3/UL (ref 0.1–1)
MONOCYTES NFR BLD AUTO: 10.2 %
NEUTROPHILS # BLD AUTO: 6.86 X10*3/UL (ref 1.2–7.7)
NEUTROPHILS NFR BLD AUTO: 82.1 %
NRBC BLD-RTO: 0 /100 WBCS (ref 0–0)
PLATELET # BLD AUTO: 87 X10*3/UL (ref 150–450)
POTASSIUM SERPL-SCNC: 3.6 MMOL/L (ref 3.5–5.3)
PROT SERPL-MCNC: 5.7 G/DL (ref 6.4–8.2)
Q ONSET: 215 MS
QRS COUNT: 14 BEATS
QRS DURATION: 98 MS
QT INTERVAL: 366 MS
QTC CALCULATION(BAZETT): 437 MS
QTC FREDERICIA: 412 MS
R AXIS: 14 DEGREES
RBC # BLD AUTO: 3.81 X10*6/UL (ref 4.5–5.9)
SODIUM SERPL-SCNC: 136 MMOL/L (ref 136–145)
T AXIS: 63 DEGREES
T OFFSET: 398 MS
VENTRICULAR RATE: 86 BPM
WBC # BLD AUTO: 8.4 X10*3/UL (ref 4.4–11.3)

## 2024-10-03 PROCEDURE — 86160 COMPLEMENT ANTIGEN: CPT | Mod: ELYLAB | Performed by: INTERNAL MEDICINE

## 2024-10-03 PROCEDURE — 36415 COLL VENOUS BLD VENIPUNCTURE: CPT | Performed by: NURSE PRACTITIONER

## 2024-10-03 PROCEDURE — 99232 SBSQ HOSP IP/OBS MODERATE 35: CPT | Performed by: INTERNAL MEDICINE

## 2024-10-03 PROCEDURE — 99222 1ST HOSP IP/OBS MODERATE 55: CPT

## 2024-10-03 PROCEDURE — 2500000002 HC RX 250 W HCPCS SELF ADMINISTERED DRUGS (ALT 637 FOR MEDICARE OP, ALT 636 FOR OP/ED): Performed by: INTERNAL MEDICINE

## 2024-10-03 PROCEDURE — 82947 ASSAY GLUCOSE BLOOD QUANT: CPT

## 2024-10-03 PROCEDURE — 2500000001 HC RX 250 WO HCPCS SELF ADMINISTERED DRUGS (ALT 637 FOR MEDICARE OP): Performed by: INTERNAL MEDICINE

## 2024-10-03 PROCEDURE — 80053 COMPREHEN METABOLIC PANEL: CPT | Performed by: NURSE PRACTITIONER

## 2024-10-03 PROCEDURE — 2500000001 HC RX 250 WO HCPCS SELF ADMINISTERED DRUGS (ALT 637 FOR MEDICARE OP): Performed by: NURSE PRACTITIONER

## 2024-10-03 PROCEDURE — 85025 COMPLETE CBC W/AUTO DIFF WBC: CPT | Performed by: NURSE PRACTITIONER

## 2024-10-03 PROCEDURE — 2500000004 HC RX 250 GENERAL PHARMACY W/ HCPCS (ALT 636 FOR OP/ED)

## 2024-10-03 PROCEDURE — 99233 SBSQ HOSP IP/OBS HIGH 50: CPT | Performed by: INTERNAL MEDICINE

## 2024-10-03 PROCEDURE — 1200000002 HC GENERAL ROOM WITH TELEMETRY DAILY

## 2024-10-03 PROCEDURE — 83735 ASSAY OF MAGNESIUM: CPT | Performed by: NURSE PRACTITIONER

## 2024-10-03 PROCEDURE — 93005 ELECTROCARDIOGRAM TRACING: CPT

## 2024-10-03 ASSESSMENT — COGNITIVE AND FUNCTIONAL STATUS - GENERAL
DAILY ACTIVITIY SCORE: 24
MOBILITY SCORE: 23
MOBILITY SCORE: 24
CLIMB 3 TO 5 STEPS WITH RAILING: A LITTLE
DAILY ACTIVITIY SCORE: 24

## 2024-10-03 ASSESSMENT — PAIN DESCRIPTION - LOCATION: LOCATION: ARM

## 2024-10-03 ASSESSMENT — PAIN - FUNCTIONAL ASSESSMENT: PAIN_FUNCTIONAL_ASSESSMENT: 0-10

## 2024-10-03 ASSESSMENT — PAIN SCALES - GENERAL
PAINLEVEL_OUTOF10: 1
PAINLEVEL_OUTOF10: 0 - NO PAIN
PAINLEVEL_OUTOF10: 4

## 2024-10-03 ASSESSMENT — ENCOUNTER SYMPTOMS
MYALGIAS: 1
COLOR CHANGE: 1

## 2024-10-03 ASSESSMENT — PAIN DESCRIPTION - ORIENTATION: ORIENTATION: RIGHT

## 2024-10-03 NOTE — CARE PLAN
Problem: Pain - Adult  Goal: Verbalizes/displays adequate comfort level or baseline comfort level  Outcome: Progressing     Problem: Safety - Adult  Goal: Free from fall injury  Outcome: Progressing     Problem: Discharge Planning  Goal: Discharge to home or other facility with appropriate resources  Outcome: Progressing     Problem: Chronic Conditions and Co-morbidities  Goal: Patient's chronic conditions and co-morbidity symptoms are monitored and maintained or improved  Outcome: Progressing     Problem: Fall/Injury  Goal: Not fall by end of shift  Outcome: Progressing  Goal: Be free from injury by end of the shift  Outcome: Progressing  Goal: Verbalize understanding of personal risk factors for fall in the hospital  Outcome: Progressing  Goal: Verbalize understanding of risk factor reduction measures to prevent injury from fall in the home  Outcome: Progressing  Goal: Use assistive devices by end of the shift  Outcome: Progressing  Goal: Pace activities to prevent fatigue by end of the shift  Outcome: Progressing     Problem: Diabetes  Goal: Achieve decreasing blood glucose levels by end of shift  Outcome: Progressing  Goal: Increase stability of blood glucose readings by end of shift  Outcome: Progressing  Goal: Decrease in ketones present in urine by end of shift  Outcome: Progressing  Goal: Maintain electrolyte levels within acceptable range throughout shift  Outcome: Progressing  Goal: Maintain glucose levels >70mg/dl to <250mg/dl throughout shift  Outcome: Progressing  Goal: No changes in neurological exam by end of shift  Outcome: Progressing  Goal: Learn about and adhere to nutrition recommendations by end of shift  Outcome: Progressing  Goal: Vital signs within normal range for age by end of shift  Outcome: Progressing  Goal: Increase self care and/or family involovement by end of shift  Outcome: Progressing  Goal: Receive DSME education by end of shift  Outcome: Progressing     Problem: Skin  Goal:  Decreased wound size/increased tissue granulation at next dressing change  Outcome: Progressing  Goal: Participates in plan/prevention/treatment measures  Outcome: Progressing  Goal: Prevent/manage excess moisture  Outcome: Progressing  Goal: Prevent/minimize sheer/friction injuries  Outcome: Progressing  Goal: Promote/optimize nutrition  Outcome: Progressing  Goal: Promote skin healing  Outcome: Progressing     Problem: Pain  Goal: Takes deep breaths with improved pain control throughout the shift  Outcome: Progressing  Goal: Turns in bed with improved pain control throughout the shift  Outcome: Progressing  Goal: Walks with improved pain control throughout the shift  Outcome: Progressing  Goal: Performs ADL's with improved pain control throughout shift  Outcome: Progressing  Goal: Participates in PT with improved pain control throughout the shift  Outcome: Progressing  Goal: Free from opioid side effects throughout the shift  Outcome: Progressing  Goal: Free from acute confusion related to pain meds throughout the shift  Outcome: Progressing   The patient's goals for the shift include rest    The clinical goals for the shift include Patient will have a decrease and pain and itchiness in R arm

## 2024-10-03 NOTE — PROGRESS NOTES
Patient admitted with fever, bacteremia. Patient found to have positive blood cultures, will need IV antibiotics going home, plan will be [facility] per wife, Dr. Mayo is PCP and will follow. Patient's wife states their son in law is an RN in [place], they will be coming in this weekend to assist with home going needs. At this time patient cannot have PICC insertion for 48 hours d/t positive blood cultures. PICC can possibly be placed Friday, if not patient will be here over weekend. Will continue to follow.

## 2024-10-03 NOTE — CARE PLAN
Problem: Pain - Adult  Goal: Verbalizes/displays adequate comfort level or baseline comfort level  Outcome: Progressing     Problem: Safety - Adult  Goal: Free from fall injury  Outcome: Progressing     Problem: Discharge Planning  Goal: Discharge to home or other facility with appropriate resources  Outcome: Progressing     Problem: Chronic Conditions and Co-morbidities  Goal: Patient's chronic conditions and co-morbidity symptoms are monitored and maintained or improved  Outcome: Progressing     Problem: Fall/Injury  Goal: Not fall by end of shift  Outcome: Progressing  Goal: Be free from injury by end of the shift  Outcome: Progressing  Goal: Verbalize understanding of personal risk factors for fall in the hospital  Outcome: Progressing  Goal: Verbalize understanding of risk factor reduction measures to prevent injury from fall in the home  Outcome: Progressing  Goal: Use assistive devices by end of the shift  Outcome: Progressing  Goal: Pace activities to prevent fatigue by end of the shift  Outcome: Progressing     Problem: Diabetes  Goal: Achieve decreasing blood glucose levels by end of shift  Outcome: Progressing  Goal: Increase stability of blood glucose readings by end of shift  Outcome: Progressing  Goal: Decrease in ketones present in urine by end of shift  Outcome: Progressing  Goal: Maintain electrolyte levels within acceptable range throughout shift  Outcome: Progressing  Goal: Maintain glucose levels >70mg/dl to <250mg/dl throughout shift  Outcome: Progressing  Goal: No changes in neurological exam by end of shift  Outcome: Progressing  Goal: Learn about and adhere to nutrition recommendations by end of shift  Outcome: Progressing  Goal: Vital signs within normal range for age by end of shift  Outcome: Progressing  Goal: Increase self care and/or family involovement by end of shift  Outcome: Progressing  Goal: Receive DSME education by end of shift  Outcome: Progressing     Problem: Skin  Goal:  Decreased wound size/increased tissue granulation at next dressing change  Outcome: Progressing  Goal: Participates in plan/prevention/treatment measures  Outcome: Progressing  Goal: Prevent/manage excess moisture  Outcome: Progressing  Goal: Prevent/minimize sheer/friction injuries  Outcome: Progressing  Goal: Promote/optimize nutrition  Outcome: Progressing  Goal: Promote skin healing  Outcome: Progressing     Problem: Pain  Goal: Takes deep breaths with improved pain control throughout the shift  Outcome: Progressing  Goal: Turns in bed with improved pain control throughout the shift  Outcome: Progressing  Goal: Walks with improved pain control throughout the shift  Outcome: Progressing  Goal: Performs ADL's with improved pain control throughout shift  Outcome: Progressing  Goal: Participates in PT with improved pain control throughout the shift  Outcome: Progressing  Goal: Free from opioid side effects throughout the shift  Outcome: Progressing  Goal: Free from acute confusion related to pain meds throughout the shift  Outcome: Progressing   The patient's goals for the shift include rest    The clinical goals for the shift include Patient will have a decrease in pain throughout shift

## 2024-10-03 NOTE — PROGRESS NOTES
Renal Progress Note    Assessment/  69 y.o. year old male who presented to the emergency department for further evaluation and management chest pain preceded by right upper extremity cellulitis the site of IV line as patient was briefly in the hospital for heart catheterization   Patient does have preadmission chronic comorbidity of hypertension diabetes type 2 coronary artery disease recently during the heart catheterization the patient did receive stenting obstructive sleep apnea is a chronic kidney disease stage III with a baseline creatinine of 1.7 and estimated GFR of 40     Stage II acute kidney injury multifactorial possible contrast-induced nephropathy patient is not on any nonsteroidal anti-inflammatory hemodynamically stable afebrile nonoliguric none polyuric no associated electrolyte or acid-base imbalance  In the presence of IV line site cellulitis with white blood cells of 16.1 and left shift kidney involvement in occult infection/bacteremia not essentially postinfectious GN could be a possibility as well  The patient is clinically euvolemic with no clinical evidence of increased extracellular fluid volume  Urine sediment is UTI type urine sediment nonnephrotic nonnephrotic  Blood culture pending     Plan/  GFR improving no need for therapeutic intervention today we will check C3 and C4 in addition to the regular daily lab    Outpatient follow up from renal standpoint: Dr. Weller    Subjective:   Admit Date: 10/1/2024    Interval History: Patient seen and examined uneventful night no uremic related or fluid volume overload related symptoms alert oriented follow command in no apparent pain or distress      Medications:   Scheduled Meds:aspirin, 81 mg, oral, Daily  atorvastatin, 40 mg, oral, Nightly  clopidogrel, 75 mg, oral, Daily  finasteride, 5 mg, oral, Daily  insulin glargine, 25 Units, subcutaneous, BID AC  insulin lispro, 0-15 Units, subcutaneous, TID  metoprolol succinate XL, 25 mg, oral,  "Daily  pantoprazole, 40 mg, oral, Daily  ranolazine, 500 mg, oral, q12h  tamsulosin, 0.4 mg, oral, Daily  [Held by provider] valsartan, 40 mg, oral, Daily  vancomycin, 1,000 mg, intravenous, q24h      Continuous Infusions:     CBC:   Lab Results   Component Value Date    HGB 11.8 (L) 10/03/2024    HGB 12.1 (L) 10/02/2024    WBC 8.4 10/03/2024    WBC 16.1 (H) 10/02/2024    PLT 87 (L) 10/03/2024    PLT 91 (L) 10/02/2024      Anemia:  No results found for: \"FERRITIN\", \"IRON\", \"TIBC\"   BMP:    Lab Results   Component Value Date     10/03/2024     (L) 10/02/2024    K 3.6 10/03/2024    K 3.9 10/02/2024     10/03/2024     10/02/2024    CO2 21 10/03/2024    CO2 21 10/02/2024    BUN 38 (H) 10/03/2024    BUN 37 (H) 10/02/2024    CREATININE 2.20 (H) 10/03/2024    CREATININE 2.57 (H) 10/02/2024      Bone disease: No results found for: \"PHOS\", \"PTH\", \"VITD25\"   Urinalysis:    Lab Results   Component Value Date    ROMEO 5.0 10/02/2024    PROTUR NEGATIVE 10/02/2024    GLUCOSEU Normal 10/02/2024    BLOODU NEGATIVE 10/02/2024    KETONESU NEGATIVE 10/02/2024    BILIRUBINU NEGATIVE 10/02/2024    NITRITEU NEGATIVE 10/02/2024    LEUKOCYTESU 75 [place]/µL (A) 10/02/2024        Objective:   Vitals: /72 (BP Location: Left arm, Patient Position: Sitting)   Pulse 87   Temp 36.6 °C (97.9 °F) (Temporal)   Resp 17   Ht 1.829 m (6')   Wt 121 kg (266 lb 15.6 oz)   SpO2 92%   BMI 36.21 kg/m²    Wt Readings from Last 3 Encounters:   10/03/24 121 kg (266 lb 15.6 oz)   09/28/24 122 kg (268 lb 8.3 oz)   09/25/24 122 kg (268 lb 1.3 oz)      24HR INTAKE/OUTPUT:    Intake/Output Summary (Last 24 hours) at 10/3/2024 1306  Last data filed at 10/3/2024 1149  Gross per 24 hour   Intake 746.8 ml   Output 825 ml   Net -78.2 ml     Admission weight:  Weight: 122 kg (268 lb 15.4 oz)      Constitutional:  Alert, awake, no apparent distress   Skin:normal, no rash  HEENT:sclera anicteric.  Head atraumatic normocephalic  Neck:supple " with no thyromegally  Cardiovascular:  S1, S2 without m/r/g   Respiratory:  CTA B without w/r/r   Abdomen: +bs, soft, nt  Ext: no LE edema  Musculoskeletal:Intact  Neuro:Alert and oriented with no deficit      Electronically signed by Deven Mathis MD on 10/3/2024 at 1:06 PM

## 2024-10-03 NOTE — PROGRESS NOTES
Manny Reveles is a 69 y.o. male on day 2 of admission presenting with Chest pain.      Subjective   The patient is seen and evaluated this morning.  He states he still has some sweats and some chills.  He states his right arm cellulitis is improving.  He does have some induration of the right upper extremity will have surgery to see the patient as well.  He continues to have night sweats.  Blood cultures came back positive for Staph aureus repeat cultures are currently pending.  The patient otherwise denies any chest pain shortness of breath nausea vomiting abdominal pain but reports significant night sweats and states he was not able to sleep due to that.       Objective     Last Recorded Vitals  /72 (BP Location: Left arm, Patient Position: Sitting)   Pulse 87   Temp 36.6 °C (97.9 °F) (Temporal)   Resp 17   Wt 121 kg (266 lb 15.6 oz)   SpO2 92%   Intake/Output last 3 Shifts:    Intake/Output Summary (Last 24 hours) at 10/3/2024 1404  Last data filed at 10/3/2024 1149  Gross per 24 hour   Intake 266.8 ml   Output 825 ml   Net -558.2 ml       Admission Weight  Weight: 122 kg (268 lb 15.4 oz) (10/01/24 2028)    Daily Weight  10/03/24 : 121 kg (266 lb 15.6 oz)    Image Results  ECG 12 Lead  Sinus rhythm with Premature atrial complexes in a pattern of bigeminy  Otherwise normal ECG  When compared with ECG of 01-OCT-2024 21:35, (unconfirmed)  Premature atrial complexes are now Present  Confirmed by Santana Savage ([id number]) on 10/2/2024 10:39:12 PM  ECG 12 lead  Normal sinus rhythm  Possible Left atrial enlargement  Borderline ECG  When compared with ECG of 01-OCT-2024 20:28, (unconfirmed)  No significant change was found  See ED provider note for full interpretation and clinical correlation  Confirmed by Marcia Morejon ([id number]) on 10/2/2024 10:30:08 AM  ECG 12 lead  Sinus tachycardia  Possible Left atrial enlargement  Borderline ECG  When compared with ECG of 30-SEP-2024 09:57, (unconfirmed)  Vent. rate  has increased BY  56 BPM  Nonspecific T wave abnormality now evident in Lateral leads  See ED provider note for full interpretation and clinical correlation  Confirmed by Marcia Morejon ([id number]) on 10/2/2024 10:29:29 AM  Vascular US Upper Extremity Venous Duplex Right  Narrative: Interpreted By:  Roger Grajeda,   STUDY:  [facility] US UPPER EXTREMITY VENOUS DUPLEX RIGHT;  10/2/2024 9:29 am      INDICATION:  Signs/Symptoms:Right upper ext. cellulitis. Possible foregin  object/needle at the site of cellulittis..      ,L03.113 Cellulitis of right upper limb,R22.31 Localized swelling,  mass and lump, right upper limb      COMPARISON:  None.      ACCESSION NUMBER(S):  [id number]      ORDERING CLINICIAN:  ALFRED KITCHEN      TECHNIQUE:  Vascular ultrasound of the  right upper extremity was performed.  Evaluation was performed with grayscale, color, and spectral Doppler.  When possible, compression views of the evaluated veins was also  performed.      FINDINGS:  Evaluation of the visualized portions of the internal jugular,  subclavian, axillary, brachial, cephalic, and basilic veins was  performed.      There is occlusive thrombosis of the right cephalic vein extending  from the antecubital fossa to the mid forearm.      There is normal respiratory variation, normal compressibility, as  well as normal color doppler signal in the remaining visualized  vessels.      Impression: No sonographic evidence of deep venous thrombosis.  Positive for superficial thrombosis of the right cephalic vein.      MACRO:  None.      Signed by: Roger Grajeda 10/2/2024 9:39 AM  Dictation workstation:   [id number]      Physical Exam  HENT:      Head: Normocephalic and atraumatic.      Nose: Nose normal.      Mouth/Throat:      Mouth: Mucous membranes are dry.   Eyes:      Extraocular Movements: Extraocular movements intact.      Conjunctiva/sclera: Conjunctivae normal.   Cardiovascular:      Rate and Rhythm: Normal rate and regular rhythm.       Pulses: Normal pulses.      Heart sounds: Normal heart sounds.   Pulmonary:      Effort: Pulmonary effort is normal.      Breath sounds: Normal breath sounds.   Abdominal:      General: Bowel sounds are normal.      Palpations: Abdomen is soft.   Musculoskeletal:         General: Normal range of motion.      Cervical back: Normal range of motion and neck supple.   Skin:     General: Skin is warm and dry.      Comments: Right upper extremity distal to antecubital fossa there is erythema edema and tenderness which is improving   Neurological:      General: No focal deficit present.      Mental Status: He is alert. Mental status is at baseline.   Psychiatric:         Mood and Affect: Mood normal.       Relevant Results               Assessment/Plan          Manny Reveles is a 69 y.o. male with a significant past medical history of HTN, DLD, DM2, CAD s/p multiple PCI, PAZ, CKD3 presenting to [place] [facility] with wife for complaints of chest pain and altered mental status admitted with sepsis and right upper extremity cellulitis        Assessment & Plan  Chest pain    Bacteremia with Staph aureus  Right arm cellulitis - old IV site  Sepsis present on admission in setting of fever hypotension, altered mental status, likely source cellulitis and thrombophlebitis  -Continue with vancomycin  -Blood cultures from 10/1/2024 show Staph aureus sensitivities are currently pending.  Daily blood cultures and infectious diseases recommending PICC line once cultures have been clear for 48 hours.   -Will order a DUONG.  - renal dose meds  -Right upper extremity cephalic vein thrombosis based on ultrasound no DVT.  -Appreciate surgery recommendations no plans for I&D as the patient is on Plavix and high risk of bleeding.  White count is improving and to monitor closely.    Metabolic encephalopathy-now resolved likely due to above    Elevated Troponin   S/p PCI of the LAD from recent admission.   -Appreciate cardiology recommendations  currently without any chest pain  -Troponin pattern has been flat likely in setting of CKD  -Continue with aspirin Plavix statin and beta-blockers  -Right upper extremity without any evidence of DVT and heparin was discontinued.       ANTONIO on CKD3  - baseline Cr ~1.3  - on admission 2.33, renal function started to improve today.  Appreciate nephrology recommendations. Unlikely TTP a this time.     Thrombocytopenia  -Platelets noted to be 87. Baseline around 150-160. Recent Heparin exposure. Cont to monitor. Currently off Heparin.      DM2  - accu checks and SSI, added basal insulin as well  - diet carb controlled     Hypomagnesemia-replaced and improved              Bobby Wood MD

## 2024-10-03 NOTE — CONSULTS
Inpatient consult to Acute Care Surgery  Consult performed by: Kaylene Morrison PA-C  Consult ordered by: Bobby Wood MD  Reason for consult: Right arm concern for abscess      General Surgery Consult Note  Patient: Manny Reveles  Unit/Bed: 1010/1010-A  YOB: 1955  MRN: [id number]  Acct: [id number]   Admitting Diagnosis: Hypomagnesemia [E83.42]  Chest pain [R07.9]  NSTEMI (non-ST elevated myocardial infarction) (Multi) [I21.4]  Cellulitis of right upper extremity [L03.113]  Acute kidney injury superimposed on CKD (CMS-HCC) [N17.9, N18.9]  Chest pain, unspecified type [R07.9]  Sepsis, due to unspecified organism, unspecified whether acute organ dysfunction present (Multi) [A41.9]  Date:  10/1/2024  Hospital Day: 2  Attending: Bobby Wood MD    Complaint:  Chief Complaint   Patient presents with    Chest Pain     Patient c/o of chest pain following Stent placement 9/28.       History of Present Illness:  69-year-old male with history of HTN, DLD, DM2, CAD s/p multiple PCI, PAZ, CKD3 presented to the ER on 9/28/2024 with chest pain.  He was diagnosed with NSTEMI, cardiology was consulted and recommended heparin and admission.  On 9/30/2024 he underwent cardiac catheterization with PCI and drug-eluting stent.  The patient states the plan was for discharge that day, however he had pain in his right arm.  He had an IV placed on Saturday which was removed, but developed cellulitis of the right arm.  He also had a fever.  He was started on cefepime and vancomycin.  Infectious disease was consulted and states he will likely need IV antibiotics on discharge.  Blood cultures x 2 showed new gram-positive cocci in clusters.  Ultrasound of the right upper extremity showed superficial thrombosis but no DVT.  He is afebrile today.  WBC 8.4 today.  General surgery was consulted for concern for right arm abscess.  On discussion with the patient, he states the IV in his right arm was placed on Saturday,  and then Sunday, 4 days ago, he developed pain in the arm.  There was also been swelling and redness to the IV site, although the redness has decreased.  Pain has also improved. He has no other complaints at this time.  Allergies:  Allergies   Allergen Reactions    Adhesive Tape-Silicones Hives and Itching    Epinephrine Syncope    Latex Other    Meperidine Other    Penicillins Other    Promethazine Other    Sulfa (Sulfonamide Antibiotics) Other      PMHx:  History reviewed. No pertinent past medical history.  PSHx:  Past Surgical History:   Procedure Laterality Date    CARDIAC CATHETERIZATION N/A 9/30/2024    Procedure: Left Heart Cath with Coronary Angiography and LV;  Surgeon: Armando Antonio MD;  Location: [facility];  Service: Cardiovascular;  Laterality: N/A;    CARDIAC CATHETERIZATION N/A 9/30/2024    Procedure: PCI ANTOINE Stent- Coronary;  Surgeon: Armando Antonio MD;  Location: [facility];  Service: Cardiovascular;  Laterality: N/A;    OTHER SURGICAL HISTORY  12/12/2018    Cardiac catheterization with stent placement    OTHER SURGICAL HISTORY  12/12/2018    Renal lithotripsy    OTHER SURGICAL HISTORY  12/12/2018    Shoulder surgery    OTHER SURGICAL HISTORY  12/12/2018    Knee reconstruction    OTHER SURGICAL HISTORY  12/12/2018    Hernia repair    OTHER SURGICAL HISTORY  02/28/2022    Colonoscopy    OTHER SURGICAL HISTORY  02/28/2022    Dental surgery    OTHER SURGICAL HISTORY  02/28/2022    Inguinal hernia repair    OTHER SURGICAL HISTORY  02/28/2022    Percutaneous transluminal coronary angioplasty    OTHER SURGICAL HISTORY  03/09/2022    Dermatological surgery     Social Hx:  Social History     Socioeconomic History    Marital status:    Tobacco Use    Smoking status: Never     Passive exposure: Never    Smokeless tobacco: Never   Vaping Use    Vaping status: Never Used   Substance and Sexual Activity    Alcohol use: Not Currently    Drug use: Never    Sexual  activity: Defer     Social Determinants of Health     Financial Resource Strain: Low Risk  (10/2/2024)    Overall Financial Resource Strain (CARDIA)     Difficulty of Paying Living Expenses: Not hard at all   Food Insecurity: No Food Insecurity (10/2/2024)    Hunger Vital Sign     Worried About Running Out of Food in the Last Year: Never true     Ran Out of Food in the Last Year: Never true   Transportation Needs: No Transportation Needs (10/2/2024)    PRAPARE - Transportation     Lack of Transportation (Medical): No     Lack of Transportation (Non-Medical): No   Physical Activity: Inactive (10/2/2024)    Exercise Vital Sign     Days of Exercise per Week: 0 days     Minutes of Exercise per Session: 0 min   Stress: No Stress Concern Present (10/2/2024)    [employer] - Occupational Stress Questionnaire     Feeling of Stress : Not at all   Social Connections: Moderately Integrated (10/2/2024)    Social Connection and Isolation Panel [NHANES]     Frequency of Communication with Friends and Family: More than three times a week     Frequency of Social Gatherings with Friends and Family: More than three times a week     Attends [employer] Services: More than 4 times per year     Active Member of Clubs or Organizations: No     Attends Club or Organization Meetings: Never     Marital Status:    Intimate Partner Violence: Not At Risk (10/2/2024)    Humiliation, Afraid, Rape, and Kick questionnaire     Fear of Current or Ex-Partner: No     Emotionally Abused: No     Physically Abused: No     Sexually Abused: No   Housing Stability: Low Risk  (10/2/2024)    Housing Stability Vital Sign     Unable to Pay for Housing in the Last Year: No     Number of Times Moved in the Last Year: 0     Homeless in the Last Year: No     Family Hx:  Family History   Problem Relation Name Age of Onset    Other (bone/cartilage disorder) Mother      Diabetes Mother      Gallbladder disease Mother      Diabetes  Father      Other (cardiac disorder) Father      Nephrolithiasis Father      Prostate cancer Father      Other (bladder cancer) Father      Heart attack Father      Rheum arthritis Father      Skin cancer Father      Kidney nephrosis Father      Diabetes Paternal Grandfather      Skin cancer Paternal Grandfather       Review of Systems:   Review of Systems   Musculoskeletal:  Positive for myalgias (right arm).   Skin:  Positive for color change.   All other systems reviewed and are negative.    Physical Examination:    Visit Vitals  /72 (BP Location: Left arm, Patient Position: Sitting)   Pulse 87   Temp 36.6 °C (97.9 °F) (Temporal)   Resp 17      Physical Exam  Constitutional:       General: He is not in acute distress.  HENT:      Head: Normocephalic and atraumatic.      Mouth/Throat:      Mouth: Mucous membranes are moist.   Eyes:      Conjunctiva/sclera: Conjunctivae normal.   Pulmonary:      Effort: Pulmonary effort is normal. No respiratory distress.   Skin:     General: Skin is warm and dry.      Comments: Area of erythema and fluctuance to right upper arm, tender to palpation.   Neurological:      Mental Status: He is alert.   Psychiatric:         Mood and Affect: Mood normal.         Behavior: Behavior normal.       LABS:  CBC:   Lab Results   Component Value Date    WBC 8.4 10/03/2024    RBC 3.81 (L) 10/03/2024    HGB 11.8 (L) 10/03/2024    HCT 35.1 (L) 10/03/2024    MCV 92 10/03/2024    MCH 31.0 10/03/2024    MCHC 33.6 10/03/2024    RDW 13.0 10/03/2024    PLT 87 (L) 10/03/2024     CBC with Differential:    Lab Results   Component Value Date    WBC 8.4 10/03/2024    RBC 3.81 (L) 10/03/2024    HGB 11.8 (L) 10/03/2024    HCT 35.1 (L) 10/03/2024    PLT 87 (L) 10/03/2024    MCV 92 10/03/2024    MCH 31.0 10/03/2024    MCHC 33.6 10/03/2024    RDW 13.0 10/03/2024    NRBC 0.0 10/03/2024    LYMPHOPCT 6.0 10/03/2024    MONOPCT 10.2 10/03/2024    EOSPCT 0.8 10/03/2024    BASOPCT 0.5 10/03/2024    MONOSABS 0.85  "10/03/2024    LYMPHSABS 0.50 (L) 10/03/2024    EOSABS 0.07 10/03/2024    BASOSABS 0.04 10/03/2024     CMP:    Lab Results   Component Value Date     10/03/2024    K 3.6 10/03/2024     10/03/2024    CO2 21 10/03/2024    BUN 38 (H) 10/03/2024    CREATININE 2.20 (H) 10/03/2024    GLUCOSE 104 (H) 10/03/2024    PROT 5.7 (L) 10/03/2024    CALCIUM 8.2 (L) 10/03/2024    BILITOT 1.7 (H) 10/03/2024    ALKPHOS 50 10/03/2024    AST 38 10/03/2024    ALT 84 (H) 10/03/2024     BMP:    Lab Results   Component Value Date     10/03/2024    K 3.6 10/03/2024     10/03/2024    CO2 21 10/03/2024    BUN 38 (H) 10/03/2024    CREATININE 2.20 (H) 10/03/2024    CALCIUM 8.2 (L) 10/03/2024    GLUCOSE 104 (H) 10/03/2024     Magnesium:  Lab Results   Component Value Date    MG 1.90 10/03/2024     Troponin:    Lab Results   Component Value Date    TROPHS 87 (HH) 10/01/2024    TROPHS 81 (HH) 10/01/2024     Lipid Panel:  No results found for: \"HDL\", \"CHHDL\", \"VLDL\", \"TRIG\", \"NHDL\"   Current Medications:    Current Facility-Administered Medications:     acetaminophen (Tylenol) tablet 650 mg, 650 mg, oral, q4h PRN, 650 mg at 10/02/24 0449 **OR** acetaminophen (Tylenol) oral liquid 650 mg, 650 mg, oral, q4h PRN **OR** acetaminophen (Tylenol) suppository 650 mg, 650 mg, rectal, q4h PRN, Magda Loja, APRN-CNP    aspirin chewable tablet 81 mg, 81 mg, oral, Daily, Bobby Wood MD, 81 mg at 10/03/24 0801    atorvastatin (Lipitor) tablet 40 mg, 40 mg, oral, Nightly, Bobby Wood MD, 40 mg at 10/02/24 2033    clopidogrel (Plavix) tablet 75 mg, 75 mg, oral, Daily, Bobby Wood MD, 75 mg at 10/03/24 0801    dextrose 50 % injection 12.5 g, 12.5 g, intravenous, q15 min PRN, Bobby Wood MD    dextrose 50 % injection 25 g, 25 g, intravenous, q15 min PRN, Bobby Wood MD    finasteride (Proscar) tablet 5 mg, 5 mg, oral, Daily, Bobby Wood MD, 5 mg at 10/03/24 0801    glucagon (Glucagen) injection 1 " mg, 1 mg, intramuscular, q15 min PRN, Bobby Wood MD    glucagon (Glucagen) injection 1 mg, 1 mg, intramuscular, q15 min PRN, Bobby Wood MD    insulin glargine (Lantus) injection 25 Units, 25 Units, subcutaneous, BID AC, Bobby Wood MD, 25 Units at 10/03/24 0801    insulin lispro (HumaLOG) injection 0-15 Units, 0-15 Units, subcutaneous, TID, Bobby Wood MD, 6 Units at 10/03/24 1101    melatonin tablet 3 mg, 3 mg, oral, Nightly PRN, DIEGO Pruitt-CNP, 3 mg at 10/02/24 2034    metoprolol succinate XL (Toprol-XL) 24 hr tablet 25 mg, 25 mg, oral, Daily, Bobby Wood MD, 25 mg at 10/03/24 0801    ondansetron (Zofran) tablet 4 mg, 4 mg, oral, q8h PRN **OR** ondansetron (Zofran) injection 4 mg, 4 mg, intravenous, q8h PRN, Magda Loja, APRN-CNP    pantoprazole (ProtoNix) EC tablet 40 mg, 40 mg, oral, Daily, 40 mg at 10/03/24 0610 **OR** [DISCONTINUED] pantoprazole (ProtoNix) injection 40 mg, 40 mg, intravenous, Daily, Magda Loja, APRN-CNP    ranolazine (Ranexa) 12 hr tablet 500 mg, 500 mg, oral, q12h, Bobby Wood MD, 500 mg at 10/03/24 0801    tamsulosin (Flomax) 24 hr capsule 0.4 mg, 0.4 mg, oral, Daily, Bobby Wood MD, 0.4 mg at 10/03/24 0801    [Held by provider] valsartan (Diovan) tablet 40 mg, 40 mg, oral, Daily, Bobby Wood MD    vancomycin (Vancocin) 1,000 mg in dextrose 5%  mL, 1,000 mg, intravenous, q24h, Humble Harris, PharmD, Stopped at 10/03/24 0000    vancomycin (Vancocin) pharmacy to dose - pharmacy monitoring, , miscellaneous, Daily PRN, Magda Loja, APRN-CNP  ECG 12 Lead    Result Date: 10/2/2024  Sinus rhythm with Premature atrial complexes in a pattern of bigeminy Otherwise normal ECG When compared with ECG of 01-OCT-2024 21:35, (unconfirmed) Premature atrial complexes are now Present Confirmed by Santana Savage ([id number]) on 10/2/2024 10:39:12 PM    ECG 12 lead    Result Date: 10/2/2024  Normal sinus rhythm Possible Left atrial  enlargement Borderline ECG When compared with ECG of 01-OCT-2024 20:28, (unconfirmed) No significant change was found See ED provider note for full interpretation and clinical correlation Confirmed by Marcia Morejon ([id number]) on 10/2/2024 10:30:08 AM    ECG 12 lead    Result Date: 10/2/2024  Sinus tachycardia Possible Left atrial enlargement Borderline ECG When compared with ECG of 30-SEP-2024 09:57, (unconfirmed) Vent. rate has increased BY  56 BPM Nonspecific T wave abnormality now evident in Lateral leads See ED provider note for full interpretation and clinical correlation Confirmed by Marcia Morejon ([id number]) on 10/2/2024 10:29:29 AM    Vascular US Upper Extremity Venous Duplex Right    Result Date: 10/2/2024  Interpreted By:  Roger Grajeda, STUDY: [facility] US UPPER EXTREMITY VENOUS DUPLEX RIGHT;  10/2/2024 9:29 am   INDICATION: Signs/Symptoms:Right upper ext. cellulitis. Possible foregin object/needle at the site of cellulittis..   ,L03.113 Cellulitis of right upper limb,R22.31 Localized swelling, mass and lump, right upper limb   COMPARISON: None.   ACCESSION NUMBER(S): [id number]   ORDERING CLINICIAN: ALFRED KITCHEN   TECHNIQUE: Vascular ultrasound of the  right upper extremity was performed. Evaluation was performed with grayscale, color, and spectral Doppler. When possible, compression views of the evaluated veins was also performed.   FINDINGS: Evaluation of the visualized portions of the internal jugular, subclavian, axillary, brachial, cephalic, and basilic veins was performed.   There is occlusive thrombosis of the right cephalic vein extending from the antecubital fossa to the mid forearm.   There is normal respiratory variation, normal compressibility, as well as normal color doppler signal in the remaining visualized vessels.       No sonographic evidence of deep venous thrombosis. Positive for superficial thrombosis of the right cephalic vein.   MACRO: None.   Signed by: Roger Grajeda 10/2/2024  9:39 AM Dictation workstation:   [id number]    XR chest 1 view    Result Date: 10/1/2024  Interpreted By:  Chon Gifford, STUDY: XR CHEST 1 VIEW;  10/1/2024 8:48 pm   INDICATION: Signs/Symptoms:Chest Pain.     COMPARISON: 09/28/2024   ACCESSION NUMBER(S): [id number]   ORDERING CLINICIAN: CALI ROSADO   FINDINGS:         CARDIOMEDIASTINAL SILHOUETTE: Cardiomediastinal silhouette is normal in size and configuration.   LUNGS: Lungs are clear. No consolidation or effusion seen.   ABDOMEN: No remarkable upper abdominal findings.   BONES: No acute osseous changes.       1.  No evidence of acute cardiopulmonary process.       MACRO: None   Signed by: Chon Gifford 10/1/2024 9:15 PM Dictation workstation:   [id number]       Assessment:    Right arm cellulitis and possible abscess    69-year-old male with history of HTN, DLD, DM2, CAD s/p multiple PCI, PAZ, CKD3 presented to the ER on 9/28/2024 with chest pain.  He was diagnosed with NSTEMI, cardiology was consulted and recommended heparin and admission.  On 9/30/2024 he underwent cardiac catheterization with PCI and drug-eluting stent.  The patient states the plan was for discharge that day, however he had pain in his right arm.  He had an IV placed on Saturday which was removed, but developed cellulitis of the right arm.  He also had a fever.  He was started on cefepime and vancomycin.  Infectious disease was consulted and states he will likely need IV antibiotics on discharge.  Blood cultures x 2 showed new gram-positive cocci in clusters.  Ultrasound of the right upper extremity showed superficial thrombosis but no DVT.  He is afebrile today.  WBC 8.4 today.  General surgery was consulted for concern for right arm abscess.  On discussion with the patient, he states the IV in his right arm was placed on Saturday, and then Sunday, 4 days ago, he developed pain in the arm.  There was also been swelling and redness to the IV site, although the redness has  decreased.  Pain has also improved. On exam, there is an area of erythema and fluctuance to right upper arm that is tender to palpation.  He is on Plavix.      Plan:  -No plan for incision and drainage at this time.  Patient is at high risk for bleeding due to being on Plavix, and symptoms and white blood count have improved.  We will continue to observe.  -Continue antibiotics  -Carb controlled diet  -Pain control  -DVT prophylaxis-per primary team  -Continue care per primary team     Further recommendations per Dr. Casper     Time spent  60  minutes obtaining labs, imaging, recommendations, interview, assessment, examination, medication review/ordering, and EMR review.    Plan of care was discussed extensively with patient. Patient verbalized understanding through teach back method. All questions and concerns addressed upon examination.     Of note, this documentation is completed using the Dragon Dictation system (voice recognition software). There may be spelling and/or grammatical errors that were not corrected prior to final submission.    Electronically signed by Kaylene Morrison PA-C on 10/3/2024 at 11:35 AM

## 2024-10-03 NOTE — PROGRESS NOTES
Infectious Disease Progress Note       10/3/2024    Patient is a followup regarding Staph aureus bacteremia, unclear whether MRSA or MSSA at this point  Feels okay at the present time.  Family at bedside.  He is most happy talking about his grandson, which is lifting his spirits  Still complains of pain in the right arm although the area of induration has come down with heating pad and antibiotics    Creatinine clearance is still an issue  He still feels somewhat foggy headed as well    Lab Results   Component Value Date    WBC 8.4 10/03/2024    HGB 11.8 (L) 10/03/2024    HCT 35.1 (L) 10/03/2024    MCV 92 10/03/2024    PLT 87 (L) 10/03/2024     Lab Results   Component Value Date    GLUCOSE 104 (H) 10/03/2024    CALCIUM 8.2 (L) 10/03/2024     10/03/2024    K 3.6 10/03/2024    CO2 21 10/03/2024     10/03/2024    BUN 38 (H) 10/03/2024    CREATININE 2.20 (H) 10/03/2024       WBC trends are being monitored. Antibiotic doses are being adjusted per most recent renal labs.     Vitals:    10/03/24 1622   BP: 108/51   Pulse: 80   Resp: 18   Temp: 36.8 °C (98.2 °F)   SpO2: 96%     Patient is awake and alert  NAD  Neck supple  Heart S1S2  Chest: Equal expansion, bilaterally clear to auscultation  Abdomen: soft, ND, NTTP, no guarding  Extrem: no pain to palpation of the bilateral lower extremities, right antecubital region is  to palpation the area of erythema has come down significantly and the area of induration has come down significantly but still present  Skin: no rashes, no diaphoresis  Neuro: CNS intact  Affect appropriate and patient is interactive        Patient Active Problem List   Diagnosis    Stage 3 chronic kidney disease (Multi)    Obstructive sleep apnea syndrome    Essential hypertriglyceridemia    CAD S/P percutaneous coronary angioplasty    Benign prostatic hyperplasia with urinary obstruction    Benign essential hypertension    Calculus of kidney    Incomplete bladder emptying     Squamous cell carcinoma of skin of other parts of face    Type 2 diabetes mellitus without retinopathy (Multi)    Class 2 severe obesity due to excess calories with serious comorbidity and body mass index (BMI) of 35.0 to 35.9 in adult    Hypokalemia    Uses self-applied continuous glucose monitoring device    Never smoked tobacco    Unstable angina (Multi)    Chest pain           ASSESSMENT:  Right arm thrombophlebitis with cellulitis complicated by bacteremia  ANTONIO    PLAN:  Change Vanco to Dapto for now -once sensitivities are available we will de-escalate and direct therapy.  Will likely need to go home on IV antibiotics due to Staph aureus bacteremia  Line cannot be placed until Staph aureus bacteremia clear for 48 hours at minimum  He just had TTE on 9/30/2024  Patient understands that he may be here the weekend due to inability to place the line until Monday.    Estimated Creatinine Clearance: 42.6 mL/min (A) (by C-G formula based on SCr of 2.2 mg/dL (H)).        Imaging and labs were reviewed per medical records and any ID pertinent labs were also addressed  Time spent before, during and after care today, including coordination of care >40 min      Nae Uriostegui DO

## 2024-10-03 NOTE — NURSING NOTE
1740- Pts tele showing Afib, no history noted and previous EKGs NSR, Dr Wood notified, Stat EKG being obtained    1615- EKG confirmed rate controlled Afib, Dr Wood and cardiology notified

## 2024-10-04 ENCOUNTER — APPOINTMENT (OUTPATIENT)
Dept: CARDIOLOGY | Facility: HOSPITAL | Age: 69
End: 2024-10-04
Payer: MEDICARE

## 2024-10-04 LAB
ALBUMIN SERPL BCP-MCNC: 3.2 G/DL (ref 3.4–5)
ALP SERPL-CCNC: 79 U/L (ref 33–136)
ALT SERPL W P-5'-P-CCNC: 71 U/L (ref 10–52)
ANION GAP SERPL CALC-SCNC: 13 MMOL/L (ref 10–20)
AST SERPL W P-5'-P-CCNC: 30 U/L (ref 9–39)
BACTERIA BLD AEROBE CULT: ABNORMAL
BACTERIA BLD CULT: ABNORMAL
BASOPHILS # BLD AUTO: 0.04 X10*3/UL (ref 0–0.1)
BASOPHILS NFR BLD AUTO: 0.5 %
BILIRUB SERPL-MCNC: 1.7 MG/DL (ref 0–1.2)
BUN SERPL-MCNC: 35 MG/DL (ref 6–23)
CALCIUM SERPL-MCNC: 8.5 MG/DL (ref 8.6–10.3)
CHLORIDE SERPL-SCNC: 109 MMOL/L (ref 98–107)
CO2 SERPL-SCNC: 20 MMOL/L (ref 21–32)
CREAT SERPL-MCNC: 1.93 MG/DL (ref 0.5–1.3)
EGFRCR SERPLBLD CKD-EPI 2021: 37 ML/MIN/1.73M*2
EJECTION FRACTION: 63 %
EOSINOPHIL # BLD AUTO: 0.23 X10*3/UL (ref 0–0.7)
EOSINOPHIL NFR BLD AUTO: 3 %
ERYTHROCYTE [DISTWIDTH] IN BLOOD BY AUTOMATED COUNT: 12.6 % (ref 11.5–14.5)
GLUCOSE SERPL-MCNC: 135 MG/DL (ref 74–99)
GRAM STN SPEC: ABNORMAL
HCT VFR BLD AUTO: 36.7 % (ref 41–52)
HGB BLD-MCNC: 12.3 G/DL (ref 13.5–17.5)
HOLD SPECIMEN: NORMAL
IMM GRANULOCYTES # BLD AUTO: 0.04 X10*3/UL (ref 0–0.7)
IMM GRANULOCYTES NFR BLD AUTO: 0.5 % (ref 0–0.9)
LYMPHOCYTES # BLD AUTO: 0.67 X10*3/UL (ref 1.2–4.8)
LYMPHOCYTES NFR BLD AUTO: 8.7 %
MAGNESIUM SERPL-MCNC: 1.9 MG/DL (ref 1.6–2.4)
MCH RBC QN AUTO: 30.2 PG (ref 26–34)
MCHC RBC AUTO-ENTMCNC: 33.5 G/DL (ref 32–36)
MCV RBC AUTO: 90 FL (ref 80–100)
MONOCYTES # BLD AUTO: 0.86 X10*3/UL (ref 0.1–1)
MONOCYTES NFR BLD AUTO: 11.2 %
NEUTROPHILS # BLD AUTO: 5.84 X10*3/UL (ref 1.2–7.7)
NEUTROPHILS NFR BLD AUTO: 76.1 %
NRBC BLD-RTO: 0 /100 WBCS (ref 0–0)
PLATELET # BLD AUTO: 99 X10*3/UL (ref 150–450)
POTASSIUM SERPL-SCNC: 3.7 MMOL/L (ref 3.5–5.3)
PROT SERPL-MCNC: 5.8 G/DL (ref 6.4–8.2)
RBC # BLD AUTO: 4.07 X10*6/UL (ref 4.5–5.9)
SODIUM SERPL-SCNC: 138 MMOL/L (ref 136–145)
WBC # BLD AUTO: 7.7 X10*3/UL (ref 4.4–11.3)

## 2024-10-04 PROCEDURE — 1200000002 HC GENERAL ROOM WITH TELEMETRY DAILY

## 2024-10-04 PROCEDURE — 2500000004 HC RX 250 GENERAL PHARMACY W/ HCPCS (ALT 636 FOR OP/ED): Mod: JZ | Performed by: INTERNAL MEDICINE

## 2024-10-04 PROCEDURE — 7100000001 HC RECOVERY ROOM TIME - INITIAL BASE CHARGE

## 2024-10-04 PROCEDURE — 99232 SBSQ HOSP IP/OBS MODERATE 35: CPT | Performed by: INTERNAL MEDICINE

## 2024-10-04 PROCEDURE — 99233 SBSQ HOSP IP/OBS HIGH 50: CPT | Performed by: INTERNAL MEDICINE

## 2024-10-04 PROCEDURE — 80053 COMPREHEN METABOLIC PANEL: CPT | Performed by: INTERNAL MEDICINE

## 2024-10-04 PROCEDURE — 2500000004 HC RX 250 GENERAL PHARMACY W/ HCPCS (ALT 636 FOR OP/ED)

## 2024-10-04 PROCEDURE — 99152 MOD SED SAME PHYS/QHP 5/>YRS: CPT

## 2024-10-04 PROCEDURE — 87040 BLOOD CULTURE FOR BACTERIA: CPT | Mod: ELYLAB | Performed by: INTERNAL MEDICINE

## 2024-10-04 PROCEDURE — 2500000004 HC RX 250 GENERAL PHARMACY W/ HCPCS (ALT 636 FOR OP/ED): Performed by: INTERNAL MEDICINE

## 2024-10-04 PROCEDURE — 2500000001 HC RX 250 WO HCPCS SELF ADMINISTERED DRUGS (ALT 637 FOR MEDICARE OP): Performed by: INTERNAL MEDICINE

## 2024-10-04 PROCEDURE — 2500000005 HC RX 250 GENERAL PHARMACY W/O HCPCS: Performed by: INTERNAL MEDICINE

## 2024-10-04 PROCEDURE — 93320 DOPPLER ECHO COMPLETE: CPT

## 2024-10-04 PROCEDURE — 83735 ASSAY OF MAGNESIUM: CPT | Performed by: INTERNAL MEDICINE

## 2024-10-04 PROCEDURE — 99152 MOD SED SAME PHYS/QHP 5/>YRS: CPT | Performed by: INTERNAL MEDICINE

## 2024-10-04 PROCEDURE — 93325 DOPPLER ECHO COLOR FLOW MAPG: CPT | Performed by: INTERNAL MEDICINE

## 2024-10-04 PROCEDURE — 7100000002 HC RECOVERY ROOM TIME - EACH INCREMENTAL 1 MINUTE

## 2024-10-04 PROCEDURE — 7100000010 HC PHASE TWO TIME - EACH INCREMENTAL 1 MINUTE

## 2024-10-04 PROCEDURE — 93320 DOPPLER ECHO COMPLETE: CPT | Performed by: INTERNAL MEDICINE

## 2024-10-04 PROCEDURE — 36415 COLL VENOUS BLD VENIPUNCTURE: CPT | Performed by: INTERNAL MEDICINE

## 2024-10-04 PROCEDURE — 7100000009 HC PHASE TWO TIME - INITIAL BASE CHARGE

## 2024-10-04 PROCEDURE — 85025 COMPLETE CBC W/AUTO DIFF WBC: CPT | Performed by: INTERNAL MEDICINE

## 2024-10-04 PROCEDURE — 2500000002 HC RX 250 W HCPCS SELF ADMINISTERED DRUGS (ALT 637 FOR MEDICARE OP, ALT 636 FOR OP/ED): Performed by: INTERNAL MEDICINE

## 2024-10-04 PROCEDURE — 2500000001 HC RX 250 WO HCPCS SELF ADMINISTERED DRUGS (ALT 637 FOR MEDICARE OP): Performed by: NURSE PRACTITIONER

## 2024-10-04 PROCEDURE — 93312 ECHO TRANSESOPHAGEAL: CPT | Performed by: INTERNAL MEDICINE

## 2024-10-04 RX ORDER — CEFAZOLIN SODIUM 2 G/100ML
2 INJECTION, SOLUTION INTRAVENOUS EVERY 8 HOURS
Status: DISPENSED | OUTPATIENT
Start: 2024-10-04 | End: 2024-10-09

## 2024-10-04 RX ORDER — LIDOCAINE HYDROCHLORIDE 20 MG/ML
JELLY TOPICAL AS NEEDED
Status: DISCONTINUED | OUTPATIENT
Start: 2024-10-04 | End: 2024-10-06 | Stop reason: HOSPADM

## 2024-10-04 RX ORDER — MIDAZOLAM HYDROCHLORIDE 1 MG/ML
INJECTION INTRAMUSCULAR; INTRAVENOUS AS NEEDED
Status: DISCONTINUED | OUTPATIENT
Start: 2024-10-04 | End: 2024-10-06 | Stop reason: HOSPADM

## 2024-10-04 RX ORDER — LIDOCAINE HYDROCHLORIDE 10 MG/ML
30 INJECTION, SOLUTION INFILTRATION; PERINEURAL ONCE
Status: COMPLETED | OUTPATIENT
Start: 2024-10-04 | End: 2024-10-04

## 2024-10-04 RX ORDER — LIDOCAINE HYDROCHLORIDE 10 MG/ML
20 INJECTION, SOLUTION INFILTRATION; PERINEURAL ONCE
Status: COMPLETED | OUTPATIENT
Start: 2024-10-04 | End: 2024-10-04

## 2024-10-04 RX ORDER — LORAZEPAM 0.5 MG/1
0.5 TABLET ORAL NIGHTLY PRN
Status: DISCONTINUED | OUTPATIENT
Start: 2024-10-04 | End: 2024-10-05

## 2024-10-04 RX ORDER — FENTANYL CITRATE 50 UG/ML
INJECTION, SOLUTION INTRAMUSCULAR; INTRAVENOUS AS NEEDED
Status: DISCONTINUED | OUTPATIENT
Start: 2024-10-04 | End: 2024-10-05

## 2024-10-04 ASSESSMENT — PAIN SCALES - GENERAL
PAINLEVEL_OUTOF10: 0 - NO PAIN

## 2024-10-04 ASSESSMENT — COGNITIVE AND FUNCTIONAL STATUS - GENERAL
MOBILITY SCORE: 24
MOBILITY SCORE: 23
DAILY ACTIVITIY SCORE: 24
DAILY ACTIVITIY SCORE: 24
CLIMB 3 TO 5 STEPS WITH RAILING: A LITTLE

## 2024-10-04 ASSESSMENT — PAIN - FUNCTIONAL ASSESSMENT
PAIN_FUNCTIONAL_ASSESSMENT: 0-10

## 2024-10-04 NOTE — CONSULTS
Vancomycin Dosing by Pharmacy- Cessation of Therapy    Consult to pharmacy for vancomycin dosing has been discontinued by the prescriber, pharmacy will sign off at this time.    Please call pharmacy if there are further questions or re-enter a consult if vancomycin is resumed.     Hemant Valiente, [facility]

## 2024-10-04 NOTE — PROGRESS NOTES
Renal Progress Note  Assessment/  69 y.o. year old male who presented to the emergency department for further evaluation and management chest pain preceded by right upper extremity cellulitis the site of IV line as patient was briefly in the hospital for heart catheterization   Patient does have preadmission chronic comorbidity of hypertension diabetes type 2 coronary artery disease recently during the heart catheterization the patient did receive stenting obstructive sleep apnea is a chronic kidney disease stage III with a baseline creatinine of 1.7 and estimated GFR of 40     Stage II acute kidney injury multifactorial possible contrast-induced nephropathy patient is not on any nonsteroidal anti-inflammatory hemodynamically stable afebrile nonoliguric none polyuric no associated electrolyte or acid-base imbalance up today    In the presence of IV line site cellulitis with white blood cells of 16.1 and left shift kidney involvement in occult infection/bacteremia not essentially postinfectious GN could be a possibility however C3 and C4 are normal    The patient is clinically euvolemic with no clinical evidence of increased extracellular fluid volume    Hemodynamically stable afebrile nonoliguric none polyuric with [place] urine output no dysuria    Urine sediment is UTI type urine sediment nonnephrotic nonnephrotic  Blood culture pending     Plan/  Monitor clinical and laboratory daily  Outpatient follow up from renal standpoint: Dr. Weller      Subjective:   Admit Date: 10/1/2024    Interval History: Patient seen and examined uneventful night expressed no uremic related or fluid volume overload related symptoms every time in the room patient is curious about his creatinine today is 1.9 patient is satisfied with the value      Medications:   Scheduled Meds:apixaban, 5 mg, oral, q12h  [Held by provider] atorvastatin, 40 mg, oral, Nightly  ceFAZolin, 2 g, intravenous, q8h  clopidogrel, 75 mg, oral,  "Daily  finasteride, 5 mg, oral, Daily  insulin glargine, 25 Units, subcutaneous, BID AC  insulin lispro, 0-15 Units, subcutaneous, TID  metoprolol succinate XL, 25 mg, oral, Daily  pantoprazole, 40 mg, oral, Daily  ranolazine, 500 mg, oral, q12h  tamsulosin, 0.4 mg, oral, Daily  [Held by provider] valsartan, 40 mg, oral, Daily      Continuous Infusions:     CBC:   Lab Results   Component Value Date    HGB 12.3 (L) 10/04/2024    HGB 11.8 (L) 10/03/2024    WBC 7.7 10/04/2024    WBC 8.4 10/03/2024    PLT 99 (L) 10/04/2024    PLT 87 (L) 10/03/2024      Anemia:  No results found for: \"FERRITIN\", \"IRON\", \"TIBC\"   BMP:    Lab Results   Component Value Date     10/04/2024     10/03/2024    K 3.7 10/04/2024    K 3.6 10/03/2024     (H) 10/04/2024     10/03/2024    CO2 20 (L) 10/04/2024    CO2 21 10/03/2024    BUN 35 (H) 10/04/2024    BUN 38 (H) 10/03/2024    CREATININE 1.93 (H) 10/04/2024    CREATININE 2.20 (H) 10/03/2024      Bone disease: No results found for: \"PHOS\", \"PTH\", \"VITD25\"   Urinalysis:    Lab Results   Component Value Date    ROMEO 5.0 10/02/2024    PROTUR NEGATIVE 10/02/2024    GLUCOSEU Normal 10/02/2024    BLOODU NEGATIVE 10/02/2024    KETONESU NEGATIVE 10/02/2024    BILIRUBINU NEGATIVE 10/02/2024    NITRITEU NEGATIVE 10/02/2024    LEUKOCYTESU 75 [place]/µL (A) 10/02/2024        Objective:   Vitals: /65   Pulse 81   Temp 36 °C (96.8 °F)   Resp 18   Ht 1.829 m (6')   Wt 121 kg (266 lb 5.1 oz)   SpO2 95%   BMI 36.12 kg/m²    Wt Readings from Last 3 Encounters:   10/04/24 121 kg (266 lb 5.1 oz)   09/28/24 122 kg (268 lb 8.3 oz)   09/25/24 122 kg (268 lb 1.3 oz)      24HR INTAKE/OUTPUT:    Intake/Output Summary (Last 24 hours) at 10/4/2024 1208  Last data filed at 10/4/2024 0729  Gross per 24 hour   Intake 0 ml   Output 2320 ml   Net -2320 ml     Admission weight:  Weight: 122 kg (268 lb 15.4 oz)      Constitutional:  Alert, awake, no apparent distress   Skin:normal, no " rash  HEENT:sclera anicteric.  Head atraumatic normocephalic  Neck:supple with no thyromegally  Cardiovascular:  S1, S2 without m/r/g   Respiratory:  CTA B without w/r/r   Abdomen: +bs, soft, nt  Ext: no LE edema  Musculoskeletal:Intact  Neuro:Alert and oriented with no deficit      Electronically signed by Deven Mathis MD on 10/4/2024 at 12:08 PM

## 2024-10-04 NOTE — PROCEDURES
Procedure Name: Aspiration    Indication: RUE cellulitis, concern for abscess    The area measured about 3 cm horizontally. 20 cc of 1% Lidocaine was injected with a 25 gauge needle in a wheal like fashion. Before injecting the Lidocaine, there was no aspiration of blood. A 18 gauge needle was used to aspirate. No fluid was drained. There was no purulent drainage. A second area of swelling more medially measured about 2 cm horizontally. 20 cc of 1% Lidocaine was injected with a 25 gauge needle in a wheal like fashion. Before injecting the Lidocaine, there was no aspiration of blood. A 18 gauge needle was used to aspirate. No fluid was drained. There was no purulent drainage.  Direct pressure was applied to the sites of aspiration to slow the bleeding. The area was covered with 4x4 gauze, Kerlix, and ACE wrap. The patient tolerated the procedure well.    This procedure was performed by myself and Dr. Casper.

## 2024-10-04 NOTE — NURSING NOTE
Patient positive for gag reflex. No difficulty swallowing, no complaint of pain. Report called to 10 juan carlos RN. Patient transported back to floor.

## 2024-10-04 NOTE — PRE-SEDATION DOCUMENTATION
Sedation Plan    ASA 3     Mallampati class: III.    Risks, benefits, and alternatives discussed with patient.    DUONG secondary to bacteremia to assure no endocarditis

## 2024-10-04 NOTE — PROGRESS NOTES
Hilario Reveles is a 69 y.o. male on day 3 of admission presenting with Chest pain.      Subjective   The patient is seen and evaluated this morning.  He states he still has some sweats and some chills.  He states his right arm cellulitis is getting a bit worse.  He states he has not been able to sleep well at night.  The patient underwent DUONG today for possible cardioversion later by cardiology as the patient went into A-fib yesterday.  The patient's family is at bedside.       Objective     Last Recorded Vitals  /65   Pulse 81   Temp 36 °C (96.8 °F)   Resp 18   Wt 121 kg (266 lb 5.1 oz)   SpO2 95%   Intake/Output last 3 Shifts:    Intake/Output Summary (Last 24 hours) at 10/4/2024 1342  Last data filed at 10/4/2024 0729  Gross per 24 hour   Intake 0 ml   Output 2320 ml   Net -2320 ml       Admission Weight  Weight: 122 kg (268 lb 15.4 oz) (10/01/24 2028)    Daily Weight  10/04/24 : 121 kg (266 lb 5.1 oz)    Image Results  Transesophageal Echo (DUONG)            [place] [id number]   Tel [phone] Fax [phone]    TRANSESOPHAGEAL ECHOCARDIOGRAM REPORT    Patient Name:      HILARIO REVELES     Reading Physician:    [id number] Santana Savage MD, [facility]  Study Date:        10/4/2024             Ordering Provider:    [id number] ALFRED KITCHEN  MRN/PID:           [id number]              Fellow:  Accession#:        [id number]          Nurse:                Liam Sears RN  Date of Birth/Age: 1955 / 69 years   Sonographer:          Zina Frais RDCS  Gender:            M                     Additional Staff:  Height:            182.88 cm              Admit Date:           10/1/2024  Weight:            122.02 kg             Admission Status:     Inpatient -                                                                 Routine  BSA / BMI:         2.42 m2 / 36.48 kg/m2 Department Location:  [address] [place] [address]  Blood Pressure: 146 /78 mmHg    Study Type:    TRANSESOPHAGEAL ECHO (DUONG)  Diagnosis/ICD: Bacteremia-R78.81  Indication:    Bacteremia  CPT Codes:     DUONG Complete-10155; Moderate Sedation Services initial 15 minutes                 patient >5 years-[id number]   Study Detail: The following Echo studies were performed: 2D, Doppler and color                flow. Agitated saline used as a contrast agent for intraseptal                flow evaluation. The patient was sedated.       PHYSICIAN INTERPRETATION:  DUONG Details: The DUONG probe used was [id number]. Technically adequate omniplane transesophageal echocardiogram performed. Agitated saline contrast echo was performed to assess for the presence of a patent foramen ovale.  DUONG Medication: The pharynx was anesthetized with Cetacaine spray and viscous lidocaine. The patient was sedated using moderate sedation. Midazolam and Fentanyl was used to sedate the patient for this exam.  DUONG Procedure: The probe was passed without difficulty. The following complication was encountered during the procedure: Patient tolerated the procedure well without any apparent complications.  Left Ventricle: The left ventricular systolic function is normal, with a visually estimated ejection fraction of 60-65%. There are no regional wall motion abnormalities. The left ventricular cavity size is normal. Left ventricular diastolic filling was not assessed.  Left Atrium: The left atrium is normal in size. There is no evidence of a patent foramen ovale. There is no atrial septal defect present. The left atrial appendage appears multilobed and there is no thrombus visualized in the left atrial appendage. Left atrium normal dimension. Left atrial  appendage rather narrow with distal lobes. No thrombi. There is no spontaneous echo contrast.  Right Ventricle: The right ventricle is normal in size. There is normal right ventricular global systolic function. No calculated RVSP has no significant TR Doppler envelope.  Right Atrium: The right atrium is normal in size.  Aortic Valve: The aortic valve is trileaflet. There is no evidence of aortic valve regurgitation. Mildly sclerotic aortic valve without stenosis or regurgitation.  Mitral Valve: The mitral valve is normal in structure. There is mild mitral valve regurgitation. 1-2+ MR with visually normal mitral valve possibly related to atrial fibrillation.  Tricuspid Valve: The tricuspid valve is structurally normal. No evidence of tricuspid regurgitation. Normal tricuspid valve.  Pulmonic Valve: The pulmonic valve is structurally normal. There is mild to moderate pulmonic valve regurgitation. There is 2+ very eccentric pulmonic insufficiency. The pulmonary artery is of normal dimension and the valve itself appears unremarkable.  Pericardium: No pericardial effusion noted.  Aorta: The aortic root is normal.  Pulmonary Artery: The main pulmonary artery is normal in size, and position, with normal bifurcation into the left and right pulmonary arteries.  Pulmonary Veins: The pulmonary veins appear normal and return normally to the left atrium.       CONCLUSIONS:   1. The left ventricular systolic function is normal, with a visually estimated ejection fraction of 60-65%.   2. There is normal right ventricular global systolic function.   3. No calculated RVSP has no significant TR Doppler envelope.   4. Left atrium normal dimension. Left atrial appendage rather narrow with distal lobes. No thrombi. There is no spontaneous echo contrast.   5. 1-2+ MR with visually normal mitral valve possibly related to atrial fibrillation.   6. Normal tricuspid valve.   7. Mildly sclerotic aortic valve without stenosis or  regurgitation.   8. Mild to moderate pulmonic valve regurgitation.   9. There is 2+ very eccentric pulmonic insufficiency. The pulmonary artery is of normal dimension and the valve itself appears unremarkable.  10. The main pulmonary artery is normal in size, and position, with normal bifurcation into the left and right pulmonary arteries.  11. The pulmonary veins appear normal and return normally to the left atrium.  12. There is no evidence of a patent foramen ovale.  13. There are no valvular vegetations and no evidence of endocarditis.    QUANTITATIVE DATA SUMMARY:     LV SYSTOLIC FUNCTION BY 2D PLANIMETRY (MOD):                       Normal Ranges:  EF-Visual:      63 %  LV EF Reported: 63 %       [id number] Santana Savage MD, [facility]  Electronically signed on 10/4/2024 at 1:39:13 PM       ** Final **      Physical Exam  HENT:      Head: Normocephalic and atraumatic.      Nose: Nose normal.      Mouth/Throat:      Mouth: Mucous membranes are dry.   Eyes:      Extraocular Movements: Extraocular movements intact.      Conjunctiva/sclera: Conjunctivae normal.   Cardiovascular:      Rate and Rhythm: Normal rate and regular rhythm.      Pulses: Normal pulses.      Heart sounds: Normal heart sounds.   Pulmonary:      Effort: Pulmonary effort is normal.      Breath sounds: Normal breath sounds.   Abdominal:      General: Bowel sounds are normal.      Palpations: Abdomen is soft.   Musculoskeletal:         General: Normal range of motion.      Cervical back: Normal range of motion and neck supple.   Skin:     General: Skin is warm and dry.      Comments: Right upper extremity distal to antecubital fossa there is erythema edema and tenderness which is improving   Neurological:      General: No focal deficit present.      Mental Status: He is alert. Mental status is at baseline.   Psychiatric:         Mood and Affect: Mood normal.         Relevant Results               Assessment/Plan          Manny Reveles is a 69 y.o. male  with a significant past medical history of HTN, DLD, DM2, CAD s/p multiple PCI, PAZ, CKD3 presenting to [facility] with wife for complaints of chest pain and altered mental status admitted with sepsis and right upper extremity cellulitis.  Patient with MSSA bacteremia.  New onset atrial fibrillation underwent DUONG that did not reveal vegetation endocarditis or thrombus.  Patient has been started on Eliquis and has been continued with Plavix.        Assessment & Plan  Chest pain    Bacteremia with MSSA  Right arm cellulitis- old IV site  Sepsis present on admission in setting of fever hypotension, altered mental status, likely source cellulitis and thrombophlebitis  -Was on vancomycin sensitivities show MSSA has been switched to cefazolin  -Blood cultures from 10/1/2024 show MSSA.  Cultures remain positive on 10/2/2024.  Continue with daily blood cultures and infectious diseases recommending PICC line once cultures have been clear for 48 hours.   -Underwent DUONG on 10/4/2024 without any evidence of thrombi and no evidence of endocarditis or vegetation.  LVEF is 60 to 65%.  -Right upper extremity cephalic vein thrombosis based on ultrasound no DVT.  -Appreciate surgery recommendations no plans for I&D as the patient is on Plavix and high risk of bleeding.  White count is improving and to monitor closely.    Metabolic encephalopathy-now resolved likely due to above    New onset atrial fibrillation  S/p PCI of the LAD from recent admission.   -Appreciate cardiology recommendations possible DUONG cardioversion down the road per cardiology.  -Continue with Plavix statin and beta-blockers.  -New onset atrial fibrillation rate is currently controlled has been started on Eliquis as well.  Possible cardioversion per cardiology later.  -Right upper extremity without any evidence of DVT and heparin was discontinued.    ANTONIO on CKD3  - baseline Cr ~1.3  - on admission 2.33, renal function started to improve.  Appreciate nephrology  recommendations. Unlikely TTP a this time.     Thrombocytopenia  -Platelets are staying stable at this time.  Baseline around 150-160. Recent Heparin exposure. Cont to monitor. Currently off Heparin.      DM2  - accu checks and SSI, added basal insulin as well  - diet carb controlled     Hypomagnesemia-replaced and improved              Bobby Wood MD

## 2024-10-04 NOTE — POST-PROCEDURE NOTE
Physician Transition of Care Summary  Invasive Cardiovascular Lab    Procedure Date: 10/4/2024  Attending: Santana Savage MD  Resident/Fellow/Other Assistant: None    Indications:   Bacteremia    Post-procedure diagnosis:   No evidence of endocarditis    Procedure(s):   Transesophageal echo      Procedure Findings:   No pericardial effusion  Mild aortic sclerosis without hemodynamically significant stenosis normal aortic valve function  Mitral valve normal appearance 1+ MR  Pulmonic valve normal with 2+ PI normal dimension pulmonary artery  Tricuspid valve normal no significant TR  No valvular vegetations  Normal intracavitary chamber dimensions  No left atrial or left atrial appendage thrombi  Normal LV and RV systolic function LVEF 60%  Atrial fibrillation throughout the study    Description of the Procedure:   Patient in the fasting state continuous oximetry recorded.  Admission blood pressures recorded.  Cetacaine topical spray and viscous Xylocaine utilized for posterior pharynx sedation.  Probe easily advanced in the posterior pharynx multiple views of myocardium obtained.  Color and Doppler analysis performed.  2D imaging performed.  Agitated saline contrast utilized.  Probe was drawn his aorta is visualized patient tolerated procedure well    Complications:   None    Stents/Implants:   Implants       No implant documentation for this case.            Anticoagulation/Antiplatelet Plan:   Will be begun after procedure    Estimated Blood Loss: None  Anesthesia: 2 mg Versed 75 mcg fentanyl         Disposition:   Return to patient room      Electronically signed by: Santana Savage MD, 10/4/2024 10:12 AM

## 2024-10-04 NOTE — PROGRESS NOTES
Infectious Disease Progress Note       10/4/2024    Patient is a followup regarding MSSA bacteremia,   Feels okay at the present time.  Family at bedside.    States that he feels okay but has been foggy headed.  Tells me that he wears CPAP at home and did not bring it with him.  His tubing at home has been malfunctioning so the fit is not correct.  This may explain his persistent foggy headedness.    Patient was seen earlier by general surgery per patient. Aspiration was apparently attempted 3 times of the right antecubital region at bedside.  Apparently the area was still too indurated for any drainage.  Per patient the swelling worsened and he has compression dressing in place.  He is still having pain in that arm.  Lab Results   Component Value Date    WBC 7.7 10/04/2024    HGB 12.3 (L) 10/04/2024    HCT 36.7 (L) 10/04/2024    MCV 90 10/04/2024    PLT 99 (L) 10/04/2024     Lab Results   Component Value Date    GLUCOSE 135 (H) 10/04/2024    CALCIUM 8.5 (L) 10/04/2024     10/04/2024    K 3.7 10/04/2024    CO2 20 (L) 10/04/2024     (H) 10/04/2024    BUN 35 (H) 10/04/2024    CREATININE 1.93 (H) 10/04/2024       WBC trends are being monitored. Antibiotic doses are being adjusted per most recent renal labs.     Vitals:    10/04/24 0729   BP: 132/72   Pulse: 80   Resp:    Temp: 36.3 °C (97.3 °F)   SpO2: 93%     Patient is awake and alert  NAD  Neck supple  Heart S1S2  Chest: Equal expansion, bilaterally clear to auscultation  Abdomen: soft, ND, NTTP, no guarding  Extrem:  unable to examine the R antecubital area due to compression dressing.   Skin: no rashes, no diaphoresis  Neuro: CNS intact  Affect appropriate and patient is interactive      Patient Active Problem List   Diagnosis    Stage 3 chronic kidney disease (Multi)    Obstructive sleep apnea syndrome    Essential hypertriglyceridemia    CAD S/P percutaneous coronary angioplasty    Benign prostatic hyperplasia with urinary obstruction    Benign  essential hypertension    Calculus of kidney    Incomplete bladder emptying    Squamous cell carcinoma of skin of other parts of face    Type 2 diabetes mellitus without retinopathy (Multi)    Class 2 severe obesity due to excess calories with serious comorbidity and body mass index (BMI) of 35.0 to 35.9 in adult    Hypokalemia    Uses self-applied continuous glucose monitoring device    Never smoked tobacco    Unstable angina (Multi)    Chest pain           ASSESSMENT:  Right arm thrombophlebitis with cellulitis complicated by MSSA bacteremia - attempted aspiration?  ANTONIO -creatinine clearance steadily improving    PLAN:  Change antibiotic to cefazolin IV to help direct therapy.   BC clear so far from 10/4  Will likely need to go home on IV antibiotics due to Staph aureus bacteremia  Line cannot be placed until Staph aureus bacteremia clear for 48 hours at minimum  New A fib - cardioversion pending.     Estimated Creatinine Clearance: 48.5 mL/min (A) (by C-G formula based on SCr of 1.93 mg/dL (H)).        Imaging and labs were reviewed per medical records and any ID pertinent labs were also addressed  Time spent before, during and after care today, including coordination of care >40 min      Nae Uriostegui DO

## 2024-10-04 NOTE — PRE-PROCEDURE ASSESSMENT
[facility]-NCDR CathPCI V5 Collection Form    Pre-Procedure Information  - Electrocardiac assessment method: ECG     - The assessment result was abnormal.      - No new antiarrhythmic therapy was received prior to evaluation within the cath lab.      - Abnormal assessment findings include: new onset atrial fibrillation    Indications and Presentation  - Indication(s) for cath lab visit: other    - Ventricular support was not required.     Patient for DUONG to determine if endocarditis is present secondary to bacteremia  
18-Feb-2024 12:06

## 2024-10-04 NOTE — PROGRESS NOTES
General Surgery Progress Note    Patient: Manny Reveles  Unit/Bed: 1010/1010-A  YOB: 1955  MRN: [id number]  Acct: [id number]   Admitting Diagnosis: Hypomagnesemia [E83.42]  Chest pain [R07.9]  NSTEMI (non-ST elevated myocardial infarction) (Multi) [I21.4]  Cellulitis of right upper extremity [L03.113]  Acute kidney injury superimposed on CKD (CMS-HCC) [N17.9, N18.9]  Chest pain, unspecified type [R07.9]  Sepsis, due to unspecified organism, unspecified whether acute organ dysfunction present (Multi) [A41.9]  Date:  10/1/2024  Hospital Day: 3  Attending: Bobby Wood MD    Complaint:  Chief Complaint   Patient presents with    Chest Pain     Patient c/o of chest pain following Stent placement 9/28.       Subjective  Patient seen and examined this afternoon after his DUONG.  He states he has had increased pain to his right upper extremity at the site of swelling since last night.  He has also noticed more swelling, pins and needle sensation to the arm, and had an episode where he felt hot.  PHYSICAL EXAM:  Physical Exam  Constitutional:       General: He is not in acute distress.  HENT:      Head: Normocephalic and atraumatic.      Mouth/Throat:      Mouth: Mucous membranes are moist.   Eyes:      Conjunctiva/sclera: Conjunctivae normal.   Pulmonary:      Effort: Pulmonary effort is normal. No respiratory distress.   Skin:     General: Skin is warm and dry.      Comments: Area of erythema and swelling to right upper arm, more tender to palpation compared to yesterday.   Neurological:      Mental Status: He is alert.   Psychiatric:         Mood and Affect: Mood normal.         Behavior: Behavior normal.       Vital signs in last 24 hours:  Vitals:    10/04/24 1500   BP: 105/65   Pulse: 90   Resp:    Temp: 36.9 °C (98.4 °F)   SpO2: 96%     Intake/Output this shift:    Intake/Output Summary (Last 24 hours) at 10/4/2024 3904  Last data filed at 10/4/2024 0729  Gross per 24 hour   Intake 0 ml   Output  2320 ml   Net -2320 ml      Allergies:  Allergies   Allergen Reactions    Adhesive Tape-Silicones Hives and Itching    Epinephrine Syncope    Latex Other    Meperidine Other    Penicillins Other     Tolerated cephalosporins this admission on 10/2024    Promethazine Other    Sulfa (Sulfonamide Antibiotics) Other      Medications:  Scheduled medications  apixaban, 5 mg, oral, q12h  [Held by provider] atorvastatin, 40 mg, oral, Nightly  ceFAZolin, 2 g, intravenous, q8h  clopidogrel, 75 mg, oral, Daily  finasteride, 5 mg, oral, Daily  insulin glargine, 25 Units, subcutaneous, BID AC  insulin lispro, 0-15 Units, subcutaneous, TID  metoprolol succinate XL, 25 mg, oral, Daily  pantoprazole, 40 mg, oral, Daily  ranolazine, 500 mg, oral, q12h  tamsulosin, 0.4 mg, oral, Daily  [Held by provider] valsartan, 40 mg, oral, Daily      Continuous medications     PRN medications  PRN medications: acetaminophen **OR** acetaminophen **OR** acetaminophen, butamben-tetracaine-benzocaine, dextrose, dextrose, fentaNYL PF, glucagon, glucagon, lidocaine, LORazepam, melatonin, midazolam, ondansetron **OR** ondansetron  Labs:  Results for orders placed or performed during the hospital encounter of 10/01/24 (from the past 24 hour(s))   ECG 12 Lead   Result Value Ref Range    Ventricular Rate 86 BPM    Atrial Rate 277 BPM    QRS Duration 98 ms    QT Interval 366 ms    QTC Calculation(Bazett) 437 ms    R Axis 14 degrees    T Axis 63 degrees    QRS Count 14 beats    Q Onset 215 ms    T Offset 398 ms    QTC Fredericia 412 ms   CBC and Auto Differential   Result Value Ref Range    WBC 7.7 4.4 - 11.3 x10*3/uL    nRBC 0.0 0.0 - 0.0 /100 WBCs    RBC 4.07 (L) 4.50 - 5.90 x10*6/uL    Hemoglobin 12.3 (L) 13.5 - 17.5 g/dL    Hematocrit 36.7 (L) 41.0 - 52.0 %    MCV 90 80 - 100 fL    MCH 30.2 26.0 - 34.0 pg    MCHC 33.5 32.0 - 36.0 g/dL    RDW 12.6 11.5 - 14.5 %    Platelets 99 (L) 150 - 450 x10*3/uL    Neutrophils % 76.1 40.0 - 80.0 %    Immature  Granulocytes %, Automated 0.5 0.0 - 0.9 %    Lymphocytes % 8.7 13.0 - 44.0 %    Monocytes % 11.2 2.0 - 10.0 %    Eosinophils % 3.0 0.0 - 6.0 %    Basophils % 0.5 0.0 - 2.0 %    Neutrophils Absolute 5.84 1.20 - 7.70 x10*3/uL    Immature Granulocytes Absolute, Automated 0.04 0.00 - 0.70 x10*3/uL    Lymphocytes Absolute 0.67 (L) 1.20 - 4.80 x10*3/uL    Monocytes Absolute 0.86 0.10 - 1.00 x10*3/uL    Eosinophils Absolute 0.23 0.00 - 0.70 x10*3/uL    Basophils Absolute 0.04 0.00 - 0.10 x10*3/uL   Comprehensive metabolic panel   Result Value Ref Range    Glucose 135 (H) 74 - 99 mg/dL    Sodium 138 136 - 145 mmol/L    Potassium 3.7 3.5 - 5.3 mmol/L    Chloride 109 (H) 98 - 107 mmol/L    Bicarbonate 20 (L) 21 - 32 mmol/L    Anion Gap 13 10 - 20 mmol/L    Urea Nitrogen 35 (H) 6 - 23 mg/dL    Creatinine 1.93 (H) 0.50 - 1.30 mg/dL    eGFR 37 (L) >60 mL/min/1.73m*2    Calcium 8.5 (L) 8.6 - 10.3 mg/dL    Albumin 3.2 (L) 3.4 - 5.0 g/dL    Alkaline Phosphatase 79 33 - 136 U/L    Total Protein 5.8 (L) 6.4 - 8.2 g/dL    AST 30 9 - 39 U/L    Bilirubin, Total 1.7 (H) 0.0 - 1.2 mg/dL    ALT 71 (H) 10 - 52 U/L   Magnesium   Result Value Ref Range    Magnesium 1.90 1.60 - 2.40 mg/dL   SST TOP   Result Value Ref Range    Extra Tube Hold for add-ons.    Blood Culture    Specimen: Peripheral Venipuncture; Blood culture   Result Value Ref Range    Blood Culture Loaded on Instrument - Culture in progress    Blood Culture    Specimen: Peripheral Venipuncture; Blood culture   Result Value Ref Range    Blood Culture Loaded on Instrument - Culture in progress    Transesophageal Echo (DUONG)   Result Value Ref Range    LV EF 63 %      Imaging:  Transesophageal Echo (DUONG)    Result Date: 10/4/2024          [place] [id number]  Tel [phone] Fax [phone] TRANSESOPHAGEAL ECHOCARDIOGRAM REPORT Patient Name:      HILARIO Roper Physician:    [id number] Santana Savage                                                                 MD, [facility] Study Date:        10/4/2024             Ordering Provider:    [id number] ALFRED KITCHEN MRN/PID:           [id number]              Fellow: Accession#:        [id number]          Nurse:                Liam Sears RN Date of Birth/Age: 1955 / 69 years   Sonographer:          Zina Frias RDCS Gender:            M                     Additional Staff: Height:            182.88 cm             Admit Date:           10/1/2024 Weight:            122.02 kg             Admission Status:     Inpatient -                                                                Routine BSA / BMI:         2.42 m2 / 36.48 kg/m2 Department Location:   [facility] Blood Pressure: 146 /78 mmHg Study Type:    TRANSESOPHAGEAL ECHO (DUONG) Diagnosis/ICD: Bacteremia-R78.81 Indication:    Bacteremia CPT Codes:     DUONG Complete-87903; Moderate Sedation Services initial 15 minutes                patient >5 years-25012  Study Detail: The following Echo studies were performed: 2D, Doppler and color               flow. Agitated saline used as a contrast agent for intraseptal               flow evaluation. The patient was sedated.  PHYSICIAN INTERPRETATION: DUONG Details: The DUONG probe used was [id number]. Technically adequate omniplane transesophageal echocardiogram performed. Agitated saline contrast echo was performed to assess for the presence of a patent foramen ovale. DUONG Medication: The pharynx was anesthetized with Cetacaine spray and viscous lidocaine. The patient was sedated using moderate sedation. Midazolam and Fentanyl was used to sedate the patient for this exam. DUONG Procedure: The probe was passed without difficulty. The following complication was  encountered during the procedure: Patient tolerated the procedure well without any apparent complications. Left Ventricle: The left ventricular systolic function is normal, with a visually estimated ejection fraction of 60-65%. There are no regional wall motion abnormalities. The left ventricular cavity size is normal. Left ventricular diastolic filling was not assessed. Left Atrium: The left atrium is normal in size. There is no evidence of a patent foramen ovale. There is no atrial septal defect present. The left atrial appendage appears multilobed and there is no thrombus visualized in the left atrial appendage. Left atrium normal dimension. Left atrial appendage rather narrow with distal lobes. No thrombi. There is no spontaneous echo contrast. Right Ventricle: The right ventricle is normal in size. There is normal right ventricular global systolic function. No calculated RVSP has no significant TR Doppler envelope. Right Atrium: The right atrium is normal in size. Aortic Valve: The aortic valve is trileaflet. There is no evidence of aortic valve regurgitation. Mildly sclerotic aortic valve without stenosis or regurgitation. Mitral Valve: The mitral valve is normal in structure. There is mild mitral valve regurgitation. 1-2+ MR with visually normal mitral valve possibly related to atrial fibrillation. Tricuspid Valve: The tricuspid valve is structurally normal. No evidence of tricuspid regurgitation. Normal tricuspid valve. Pulmonic Valve: The pulmonic valve is structurally normal. There is mild to moderate pulmonic valve regurgitation. There is 2+ very eccentric pulmonic insufficiency. The pulmonary artery is of normal dimension and the valve itself appears unremarkable. Pericardium: No pericardial effusion noted. Aorta: The aortic root is normal. Pulmonary Artery: The main pulmonary artery is normal in size, and position, with normal bifurcation into the left and right pulmonary arteries. Pulmonary Veins: The  pulmonary veins appear normal and return normally to the left atrium.  CONCLUSIONS:  1. The left ventricular systolic function is normal, with a visually estimated ejection fraction of 60-65%.  2. There is normal right ventricular global systolic function.  3. No calculated RVSP has no significant TR Doppler envelope.  4. Left atrium normal dimension. Left atrial appendage rather narrow with distal lobes. No thrombi. There is no spontaneous echo contrast.  5. 1-2+ MR with visually normal mitral valve possibly related to atrial fibrillation.  6. Normal tricuspid valve.  7. Mildly sclerotic aortic valve without stenosis or regurgitation.  8. Mild to moderate pulmonic valve regurgitation.  9. There is 2+ very eccentric pulmonic insufficiency. The pulmonary artery is of normal dimension and the valve itself appears unremarkable. 10. The main pulmonary artery is normal in size, and position, with normal bifurcation into the left and right pulmonary arteries. 11. The pulmonary veins appear normal and return normally to the left atrium. 12. There is no evidence of a patent foramen ovale. 13. There are no valvular vegetations and no evidence of endocarditis. QUANTITATIVE DATA SUMMARY:  LV SYSTOLIC FUNCTION BY 2D PLANIMETRY (MOD):                      Normal Ranges: EF-Visual:      63 % LV EF Reported: 63 %  [id number] Santana Savage MD, [facility] Electronically signed on 10/4/2024 at 1:39:13 PM  ** Final **     ECG 12 Lead    Result Date: 10/3/2024  Atrial fibrillation Nonspecific T wave abnormality Abnormal ECG When compared with ECG of 02-OCT-2024 10:10, Atrial fibrillation has replaced Sinus rhythm Confirmed by Santana Savage ([id number]) on 10/3/2024 7:32:25 PM    Assessment  Right arm cellulitis    Patient seen and examined this afternoon after his DUONG.  He states he has had increased pain to his right upper extremity at the site of swelling since last night.  He has also noticed more swelling, pins and needle sensation to the arm, and had  an episode where he felt hot.  On exam, there is erythema and swelling to the right upper arm that is more tender to palpation.  Afebrile.  No leukocytosis.  He was just started on Eliquis today for new onset A-fib.  He is still high risk for incision and drainage due to being on Plavix and Eliquis.    Plan  -Bedside aspiration was performed (see separate procedure note). No pus was aspirated. A dressing was applied and will stay on for the next couple days.  -Pulse checks every shifts for RUE  -Continue antibiotics  -Carb controlled diet  -Pain control  -DVT prophylaxis-per primary team  -Continue care per primary team   -General surgery will continue to follow      Further recommendations per Dr. Pennie Morrison PA-C    Time spent  35  minutes obtaining labs, imaging, recommendations, interview, assessment, examination, medication review/ordering, and EMR review.    Plan of care was discussed extensively with patient. Patient verbalized understanding through teach back method. All questions and concerns addressed upon examination.     Of note, this documentation is completed using the Dragon Dictation system (voice recognition software). There may be spelling and/or grammatical errors that were not corrected prior to final submission.

## 2024-10-04 NOTE — PROGRESS NOTES
Cardiology Progress Note  Patient: Manny Reveles  Unit/Bed: 1010/1010-A  YOB: 1955  MRN: [id number]  Acct: [id number]   Admitting Diagnosis: Hypomagnesemia [E83.42]  Chest pain [R07.9]  NSTEMI (non-ST elevated myocardial infarction) (Multi) [I21.4]  Cellulitis of right upper extremity [L03.113]  Acute kidney injury superimposed on CKD (CMS-HCC) [N17.9, N18.9]  Chest pain, unspecified type [R07.9]  Sepsis, due to unspecified organism, unspecified whether acute organ dysfunction present (Multi) [A41.9]  Date:  10/1/2024  Hospital Day: 3  Attending: Bobby Wood MD   Rounding Cardiologist:  Santana Savage MD,   Primary Cardiologist: Dr. Armando Antonio      Complaint:  Chief Complaint   Patient presents with    Chest Pain     Patient c/o of chest pain following Stent placement 9/28.         SUBJECTIVE    10/4/24: Presented to Cath Lab for DUONG secondary to bacteremia.  Also noted to be in atrial fibrillation first demonstrated yesterday on ECG.  Patient asymptomatic.  Underwent uncomplicated DUONG without evidence of thrombi and no evidence of endocarditis.  He had not been scheduled for cardioversion.  I did discuss with he and his wife he has new onset of atrial fibrillation.  He is completely asymptomatic at this time.  He says at home once in a while he will feel short of breath when he goes up the stairs but not consistently he is never felt irregularities of his heart and he does not now even though he has atrial fibrillation.  He has had no bleeding issues.  He does not drink alcohol, abuse drugs or smoke cigarettes.    10/3/2024 approximately 1600  hrs. telemetry demonstrated atrial fibrillation ECG demonstrates atrial fibrillation controlled rate at 86    10/1/2024 readmitted after his angioplasty with chest discomfort found to have cellulitis of the right arm and subsequent gram-positive bacteremia.  Antibiotics begun.  Vital signs were stable heart rate ranging 65-80.  Noted to  have worsening renal function, liver function as well    9/30/2024 uncomplicated coronary angioplasty drug-eluting stent to the LAD with underlying LV function normal 60% EF.  Stents of circumflex RCA noted to be normal    9/29/2024 initial cardiology consultation presented with unstable angina chronic kidney disease known coronary disease        VITALS      Vitals:    10/04/24 0430 10/04/24 0729 10/04/24 0920 10/04/24 0921   BP:  132/72 139/78    BP Location:  Left arm     Patient Position:  Lying     Pulse: 82 80 72    Resp:  16 18    Temp: 36.4 °C (97.5 °F) 36.3 °C (97.3 °F)     TempSrc:  Temporal     SpO2: 94% 93% 94% 97%   Weight:       Height:           Intake/Output Summary (Last 24 hours) at 10/4/2024 1054  Last data filed at 10/4/2024 0729  Gross per 24 hour   Intake 66.8 ml   Output 2320 ml   Net -2253.2 ml      Wt Readings from Last 4 Encounters:   10/04/24 121 kg (266 lb 5.1 oz)   09/28/24 122 kg (268 lb 8.3 oz)   09/25/24 122 kg (268 lb 1.3 oz)   09/09/24 123 kg (272 lb)        Allergies:  Allergies   Allergen Reactions    Adhesive Tape-Silicones Hives and Itching    Epinephrine Syncope    Latex Other    Meperidine Other    Penicillins Other     Tolerated cephalosporins this admission on 10/2024    Promethazine Other    Sulfa (Sulfonamide Antibiotics) Other        PHYSICAL EXAM   Physical Exam      LABS   CBC:   Results from last 7 days   Lab Units 10/04/24  0552 10/03/24  0600 10/02/24  0326   WBC AUTO x10*3/uL 7.7 8.4 16.1*   RBC AUTO x10*6/uL 4.07* 3.81* 3.93*   HEMOGLOBIN g/dL 12.3* 11.8* 12.1*   HEMATOCRIT % 36.7* 35.1* 36.6*   MCV fL 90 92 93   MCH pg 30.2 31.0 30.8   MCHC g/dL 33.5 33.6 33.1   RDW % 12.6 13.0 12.8   PLATELETS AUTO x10*3/uL 99* 87* 91*     CMP:    Results from last 7 days   Lab Units 10/04/24  0552 10/03/24  0600 10/02/24  0325   SODIUM mmol/L 138 136 132*   POTASSIUM mmol/L 3.7 3.6 3.9   CHLORIDE mmol/L 109* 107 100   CO2 mmol/L 20* 21 21   BUN mg/dL 35* 38* 37*   CREATININE  mg/dL 1.93* 2.20* 2.57*   GLUCOSE mg/dL 135* 104* 274*   PROTEIN TOTAL g/dL 5.8* 5.7* 5.9*   CALCIUM mg/dL 8.5* 8.2* 8.4*   BILIRUBIN TOTAL mg/dL 1.7* 1.7* 2.5*   ALK PHOS U/L 79 50 46   AST U/L 30 38 39   ALT U/L 71* 84* 44     BMP:    Results from last 7 days   Lab Units 10/04/24  0552 10/03/24  0600 10/02/24  0325   SODIUM mmol/L 138 136 132*   POTASSIUM mmol/L 3.7 3.6 3.9   CHLORIDE mmol/L 109* 107 100   CO2 mmol/L 20* 21 21   BUN mg/dL 35* 38* 37*   CREATININE mg/dL 1.93* 2.20* 2.57*   CALCIUM mg/dL 8.5* 8.2* 8.4*   GLUCOSE mg/dL 135* 104* 274*     Magnesium:  Results from last 7 days   Lab Units 10/04/24  0552 10/03/24  0600 10/02/24  0325   MAGNESIUM mg/dL 1.90 1.90 1.81     Troponin:    Results from last 7 days   Lab Units 10/01/24  2134 10/01/24  2034 09/30/24  0440   TROPHS ng/L 87* 81* 9     BNP:     Lipid Panel:         aspirin, 81 mg, oral, Daily  [Held by provider] atorvastatin, 40 mg, oral, Nightly  ceFAZolin, 2 g, intravenous, q8h  clopidogrel, 75 mg, oral, Daily  finasteride, 5 mg, oral, Daily  insulin glargine, 25 Units, subcutaneous, BID AC  insulin lispro, 0-15 Units, subcutaneous, TID  metoprolol succinate XL, 25 mg, oral, Daily  pantoprazole, 40 mg, oral, Daily  ranolazine, 500 mg, oral, q12h  tamsulosin, 0.4 mg, oral, Daily  [Held by provider] valsartan, 40 mg, oral, Daily           Current Outpatient Medications   Medication Instructions    amLODIPine (NORVASC) 5 mg, oral, Daily, as directed    aspirin 81 mg chewable tablet oral    atorvastatin (LIPITOR) 40 mg, oral, Nightly    cetirizine (ZYRTEC) 10 mg, oral, Daily    cholecalciferol (VITAMIN D-3) 50,000 Units, oral, Once Weekly    clopidogrel (PLAVIX) 75 mg, oral, Daily    CRANBERRY ORAL 2 tablets, oral, Daily    docusate sodium (COLACE) 100 mg, oral, 2 times daily    finasteride (PROSCAR) 5 mg, oral, Daily    icosapent ethyL (VASCEPA) 2 g, oral, 2 times daily    insulin aspart (NovoLOG FlexPen U-100 Insulin) 100 unit/mL (3 mL) pen  subcutaneous, Inject 20 units plus coverage for snacks     insulin degludec (TRESIBA FLEXTOUCH U-200) 104 Units, subcutaneous, Nightly    ketoconazole (NIZOral) 2 % shampoo Topical, 2 times weekly, Apply to scalp in shower. Lather, leave on 5 minutes then rinse    Lactobacillus acidophilus (PROBIOTIC ORAL) oral    metoprolol succinate XL (TOPROL-XL) 25 mg, oral, Daily    nitroglycerin (NITROSTAT) 0.4 mg, sublingual, Every 5 min PRN    potassium chloride CR (Klor-Con M20) 20 mEq ER tablet 20 mEq, oral, Daily    ranolazine (RANEXA) 500 mg, oral, Every 12 hours    semaglutide (Rybelsus) 14 mg tablet tablet 1 tablet, oral, Daily    tamsulosin (FLOMAX) 0.4 mg, oral, Daily    telmisartan (MICARDIS) 20 mg, oral, Every 24 hours    triamterene-hydrochlorothiazid (Maxzide-25) 37.5-25 mg tablet Take half tablet po daily        ECG 12 Lead    Result Date: 10/2/2024  Sinus rhythm with Premature atrial complexes in a pattern of bigeminy Otherwise normal ECG When compared with ECG of 01-OCT-2024 21:35, (unconfirmed) Premature atrial complexes are now Present Confirmed by Santana Savage ([id number]) on 10/2/2024 10:39:12 PM    ECG 12 lead    Result Date: 10/2/2024  Normal sinus rhythm Possible Left atrial enlargement Borderline ECG When compared with ECG of 01-OCT-2024 20:28, (unconfirmed) No significant change was found See ED provider note for full interpretation and clinical correlation Confirmed by Marcia Morejon ([id number]) on 10/2/2024 10:30:08 AM    ECG 12 lead    Result Date: 10/2/2024  Sinus tachycardia Possible Left atrial enlargement Borderline ECG When compared with ECG of 30-SEP-2024 09:57, (unconfirmed) Vent. rate has increased BY  56 BPM Nonspecific T wave abnormality now evident in Lateral leads See ED provider note for full interpretation and clinical correlation Confirmed by Marcia Morejon ([id number]) on 10/2/2024 10:29:29 AM    Vascular US Upper Extremity Venous Duplex Right    Result Date: 10/2/2024  Interpreted By:   Roger Grajeda, STUDY: [facility] US UPPER EXTREMITY VENOUS DUPLEX RIGHT;  10/2/2024 9:29 am   INDICATION: Signs/Symptoms:Right upper ext. cellulitis. Possible foregin object/needle at the site of cellulittis..   ,L03.113 Cellulitis of right upper limb,R22.31 Localized swelling, mass and lump, right upper limb   COMPARISON: None.   ACCESSION NUMBER(S): [id number]   ORDERING CLINICIAN: ALFRED KITCHEN   TECHNIQUE: Vascular ultrasound of the  right upper extremity was performed. Evaluation was performed with grayscale, color, and spectral Doppler. When possible, compression views of the evaluated veins was also performed.   FINDINGS: Evaluation of the visualized portions of the internal jugular, subclavian, axillary, brachial, cephalic, and basilic veins was performed.   There is occlusive thrombosis of the right cephalic vein extending from the antecubital fossa to the mid forearm.   There is normal respiratory variation, normal compressibility, as well as normal color doppler signal in the remaining visualized vessels.       No sonographic evidence of deep venous thrombosis. Positive for superficial thrombosis of the right cephalic vein.   MACRO: None.   Signed by: Roger Grajeda 10/2/2024 9:39 AM Dictation workstation:   [id number]    XR chest 1 view    Result Date: 10/1/2024  Interpreted By:  Chon Gifford, STUDY: XR CHEST 1 VIEW;  10/1/2024 8:48 pm   INDICATION: Signs/Symptoms:Chest Pain.     COMPARISON: 09/28/2024   ACCESSION NUMBER(S): [id number]   ORDERING CLINICIAN: CALI ROSADO   FINDINGS:         CARDIOMEDIASTINAL SILHOUETTE: Cardiomediastinal silhouette is normal in size and configuration.   LUNGS: Lungs are clear. No consolidation or effusion seen.   ABDOMEN: No remarkable upper abdominal findings.   BONES: No acute osseous changes.       1.  No evidence of acute cardiopulmonary process.       MACRO: None   Signed by: Chon Gifford 10/1/2024 9:15 PM Dictation workstation:   [id number]       No results  found for this or any previous visit from the past 1095 days.                 Encounter Date: 10/01/24   ECG 12 Lead   Result Value    Ventricular Rate 86    Atrial Rate 277    QRS Duration 98    QT Interval 366    QTC Calculation(Bazett) 437    R Axis 14    T Axis 63    QRS Count 14    Q Onset 215    T Offset 398    QTC Fredericia 412    Narrative    Atrial fibrillation  Nonspecific T wave abnormality  Abnormal ECG  When compared with ECG of 02-OCT-2024 10:10,  Atrial fibrillation has replaced Sinus rhythm  Confirmed by Santana Savage ([id number]) on 10/3/2024 7:32:25 PM        Tele Monitoring: Atrial fibrillation controlled ventricular response    Assessment     Patient Active Problem List   Diagnosis    Stage 3 chronic kidney disease (Multi)    Obstructive sleep apnea syndrome    Essential hypertriglyceridemia    CAD S/P percutaneous coronary angioplasty    Benign prostatic hyperplasia with urinary obstruction    Benign essential hypertension    Calculus of kidney    Incomplete bladder emptying    Squamous cell carcinoma of skin of other parts of face    Type 2 diabetes mellitus without retinopathy (Multi)    Class 2 severe obesity due to excess calories with serious comorbidity and body mass index (BMI) of 35.0 to 35.9 in adult    Hypokalemia    Uses self-applied continuous glucose monitoring device    Never smoked tobacco    Unstable angina (Multi)    Chest pain      DUONG: Normal LV/RV systolic function.  2+ pulmonic insufficiency, 1+ mitral insufficiency, otherwise normal valve structure and function no endocarditis evident no intracavitary thrombi masses or vegetations    Plan:  Coronary artery disease: No recurrent angina now less than 1 week status post PTCA which was done on a semiurgent basis but no acute ST elevation infarct  New onset atrial fibrillation potential triggers include worsening renal function, underlying hypertension and vascular disease as well as infection  Heart rate is well-controlled he is  asymptomatic  Would pursue a rhythm control strategy over the weekend would like to see renal function improved such that more choices of antiarrhythmics would be available should cardioversion fail.  He has DUONG shows absolutely no thrombi therefore with institution of full anticoagulation today can have cardioversion at any time in the future.  Will utilize Eliquis 5 twice daily though creatinine is elevated his size and age would suggest he can tolerate that safely but aspirin will be discontinued.  Also discussed case with Dr. Hansen who will see the patient tomorrow  Discussed in detail with patient and his wife  6.   Bacteremia: No evidence of endocarditis       Electronically signed by Santana Savage MD on 10/4/2024 at 10:54 AM

## 2024-10-05 ENCOUNTER — APPOINTMENT (OUTPATIENT)
Dept: CARDIOLOGY | Facility: HOSPITAL | Age: 69
End: 2024-10-05
Payer: MEDICARE

## 2024-10-05 LAB
ALBUMIN SERPL BCP-MCNC: 3.1 G/DL (ref 3.4–5)
ALP SERPL-CCNC: 201 U/L (ref 33–136)
ALT SERPL W P-5'-P-CCNC: 128 U/L (ref 10–52)
ANION GAP SERPL CALC-SCNC: 14 MMOL/L (ref 10–20)
AST SERPL W P-5'-P-CCNC: 116 U/L (ref 9–39)
BASOPHILS # BLD AUTO: 0.03 X10*3/UL (ref 0–0.1)
BASOPHILS NFR BLD AUTO: 0.4 %
BILIRUB SERPL-MCNC: 1.7 MG/DL (ref 0–1.2)
BUN SERPL-MCNC: 30 MG/DL (ref 6–23)
CALCIUM SERPL-MCNC: 8.3 MG/DL (ref 8.6–10.3)
CHLORIDE SERPL-SCNC: 107 MMOL/L (ref 98–107)
CO2 SERPL-SCNC: 19 MMOL/L (ref 21–32)
CREAT SERPL-MCNC: 1.79 MG/DL (ref 0.5–1.3)
EGFRCR SERPLBLD CKD-EPI 2021: 41 ML/MIN/1.73M*2
EOSINOPHIL # BLD AUTO: 0.13 X10*3/UL (ref 0–0.7)
EOSINOPHIL NFR BLD AUTO: 1.6 %
ERYTHROCYTE [DISTWIDTH] IN BLOOD BY AUTOMATED COUNT: 12.8 % (ref 11.5–14.5)
GLUCOSE BLD MANUAL STRIP-MCNC: 228 MG/DL (ref 74–99)
GLUCOSE BLD MANUAL STRIP-MCNC: 280 MG/DL (ref 74–99)
GLUCOSE BLD MANUAL STRIP-MCNC: 313 MG/DL (ref 74–99)
GLUCOSE SERPL-MCNC: 221 MG/DL (ref 74–99)
HCT VFR BLD AUTO: 33.4 % (ref 41–52)
HGB BLD-MCNC: 11.4 G/DL (ref 13.5–17.5)
HOLD SPECIMEN: NORMAL
IMM GRANULOCYTES # BLD AUTO: 0.04 X10*3/UL (ref 0–0.7)
IMM GRANULOCYTES NFR BLD AUTO: 0.5 % (ref 0–0.9)
LYMPHOCYTES # BLD AUTO: 1.02 X10*3/UL (ref 1.2–4.8)
LYMPHOCYTES NFR BLD AUTO: 12.8 %
MAGNESIUM SERPL-MCNC: 1.78 MG/DL (ref 1.6–2.4)
MCH RBC QN AUTO: 30.7 PG (ref 26–34)
MCHC RBC AUTO-ENTMCNC: 34.1 G/DL (ref 32–36)
MCV RBC AUTO: 90 FL (ref 80–100)
MONOCYTES # BLD AUTO: 0.89 X10*3/UL (ref 0.1–1)
MONOCYTES NFR BLD AUTO: 11.1 %
NEUTROPHILS # BLD AUTO: 5.88 X10*3/UL (ref 1.2–7.7)
NEUTROPHILS NFR BLD AUTO: 73.6 %
NRBC BLD-RTO: 0 /100 WBCS (ref 0–0)
PLATELET # BLD AUTO: 108 X10*3/UL (ref 150–450)
POTASSIUM SERPL-SCNC: 3.6 MMOL/L (ref 3.5–5.3)
PROT SERPL-MCNC: 5.8 G/DL (ref 6.4–8.2)
RBC # BLD AUTO: 3.71 X10*6/UL (ref 4.5–5.9)
SODIUM SERPL-SCNC: 136 MMOL/L (ref 136–145)
WBC # BLD AUTO: 8 X10*3/UL (ref 4.4–11.3)

## 2024-10-05 PROCEDURE — 2500000001 HC RX 250 WO HCPCS SELF ADMINISTERED DRUGS (ALT 637 FOR MEDICARE OP): Performed by: INTERNAL MEDICINE

## 2024-10-05 PROCEDURE — 2500000004 HC RX 250 GENERAL PHARMACY W/ HCPCS (ALT 636 FOR OP/ED): Mod: JZ | Performed by: INTERNAL MEDICINE

## 2024-10-05 PROCEDURE — 93010 ELECTROCARDIOGRAM REPORT: CPT | Performed by: INTERNAL MEDICINE

## 2024-10-05 PROCEDURE — 85025 COMPLETE CBC W/AUTO DIFF WBC: CPT | Performed by: INTERNAL MEDICINE

## 2024-10-05 PROCEDURE — 82947 ASSAY GLUCOSE BLOOD QUANT: CPT

## 2024-10-05 PROCEDURE — 84145 PROCALCITONIN (PCT): CPT | Mod: ELYLAB | Performed by: INTERNAL MEDICINE

## 2024-10-05 PROCEDURE — 99232 SBSQ HOSP IP/OBS MODERATE 35: CPT | Performed by: SURGERY

## 2024-10-05 PROCEDURE — 93005 ELECTROCARDIOGRAM TRACING: CPT

## 2024-10-05 PROCEDURE — 99233 SBSQ HOSP IP/OBS HIGH 50: CPT | Performed by: INTERNAL MEDICINE

## 2024-10-05 PROCEDURE — 80074 ACUTE HEPATITIS PANEL: CPT | Mod: ELYLAB | Performed by: INTERNAL MEDICINE

## 2024-10-05 PROCEDURE — 87040 BLOOD CULTURE FOR BACTERIA: CPT | Mod: ELYLAB | Performed by: INTERNAL MEDICINE

## 2024-10-05 PROCEDURE — 2500000001 HC RX 250 WO HCPCS SELF ADMINISTERED DRUGS (ALT 637 FOR MEDICARE OP): Performed by: NURSE PRACTITIONER

## 2024-10-05 PROCEDURE — 36415 COLL VENOUS BLD VENIPUNCTURE: CPT | Performed by: INTERNAL MEDICINE

## 2024-10-05 PROCEDURE — 1210000001 HC SEMI-PRIVATE ROOM DAILY

## 2024-10-05 PROCEDURE — 80053 COMPREHEN METABOLIC PANEL: CPT | Performed by: INTERNAL MEDICINE

## 2024-10-05 PROCEDURE — 99232 SBSQ HOSP IP/OBS MODERATE 35: CPT | Performed by: INTERNAL MEDICINE

## 2024-10-05 PROCEDURE — 83735 ASSAY OF MAGNESIUM: CPT | Performed by: INTERNAL MEDICINE

## 2024-10-05 PROCEDURE — 2500000002 HC RX 250 W HCPCS SELF ADMINISTERED DRUGS (ALT 637 FOR MEDICARE OP, ALT 636 FOR OP/ED): Performed by: INTERNAL MEDICINE

## 2024-10-05 RX ORDER — LINEZOLID 2 MG/ML
600 INJECTION, SOLUTION INTRAVENOUS EVERY 12 HOURS
Status: DISPENSED | OUTPATIENT
Start: 2024-10-05

## 2024-10-05 ASSESSMENT — PAIN - FUNCTIONAL ASSESSMENT: PAIN_FUNCTIONAL_ASSESSMENT: 0-10

## 2024-10-05 ASSESSMENT — COGNITIVE AND FUNCTIONAL STATUS - GENERAL
DAILY ACTIVITIY SCORE: 24
MOBILITY SCORE: 24

## 2024-10-05 ASSESSMENT — PAIN SCALES - GENERAL: PAINLEVEL_OUTOF10: 0 - NO PAIN

## 2024-10-05 NOTE — PROGRESS NOTES
Manny Reveles is a 69 y.o. male on day 4 of admission presenting with Chest pain.    Subjective   No new complaints.  Arm pain stable.  Perhaps slightly improved.  No drainage swelling or erythema per patient       Objective     Physical Exam  No fevers vital signs otherwise stable  No acute distress  Right upper extremity with AC fossa localized induration tenderness and erythema, no fluctuance or necrosis.    Last Recorded Vitals  Blood pressure 128/59, pulse 80, temperature 36.9 °C (98.4 °F), temperature source Temporal, resp. rate 17, height 1.829 m (6'), weight 121 kg (266 lb 5.1 oz), SpO2 97%.  Intake/Output last 3 Shifts:  I/O last 3 completed shifts:  In: 300 (2.5 mL/kg) [IV Piggyback:300]  Out: 2460 (20.4 mL/kg) [Urine:2460 (0.6 mL/kg/hr)]  Weight: 120.8 kg     Relevant Results                              Assessment/Plan   Assessment & Plan  Chest pain    Right arm cellulitis.  Monitoring for progression to abscess necessitating incision and drainage.  Continue blood thinners.  Continue antibiotics       I spent  minutes in the professional and overall care of this patient.      Christie George MD

## 2024-10-05 NOTE — PROGRESS NOTES
[facility] Progress Note               Rounding Cardiologist:  Evan Hansen MD, MD   Primary Cardiologist: Dr. Evan Hansen    Date:  10/5/2024  Patient:  Manny Reveles  YOB: 1955  MRN:  [id number]   Admit Date:  10/1/2024      SUBJECTIVE    Manny Reveles was seen and examined today at bedside.  Doing well  No chest pain or shortness of breath  Atrial fibrillation by telemetry.  Rates between  bpm    Laboratory today shows sodium 136 potassium 3.6 chloride 107 BUN 30 creatinine 1.79   magnesium 1.78.  WBC 8.0.  Hemoglobin 11.4.  Hematocrit 33.4.  Platelet count 108.    DUONG performed on October 4, 2024  CONCLUSIONS:   1. The left ventricular systolic function is normal, with a visually estimated ejection fraction of 60-65%.   2. There is normal right ventricular global systolic function.   3. No calculated RVSP has no significant TR Doppler envelope.   4. Left atrium normal dimension. Left atrial appendage rather narrow with distal lobes. No thrombi. There is no spontaneous echo contrast.   5. 1-2+ MR with visually normal mitral valve possibly related to atrial fibrillation.   6. Normal tricuspid valve.   7. Mildly sclerotic aortic valve without stenosis or regurgitation.   8. Mild to moderate pulmonic valve regurgitation.   9. There is 2+ very eccentric pulmonic insufficiency. The pulmonary artery is of normal dimension and the valve itself appears unremarkable.  10. The main pulmonary artery is normal in size, and position, with normal bifurcation into the left and right pulmonary arteries.  11. The pulmonary veins appear normal and return normally to the left atrium.  12. There is no evidence of a patent foramen ovale.  13. There are no valvular vegetations and no evidence of endocarditis.    September 30, 2024 echocardiogram.  CONCLUSIONS:   1. The left ventricular systolic function is normal, with a visually estimated ejection fraction of 60%.   2. There is moderate  concentric left ventricular hypertrophy.   3. There is normal right ventricular global systolic function.   4. There is no evidence of mitral valve stenosis.   5. No evidence of mitral valve regurgitation.   6. Aortic valve stenosis is not present.   7. The main pulmonary artery is normal in size, and position, with normal bifurcation into the left and right pulmonary arteries.      Cardiac catheterization 2024  CONCLUSIONS:   1. The entire Left Main: <10% stenosis.   2. Proximal LAD Lesion: The percent stenosis is 10-30%.   3. Circumflex Coronary Artery: contains patent previously placed stents.   4. Mid and distal CX Lesion: The percent stenosis is 30%.   5. Right Coronary Artery: contains patent previously placed stents and fills via collaterals.   6. Mid and distal RCA Lesion: The percent stenosis is 10-30%.   7. Prox PDA RCA Lesion: The percent stenosis is 40%.   8. Prox PLVB RCA Lesion: The percent stenosis is 100%.   9. The Left Ventricular Ejection Fraction is 60%.  10. Mid LAD Lesion: The percent stenosis is 80%.  11. Mid LAD Lesion: pre-dilation [employer] 2.75x22: 0% residual stenosis.       VITALS     Vitals:    10/04/24 1851 10/05/24 0013 10/05/24 0739 10/05/24 1118   BP: 125/62 115/59 147/81 116/72   BP Location:  Left arm Left arm Left arm   Patient Position:  Lying Lying Sitting   Pulse: 79 84 82 79   Resp: 20 16 18 18   Temp: 36.6 °C (97.9 °F) 37 °C (98.6 °F) 36.5 °C (97.7 °F) 36.6 °C (97.9 °F)   TempSrc:  Temporal Temporal Temporal   SpO2: 94% 95% 95% 93%   Weight:       Height:           Intake/Output Summary (Last 24 hours) at 10/5/2024 1134  Last data filed at 10/5/2024 0800  Gross per 24 hour   Intake 1050 ml   Output 900 ml   Net 150 ml       [unfilled]    PHYSICAL EXAM   HENT:      Head: Normocephalic and atraumatic.      Nose: Nose normal.      Mouth/Throat:      Mouth: Mucous membranes are dry.   Eyes:      Extraocular Movements: Extraocular movements intact.      Conjunctiva/sclera:  "Conjunctivae normal.   Cardiovascular:      Rate and Rhythm: Normal rate and regular rhythm.      Pulses: Normal pulses.      Heart sounds: Normal heart sounds.   Pulmonary:      Effort: Pulmonary effort is normal.      Breath sounds: Normal breath sounds.   Abdominal:      General: Bowel sounds are normal.      Palpations: Abdomen is soft.   Musculoskeletal:         General: Normal range of motion.      Cervical back: Normal range of motion and neck supple.   Skin:     General: Skin is warm and dry.      Comments: Right upper extremity distal to antecubital fossa there is erythema edema and tenderness which is improving   Neurological:      General: No focal deficit present.      Mental Status: He is alert. Mental status is at baseline.   Psychiatric:         Mood and Affect: Mood normal.        DIAGNOSTIC RESULTS   EKG:     Telemetry: Atrial fibrillation.  Rates between  bpm.      LAB DATA   BMP:  @LABRCNT(Na:3,K:3,CL:3,CO2:3,Bun:3,Creatinine:3,Glu:3, CA:3,LABGLOM)@    Cardiac Enzymes:  @LABRCNT(CKTOTAL:3,CKMB:3,CKMBINDEX:3,TROPONINI:3)@    CBC:   Lab Results   Component Value Date    WBC 8.0 10/05/2024    RBC 3.71 (L) 10/05/2024    HGB 11.4 (L) 10/05/2024    HCT 33.4 (L) 10/05/2024     (L) 10/05/2024       CMP:    Lab Results   Component Value Date     10/05/2024    K 3.6 10/05/2024     10/05/2024    CO2 19 (L) 10/05/2024    BUN 30 (H) 10/05/2024    CREATININE 1.79 (H) 10/05/2024    GLUCOSE 221 (H) 10/05/2024    CALCIUM 8.3 (L) 10/05/2024       Hepatic Function Panel:    Lab Results   Component Value Date    ALKPHOS 201 (H) 10/05/2024     (H) 10/05/2024     (H) 10/05/2024    PROT 5.8 (L) 10/05/2024    BILITOT 1.7 (H) 10/05/2024       Magnesium:    Lab Results   Component Value Date    MG 1.78 10/05/2024       PT/INR:  No results found for: \"PROTIME\", \"INR\"  @LABRCNT(inr:3)@     HgBA1c:  No components found for: \"LABA1C\"    Lipid Profile:  No results found for: \"CHLPL\", " "\"TRIG\", \"HDL\", \"LDLCALC\", \"LDLDIRECT\"    TSH:  No results found for: \"TSH\"    ABG:  No results found for: \"PH\"    PRO-BNP: No results found for: \"PROBNP\"       RADIOLOGY     Transesophageal Echo (DUONG)   Final Result      Vascular US Upper Extremity Venous Duplex Right   Final Result   No sonographic evidence of deep venous thrombosis.   Positive for superficial thrombosis of the right cephalic vein.        MACRO:   None.        Signed by: Roger Grajeda 10/2/2024 9:39 AM   Dictation workstation:   [id number]      XR chest 1 view   Final Result   1.  No evidence of acute cardiopulmonary process.                  MACRO:   None        Signed by: Chon Gifford 10/1/2024 9:15 PM   Dictation workstation:   [id number]          @[facility]@      CURRENT MEDICATIONS    apixaban, 5 mg, oral, q12h  [Held by provider] atorvastatin, 40 mg, oral, Nightly  ceFAZolin, 2 g, intravenous, q8h  clopidogrel, 75 mg, oral, Daily  finasteride, 5 mg, oral, Daily  insulin glargine, 25 Units, subcutaneous, BID AC  insulin lispro, 0-15 Units, subcutaneous, TID  metoprolol succinate XL, 25 mg, oral, Daily  pantoprazole, 40 mg, oral, Daily  ranolazine, 500 mg, oral, q12h  tamsulosin, 0.4 mg, oral, Daily  [Held by provider] valsartan, 40 mg, oral, Daily             ASSESSMENT   Principal Problem:    Chest pain        Patient Active Problem List   Diagnosis    Stage 3 chronic kidney disease (Multi)    Obstructive sleep apnea syndrome    Essential hypertriglyceridemia    CAD S/P percutaneous coronary angioplasty    Benign prostatic hyperplasia with urinary obstruction    Benign essential hypertension    Calculus of kidney    Incomplete bladder emptying    Squamous cell carcinoma of skin of other parts of face    Type 2 diabetes mellitus without retinopathy (Multi)    Class 2 severe obesity due to excess calories with serious comorbidity and body mass index (BMI) of 35.0 to 35.9 in adult    Hypokalemia    Uses self-applied continuous glucose " monitoring device    Never smoked tobacco    Unstable angina (Multi)    Chest pain     Chest pain/unstable angina  ASHD class II  Remote multivessel angioplasty and stenting  Stage III kidney disease  Sleep apnea  Type 2 diabetes  Moderate obesity  Renal calculus in the past  Dyslipidemia  New diagnosed atrial fibrillation during this admission  Bacteremia with MSSA  Sepsis on admission with fever, hypotension, altered mental status.    PLAN     Continue with treatment for bacteremia  Long conversation with patient and family members about plan to follow for management of atrial fibrillation.  Liver enzymes elevating  For cardioversion early next week or as an outpatient.  Continue with anticoagulant therapy with Eliquis  Telemetry  EKG daily    Evan Hansen MD        Please do not hesitate to call with questions.  Electronically signed by Evan Hansen MD, FACC on 10/5/2024 at 11:34 AM

## 2024-10-05 NOTE — DOCUMENTATION CLARIFICATION NOTE
"    PATIENT:               HILARIO FUENTES  ACCT #:                  [id number]  MRN:                       [id number]  :                       1955  ADMIT DATE:       10/1/2024 8:26 PM  DISCH DATE:  RESPONDING PROVIDER #:        [id number]          PROVIDER RESPONSE TEXT:    NSTEMI Ruled Out, elevated troponins 2/2 myocardial injury from recent procedure, ANTONIO    CDI QUERY TEXT:    Clarification    Instruction:    Based on your assessment of the patient and the clinical information, please provide the requested documentation by clicking on the appropriate radio button and enter any additional information if prompted.    Question: Please further clarify the diagnosis of NSTEMI as    When answering this query, please exercise your independent professional judgment. The fact that a question is being asked, does not imply that any particular answer is desired or expected.    The patient's clinical indicators include:  Clinical Information:  The patient is a 69 year old male who presented to the ED with weakness, fevers and confusion.  He was found to have a right arm cellulitis/thrombophlebitis of a previous IV site with sepsis.  His PMH includes HTN, DM, CKD3, PAZ, CAD with recent stent placement.    Documented Diagnosis:  \"NSTEMI\" is documented on the 10/1 ED Note by Dr. Glez.    ED Note 10/1 \"Patient started on heparin drip for NSTEMI due to the possibilities of stent occlusion given the uptrending troponin level.\"    HP 10/1 \"Chest pain.  Elevated troponin 81, 87.\"  \"Suspect this is d/t demand given SIRS/Sepsis with cellulitis.\"    Cardiology Consult 10/2 \"Elevated biomarkers secondary to ANTONIO, recent procedure and stent placement.\"    Clinical Indicators and Documentation:  -Vital Signs:  10/1:  38.5, 108, 20, 149/65, 95 percent on RA  -Troponin trend:  10/1:  81, 87  -EKG findings:  10/1 \"Normal sinus rhythm, possible left atrial enlargement, borderline ECG.\"  -ECHO findings:  DUONG 10/3 \"1. The left " "ventricular systolic function is normal, with a visually estimated ejection fraction of 60-65.  2. There is normal right ventricular global systolic function.  3. No calculated RVSP has no significant TR Doppler envelope.  4. Left atrium normal dimension. Left atrial appendage rather narrow with distal lobes. No thrombi. There is no spontaneous echo contrast.\"  -Physical Exam or Documented/Presenting symptoms:  Pt presented with CP, fevers, weakness.  -Cardiac Interventions:  DUONG to r/o endocarditis  -Cardiac Consult:  Yes    Treatment:  IV heparin, DUONG on 10/3, Cardio consult, EKGs    Risk Factors:  Sepsis, recent stent placement on 9/30  Options provided:  -- NSTEMI as evidenced by, Please specify additional information below  -- NSTEMI Ruled Out, elevated troponins 2/2 myocardial injury from recent procedure, ANTONIO  -- Other - I will add my own diagnosis  -- Refer to Clinical Documentation Reviewer    Query created by: Zunilda Farley on 10/5/2024 7:12 AM      Electronically signed by:  ALFRED KITCHEN MD 10/5/2024 9:02 AM          "

## 2024-10-05 NOTE — CARE PLAN
The patient's goals for the shift include Labs WNL    The clinical goals for the shift include safety      Problem: Pain - Adult  Goal: Verbalizes/displays adequate comfort level or baseline comfort level  Outcome: Progressing     Problem: Safety - Adult  Goal: Free from fall injury  Outcome: Progressing     Problem: Discharge Planning  Goal: Discharge to home or other facility with appropriate resources  Outcome: Progressing     Problem: Chronic Conditions and Co-morbidities  Goal: Patient's chronic conditions and co-morbidity symptoms are monitored and maintained or improved  Outcome: Progressing     Problem: Fall/Injury  Goal: Not fall by end of shift  Outcome: Progressing  Goal: Be free from injury by end of the shift  Outcome: Progressing  Goal: Verbalize understanding of personal risk factors for fall in the hospital  Outcome: Progressing  Goal: Verbalize understanding of risk factor reduction measures to prevent injury from fall in the home  Outcome: Progressing  Goal: Use assistive devices by end of the shift  Outcome: Progressing  Goal: Pace activities to prevent fatigue by end of the shift  Outcome: Progressing

## 2024-10-05 NOTE — PROGRESS NOTES
Renal Progress Note  Assessment/  69 y.o. year old male who presented to the emergency department for further evaluation and management chest pain preceded by right upper extremity cellulitis the site of IV line as patient was briefly in the hospital for heart catheterization   Patient does have preadmission chronic comorbidity of hypertension diabetes type 2 coronary artery disease recently during the heart catheterization the patient did receive stenting obstructive sleep apnea is a chronic kidney disease stage III with a baseline creatinine of 1.7 and estimated GFR of 40     Stage II acute kidney injury multifactorial possible contrast-induced nephropathy patient is not on any nonsteroidal anti-inflammatory hemodynamically stable afebrile nonoliguric none polyuric no associated electrolyte or acid-base imbalance up today    In the presence of IV line site cellulitis with white blood cells of 16.1 and left shift kidney involvement in occult infection/bacteremia not essentially postinfectious GN could be a possibility however C3 and C4 are normal    The patient is clinically euvolemic with no clinical evidence of increased extracellular fluid volume    Hemodynamically stable afebrile nonoliguric none polyuric with [place] urine output no dysuria    Urine sediment is UTI type urine sediment nonnephrotic nonnephrotic  Blood culture pending     Plan/  Cont ancef for MSSA  GFR stable.    Outpatient follow up from renal standpoint: Dr. Weller      Subjective:   Admit Date: 10/1/2024    Interval History: GFR stable/ improved.  No cp.  No increase in sob. Blood cx + MSSA      Medications:   Scheduled Meds:apixaban, 5 mg, oral, q12h  [Held by provider] atorvastatin, 40 mg, oral, Nightly  ceFAZolin, 2 g, intravenous, q8h  clopidogrel, 75 mg, oral, Daily  finasteride, 5 mg, oral, Daily  insulin glargine, 25 Units, subcutaneous, BID AC  insulin lispro, 0-15 Units, subcutaneous, TID  metoprolol succinate XL, 25 mg, oral,  "Daily  pantoprazole, 40 mg, oral, Daily  ranolazine, 500 mg, oral, q12h  tamsulosin, 0.4 mg, oral, Daily  [Held by provider] valsartan, 40 mg, oral, Daily      Continuous Infusions:     CBC:   Lab Results   Component Value Date    HGB 11.4 (L) 10/05/2024    HGB 12.3 (L) 10/04/2024    WBC 8.0 10/05/2024    WBC 7.7 10/04/2024     (L) 10/05/2024    PLT 99 (L) 10/04/2024      Anemia:  No results found for: \"FERRITIN\", \"IRON\", \"TIBC\"   BMP:    Lab Results   Component Value Date     10/05/2024     10/04/2024    K 3.6 10/05/2024    K 3.7 10/04/2024     10/05/2024     (H) 10/04/2024    CO2 19 (L) 10/05/2024    CO2 20 (L) 10/04/2024    BUN 30 (H) 10/05/2024    BUN 35 (H) 10/04/2024    CREATININE 1.79 (H) 10/05/2024    CREATININE 1.93 (H) 10/04/2024      Bone disease: No results found for: \"PHOS\", \"PTH\", \"VITD25\"   Urinalysis:    Lab Results   Component Value Date    ROMEO 5.0 10/02/2024    PROTUR NEGATIVE 10/02/2024    GLUCOSEU Normal 10/02/2024    BLOODU NEGATIVE 10/02/2024    KETONESU NEGATIVE 10/02/2024    BILIRUBINU NEGATIVE 10/02/2024    NITRITEU NEGATIVE 10/02/2024    LEUKOCYTESU 75 [place]/µL (A) 10/02/2024        Objective:   Vitals: /81 (BP Location: Left arm, Patient Position: Lying)   Pulse 82   Temp 36.5 °C (97.7 °F) (Temporal)   Resp 18   Ht 1.829 m (6')   Wt 121 kg (266 lb 5.1 oz)   SpO2 95%   BMI 36.12 kg/m²    Wt Readings from Last 3 Encounters:   10/04/24 121 kg (266 lb 5.1 oz)   09/28/24 122 kg (268 lb 8.3 oz)   09/25/24 122 kg (268 lb 1.3 oz)      24HR INTAKE/OUTPUT:    Intake/Output Summary (Last 24 hours) at 10/5/2024 0851  Last data filed at 10/5/2024 0739  Gross per 24 hour   Intake 850 ml   Output 900 ml   Net -50 ml     Admission weight:  Weight: 122 kg (268 lb 15.4 oz)      Constitutional:  Alert, awake, no apparent distress   Skin:normal, no rash  HEENT:sclera anicteric.  Head atraumatic normocephalic  Neck:supple with no thyromegally  Cardiovascular:  S1, S2 " without m/r/g   Respiratory:  CTA B without w/r/r   Abdomen: +bs, soft, nt  Ext: no LE edema  Musculoskeletal:Intact  Neuro:Alert and oriented with no deficit      Electronically signed by Ricky Weller MD on 10/5/2024 at 8:51 AM

## 2024-10-06 ENCOUNTER — APPOINTMENT (OUTPATIENT)
Dept: CARDIOLOGY | Facility: HOSPITAL | Age: 69
End: 2024-10-06
Payer: MEDICARE

## 2024-10-06 ENCOUNTER — ANESTHESIA EVENT (OUTPATIENT)
Dept: OPERATING ROOM | Facility: HOSPITAL | Age: 69
End: 2024-10-06
Payer: MEDICARE

## 2024-10-06 ENCOUNTER — APPOINTMENT (OUTPATIENT)
Dept: RADIOLOGY | Facility: HOSPITAL | Age: 69
End: 2024-10-06
Payer: MEDICARE

## 2024-10-06 ENCOUNTER — ANESTHESIA (OUTPATIENT)
Dept: OPERATING ROOM | Facility: HOSPITAL | Age: 69
End: 2024-10-06
Payer: MEDICARE

## 2024-10-06 VITALS
DIASTOLIC BLOOD PRESSURE: 65 MMHG | HEART RATE: 70 BPM | RESPIRATION RATE: 16 BRPM | BODY MASS INDEX: 36.07 KG/M2 | OXYGEN SATURATION: 94 % | WEIGHT: 266.32 LBS | SYSTOLIC BLOOD PRESSURE: 138 MMHG | HEIGHT: 72 IN | TEMPERATURE: 98.6 F

## 2024-10-06 LAB
ALBUMIN SERPL BCP-MCNC: 3.2 G/DL (ref 3.4–5)
ALP SERPL-CCNC: 202 U/L (ref 33–136)
ALT SERPL W P-5'-P-CCNC: 96 U/L (ref 10–52)
ANION GAP SERPL CALC-SCNC: 14 MMOL/L (ref 10–20)
AST SERPL W P-5'-P-CCNC: 60 U/L (ref 9–39)
ATRIAL RATE: 100 BPM
ATRIAL RATE: 202 BPM
BACTERIA BLD CULT: NORMAL
BASOPHILS # BLD AUTO: 0.05 X10*3/UL (ref 0–0.1)
BASOPHILS NFR BLD AUTO: 0.5 %
BILIRUB SERPL-MCNC: 1.4 MG/DL (ref 0–1.2)
BUN SERPL-MCNC: 28 MG/DL (ref 6–23)
CALCIUM SERPL-MCNC: 8.4 MG/DL (ref 8.6–10.3)
CHLORIDE SERPL-SCNC: 108 MMOL/L (ref 98–107)
CO2 SERPL-SCNC: 19 MMOL/L (ref 21–32)
CREAT SERPL-MCNC: 1.66 MG/DL (ref 0.5–1.3)
EGFRCR SERPLBLD CKD-EPI 2021: 44 ML/MIN/1.73M*2
EOSINOPHIL # BLD AUTO: 0.18 X10*3/UL (ref 0–0.7)
EOSINOPHIL NFR BLD AUTO: 1.8 %
ERYTHROCYTE [DISTWIDTH] IN BLOOD BY AUTOMATED COUNT: 12.7 % (ref 11.5–14.5)
GLUCOSE BLD MANUAL STRIP-MCNC: 146 MG/DL (ref 74–99)
GLUCOSE BLD MANUAL STRIP-MCNC: 154 MG/DL (ref 74–99)
GLUCOSE BLD MANUAL STRIP-MCNC: 162 MG/DL (ref 74–99)
GLUCOSE BLD MANUAL STRIP-MCNC: 249 MG/DL (ref 74–99)
GLUCOSE SERPL-MCNC: 179 MG/DL (ref 74–99)
HAV IGM SER QL: NONREACTIVE
HBV CORE IGM SER QL: NONREACTIVE
HBV SURFACE AG SERPL QL IA: NONREACTIVE
HCT VFR BLD AUTO: 33.1 % (ref 41–52)
HCV AB SER QL: NONREACTIVE
HGB BLD-MCNC: 11.3 G/DL (ref 13.5–17.5)
HOLD SPECIMEN: NORMAL
IMM GRANULOCYTES # BLD AUTO: 0.13 X10*3/UL (ref 0–0.7)
IMM GRANULOCYTES NFR BLD AUTO: 1.3 % (ref 0–0.9)
LYMPHOCYTES # BLD AUTO: 1.62 X10*3/UL (ref 1.2–4.8)
LYMPHOCYTES NFR BLD AUTO: 16 %
MAGNESIUM SERPL-MCNC: 1.77 MG/DL (ref 1.6–2.4)
MCH RBC QN AUTO: 30.8 PG (ref 26–34)
MCHC RBC AUTO-ENTMCNC: 34.1 G/DL (ref 32–36)
MCV RBC AUTO: 90 FL (ref 80–100)
MONOCYTES # BLD AUTO: 1.06 X10*3/UL (ref 0.1–1)
MONOCYTES NFR BLD AUTO: 10.5 %
NEUTROPHILS # BLD AUTO: 7.1 X10*3/UL (ref 1.2–7.7)
NEUTROPHILS NFR BLD AUTO: 69.9 %
NRBC BLD-RTO: 0 /100 WBCS (ref 0–0)
PLATELET # BLD AUTO: 133 X10*3/UL (ref 150–450)
POTASSIUM SERPL-SCNC: 3.6 MMOL/L (ref 3.5–5.3)
PROCALCITONIN SERPL-MCNC: 2.89 NG/ML
PROT SERPL-MCNC: 6.1 G/DL (ref 6.4–8.2)
Q ONSET: 215 MS
Q ONSET: 220 MS
QRS COUNT: 14 BEATS
QRS COUNT: 16 BEATS
QRS DURATION: 108 MS
QRS DURATION: 86 MS
QT INTERVAL: 350 MS
QT INTERVAL: 390 MS
QTC CALCULATION(BAZETT): 435 MS
QTC CALCULATION(BAZETT): 455 MS
QTC FREDERICIA: 405 MS
QTC FREDERICIA: 432 MS
R AXIS: 13 DEGREES
R AXIS: 23 DEGREES
RBC # BLD AUTO: 3.67 X10*6/UL (ref 4.5–5.9)
SODIUM SERPL-SCNC: 137 MMOL/L (ref 136–145)
T AXIS: 43 DEGREES
T AXIS: 52 DEGREES
T OFFSET: 395 MS
T OFFSET: 410 MS
VENTRICULAR RATE: 82 BPM
VENTRICULAR RATE: 93 BPM
WBC # BLD AUTO: 10.1 X10*3/UL (ref 4.4–11.3)

## 2024-10-06 PROCEDURE — 87205 SMEAR GRAM STAIN: CPT | Mod: ELYLAB | Performed by: SURGERY

## 2024-10-06 PROCEDURE — 0J9D0ZZ DRAINAGE OF RIGHT UPPER ARM SUBCUTANEOUS TISSUE AND FASCIA, OPEN APPROACH: ICD-10-PCS | Performed by: SURGERY

## 2024-10-06 PROCEDURE — 93010 ELECTROCARDIOGRAM REPORT: CPT | Performed by: INTERNAL MEDICINE

## 2024-10-06 PROCEDURE — 1210000001 HC SEMI-PRIVATE ROOM DAILY

## 2024-10-06 PROCEDURE — 3600000007 HC OR TIME - EACH INCREMENTAL 1 MINUTE - PROCEDURE LEVEL TWO: Performed by: SURGERY

## 2024-10-06 PROCEDURE — 85025 COMPLETE CBC W/AUTO DIFF WBC: CPT | Performed by: INTERNAL MEDICINE

## 2024-10-06 PROCEDURE — 2720000007 HC OR 272 NO HCPCS: Performed by: SURGERY

## 2024-10-06 PROCEDURE — 36415 COLL VENOUS BLD VENIPUNCTURE: CPT | Performed by: INTERNAL MEDICINE

## 2024-10-06 PROCEDURE — 3700000002 HC GENERAL ANESTHESIA TIME - EACH INCREMENTAL 1 MINUTE: Performed by: SURGERY

## 2024-10-06 PROCEDURE — 83735 ASSAY OF MAGNESIUM: CPT | Performed by: INTERNAL MEDICINE

## 2024-10-06 PROCEDURE — 2500000005 HC RX 250 GENERAL PHARMACY W/O HCPCS: Performed by: SURGERY

## 2024-10-06 PROCEDURE — 2500000002 HC RX 250 W HCPCS SELF ADMINISTERED DRUGS (ALT 637 FOR MEDICARE OP, ALT 636 FOR OP/ED): Performed by: SURGERY

## 2024-10-06 PROCEDURE — 99233 SBSQ HOSP IP/OBS HIGH 50: CPT | Performed by: INTERNAL MEDICINE

## 2024-10-06 PROCEDURE — 84075 ASSAY ALKALINE PHOSPHATASE: CPT | Performed by: INTERNAL MEDICINE

## 2024-10-06 PROCEDURE — 3600000002 HC OR TIME - INITIAL BASE CHARGE - PROCEDURE LEVEL TWO: Performed by: SURGERY

## 2024-10-06 PROCEDURE — 2500000004 HC RX 250 GENERAL PHARMACY W/ HCPCS (ALT 636 FOR OP/ED): Performed by: INTERNAL MEDICINE

## 2024-10-06 PROCEDURE — 2780000003 HC OR 278 NO HCPCS: Performed by: SURGERY

## 2024-10-06 PROCEDURE — 2500000004 HC RX 250 GENERAL PHARMACY W/ HCPCS (ALT 636 FOR OP/ED): Performed by: SURGERY

## 2024-10-06 PROCEDURE — 2500000004 HC RX 250 GENERAL PHARMACY W/ HCPCS (ALT 636 FOR OP/ED): Performed by: ANESTHESIOLOGY

## 2024-10-06 PROCEDURE — 99231 SBSQ HOSP IP/OBS SF/LOW 25: CPT | Performed by: SURGERY

## 2024-10-06 PROCEDURE — 10061 I&D ABSCESS COMP/MULTIPLE: CPT | Performed by: SURGERY

## 2024-10-06 PROCEDURE — 76705 ECHO EXAM OF ABDOMEN: CPT

## 2024-10-06 PROCEDURE — 3700000001 HC GENERAL ANESTHESIA TIME - INITIAL BASE CHARGE: Performed by: SURGERY

## 2024-10-06 PROCEDURE — 2500000001 HC RX 250 WO HCPCS SELF ADMINISTERED DRUGS (ALT 637 FOR MEDICARE OP): Performed by: SURGERY

## 2024-10-06 PROCEDURE — 82947 ASSAY GLUCOSE BLOOD QUANT: CPT

## 2024-10-06 PROCEDURE — 93005 ELECTROCARDIOGRAM TRACING: CPT

## 2024-10-06 PROCEDURE — 76705 ECHO EXAM OF ABDOMEN: CPT | Performed by: RADIOLOGY

## 2024-10-06 RX ORDER — INSULIN GLARGINE 100 [IU]/ML
30 INJECTION, SOLUTION SUBCUTANEOUS
Status: DISPENSED | OUTPATIENT
Start: 2024-10-06

## 2024-10-06 RX ORDER — OXYCODONE AND ACETAMINOPHEN 5; 325 MG/1; MG/1
1 TABLET ORAL EVERY 6 HOURS PRN
Status: ACTIVE | OUTPATIENT
Start: 2024-10-06

## 2024-10-06 RX ORDER — MIDAZOLAM HYDROCHLORIDE 1 MG/ML
INJECTION, SOLUTION INTRAMUSCULAR; INTRAVENOUS AS NEEDED
Status: DISCONTINUED | OUTPATIENT
Start: 2024-10-06 | End: 2024-10-08

## 2024-10-06 RX ORDER — SODIUM CHLORIDE, SODIUM LACTATE, POTASSIUM CHLORIDE, CALCIUM CHLORIDE 600; 310; 30; 20 MG/100ML; MG/100ML; MG/100ML; MG/100ML
INJECTION, SOLUTION INTRAVENOUS CONTINUOUS PRN
Status: DISCONTINUED | OUTPATIENT
Start: 2024-10-06 | End: 2024-10-08

## 2024-10-06 RX ORDER — PROPOFOL 10 MG/ML
INJECTION, EMULSION INTRAVENOUS AS NEEDED
Status: DISCONTINUED | OUTPATIENT
Start: 2024-10-06 | End: 2024-10-08

## 2024-10-06 RX ORDER — FENTANYL CITRATE 50 UG/ML
INJECTION, SOLUTION INTRAMUSCULAR; INTRAVENOUS AS NEEDED
Status: DISCONTINUED | OUTPATIENT
Start: 2024-10-06 | End: 2024-10-08

## 2024-10-06 RX ORDER — LIDOCAINE HYDROCHLORIDE AND EPINEPHRINE 10; 10 MG/ML; UG/ML
INJECTION, SOLUTION INFILTRATION; PERINEURAL AS NEEDED
Status: DISCONTINUED | OUTPATIENT
Start: 2024-10-06 | End: 2024-10-06 | Stop reason: HOSPADM

## 2024-10-06 RX ORDER — SODIUM CHLORIDE 0.9 G/100ML
IRRIGANT IRRIGATION AS NEEDED
Status: DISCONTINUED | OUTPATIENT
Start: 2024-10-06 | End: 2024-10-06 | Stop reason: HOSPADM

## 2024-10-06 SDOH — HEALTH STABILITY: MENTAL HEALTH: CURRENT SMOKER: 0

## 2024-10-06 ASSESSMENT — PAIN SCALES - GENERAL
PAINLEVEL_OUTOF10: 0 - NO PAIN
PAINLEVEL_OUTOF10: 0 - NO PAIN

## 2024-10-06 ASSESSMENT — PAIN - FUNCTIONAL ASSESSMENT: PAIN_FUNCTIONAL_ASSESSMENT: 0-10

## 2024-10-06 NOTE — PROGRESS NOTES
Renal Progress Note  Assessment/  69 y.o. year old male who presented to the emergency department for further evaluation and management chest pain preceded by right upper extremity cellulitis the site of IV line as patient was briefly in the hospital for heart catheterization   Patient does have preadmission chronic comorbidity of hypertension diabetes type 2 coronary artery disease recently during the heart catheterization the patient did receive stenting obstructive sleep apnea is a chronic kidney disease stage III with a baseline creatinine of 1.7 and estimated GFR of 40     Stage II acute kidney injury multifactorial possible contrast-induced nephropathy patient is not on any nonsteroidal anti-inflammatory hemodynamically stable afebrile nonoliguric none polyuric no associated electrolyte or acid-base imbalance up today    In the presence of IV line site cellulitis with white blood cells of 16.1 and left shift kidney involvement in occult infection/bacteremia not essentially postinfectious GN could be a possibility however C3 and C4 are normal    The patient is clinically euvolemic with no clinical evidence of increased extracellular fluid volume    Hemodynamically stable afebrile nonoliguric none polyuric with [place] urine output no dysuria    Urine sediment is UTI type urine sediment nonnephrotic nonnephrotic  Blood culture pending     Plan/  Cont ancef for MSSA  GFR stable.    Outpatient follow up from renal standpoint: Dr. Weller      Subjective:   Admit Date: 10/1/2024    Interval History: GFR stable/ improved.  No cp.  No increase in sob. Blood cx + MSSA.  Repeat cxs are negative so far.  Pt complains of some increased redness at old IV site.  Family feels like he isnt back to his normal self.  weak      Medications:   Scheduled Meds:apixaban, 5 mg, oral, q12h  [Held by provider] atorvastatin, 40 mg, oral, Nightly  ceFAZolin, 2 g, intravenous, q8h  clopidogrel, 75 mg, oral, Daily  finasteride, 5 mg,  "oral, Daily  insulin glargine, 30 Units, subcutaneous, BID AC  insulin lispro, 0-15 Units, subcutaneous, TID  linezolid, 600 mg, intravenous, q12h  metoprolol succinate XL, 25 mg, oral, Daily  pantoprazole, 40 mg, oral, Daily  ranolazine, 500 mg, oral, q12h  tamsulosin, 0.4 mg, oral, Daily  [Held by provider] valsartan, 40 mg, oral, Daily      Continuous Infusions:     CBC:   Lab Results   Component Value Date    HGB 11.3 (L) 10/06/2024    HGB 11.4 (L) 10/05/2024    WBC 10.1 10/06/2024    WBC 8.0 10/05/2024     (L) 10/06/2024     (L) 10/05/2024      Anemia:  No results found for: \"FERRITIN\", \"IRON\", \"TIBC\"   BMP:    Lab Results   Component Value Date     10/06/2024     10/05/2024    K 3.6 10/06/2024    K 3.6 10/05/2024     (H) 10/06/2024     10/05/2024    CO2 19 (L) 10/06/2024    CO2 19 (L) 10/05/2024    BUN 28 (H) 10/06/2024    BUN 30 (H) 10/05/2024    CREATININE 1.66 (H) 10/06/2024    CREATININE 1.79 (H) 10/05/2024      Bone disease: No results found for: \"PHOS\", \"PTH\", \"VITD25\"   Urinalysis:    No results found for: \"ROMEO\", \"PROTUR\", \"GLUCOSEU\", \"BLOODU\", \"KETONESU\", \"BILIRUBINU\", \"NITRITEU\", \"LEUKOCYTESU\", \"UTPCR\"       Objective:   Vitals: /70 (BP Location: Right arm, Patient Position: Lying)   Pulse 86   Temp 36.9 °C (98.4 °F) (Temporal)   Resp 18   Ht 1.829 m (6')   Wt 121 kg (266 lb 5.1 oz)   SpO2 98%   BMI 36.12 kg/m²    Wt Readings from Last 3 Encounters:   10/04/24 121 kg (266 lb 5.1 oz)   09/28/24 122 kg (268 lb 8.3 oz)   09/25/24 122 kg (268 lb 1.3 oz)      24HR INTAKE/OUTPUT:    Intake/Output Summary (Last 24 hours) at 10/6/2024 1018  Last data filed at 10/6/2024 0540  Gross per 24 hour   Intake 600 ml   Output 1150 ml   Net -550 ml     Admission weight:  Weight: 122 kg (268 lb 15.4 oz)      Constitutional:  Alert, awake, no apparent distress   Skin:normal, no rash  HEENT:sclera anicteric.  Head atraumatic normocephalic  Neck:supple with no " thyromegally  Cardiovascular:  S1, S2 without m/r/g   Respiratory:  CTA B without w/r/r   Abdomen: +bs, soft, nt  Ext: no LE edema  Musculoskeletal:Intact  Neuro:Alert and oriented with no deficit      Electronically signed by Ricky Weller MD on 10/6/2024 at 10:18 AM

## 2024-10-06 NOTE — PROGRESS NOTES
Manny Reveles is a 69 y.o. male on day 5 of admission presenting with Chest pain.      Subjective   The patient is seen and evaluated this morning.  His right arm is in heating pad he states his right arm is not improving.  Noted with significant erythema and tenderness.  There is some pocket of induration on the medial aspect above the elbow.  He states he has not had any night sweats anymore.  He denies any nausea vomiting abdominal pain or chest pain.  Remains in atrial fibrillation.  Renal functions are improving today.  Near baseline.  LFTs slightly better but not trending up.  Liver ultrasound is currently pending.  Blood cultures are no growth to date since the 4th       Objective     Last Recorded Vitals  /70 (BP Location: Right arm, Patient Position: Lying)   Pulse 86   Temp 36.9 °C (98.4 °F) (Temporal)   Resp 18   Wt 121 kg (266 lb 5.1 oz)   SpO2 98%   Intake/Output last 3 Shifts:    Intake/Output Summary (Last 24 hours) at 10/6/2024 1131  Last data filed at 10/6/2024 0540  Gross per 24 hour   Intake 600 ml   Output 1150 ml   Net -550 ml       Admission Weight  Weight: 122 kg (268 lb 15.4 oz) (10/01/24 2028)    Daily Weight  10/04/24 : 121 kg (266 lb 5.1 oz)    Image Results  ECG 12 Lead  Atrial fibrillation with premature ventricular or aberrantly conducted complexes  Abnormal ECG  When compared with ECG of 05-OCT-2024 12:13, (unconfirmed)  No significant change was found      Physical Exam  HENT:      Head: Normocephalic and atraumatic.      Nose: Nose normal.      Mouth/Throat:      Mouth: Mucous membranes are dry.   Eyes:      Extraocular Movements: Extraocular movements intact.      Conjunctiva/sclera: Conjunctivae normal.   Cardiovascular:      Rate and Rhythm: Normal rate and regular rhythm.      Pulses: Normal pulses.      Heart sounds: Normal heart sounds.   Pulmonary:      Effort: Pulmonary effort is normal.      Breath sounds: Normal breath sounds.   Abdominal:      General: Bowel  sounds are normal.      Palpations: Abdomen is soft.   Musculoskeletal:         General: Normal range of motion.      Cervical back: Normal range of motion and neck supple.   Skin:     General: Skin is warm and dry.      Comments: Right upper extremity distal to antecubital fossa there is erythema edema and tenderness which is improving   Neurological:      General: No focal deficit present.      Mental Status: He is alert. Mental status is at baseline.   Psychiatric:         Mood and Affect: Mood normal.         Relevant Results               Assessment/Plan          Manny Reveles is a 69 y.o. male with a significant past medical history of HTN, DLD, DM2, CAD s/p multiple PCI, PAZ, CKD3 presenting to [place] [facility] with wife for complaints of chest pain and altered mental status admitted with sepsis and right upper extremity cellulitis.  Patient with MSSA bacteremia.  New onset atrial fibrillation underwent DUONG that did not reveal vegetation endocarditis or thrombus.  Patient has been started on Eliquis and has been continued with Plavix.        Assessment & Plan  Chest pain    Bacteremia with MSSA  Right arm cellulitis- old IV site  Sepsis present on admission in setting of fever hypotension, altered mental status, likely source cellulitis and thrombophlebitis  -Was on vancomycin sensitivities show MSSA has been switched to cefazolin, due to lack of improvement linezolid has been added by infectious disease.  Monitoring LFTs closely.  -Blood cultures from 10/1/2024 show MSSA.  Cultures remain positive on 10/2/2024.  Continue with daily blood cultures and infectious diseases recommending PICC line once cultures have been clear for 48 hours.  Blood cultures have so far remain negative starting 10/4/2024  -Underwent DUONG on 10/4/2024 without any evidence of thrombi and no evidence of endocarditis or vegetation.  LVEF is 60 to 65%.  -Right upper extremity cephalic vein thrombosis based on ultrasound no DVT.  -Appreciate  surgery recommendations I&D was attempted on 10/5/2024 but nothing was aspirated.  Possible OR for I&D   -Procalcitonin is improving from 12.81 inially, to 2.89 today.    Metabolic encephalopathy-now resolved likely due to above    New onset atrial fibrillation  S/p PCI of the LAD from recent admission.   -Appreciate cardiology recommendations possible DUONG cardioversion down the road per cardiology.  -Continue with Plavix and beta-blockers.  -New onset atrial fibrillation rate is currently controlled has been started on Eliquis as well.  Possible cardioversion per cardiology later.  -Right upper extremity without any evidence of DVT and heparin was discontinued.    ANTONIO on CKD3  - on admission 2.33, renal function started to improve.  Appreciate nephrology recommendations.  Renal functions are nearing baseline    Transaminitis  New elevation in LFTs, No hypotension noted. DILI is on the differential. . Trend LFTs, ultrasound of the liver is pending at this time.  Acute hepatitis panel is negative.  LFTs are not trending up anymore we will continue to monitor closely.  Holding off on statins at this time.    Thrombocytopenia  -Platelets are starting to improve.  Baseline around 150-160. Recent Heparin exposure. Cont to monitor. Currently off Heparin.      DM2  - accu checks and SSI, added basal insulin as well, increase Lantus to 30 twice daily  - diet carb controlled     Hypomagnesemia-replaced and improved    VTE prophylaxis-Yordy Wood MD

## 2024-10-06 NOTE — PROGRESS NOTES
Infectious Disease Progress Note       10/5/2024    Patient is a followup regarding MSSA bacteremia,   Feels okay at the present time.  Family at bedside.    States that he feels okay but has been foggy headed.  Tells me that he wears CPAP at home and did not bring it with him.  His tubing at home has been malfunctioning so the fit is not correct.  This may explain his persistent foggy headedness.    Worsening pain and induration of the R arm. There is streaking cellulitis up the arm as well.     Lab Results   Component Value Date    WBC 8.0 10/05/2024    HGB 11.4 (L) 10/05/2024    HCT 33.4 (L) 10/05/2024    MCV 90 10/05/2024     (L) 10/05/2024     Lab Results   Component Value Date    GLUCOSE 221 (H) 10/05/2024    CALCIUM 8.3 (L) 10/05/2024     10/05/2024    K 3.6 10/05/2024    CO2 19 (L) 10/05/2024     10/05/2024    BUN 30 (H) 10/05/2024    CREATININE 1.79 (H) 10/05/2024       WBC trends are being monitored. Antibiotic doses are being adjusted per most recent renal labs.     Vitals:    10/05/24 1932   BP: 133/78   Pulse: 77   Resp: 16   Temp: 37.5 °C (99.5 °F)   SpO2: 92%     Patient is awake and alert  NAD  Neck supple  Heart S1S2  Chest: Equal expansion, bilaterally clear to auscultation  Abdomen: soft, ND, NTTP, no guarding  Extrem:  photo uploaded - worsening swelling and blister with streaking cellulitis now up the arm despite the cefazolin  Skin: no rashes, no diaphoresis  Neuro: CNS intact  Affect appropriate and patient is interactive      Patient Active Problem List   Diagnosis    Stage 3 chronic kidney disease (Multi)    Obstructive sleep apnea syndrome    Essential hypertriglyceridemia    CAD S/P percutaneous coronary angioplasty    Benign prostatic hyperplasia with urinary obstruction    Benign essential hypertension    Calculus of kidney    Incomplete bladder emptying    Squamous cell carcinoma of skin of other parts of face    Type 2 diabetes mellitus without retinopathy (Multi)     Class 2 severe obesity due to excess calories with serious comorbidity and body mass index (BMI) of 35.0 to 35.9 in adult    Hypokalemia    Uses self-applied continuous glucose monitoring device    Never smoked tobacco    Unstable angina (Multi)    Chest pain         ASSESSMENT:  Right arm thrombophlebitis with cellulitis complicated by MSSA bacteremia s/p attempted aspiration. Now with worsening induration and swelling.   ANTONIO -creatinine clearance steadily improving  Atrial fib on anticoagulation    PLAN:  Changed antibiotic to cefazolin IV to help direct therapy. Discussed with pharmacy  - dose is appropriate. Unfortunately the arm is worse and there is spreading induration with new purulent appearing blister. Photo uploaded. Increased swelling.   BC clear so far from 10/4  Will add heating pad and Zyvox.   Monitoring LFTS - he was changed to Cefazoin overnight - monitor LFTs. Although rare, cefazolin can potentially cause increase in LFTS. Monitor on the cefazolin. If continues to increase, then will need to stop this medication. Temporarily adding zyvox since the arm is very indurated and warm and swollen with new blister post attempted aspiration. If the arm does not improve overnight with conservative treatment with additional abx and heating pad, may need surgical intervention tomorrow.     Crcl improved.     Will likely need to go home on IV antibiotics due to Staph aureus bacteremia  Line cannot be placed until Staph aureus bacteremia clear for 48 hours at minimum    New A fib - cardioversion pending.     Estimated Creatinine Clearance: 52.3 mL/min (A) (by C-G formula based on SCr of 1.79 mg/dL (H)).        Imaging and labs were reviewed per medical records and any ID pertinent labs were also addressed  Time spent before, during and after care today, including coordination of care >40 min      Nae Uriostegui DO

## 2024-10-06 NOTE — ANESTHESIA PREPROCEDURE EVALUATION
Patient: Manny Reveles    Procedure Information       Date/Time: 10/06/24 8982    Procedure: Incision and Drainage Upper Extremity (Right)    Location: [facility] / [facility]    Surgeons: Christie George MD            Relevant Problems   Cardiac   (+) Benign essential hypertension   (+) Chest pain   (+) Essential hypertriglyceridemia   (+) Unstable angina (Multi)      Pulmonary   (+) Obstructive sleep apnea syndrome      /Renal   (+) Benign prostatic hyperplasia with urinary obstruction   (+) Calculus of kidney      Endocrine   (+) Class 2 severe obesity due to excess calories with serious comorbidity and body mass index (BMI) of 35.0 to 35.9 in adult   (+) Type 2 diabetes mellitus without retinopathy (Multi)      Skin   (+) Squamous cell carcinoma of skin of other parts of face       Clinical information reviewed:   Tobacco  Allergies  Meds  Problems  Med Hx  Surg Hx   Fam Hx  Soc   Hx        NPO Detail:  No data recorded     Physical Exam    Airway  Mallampati: III     Cardiovascular - normal exam  Rhythm: regular     Dental - normal exam     Pulmonary - normal exam     Abdominal - normal exam             Anesthesia Plan    History of general anesthesia?: yes  History of complications of general anesthesia?: no    ASA 3 - emergent     MAC and general     The patient is not a current smoker.    intravenous induction   Anesthetic plan and risks discussed with patient.

## 2024-10-06 NOTE — PROGRESS NOTES
[facility] Progress Note               Rounding Cardiologist:  Evan Hansen MD, MD   Primary Cardiologist: Dr. Evan Hansen    Date:  10/6/2024  Patient:  Manny Reveles  YOB: 1955  MRN:  [id number]   Admit Date:  10/1/2024      SUBJECTIVE    Manny Reveles was seen and examined today at bedside.    Patient is doing well.  Awaiting incisional debridement of abscess of the right arm  Patient converted back to sinus rhythm at 9:40 AM today    EKG performed today shows atrial fibrillation at a rate of 82 bpm QRS duration 108 ms QT corrected 455 ms.  Sodium 137 potassium 3.6 BUN 28 creatinine 1.66 ALT 96 AST 60 magnesium 1.77 WBC 10.1 hemoglobin 11.3 hematocrit 33.1 platelet count 133  VITALS     Vitals:    10/05/24 1609 10/05/24 1932 10/06/24 0055 10/06/24 0832   BP: 128/59 133/78 99/59 126/70   BP Location: Left arm Left arm Left arm Right arm   Patient Position: Lying Sitting Lying Lying   Pulse: 80 77 79 86   Resp: 17 16 16 18   Temp: 36.9 °C (98.4 °F) 37.5 °C (99.5 °F) 36.9 °C (98.4 °F) 36.9 °C (98.4 °F)   TempSrc: Temporal Temporal Temporal Temporal   SpO2: 97% 92% 96% 98%   Weight:       Height:           Intake/Output Summary (Last 24 hours) at 10/6/2024 1142  Last data filed at 10/6/2024 0540  Gross per 24 hour   Intake 600 ml   Output 1150 ml   Net -550 ml       [unfilled]    PHYSICAL EXAM   HENT:      Head: Normocephalic and atraumatic.      Nose: Nose normal.      Mouth/Throat:      Mouth: Mucous membranes are dry.   Eyes:      Extraocular Movements: Extraocular movements intact.      Conjunctiva/sclera: Conjunctivae normal.   Cardiovascular:      Rate and Rhythm: Normal rate and regular rhythm.      Pulses: Normal pulses.      Heart sounds: Normal heart sounds.   Pulmonary:      Effort: Pulmonary effort is normal.      Breath sounds: Normal breath sounds.   Abdominal:      General: Bowel sounds are normal.      Palpations: Abdomen is soft.   Musculoskeletal:         General: Normal  "range of motion.      Cervical back: Normal range of motion and neck supple.   Skin:     General: Skin is warm and dry.      Comments: Right upper extremity distal to antecubital fossa there is erythema edema and tenderness which is improving   Neurological:      General: No focal deficit present.      Mental Status: He is alert. Mental status is at baseline.   Psychiatric:         Mood and Affect: Mood normal.       DIAGNOSTIC RESULTS   EKG:     Telemetry: Currently sinus rhythm.      LAB DATA   BMP:  @LABRCNT(Na:3,K:3,CL:3,CO2:3,Bun:3,Creatinine:3,Glu:3, CA:3,LABGLOM)@    Cardiac Enzymes:  @LABRCNT(CKTOTAL:3,CKMB:3,CKMBINDEX:3,TROPONINI:3)@    CBC:   Lab Results   Component Value Date    WBC 10.1 10/06/2024    RBC 3.67 (L) 10/06/2024    HGB 11.3 (L) 10/06/2024    HCT 33.1 (L) 10/06/2024     (L) 10/06/2024       CMP:    Lab Results   Component Value Date     10/06/2024    K 3.6 10/06/2024     (H) 10/06/2024    CO2 19 (L) 10/06/2024    BUN 28 (H) 10/06/2024    CREATININE 1.66 (H) 10/06/2024    GLUCOSE 179 (H) 10/06/2024    CALCIUM 8.4 (L) 10/06/2024       Hepatic Function Panel:    Lab Results   Component Value Date    ALKPHOS 202 (H) 10/06/2024    ALT 96 (H) 10/06/2024    AST 60 (H) 10/06/2024    PROT 6.1 (L) 10/06/2024    BILITOT 1.4 (H) 10/06/2024       Magnesium:    Lab Results   Component Value Date    MG 1.77 10/06/2024       PT/INR:  No results found for: \"PROTIME\", \"INR\"  @LABRCNT(inr:3)@     HgBA1c:  No components found for: \"LABA1C\"    Lipid Profile:  No results found for: \"CHLPL\", \"TRIG\", \"HDL\", \"LDLCALC\", \"LDLDIRECT\"    TSH:  No results found for: \"TSH\"    ABG:  No results found for: \"PH\"    PRO-BNP: No results found for: \"PROBNP\"       RADIOLOGY     Transesophageal Echo (DUONG)   Final Result      Vascular US Upper Extremity Venous Duplex Right   Final Result   No sonographic evidence of deep venous thrombosis.   Positive for superficial thrombosis of the right cephalic vein.      "   MACRO:   None.        Signed by: Roger Grajeda 10/2/2024 9:39 AM   Dictation workstation:   [id number]      XR chest 1 view   Final Result   1.  No evidence of acute cardiopulmonary process.                  MACRO:   None        Signed by: Chon Gifford 10/1/2024 9:15 PM   Dictation workstation:   [id number]      US abdomen limited liver    (Results Pending)       [unfilled]      CURRENT MEDICATIONS    apixaban, 5 mg, oral, q12h  [Held by provider] atorvastatin, 40 mg, oral, Nightly  ceFAZolin, 2 g, intravenous, q8h  clopidogrel, 75 mg, oral, Daily  finasteride, 5 mg, oral, Daily  insulin glargine, 30 Units, subcutaneous, BID AC  insulin lispro, 0-15 Units, subcutaneous, TID  linezolid, 600 mg, intravenous, q12h  metoprolol succinate XL, 25 mg, oral, Daily  pantoprazole, 40 mg, oral, Daily  ranolazine, 500 mg, oral, q12h  tamsulosin, 0.4 mg, oral, Daily  [Held by provider] valsartan, 40 mg, oral, Daily             ASSESSMENT   Principal Problem:    Chest pain        Patient Active Problem List   Diagnosis    Stage 3 chronic kidney disease (Multi)    Obstructive sleep apnea syndrome    Essential hypertriglyceridemia    CAD S/P percutaneous coronary angioplasty    Benign prostatic hyperplasia with urinary obstruction    Benign essential hypertension    Calculus of kidney    Incomplete bladder emptying    Squamous cell carcinoma of skin of other parts of face    Type 2 diabetes mellitus without retinopathy (Multi)    Class 2 severe obesity due to excess calories with serious comorbidity and body mass index (BMI) of 35.0 to 35.9 in adult    Hypokalemia    Uses self-applied continuous glucose monitoring device    Never smoked tobacco    Unstable angina (Multi)    Chest pain         Chest pain/unstable angina  ASHD class II  Remote multivessel angioplasty and stenting  Stage III kidney disease  Sleep apnea  Type 2 diabetes  Moderate obesity  Renal calculus in the past  Dyslipidemia  New diagnosed atrial fibrillation  during this admission  Bacteremia with MSSA  Sepsis on admission with fever, hypotension, altered mental status.  PLAN     Continue with beta-blocker  Continue Eliquis therapy  Patient is back in normal rhythm  LFTs trending down  We may have to reevaluate him as an outpatient for use of antiarrhythmic therapy in the near future.  Continue telemetry  EKG daily    Evan Hansen MD      Please do not hesitate to call with questions.  Electronically signed by Evan Hansen MD, FACC on 10/6/2024 at 11:42 AM

## 2024-10-06 NOTE — PROGRESS NOTES
Manny Reveles is a 69 y.o. male on day 5 of admission presenting with Chest pain.    Subjective   No new complaints.  Arm pain worsening.  More limited extension secondary to pain.  Worsening erythema but remains localized to the antecubital fossa, no extension from previous markings.  More induration but no obvious fluctuance       Objective     Physical Exam  No fevers vital signs otherwise stable  No acute distress  Right upper extremity with AC fossa localized induration tenderness and erythema, no fluctuance or necrosis.    Last Recorded Vitals  Blood pressure 126/70, pulse 86, temperature 36.9 °C (98.4 °F), temperature source Temporal, resp. rate 18, height 1.829 m (6'), weight 121 kg (266 lb 5.1 oz), SpO2 98%.  Intake/Output last 3 Shifts:  I/O last 3 completed shifts:  In: 1650 (13.7 mL/kg) [P.O.:750; IV Piggyback:900]  Out: 2050 (17 mL/kg) [Urine:2050 (0.5 mL/kg/hr)]  Weight: 120.8 kg     Relevant Results                              Assessment/Plan   Assessment & Plan  Chest pain    Right arm cellulitis.  Concern for nonresolution and gradual clinical worsening.  For I&D today.  Indications for procedure discussed as well as risks of bleeding, infection, delayed wound healing, anesthesia complications.  Understands wishes to proceed continue blood thinners.  Continue antibiotics       I spent  minutes in the professional and overall care of this patient.      Christie George MD

## 2024-10-06 NOTE — OP NOTE
Incision and Drainage Upper Extremity (R) Operative Note     Date: 10/6/2024  OR Location: [facility] OR    Name: Manny Reveles, : 1955, Age: 69 y.o., MRN: [id number], Sex: male    Diagnosis  * No Diagnosis Codes entered * * No Diagnosis Codes entered *     Procedures  Incision and Drainage Upper Extremity  67027 - AL INCISION & DRAINAGE ABSCESS SIMPLE/SINGLE      Surgeons      * Christie George - Primary    Resident/Fellow/Other Assistant:  Surgeons and Role:  * No surgeons found with a matching role *    Procedure Summary  Anesthesia: General, Monitor Anesthesia Care  ASA: III  Anesthesia Staff: Anesthesiologist: Sg Gonzalez MD  Estimated Blood Loss: 0mL  Intra-op Medications:   Administrations occurring from 1445 to 1610 on 10/06/24:   Medication Name Total Dose   sodium chloride 0.9 % irrigation solution 1,000 mL   lidocaine-epinephrine (Xylocaine W/EPI) 1 %-1:100,000 injection 10 mL   insulin glargine (Lantus) injection 30 Units Cannot be calculated              Anesthesia Record               Intraprocedure I/O Totals       None           Specimen:   ID Type Source Tests Collected by Time   A : Right Arm Abscess Swab ABSCESS TISSUE/WOUND CULTURE/SMEAR Christie George MD 10/6/2024 1518        Staff:   Circulator: Kadie  Scrub Person: Jose Alberto         Drains and/or Catheters: * None in log *    Tourniquet Times:         Implants:     Findings: See report    Indications: Manny Reveles is an 69 y.o. male who is having surgery for * No pre-op diagnosis entered *.  Right upper extremity abscess    The patient was seen in the preoperative area. The risks, benefits, complications, treatment options, non-operative alternatives, expected recovery and outcomes were discussed with the patient. The possibilities of reaction to medication, pulmonary aspiration, injury to surrounding structures, bleeding, recurrent infection, the need for additional procedures, failure to diagnose a condition, and creating a complication  requiring transfusion or operation were discussed with the patient. The patient concurred with the proposed plan, giving informed consent.  The site of surgery was properly noted/marked if necessary per policy. The patient has been actively warmed in preoperative area. Preoperative antibiotics given.  Venous thrombosis prophylaxis given.    Procedure Details: After satisfactory induction of anesthesia, patient was prepped and draped.  Local anesthesia was infiltrated overlying the point of maximum induration across the antecubital fossa of the right upper extremity as well as the right medial arm.  Incision was made overlying these areas into the subcutaneous tissues.  Dissection was further carried down with cautery.  No obvious abscess was encountered but edematous tissue was seen with some separation of the fascial planes.  No necrosis.  Cultures obtained.  The wounds were copiously irrigated.  Hemostasis assured with generous cautery.  The wound is packed with Surgicel powder and saline soaked gauze.  Dressings applied.  Patient awakened from anesthesia and transferred to recovery in satisfactory condition.  Tolerated well.  Complications:  None; patient tolerated the procedure well.    Disposition: PACU - hemodynamically stable.  Condition: stable         Additional Details:     Attending Attestation:     Christie George  Phone Number: [phone]

## 2024-10-07 ENCOUNTER — APPOINTMENT (OUTPATIENT)
Dept: CARDIOLOGY | Facility: HOSPITAL | Age: 69
End: 2024-10-07
Payer: MEDICARE

## 2024-10-07 LAB
ALBUMIN SERPL BCP-MCNC: 3.2 G/DL (ref 3.4–5)
ALP SERPL-CCNC: 186 U/L (ref 33–136)
ALT SERPL W P-5'-P-CCNC: 60 U/L (ref 10–52)
ANION GAP SERPL CALC-SCNC: 11 MMOL/L (ref 10–20)
AST SERPL W P-5'-P-CCNC: 33 U/L (ref 9–39)
ATRIAL RATE: 57 BPM
BACTERIA BLD AEROBE CULT: ABNORMAL
BACTERIA BLD CULT: ABNORMAL
BASOPHILS # BLD AUTO: 0.06 X10*3/UL (ref 0–0.1)
BASOPHILS NFR BLD AUTO: 0.5 %
BILIRUB SERPL-MCNC: 1.4 MG/DL (ref 0–1.2)
BUN SERPL-MCNC: 26 MG/DL (ref 6–23)
CALCIUM SERPL-MCNC: 8.5 MG/DL (ref 8.6–10.3)
CHLORIDE SERPL-SCNC: 106 MMOL/L (ref 98–107)
CO2 SERPL-SCNC: 23 MMOL/L (ref 21–32)
CREAT SERPL-MCNC: 1.62 MG/DL (ref 0.5–1.3)
EGFRCR SERPLBLD CKD-EPI 2021: 46 ML/MIN/1.73M*2
EOSINOPHIL # BLD AUTO: 0.16 X10*3/UL (ref 0–0.7)
EOSINOPHIL NFR BLD AUTO: 1.4 %
ERYTHROCYTE [DISTWIDTH] IN BLOOD BY AUTOMATED COUNT: 13 % (ref 11.5–14.5)
GLUCOSE BLD MANUAL STRIP-MCNC: 127 MG/DL (ref 74–99)
GLUCOSE BLD MANUAL STRIP-MCNC: 212 MG/DL (ref 74–99)
GLUCOSE BLD MANUAL STRIP-MCNC: 215 MG/DL (ref 74–99)
GLUCOSE BLD MANUAL STRIP-MCNC: 224 MG/DL (ref 74–99)
GLUCOSE SERPL-MCNC: 152 MG/DL (ref 74–99)
GRAM STN SPEC: ABNORMAL
HCT VFR BLD AUTO: 31 % (ref 41–52)
HGB BLD-MCNC: 10.4 G/DL (ref 13.5–17.5)
IMM GRANULOCYTES # BLD AUTO: 0.25 X10*3/UL (ref 0–0.7)
IMM GRANULOCYTES NFR BLD AUTO: 2.2 % (ref 0–0.9)
INTERPRETATION FOR ANTI-PLATELET FACTOR 4 ANTIBODY: NORMAL
LYMPHOCYTES # BLD AUTO: 1.4 X10*3/UL (ref 1.2–4.8)
LYMPHOCYTES NFR BLD AUTO: 12.1 %
MAGNESIUM SERPL-MCNC: 1.87 MG/DL (ref 1.6–2.4)
MCH RBC QN AUTO: 30.7 PG (ref 26–34)
MCHC RBC AUTO-ENTMCNC: 33.5 G/DL (ref 32–36)
MCV RBC AUTO: 91 FL (ref 80–100)
MONOCYTES # BLD AUTO: 1.12 X10*3/UL (ref 0.1–1)
MONOCYTES NFR BLD AUTO: 9.6 %
NEUTROPHILS # BLD AUTO: 8.62 X10*3/UL (ref 1.2–7.7)
NEUTROPHILS NFR BLD AUTO: 74.2 %
NRBC BLD-RTO: 0 /100 WBCS (ref 0–0)
P AXIS: -8 DEGREES
P OFFSET: 202 MS
P ONSET: 146 MS
PLATELET # BLD AUTO: 141 X10*3/UL (ref 150–450)
POTASSIUM SERPL-SCNC: 3.9 MMOL/L (ref 3.5–5.3)
PR INTERVAL: 134 MS
PROCALCITONIN SERPL-MCNC: 1.19 NG/ML
PROT SERPL-MCNC: 6.2 G/DL (ref 6.4–8.2)
Q ONSET: 213 MS
QRS COUNT: 9 BEATS
QRS DURATION: 108 MS
QT INTERVAL: 444 MS
QTC CALCULATION(BAZETT): 432 MS
QTC FREDERICIA: 436 MS
R AXIS: 36 DEGREES
RBC # BLD AUTO: 3.39 X10*6/UL (ref 4.5–5.9)
SERUM AND PLATELET FACTOR 4: NORMAL
SODIUM SERPL-SCNC: 136 MMOL/L (ref 136–145)
T AXIS: 43 DEGREES
T OFFSET: 435 MS
VENTRICULAR RATE: 57 BPM
WBC # BLD AUTO: 11.6 X10*3/UL (ref 4.4–11.3)

## 2024-10-07 PROCEDURE — 93010 ELECTROCARDIOGRAM REPORT: CPT | Performed by: INTERNAL MEDICINE

## 2024-10-07 PROCEDURE — 36415 COLL VENOUS BLD VENIPUNCTURE: CPT | Performed by: SURGERY

## 2024-10-07 PROCEDURE — 82947 ASSAY GLUCOSE BLOOD QUANT: CPT

## 2024-10-07 PROCEDURE — 84145 PROCALCITONIN (PCT): CPT | Mod: ELYLAB | Performed by: SURGERY

## 2024-10-07 PROCEDURE — 99233 SBSQ HOSP IP/OBS HIGH 50: CPT | Performed by: INTERNAL MEDICINE

## 2024-10-07 PROCEDURE — 2500000001 HC RX 250 WO HCPCS SELF ADMINISTERED DRUGS (ALT 637 FOR MEDICARE OP): Performed by: SURGERY

## 2024-10-07 PROCEDURE — 99232 SBSQ HOSP IP/OBS MODERATE 35: CPT | Performed by: INTERNAL MEDICINE

## 2024-10-07 PROCEDURE — 80053 COMPREHEN METABOLIC PANEL: CPT | Performed by: SURGERY

## 2024-10-07 PROCEDURE — 2500000002 HC RX 250 W HCPCS SELF ADMINISTERED DRUGS (ALT 637 FOR MEDICARE OP, ALT 636 FOR OP/ED): Performed by: SURGERY

## 2024-10-07 PROCEDURE — 2500000004 HC RX 250 GENERAL PHARMACY W/ HCPCS (ALT 636 FOR OP/ED): Mod: JZ | Performed by: SURGERY

## 2024-10-07 PROCEDURE — 83735 ASSAY OF MAGNESIUM: CPT | Performed by: SURGERY

## 2024-10-07 PROCEDURE — 1210000001 HC SEMI-PRIVATE ROOM DAILY

## 2024-10-07 PROCEDURE — 93005 ELECTROCARDIOGRAM TRACING: CPT

## 2024-10-07 PROCEDURE — 85025 COMPLETE CBC W/AUTO DIFF WBC: CPT | Performed by: SURGERY

## 2024-10-07 RX ORDER — OXYCODONE AND ACETAMINOPHEN 5; 325 MG/1; MG/1
1 TABLET ORAL EVERY 8 HOURS PRN
Qty: 8 TABLET | Refills: 0 | Status: SHIPPED | OUTPATIENT
Start: 2024-10-07 | End: 2024-10-14

## 2024-10-07 ASSESSMENT — PAIN SCALES - GENERAL
PAINLEVEL_OUTOF10: 8
PAINLEVEL_OUTOF10: 0 - NO PAIN
PAINLEVEL_OUTOF10: 2
PAINLEVEL_OUTOF10: 3
PAINLEVEL_OUTOF10: 7
PAINLEVEL_OUTOF10: 7

## 2024-10-07 ASSESSMENT — COGNITIVE AND FUNCTIONAL STATUS - GENERAL
MOBILITY SCORE: 24
DAILY ACTIVITIY SCORE: 24

## 2024-10-07 ASSESSMENT — PAIN - FUNCTIONAL ASSESSMENT
PAIN_FUNCTIONAL_ASSESSMENT: 0-10

## 2024-10-07 ASSESSMENT — PAIN DESCRIPTION - DESCRIPTORS: DESCRIPTORS: ACHING

## 2024-10-07 ASSESSMENT — PAIN DESCRIPTION - LOCATION: LOCATION: ARM

## 2024-10-07 ASSESSMENT — PAIN DESCRIPTION - ORIENTATION: ORIENTATION: RIGHT

## 2024-10-07 NOTE — PROGRESS NOTES
Infectious Disease Progress Note       10/7/2024    Patient is a followup regarding MSSA bacteremia,   Feels okay at the present time.         pain and induration of the R arm. Had surgery yesterday  Lab Results   Component Value Date    WBC 11.6 (H) 10/07/2024    HGB 10.4 (L) 10/07/2024    HCT 31.0 (L) 10/07/2024    MCV 91 10/07/2024     (L) 10/07/2024     Lab Results   Component Value Date    GLUCOSE 152 (H) 10/07/2024    CALCIUM 8.5 (L) 10/07/2024     10/07/2024    K 3.9 10/07/2024    CO2 23 10/07/2024     10/07/2024    BUN 26 (H) 10/07/2024    CREATININE 1.62 (H) 10/07/2024       WBC trends are being monitored. Antibiotic doses are being adjusted per most recent renal labs.     Vitals:    10/07/24 0742   BP: 138/61   Pulse: 70   Resp:    Temp: 36.2 °C (97.2 °F)   SpO2: 94%     Patient is awake and alert  NAD  Neck supple  Heart S1S2  Chest: Equal expansion, bilaterally clear to auscultation  Abdomen: soft, ND, NTTP, no guarding  Extrem:  photo uploaded - worsening swelling and blister with streaking cellulitis now up the arm despite the cefazolin  Skin: no rashes, no diaphoresis  Neuro: CNS intact  Affect appropriate and patient is interactive      Patient Active Problem List   Diagnosis    Stage 3 chronic kidney disease (Multi)    Obstructive sleep apnea syndrome    Essential hypertriglyceridemia    CAD S/P percutaneous coronary angioplasty    Benign prostatic hyperplasia with urinary obstruction    Benign essential hypertension    Calculus of kidney    Incomplete bladder emptying    Squamous cell carcinoma of skin of other parts of face    Type 2 diabetes mellitus without retinopathy (Multi)    Class 2 severe obesity due to excess calories with serious comorbidity and body mass index (BMI) of 35.0 to 35.9 in adult    Hypokalemia    Uses self-applied continuous glucose monitoring device    Never smoked tobacco    Unstable angina (Multi)    Chest pain         ASSESSMENT:  Right arm  thrombophlebitis with cellulitis complicated by MSSA bacteremia s/p attempted aspiration. Now with worsening induration and swelling.   ANTONIO -creatinine clearance steadily improving  Atrial fib on anticoagulation    PLAN:  Changed antibiotic to cefazolin IV to help direct therapy. Discussed with pharmacy  - dose is appropriate. Unfortunately the arm is worse and there is spreading induration with new purulent appearing blister. Photo uploaded. Increased swelling.   BC clear so far from 10/4  Will add heating pad and Zyvox.   Monitoring LFTS - he was changed to Cefazoin overnight - monitor LFTs. Although rare, cefazolin can potentially cause increase in LFTS. Monitor on the cefazolin.Temporarily adding zyvox since the arm is very indurated and warm and swollen with new      Will likely need to go home on IV antibiotics due to Staph aureus bacteremia  Line cannot be placed until Staph aureus bacteremia clear for 48 hours at minimum    Estimated Creatinine Clearance: 50.9 mL/min (A) (by C-G formula based on SCr of 1.84 mg/dL (H)).    Kiko Addison MD

## 2024-10-07 NOTE — CARE PLAN
The patient's goals for the shift include Labs WNL    The clinical goals for the shift include safety    Problem: Pain - Adult  Goal: Verbalizes/displays adequate comfort level or baseline comfort level  Outcome: Progressing

## 2024-10-07 NOTE — PROGRESS NOTES
Manny Reveles is a 69 y.o. male on day 6 of admission presenting with Chest pain.      Subjective   The patient is seen and evaluated this morning.  The patient is sitting in his chair and feels better.  The patient had I&D of the right arm yesterday with packing in place.  The wound site was reviewed with Dr. George this morning.  Patient denies any nausea vomiting chest pain shortness of breath.  Patient did report some night sweats still.  Blood cultures have come back negative since the fourth and has been more than 48 hours for negative cultures at this time.  Will await for infectious disease recommendations for possible PICC line today.  LFTs are improving, renal function is near baseline, platelets have improved.       Objective     Last Recorded Vitals  /61   Pulse 70   Temp 36.2 °C (97.2 °F) (Temporal)   Resp 20   Wt 121 kg (266 lb 5.1 oz)   SpO2 94%   Intake/Output last 3 Shifts:    Intake/Output Summary (Last 24 hours) at 10/7/2024 1140  Last data filed at 10/7/2024 0400  Gross per 24 hour   Intake 900 ml   Output 375 ml   Net 525 ml       Admission Weight  Weight: 122 kg (268 lb 15.4 oz) (10/01/24 2028)    Daily Weight  10/04/24 : 121 kg (266 lb 5.1 oz)    Image Results  US abdomen limited liver  Narrative: Interpreted By:  Cristino Koenig,   STUDY:  US ABDOMEN LIMITED LIVER;  10/6/2024 7:56 am      INDICATION:  Signs/Symptoms:transaminitis.          COMPARISON:  CT abdomen and pelvis without contrast 23 July 2024      ACCESSION NUMBER(S):  [id number]      ORDERING CLINICIAN:  ALFRED KITCHEN      TECHNIQUE:  Transverse and longitudinal grayscale and color Doppler ultrasound of  the right upper quadrant, including the liver, biliary system and  pancreas, and including limited views of the right kidney      FINDINGS:  LIVER:  Contour:  Slightly lobular but not nodular  Echogenicity and Echotexture:  Borderline abnormally echogenic  Size (craniocaudal long axis, in cm): 22.7, moderately  enlarged  Other:  No compelling sonographic evidence for solid hepatic mass      BILE DUCTS:  There is no intra- or proximal / perihilar extrahepatic biliary  ductal dilation. Common duct diameter: 4-5 mm.      GALLBLADDER:  Shadowing gallstones: Positive  Other:  No dilation or wall thickening.      PANCREAS:  Limited views of portions of the head and/or proximal body of the  gland are unremarkable, without duct dilation; other portions of the  gland are obscured by overlying, shadowing bowel gas      RIGHT KIDNEY:  Size (craniocaudal long axis, in cm): 12.6  Hydronephrosis: Negative  Shadowing stone: One or two nonobstructing small  Other acute unexpected finding/s: Negative      OTHER:  Right upper quadrant ascites: Negative      Impression: Gallstones are present including one that may be partially lodged in  the gallbladder neck, but no gallbladder wall thickening and only  borderline gallbladder dilation      Liver moderately enlarged and borderline fatty but not overtly  cirrhotic      No ascites in the right upper quadrant      MACRO:  None      Signed by: Cristino Koenig 10/7/2024 7:59 AM  Dictation workstation:   [id number]  ECG 12 Lead  Sinus bradycardia  Increased R/S ratio in V1, consider early transition or posterior infarct  Abnormal ECG  When compared with ECG of 06-OCT-2024 06:53,  Sinus rhythm has replaced Atrial fibrillation      Physical Exam  HENT:      Head: Normocephalic and atraumatic.      Nose: Nose normal.      Mouth/Throat:      Mouth: Mucous membranes are dry.   Eyes:      Extraocular Movements: Extraocular movements intact.      Conjunctiva/sclera: Conjunctivae normal.   Cardiovascular:      Rate and Rhythm: Normal rate and regular rhythm.      Pulses: Normal pulses.      Heart sounds: Normal heart sounds.   Pulmonary:      Effort: Pulmonary effort is normal.      Breath sounds: Normal breath sounds.   Abdominal:      General: Bowel sounds are normal.      Palpations: Abdomen is soft.    Musculoskeletal:         General: Normal range of motion.      Cervical back: Normal range of motion and neck supple.   Skin:     General: Skin is warm and dry.      Comments: Right upper extremity distal to antecubital fossa there is erythema edema and tenderness which is improving   Neurological:      General: No focal deficit present.      Mental Status: He is alert. Mental status is at baseline.   Psychiatric:         Mood and Affect: Mood normal.         Relevant Results               Assessment/Plan          Manny Reveles is a 69 y.o. male with a significant past medical history of HTN, DLD, DM2, CAD s/p multiple PCI, PAZ, CKD3 presenting to [place] [facility] with wife for complaints of chest pain and altered mental status admitted with sepsis and right upper extremity cellulitis.  Patient with MSSA bacteremia.  New onset atrial fibrillation underwent DUONG that did not reveal vegetation endocarditis or thrombus.  The patient with new onset atrial fibrillation, did have thrombocytopenia that is improving, also had ANTONIO renal functions improved to baseline, also had transaminitis with improving liver functions.  Blood cultures have come back negative for past 3 days and the patient to possibly get PICC line/midline depending on ID recommendations of antibiotics and length.      Assessment & Plan  Chest pain    Bacteremia with MSSA  Right arm cellulitis- old IV site  Sepsis present on admission in setting of fever hypotension, altered mental status, likely source cellulitis and thrombophlebitis  -Was on vancomycin sensitivities show MSSA has been switched to cefazolin, due to lack of improvement linezolid has been added by infectious disease.  Monitoring LFTs closely, they are improving.  -Blood cultures from 10/1/2024 show MSSA.  Cultures remain positive on 10/2/2024.  Continue with daily blood cultures and infectious diseases recommending PICC line once cultures have been clear for 48 hours.  Blood cultures have so  far remain negative starting 10/4/2024  -Underwent DUONG on 10/4/2024 without any evidence of thrombi and no evidence of endocarditis or vegetation.  LVEF is 60 to 65%.  -Right upper extremity cephalic vein thrombosis based on ultrasound no DVT.  -Appreciate surgery recommendations I&D was attempted on 10/5/2024 but nothing was aspirated.  Underwent I&D in OR on 10/6/2024, with packing in the wound in the right upper extremity, surgery is recommending to begin daily dressing changes with saline wet-to-dry packing. Patient will be discharged on this regimen.  The patient to follow-up in wound center with surgery after discharge.  -Procalcitonin is improving from 12.81 inially, to 2.89     Metabolic encephalopathy-now resolved likely due to above    New onset atrial fibrillation  S/p PCI of the LAD from recent admission.   -Appreciate cardiology recommendations possible DUONG cardioversion down the road per cardiology if the patient remains in A-fib.  -Continue with Plavix and beta-blockers.  -New onset atrial fibrillation rate is currently controlled has been started on Eliquis as well.  Possible cardioversion per cardiology later depending on the rhythm    ANTONIO on CKD3  - on admission 2.33, renal function started to improve.  Appreciate nephrology recommendations.  Renal functions are nearing baseline    Transaminitis  New elevation in LFTs, No hypotension noted. DILI is on the differential. . Trend LFTs,   Acute hepatitis panel is negative.  LFTs are trending down.  Holding off on statins at this time.  Ultrasound of the liver showed gallstones and also showed partially enlarged gallbladder neck but no gallbladder wall thickening was noted.  Borderline gallbladder dilation no right upper quadrant tenderness.  Also noted fatty liver but no cirrhosis    Thrombocytopenia  -Platelets are starting to improve.  Baseline around 150-160. Recent Heparin exposure. Cont to monitor. Currently off Heparin.      DM2  - accu checks  and SSI, added basal insulin as well, increase Lantus to 30 twice daily  - diet carb controlled     Hypomagnesemia-replaced and improved    VTE prophylaxis-Yordy Wood MD

## 2024-10-07 NOTE — PROGRESS NOTES
Spoke with patient this morning, had I & D of right AC yesterday 10/6 with Dr. George. Per ID on Friday, cultures were negative, patient may be able to have PICC insertion today, will follow up with ID for home antibiotic regimen. Patient is feeling somewhat better, still sore at wound site. CT team will continue to follow,  Home care is agency of choice for IV antibiotics.

## 2024-10-07 NOTE — CARE PLAN
The patient's goals for the shift include Labs WNL    The clinical goals for the shift include safety  Pt has remained safe with out fall or injury.   Pain has been controlled .

## 2024-10-07 NOTE — CONSULTS
S/P I& D of right antecubital fossa wound. Removed old packing and cleaned both areas with saline. Packed wounds with saline soaked gauze. Small amount serosanguinous drainage presing on old dressing only. Applied ABD pad and Kerlix. Pt tolerated well.   Wound Care Consult     Visit Date: 10/7/2024      Patient Name: Manny Reveles         MRN: [id number]           YOB: 1955     Reason for Consult:         Wound History:      Pertinent Labs:   Albumin   Date Value Ref Range Status   10/07/2024 3.2 (L) 3.4 - 5.0 g/dL Final     ALBUMIN (MG/L) IN URINE   Date Value Ref Range Status   12/11/2021 200.9 Not Established mg/L Final     Albumin, Urine Random   Date Value Ref Range Status   10/02/2024 18.9 Not established mg/L Final       Wound Assessment:  Wound 09/30/24 Puncture Leg Proximal;Right;Upper;Anterior (Active)   Site Assessment Clean;Dry;Intact 10/02/24 0136   Elly-Wound Assessment Clean;Dry;Intact 10/02/24 0136   Shape surgical incision 10/02/24 0136   State of Healing Closed wound edges;Healing ridge 10/02/24 0136   Margins Attached edges;Well-defined edges 10/02/24 0136   Closure Approximated 10/02/24 0136   Treatments Site care 10/02/24 0136   Drainage Description None 10/02/24 0136   Drainage Amount None 10/02/24 0136   Dressing Sterile dressing 10/02/24 0136   Dressing Status Clean;Dry 10/02/24 0136       Wound 10/02/24 Incision Arm Right (Active)   Wound Image   10/02/24 0136   Site Assessment Clean;Dry;Intact;Red 10/03/24 2050   Shape round 10/02/24 1028   Margins Attached edges 10/03/24 2050   Treatments Heat applied 10/03/24 2050   Drainage Description None 10/03/24 2050   Drainage Amount None 10/03/24 2050   Dressing Open to air 10/05/24 2158   Dressing Status Clean;Dry 10/06/24 1930       Wound Team Summary Assessment:      Wound Team Plan: Continue WTD dressings as ordered by Dr George.      Rufina Watson RN  10/7/2024  2:22 PM

## 2024-10-07 NOTE — PROGRESS NOTES
Manny Reveles is a 69 y.o. male on day 6 of admission presenting with Chest pain.    Subjective   Wound check.  Status post I&D of the right antecubital fossa yesterday.  Sore but comfortable.           Objective     Physical Exam  No acute distress  Right upper extremity warm and well-perfused, good range of motion in the elbow and distally.  Stable focal induration and cellulitis.  Dressings taken down, wound base clean and hemostatic.  Significant cautery effect.  Underlying Surgicel powder applied intraoperatively for hemostasis is in place.  No necrosis.  No edema.    Last Recorded Vitals  Blood pressure 138/61, pulse 70, temperature 36.2 °C (97.2 °F), temperature source Temporal, resp. rate 20, height 1.829 m (6'), weight 121 kg (266 lb 5.1 oz), SpO2 94%.  Intake/Output last 3 Shifts:  I/O last 3 completed shifts:  In: 1500 (12.4 mL/kg) [IV Piggyback:1500]  Out: 1025 (8.5 mL/kg) [Urine:1025 (0.2 mL/kg/hr)]  Weight: 120.8 kg     Relevant Results                              Assessment/Plan   Assessment & Plan  Chest pain    Right upper extremity abscess status post I&D.  Begin daily dressing changes with saline wet-to-dry packing.  Patient will be discharged on this regimen.  Home health care will be arranged.  Will follow-up in the wound care center.  Discharge per primary team.         I spent  minutes in the professional and overall care of this patient.      Christie George MD

## 2024-10-07 NOTE — PROGRESS NOTES
Renal Progress Note  Assessment/  69 y.o. year old male who presented to the emergency department for further evaluation and management chest pain preceded by right upper extremity cellulitis the site of IV line as patient was briefly in the hospital for heart catheterization   Patient does have preadmission chronic comorbidity of hypertension diabetes type 2 coronary artery disease recently during the heart catheterization the patient did receive stenting obstructive sleep apnea is a chronic kidney disease stage III with a baseline creatinine of 1.7 and estimated GFR of 40     Stage II acute kidney injury multifactorial possible contrast-induced nephropathy patient is not on any nonsteroidal anti-inflammatory hemodynamically stable afebrile nonoliguric none polyuric no associated electrolyte or acid-base imbalance up today     In the presence of IV line site cellulitis with white blood cells of 16.1 and left shift kidney involvement in occult infection/bacteremia not essentially postinfectious GN could be a possibility however C3 and C4 are normal     The patient is clinically euvolemic with no clinical evidence of increased extracellular fluid volume     Hemodynamically stable afebrile nonoliguric none polyuric with [place] urine output no dysuria     Urine sediment is UTI type urine sediment nonnephrotic nonnephrotic  Blood culture pending     Plan/  Cont ancef for MSSA  GFR stable.    Outpatient follow up from renal standpoint: Dr. Weller  Subjective:   Admit Date: 10/1/2024    Interval History: Patient seen and examined uneventful night no uremic related or fluid volume overload related symptoms right upper extremity abscess s/p incision and drainage      Medications:   Scheduled Meds:apixaban, 5 mg, oral, q12h  [Held by provider] atorvastatin, 40 mg, oral, Nightly  ceFAZolin, 2 g, intravenous, q8h  clopidogrel, 75 mg, oral, Daily  finasteride, 5 mg, oral, Daily  insulin glargine, 30 Units, subcutaneous, BID  "AC  insulin lispro, 0-15 Units, subcutaneous, TID  linezolid, 600 mg, intravenous, q12h  metoprolol succinate XL, 25 mg, oral, Daily  pantoprazole, 40 mg, oral, Daily  ranolazine, 500 mg, oral, q12h  tamsulosin, 0.4 mg, oral, Daily  [Held by provider] valsartan, 40 mg, oral, Daily      Continuous Infusions:     CBC:   Lab Results   Component Value Date    HGB 10.4 (L) 10/07/2024    HGB 11.3 (L) 10/06/2024    WBC 11.6 (H) 10/07/2024    WBC 10.1 10/06/2024     (L) 10/07/2024     (L) 10/06/2024      Anemia:  No results found for: \"FERRITIN\", \"IRON\", \"TIBC\"   BMP:    Lab Results   Component Value Date     10/07/2024     10/06/2024    K 3.9 10/07/2024    K 3.6 10/06/2024     10/07/2024     (H) 10/06/2024    CO2 23 10/07/2024    CO2 19 (L) 10/06/2024    BUN 26 (H) 10/07/2024    BUN 28 (H) 10/06/2024    CREATININE 1.62 (H) 10/07/2024    CREATININE 1.66 (H) 10/06/2024      Bone disease: No results found for: \"PHOS\", \"PTH\", \"VITD25\"   Urinalysis:  No results found for: \"ROMEO\", \"PROTUR\", \"GLUCOSEU\", \"BLOODU\", \"KETONESU\", \"BILIRUBINU\", \"NITRITEU\", \"LEUKOCYTESU\", \"UTPCR\"     Objective:   Vitals: /61   Pulse 70   Temp 36.2 °C (97.2 °F) (Temporal)   Resp 20   Ht 1.829 m (6')   Wt 121 kg (266 lb 5.1 oz)   SpO2 94%   BMI 36.12 kg/m²    Wt Readings from Last 3 Encounters:   10/04/24 121 kg (266 lb 5.1 oz)   09/28/24 122 kg (268 lb 8.3 oz)   09/25/24 122 kg (268 lb 1.3 oz)      24HR INTAKE/OUTPUT:    Intake/Output Summary (Last 24 hours) at 10/7/2024 1344  Last data filed at 10/7/2024 0400  Gross per 24 hour   Intake 600 ml   Output 375 ml   Net 225 ml     Admission weight:  Weight: 122 kg (268 lb 15.4 oz)      Constitutional:  Alert, awake, no apparent distress   Skin:normal, no rash  HEENT:sclera anicteric.  Head atraumatic normocephalic  Neck:supple with no thyromegally  Cardiovascular:  S1, S2 without m/r/g   Respiratory:  CTA B without w/r/r   Abdomen: +bs, soft, nt  Ext: no LE " edema  Musculoskeletal:Intact  Neuro:Alert and oriented with no deficit      Electronically signed by Deven Mathis MD on 10/7/2024 at 1:44 PM

## 2024-10-08 ENCOUNTER — APPOINTMENT (OUTPATIENT)
Dept: CARDIOLOGY | Facility: HOSPITAL | Age: 69
End: 2024-10-08
Payer: MEDICARE

## 2024-10-08 DIAGNOSIS — E11.9 TYPE 2 DIABETES MELLITUS WITHOUT RETINOPATHY (MULTI): Primary | ICD-10-CM

## 2024-10-08 LAB
ALBUMIN SERPL BCP-MCNC: 3.1 G/DL (ref 3.4–5)
ALP SERPL-CCNC: 194 U/L (ref 33–136)
ALT SERPL W P-5'-P-CCNC: 44 U/L (ref 10–52)
ANION GAP SERPL CALC-SCNC: 11 MMOL/L (ref 10–20)
AST SERPL W P-5'-P-CCNC: 27 U/L (ref 9–39)
BACTERIA BLD CULT: NORMAL
BACTERIA BLD CULT: NORMAL
BASOPHILS # BLD AUTO: 0.06 X10*3/UL (ref 0–0.1)
BASOPHILS NFR BLD AUTO: 0.6 %
BILIRUB SERPL-MCNC: 1.2 MG/DL (ref 0–1.2)
BUN SERPL-MCNC: 28 MG/DL (ref 6–23)
CALCIUM SERPL-MCNC: 8.5 MG/DL (ref 8.6–10.3)
CHLORIDE SERPL-SCNC: 104 MMOL/L (ref 98–107)
CO2 SERPL-SCNC: 24 MMOL/L (ref 21–32)
CREAT SERPL-MCNC: 1.84 MG/DL (ref 0.5–1.3)
EGFRCR SERPLBLD CKD-EPI 2021: 39 ML/MIN/1.73M*2
EOSINOPHIL # BLD AUTO: 0.17 X10*3/UL (ref 0–0.7)
EOSINOPHIL NFR BLD AUTO: 1.8 %
ERYTHROCYTE [DISTWIDTH] IN BLOOD BY AUTOMATED COUNT: 12.8 % (ref 11.5–14.5)
GLUCOSE BLD MANUAL STRIP-MCNC: 147 MG/DL (ref 74–99)
GLUCOSE BLD MANUAL STRIP-MCNC: 233 MG/DL (ref 74–99)
GLUCOSE BLD MANUAL STRIP-MCNC: 261 MG/DL (ref 74–99)
GLUCOSE SERPL-MCNC: 181 MG/DL (ref 74–99)
HCT VFR BLD AUTO: 31.2 % (ref 41–52)
HGB BLD-MCNC: 10.3 G/DL (ref 13.5–17.5)
HOLD SPECIMEN: NORMAL
IMM GRANULOCYTES # BLD AUTO: 0.4 X10*3/UL (ref 0–0.7)
IMM GRANULOCYTES NFR BLD AUTO: 4.1 % (ref 0–0.9)
LYMPHOCYTES # BLD AUTO: 1.53 X10*3/UL (ref 1.2–4.8)
LYMPHOCYTES NFR BLD AUTO: 15.8 %
MAGNESIUM SERPL-MCNC: 1.91 MG/DL (ref 1.6–2.4)
MCH RBC QN AUTO: 30.5 PG (ref 26–34)
MCHC RBC AUTO-ENTMCNC: 33 G/DL (ref 32–36)
MCV RBC AUTO: 92 FL (ref 80–100)
MONOCYTES # BLD AUTO: 0.92 X10*3/UL (ref 0.1–1)
MONOCYTES NFR BLD AUTO: 9.5 %
NEUTROPHILS # BLD AUTO: 6.62 X10*3/UL (ref 1.2–7.7)
NEUTROPHILS NFR BLD AUTO: 68.2 %
NRBC BLD-RTO: 0 /100 WBCS (ref 0–0)
PLATELET # BLD AUTO: 167 X10*3/UL (ref 150–450)
POTASSIUM SERPL-SCNC: 4.1 MMOL/L (ref 3.5–5.3)
PROT SERPL-MCNC: 6.3 G/DL (ref 6.4–8.2)
RBC # BLD AUTO: 3.38 X10*6/UL (ref 4.5–5.9)
SODIUM SERPL-SCNC: 135 MMOL/L (ref 136–145)
WBC # BLD AUTO: 9.7 X10*3/UL (ref 4.4–11.3)

## 2024-10-08 PROCEDURE — 99232 SBSQ HOSP IP/OBS MODERATE 35: CPT | Performed by: STUDENT IN AN ORGANIZED HEALTH CARE EDUCATION/TRAINING PROGRAM

## 2024-10-08 PROCEDURE — 99232 SBSQ HOSP IP/OBS MODERATE 35: CPT | Performed by: INTERNAL MEDICINE

## 2024-10-08 PROCEDURE — 82947 ASSAY GLUCOSE BLOOD QUANT: CPT

## 2024-10-08 PROCEDURE — 2500000001 HC RX 250 WO HCPCS SELF ADMINISTERED DRUGS (ALT 637 FOR MEDICARE OP): Performed by: SURGERY

## 2024-10-08 PROCEDURE — 2500000004 HC RX 250 GENERAL PHARMACY W/ HCPCS (ALT 636 FOR OP/ED): Performed by: SURGERY

## 2024-10-08 PROCEDURE — 85025 COMPLETE CBC W/AUTO DIFF WBC: CPT | Performed by: INTERNAL MEDICINE

## 2024-10-08 PROCEDURE — 2500000002 HC RX 250 W HCPCS SELF ADMINISTERED DRUGS (ALT 637 FOR MEDICARE OP, ALT 636 FOR OP/ED): Performed by: SURGERY

## 2024-10-08 PROCEDURE — 83735 ASSAY OF MAGNESIUM: CPT | Performed by: INTERNAL MEDICINE

## 2024-10-08 PROCEDURE — 80053 COMPREHEN METABOLIC PANEL: CPT | Performed by: INTERNAL MEDICINE

## 2024-10-08 PROCEDURE — 36415 COLL VENOUS BLD VENIPUNCTURE: CPT | Performed by: INTERNAL MEDICINE

## 2024-10-08 PROCEDURE — 93010 ELECTROCARDIOGRAM REPORT: CPT | Performed by: INTERNAL MEDICINE

## 2024-10-08 PROCEDURE — 1210000001 HC SEMI-PRIVATE ROOM DAILY

## 2024-10-08 PROCEDURE — 93005 ELECTROCARDIOGRAM TRACING: CPT

## 2024-10-08 RX ORDER — LIDOCAINE HYDROCHLORIDE 10 MG/ML
5 INJECTION, SOLUTION INFILTRATION; PERINEURAL ONCE
Status: DISCONTINUED | OUTPATIENT
Start: 2024-10-08 | End: 2024-10-10 | Stop reason: HOSPADM

## 2024-10-08 ASSESSMENT — COGNITIVE AND FUNCTIONAL STATUS - GENERAL
DAILY ACTIVITIY SCORE: 24
MOBILITY SCORE: 24

## 2024-10-08 ASSESSMENT — PAIN SCALES - PAIN ASSESSMENT IN ADVANCED DEMENTIA (PAINAD): TOTALSCORE: MEDICATION (SEE MAR)

## 2024-10-08 ASSESSMENT — PAIN DESCRIPTION - LOCATION
LOCATION: ARM
LOCATION: ARM

## 2024-10-08 ASSESSMENT — PAIN SCALES - GENERAL
PAINLEVEL_OUTOF10: 0 - NO PAIN
PAINLEVEL_OUTOF10: 7
PAINLEVEL_OUTOF10: 7
PAIN_LEVEL: 1
PAINLEVEL_OUTOF10: 2
PAINLEVEL_OUTOF10: 7

## 2024-10-08 ASSESSMENT — PAIN DESCRIPTION - ORIENTATION
ORIENTATION: RIGHT
ORIENTATION: RIGHT

## 2024-10-08 ASSESSMENT — PAIN - FUNCTIONAL ASSESSMENT: PAIN_FUNCTIONAL_ASSESSMENT: 0-10

## 2024-10-08 NOTE — CARE PLAN
Problem: Pain - Adult  Goal: Verbalizes/displays adequate comfort level or baseline comfort level  Outcome: Progressing     Problem: Safety - Adult  Goal: Free from fall injury  Outcome: Progressing     Problem: Discharge Planning  Goal: Discharge to home or other facility with appropriate resources  Outcome: Progressing     Problem: Chronic Conditions and Co-morbidities  Goal: Patient's chronic conditions and co-morbidity symptoms are monitored and maintained or improved  Outcome: Progressing     Problem: Fall/Injury  Goal: Not fall by end of shift  Outcome: Progressing  Goal: Be free from injury by end of the shift  Outcome: Progressing  Goal: Verbalize understanding of personal risk factors for fall in the hospital  Outcome: Progressing  Goal: Verbalize understanding of risk factor reduction measures to prevent injury from fall in the home  Outcome: Progressing  Goal: Use assistive devices by end of the shift  Outcome: Progressing  Goal: Pace activities to prevent fatigue by end of the shift  Outcome: Progressing     Problem: Diabetes  Goal: Achieve decreasing blood glucose levels by end of shift  Outcome: Progressing  Goal: Increase stability of blood glucose readings by end of shift  Outcome: Progressing  Goal: Decrease in ketones present in urine by end of shift  Outcome: Progressing  Goal: Maintain electrolyte levels within acceptable range throughout shift  Outcome: Progressing  Goal: Maintain glucose levels >70mg/dl to <250mg/dl throughout shift  Outcome: Progressing  Goal: No changes in neurological exam by end of shift  Outcome: Progressing  Goal: Learn about and adhere to nutrition recommendations by end of shift  Outcome: Progressing  Goal: Vital signs within normal range for age by end of shift  Outcome: Progressing  Goal: Increase self care and/or family involovement by end of shift  Outcome: Progressing  Goal: Receive DSME education by end of shift  Outcome: Progressing     Problem: Skin  Goal:  Decreased wound size/increased tissue granulation at next dressing change  Outcome: Progressing  Goal: Participates in plan/prevention/treatment measures  Outcome: Progressing  Goal: Prevent/manage excess moisture  Outcome: Progressing  Goal: Prevent/minimize sheer/friction injuries  Outcome: Progressing  Goal: Promote/optimize nutrition  Outcome: Progressing  Goal: Promote skin healing  Outcome: Progressing     Problem: Pain  Goal: Takes deep breaths with improved pain control throughout the shift  Outcome: Progressing  Goal: Turns in bed with improved pain control throughout the shift  Outcome: Progressing  Goal: Walks with improved pain control throughout the shift  Outcome: Progressing  Goal: Performs ADL's with improved pain control throughout shift  Outcome: Progressing  Goal: Participates in PT with improved pain control throughout the shift  Outcome: Progressing  Goal: Free from opioid side effects throughout the shift  Outcome: Progressing  Goal: Free from acute confusion related to pain meds throughout the shift  Outcome: Progressing   The patient's goals for the shift include rest    The clinical goals for the shift include Patient will have a decrease in pain by end of shift

## 2024-10-08 NOTE — ANESTHESIA POSTPROCEDURE EVALUATION
Patient: Manny Reveles    Procedure Summary       Date: 10/06/24 Room / Location: [facility] / [facility]    Anesthesia Start: 1457 Anesthesia Stop: 1532    Procedure: Incision and Drainage Upper Extremity (Right: Arm Upper) Diagnosis:     Surgeons: Christie George MD Responsible Provider: Sg Gonzalez MD    Anesthesia Type: MAC, general ASA Status: 3 - Emergent            Anesthesia Type: MAC, general    Vitals Value Taken Time   /68 10/08/24 0724   Temp 36.7 °C (98.1 °F) 10/08/24 0724   Pulse 63 10/08/24 0724   Resp 18 10/08/24 0724   SpO2 94 % 10/08/24 0724       Anesthesia Post Evaluation    Patient location during evaluation: PACU  Patient participation: complete - patient participated  Level of consciousness: awake and alert  Pain score: 1  Pain management: satisfactory to patient  Airway patency: patent  Cardiovascular status: stable  Respiratory status: acceptable  Hydration status: acceptable  Postoperative Nausea and Vomiting: none        No notable events documented.

## 2024-10-08 NOTE — PROGRESS NOTES
Patient to remain off blood thinners for insertion of PICC line. May discharge once final antibiotics ordered and home care SOC date confirmed. CT team will continue to follow.

## 2024-10-08 NOTE — PROGRESS NOTES
Manny Reveles is a 69 y.o. male on day 7 of admission presenting with Chest pain.      Subjective   Patient seen and examined.  Feels much better, pain is getting better, does complain of a little bit of swelling of his rt forearm, no fevers, chills, nausea, vomiting.     Objective     Last Recorded Vitals  /68 (BP Location: Left arm, Patient Position: Lying)   Pulse 63   Temp 36.7 °C (98.1 °F) (Temporal)   Resp 18   Wt 121 kg (266 lb 5.1 oz)   SpO2 94%   Intake/Output last 3 Shifts:    Intake/Output Summary (Last 24 hours) at 10/8/2024 1130  Last data filed at 10/8/2024 0900  Gross per 24 hour   Intake 1540 ml   Output --   Net 1540 ml       Admission Weight  Weight: 122 kg (268 lb 15.4 oz) (10/01/24 2028)    Daily Weight  10/04/24 : 121 kg (266 lb 5.1 oz)    Image Results  ECG 12 Lead  Sinus bradycardia with sinus arrhythmia  Otherwise normal ECG  When compared with ECG of 07-OCT-2024 06:29,  No significant change was found      Physical Exam  HENT:      Head: Normocephalic and atraumatic.      Nose: Nose normal.      Mouth/Throat:      Mouth: Mucous membranes are dry.   Eyes:      Extraocular Movements: Extraocular movements intact.      Conjunctiva/sclera: Conjunctivae normal.   Cardiovascular:      Rate and Rhythm: Normal rate and regular rhythm.      Pulses: Normal pulses.      Heart sounds: Normal heart sounds.   Pulmonary:      Effort: Pulmonary effort is normal.      Breath sounds: Normal breath sounds.   Abdominal:      General: Bowel sounds are normal.      Palpations: Abdomen is soft.   Musculoskeletal:         General: Normal range of motion.      Cervical back: Normal range of motion and neck supple.   Skin:     General: Skin is warm and dry.   Neurological:      General: No focal deficit present.      Mental Status: He is alert. Mental status is at baseline.   Psychiatric:         Mood and Affect: Mood normal.         Relevant Results               Assessment/Plan          Manny KAISER  Abdirizak is a 69 y.o. male with a significant past medical history of HTN, DLD, DM2, CAD s/p multiple PCI, PAZ, CKD3 presenting to [place] ER with wife for complaints of chest pain and altered mental status admitted with sepsis and right upper extremity cellulitis.  Patient with MSSA bacteremia.  New onset atrial fibrillation underwent DUONG that did not reveal vegetation endocarditis or thrombus.  The patient with new onset atrial fibrillation, did have thrombocytopenia that is improving, also had ANTONIO renal functions improved to baseline, also had transaminitis with improving liver functions.  Blood cultures have come back negative for past 3 days and the patient to possibly get PICC line/midline depending on ID recommendations of antibiotics and length.      Assessment & Plan  Chest pain    Bacteremia with MSSA  Right arm cellulitis- old IV site  Sepsis present on admission in setting of fever hypotension, altered mental status, likely source cellulitis and thrombophlebitis    Currently patient is on cefazolin and linezolid  ID is following  Initially blood cultures were persistently positive, echocardiogram (DUONG)did not show any vegetations  Blood cultures since 10/ 7 have remained negative  Last tissue culture is not showing any growth  Plan to discharge on IV Zyvox most likely since he was not responding properly to IV cefazolin  No DVT of upper extremity  Status post I&D on 10 6,  Daily dressing changes with saline wet-to-dry packing  Metabolic encephalopathy has resolved, he is at his baseline mental status  Seems to be in sinus rhythm, status post PCI, appreciate cardiology recommendations  Continue Plavix, beta-blocker, Eliquis, may need cardioversion if he jumps back-and-forth  Creatinine is 1.8, continue to monitor  Elevated liver enzymes have resolved  Platelet counts have normalized as well  Continue insul;in  sliding scale, Lantus, diabetic diet  DVT prophylaxis            Tima Keenan MD

## 2024-10-08 NOTE — PROGRESS NOTES
Renal Progress Note  Assessment/  69 y.o. year old male who presented to the emergency department for further evaluation and management chest pain preceded by right upper extremity cellulitis the site of IV line as patient was briefly in the hospital for heart catheterization   Patient does have preadmission chronic comorbidity of hypertension diabetes type 2 coronary artery disease recently during the heart catheterization the patient did receive stenting obstructive sleep apnea is a chronic kidney disease stage III with a baseline creatinine of 1.7 and estimated GFR of 40     Stage II acute kidney injury multifactorial possible contrast-induced nephropathy patient is not on any nonsteroidal anti-inflammatory hemodynamically stable afebrile nonoliguric none polyuric no associated electrolyte or acid-base imbalance up today     In the presence of IV line site cellulitis with white blood cells of 16.1 and left shift kidney involvement in occult infection/bacteremia not essentially postinfectious GN could be a possibility however C3 and C4 are normal     The patient is clinically euvolemic with no clinical evidence of increased extracellular fluid volume     Hemodynamically stable afebrile nonoliguric none polyuric with [place] urine output no dysuria     Urine sediment is UTI type urine sediment nonnephrotic nonnephrotic  Blood culture pending     Plan/  Cont ancef for MSSA  GFR stable.  Fluctuating  Outpatient follow up from renal standpoint: Dr. Weller  Subjective:   Admit Date: 10/1/2024    Interval History: Patient seen and examined uneventful night no uremic related or fluid volume overload related symptoms discussed with the patient and family the fluctuation of creatinine during hospitalization  Medications:   Scheduled Meds:apixaban, 5 mg, oral, q12h  [Held by provider] atorvastatin, 40 mg, oral, Nightly  ceFAZolin, 2 g, intravenous, q8h  clopidogrel, 75 mg, oral, Daily  finasteride, 5 mg, oral,  "Daily  insulin glargine, 30 Units, subcutaneous, BID AC  insulin lispro, 0-15 Units, subcutaneous, TID  lidocaine, 5 mL, infiltration, Once  metoprolol succinate XL, 25 mg, oral, Daily  pantoprazole, 40 mg, oral, Daily  ranolazine, 500 mg, oral, q12h  tamsulosin, 0.4 mg, oral, Daily  [Held by provider] valsartan, 40 mg, oral, Daily      Continuous Infusions:     CBC:   Lab Results   Component Value Date    HGB 10.3 (L) 10/08/2024    HGB 10.4 (L) 10/07/2024    WBC 9.7 10/08/2024    WBC 11.6 (H) 10/07/2024     10/08/2024     (L) 10/07/2024      Anemia:  No results found for: \"FERRITIN\", \"IRON\", \"TIBC\"   BMP:    Lab Results   Component Value Date     (L) 10/08/2024     10/07/2024    K 4.1 10/08/2024    K 3.9 10/07/2024     10/08/2024     10/07/2024    CO2 24 10/08/2024    CO2 23 10/07/2024    BUN 28 (H) 10/08/2024    BUN 26 (H) 10/07/2024    CREATININE 1.84 (H) 10/08/2024    CREATININE 1.62 (H) 10/07/2024      Bone disease: No results found for: \"PHOS\", \"PTH\", \"VITD25\"   Urinalysis:  No results found for: \"ROMEO\", \"PROTUR\", \"GLUCOSEU\", \"BLOODU\", \"KETONESU\", \"BILIRUBINU\", \"NITRITEU\", \"LEUKOCYTESU\", \"UTPCR\"     Objective:   Vitals: /68 (BP Location: Left arm, Patient Position: Lying)   Pulse 63   Temp 36.7 °C (98.1 °F) (Temporal)   Resp 18   Ht 1.829 m (6')   Wt 121 kg (266 lb 5.1 oz)   SpO2 94%   BMI 36.12 kg/m²    Wt Readings from Last 3 Encounters:   10/04/24 121 kg (266 lb 5.1 oz)   09/28/24 122 kg (268 lb 8.3 oz)   09/25/24 122 kg (268 lb 1.3 oz)      24HR INTAKE/OUTPUT:    Intake/Output Summary (Last 24 hours) at 10/8/2024 1256  Last data filed at 10/8/2024 0900  Gross per 24 hour   Intake 1540 ml   Output --   Net 1540 ml     Admission weight:  Weight: 122 kg (268 lb 15.4 oz)      Constitutional:  Alert, awake, no apparent distress   Skin:normal, no rash  HEENT:sclera anicteric.  Head atraumatic normocephalic  Neck:supple with no thyromegally  Cardiovascular:  S1, " S2 without m/r/g   Respiratory:  CTA B without w/r/r   Abdomen: +bs, soft, nt  Ext: no LE edema  Musculoskeletal:Intact  Neuro:Alert and oriented with no deficit      Electronically signed by Deven Mathis MD on 10/8/2024 at 12:56 PM

## 2024-10-09 ENCOUNTER — APPOINTMENT (OUTPATIENT)
Dept: CARDIOLOGY | Facility: HOSPITAL | Age: 69
End: 2024-10-09
Payer: MEDICARE

## 2024-10-09 ENCOUNTER — HOME INFUSION (OUTPATIENT)
Dept: INFUSION THERAPY | Age: 69
End: 2024-10-09
Payer: MEDICARE

## 2024-10-09 ENCOUNTER — APPOINTMENT (OUTPATIENT)
Dept: RADIOLOGY | Facility: HOSPITAL | Age: 69
End: 2024-10-09
Payer: MEDICARE

## 2024-10-09 ENCOUNTER — HOME HEALTH ADMISSION (OUTPATIENT)
Dept: HOME HEALTH SERVICES | Facility: HOME HEALTH | Age: 69
End: 2024-10-09
Payer: MEDICARE

## 2024-10-09 PROBLEM — R07.9 CHEST PAIN: Status: RESOLVED | Noted: 2024-10-01 | Resolved: 2024-10-09

## 2024-10-09 LAB
ALBUMIN SERPL BCP-MCNC: 3.3 G/DL (ref 3.4–5)
ALP SERPL-CCNC: 207 U/L (ref 33–136)
ALT SERPL W P-5'-P-CCNC: 33 U/L (ref 10–52)
ANION GAP SERPL CALC-SCNC: 11 MMOL/L (ref 10–20)
AST SERPL W P-5'-P-CCNC: 23 U/L (ref 9–39)
BACTERIA BLD CULT: NORMAL
BACTERIA SPEC CULT: NORMAL
BASO STIPL BLD QL SMEAR: PRESENT
BASOPHILS # BLD MANUAL: 0 X10*3/UL (ref 0–0.1)
BASOPHILS NFR BLD MANUAL: 0 %
BILIRUB SERPL-MCNC: 1.3 MG/DL (ref 0–1.2)
BUN SERPL-MCNC: 25 MG/DL (ref 6–23)
CALCIUM SERPL-MCNC: 8.6 MG/DL (ref 8.6–10.3)
CHLORIDE SERPL-SCNC: 104 MMOL/L (ref 98–107)
CO2 SERPL-SCNC: 25 MMOL/L (ref 21–32)
CREAT SERPL-MCNC: 1.55 MG/DL (ref 0.5–1.3)
DACRYOCYTES BLD QL SMEAR: NORMAL
EGFRCR SERPLBLD CKD-EPI 2021: 48 ML/MIN/1.73M*2
EOSINOPHIL # BLD MANUAL: 0.39 X10*3/UL (ref 0–0.7)
EOSINOPHIL NFR BLD MANUAL: 4 %
ERYTHROCYTE [DISTWIDTH] IN BLOOD BY AUTOMATED COUNT: 12.5 % (ref 11.5–14.5)
GLUCOSE BLD MANUAL STRIP-MCNC: 146 MG/DL (ref 74–99)
GLUCOSE BLD MANUAL STRIP-MCNC: 211 MG/DL (ref 74–99)
GLUCOSE BLD MANUAL STRIP-MCNC: 285 MG/DL (ref 74–99)
GLUCOSE SERPL-MCNC: 178 MG/DL (ref 74–99)
GRAM STN SPEC: NORMAL
GRAM STN SPEC: NORMAL
HCT VFR BLD AUTO: 32 % (ref 41–52)
HGB BLD-MCNC: 10.9 G/DL (ref 13.5–17.5)
HOLD SPECIMEN: NORMAL
IMM GRANULOCYTES # BLD AUTO: 0.89 X10*3/UL (ref 0–0.7)
IMM GRANULOCYTES NFR BLD AUTO: 9.1 % (ref 0–0.9)
LYMPHOCYTES # BLD MANUAL: 2.35 X10*3/UL (ref 1.2–4.8)
LYMPHOCYTES NFR BLD MANUAL: 24 %
MAGNESIUM SERPL-MCNC: 1.96 MG/DL (ref 1.6–2.4)
MCH RBC QN AUTO: 31.1 PG (ref 26–34)
MCHC RBC AUTO-ENTMCNC: 34.1 G/DL (ref 32–36)
MCV RBC AUTO: 91 FL (ref 80–100)
MONOCYTES # BLD MANUAL: 0.59 X10*3/UL (ref 0.1–1)
MONOCYTES NFR BLD MANUAL: 6 %
NEUTS SEG # BLD MANUAL: 6.47 X10*3/UL (ref 1.2–7)
NEUTS SEG NFR BLD MANUAL: 66 %
NRBC BLD-RTO: 0 /100 WBCS (ref 0–0)
PLATELET # BLD AUTO: 197 X10*3/UL (ref 150–450)
POLYCHROMASIA BLD QL SMEAR: NORMAL
POTASSIUM SERPL-SCNC: 4.1 MMOL/L (ref 3.5–5.3)
PROT SERPL-MCNC: 6.8 G/DL (ref 6.4–8.2)
RBC # BLD AUTO: 3.51 X10*6/UL (ref 4.5–5.9)
RBC MORPH BLD: NORMAL
SODIUM SERPL-SCNC: 136 MMOL/L (ref 136–145)
TOTAL CELLS COUNTED BLD: 100
WBC # BLD AUTO: 9.8 X10*3/UL (ref 4.4–11.3)

## 2024-10-09 PROCEDURE — 93005 ELECTROCARDIOGRAM TRACING: CPT

## 2024-10-09 PROCEDURE — 02HV33Z INSERTION OF INFUSION DEVICE INTO SUPERIOR VENA CAVA, PERCUTANEOUS APPROACH: ICD-10-PCS | Performed by: RADIOLOGY

## 2024-10-09 PROCEDURE — 85027 COMPLETE CBC AUTOMATED: CPT | Performed by: INTERNAL MEDICINE

## 2024-10-09 PROCEDURE — 0JH63XZ INSERTION OF TUNNELED VASCULAR ACCESS DEVICE INTO CHEST SUBCUTANEOUS TISSUE AND FASCIA, PERCUTANEOUS APPROACH: ICD-10-PCS | Performed by: RADIOLOGY

## 2024-10-09 PROCEDURE — 99232 SBSQ HOSP IP/OBS MODERATE 35: CPT | Performed by: INTERNAL MEDICINE

## 2024-10-09 PROCEDURE — 93010 ELECTROCARDIOGRAM REPORT: CPT | Performed by: INTERNAL MEDICINE

## 2024-10-09 PROCEDURE — 36010 PLACE CATHETER IN VEIN: CPT | Performed by: RADIOLOGY

## 2024-10-09 PROCEDURE — 80053 COMPREHEN METABOLIC PANEL: CPT | Performed by: INTERNAL MEDICINE

## 2024-10-09 PROCEDURE — 77001 FLUOROGUIDE FOR VEIN DEVICE: CPT | Performed by: RADIOLOGY

## 2024-10-09 PROCEDURE — 82947 ASSAY GLUCOSE BLOOD QUANT: CPT

## 2024-10-09 PROCEDURE — 83735 ASSAY OF MAGNESIUM: CPT | Performed by: INTERNAL MEDICINE

## 2024-10-09 PROCEDURE — 2500000002 HC RX 250 W HCPCS SELF ADMINISTERED DRUGS (ALT 637 FOR MEDICARE OP, ALT 636 FOR OP/ED): Performed by: SURGERY

## 2024-10-09 PROCEDURE — 85007 BL SMEAR W/DIFF WBC COUNT: CPT | Performed by: INTERNAL MEDICINE

## 2024-10-09 PROCEDURE — 36558 INSERT TUNNELED CV CATH: CPT | Performed by: RADIOLOGY

## 2024-10-09 PROCEDURE — 2500000004 HC RX 250 GENERAL PHARMACY W/ HCPCS (ALT 636 FOR OP/ED): Mod: JZ | Performed by: STUDENT IN AN ORGANIZED HEALTH CARE EDUCATION/TRAINING PROGRAM

## 2024-10-09 PROCEDURE — 2780000003 HC OR 278 NO HCPCS

## 2024-10-09 PROCEDURE — 1210000001 HC SEMI-PRIVATE ROOM DAILY

## 2024-10-09 PROCEDURE — C1751 CATH, INF, PER/CENT/MIDLINE: HCPCS

## 2024-10-09 PROCEDURE — 2500000001 HC RX 250 WO HCPCS SELF ADMINISTERED DRUGS (ALT 637 FOR MEDICARE OP): Performed by: STUDENT IN AN ORGANIZED HEALTH CARE EDUCATION/TRAINING PROGRAM

## 2024-10-09 PROCEDURE — B518ZZA FLUOROSCOPY OF SUPERIOR VENA CAVA, GUIDANCE: ICD-10-PCS | Performed by: RADIOLOGY

## 2024-10-09 PROCEDURE — 2500000001 HC RX 250 WO HCPCS SELF ADMINISTERED DRUGS (ALT 637 FOR MEDICARE OP): Performed by: SURGERY

## 2024-10-09 PROCEDURE — 36415 COLL VENOUS BLD VENIPUNCTURE: CPT | Performed by: INTERNAL MEDICINE

## 2024-10-09 PROCEDURE — 99239 HOSP IP/OBS DSCHRG MGMT >30: CPT | Performed by: STUDENT IN AN ORGANIZED HEALTH CARE EDUCATION/TRAINING PROGRAM

## 2024-10-09 RX ORDER — CEFAZOLIN 1 G/1
2 INJECTION, POWDER, FOR SOLUTION INTRAVENOUS EVERY 8 HOURS
Status: SHIPPED
Start: 2024-10-09 | End: 2024-11-06

## 2024-10-09 RX ORDER — INSULIN DEGLUDEC 200 U/ML
104 INJECTION, SOLUTION SUBCUTANEOUS EVERY MORNING
Status: DISCONTINUED | OUTPATIENT
Start: 2024-10-09 | End: 2024-10-10 | Stop reason: HOSPADM

## 2024-10-09 RX ORDER — TRIAMTERENE/HYDROCHLOROTHIAZID 37.5-25 MG
0.5 TABLET ORAL DAILY
Status: DISCONTINUED | OUTPATIENT
Start: 2024-10-09 | End: 2024-10-10 | Stop reason: HOSPADM

## 2024-10-09 RX ORDER — CEFAZOLIN SODIUM 2 G/100ML
2 INJECTION, SOLUTION INTRAVENOUS EVERY 8 HOURS
Start: 2024-10-09 | End: 2024-11-06

## 2024-10-09 RX ORDER — TRIAMTERENE/HYDROCHLOROTHIAZID 37.5-25 MG
1 TABLET ORAL DAILY
Status: DISCONTINUED | OUTPATIENT
Start: 2024-10-09 | End: 2024-10-09

## 2024-10-09 RX ORDER — HYDRALAZINE HYDROCHLORIDE 20 MG/ML
10 INJECTION INTRAMUSCULAR; INTRAVENOUS EVERY 4 HOURS PRN
Status: DISCONTINUED | OUTPATIENT
Start: 2024-10-09 | End: 2024-10-10 | Stop reason: HOSPADM

## 2024-10-09 RX ORDER — CETIRIZINE HYDROCHLORIDE 10 MG/1
10 TABLET ORAL DAILY
Status: DISCONTINUED | OUTPATIENT
Start: 2024-10-09 | End: 2024-10-10 | Stop reason: HOSPADM

## 2024-10-09 RX ORDER — AMLODIPINE BESYLATE 5 MG/1
5 TABLET ORAL DAILY
Status: DISCONTINUED | OUTPATIENT
Start: 2024-10-09 | End: 2024-10-10 | Stop reason: HOSPADM

## 2024-10-09 RX ORDER — CEFAZOLIN SODIUM 2 G/100ML
2 INJECTION, SOLUTION INTRAVENOUS EVERY 8 HOURS
Status: DISCONTINUED | OUTPATIENT
Start: 2024-10-09 | End: 2024-10-10 | Stop reason: HOSPADM

## 2024-10-09 SDOH — ECONOMIC STABILITY: TRANSPORTATION INSECURITY

## 2024-10-09 SDOH — ECONOMIC STABILITY: HOUSING INSECURITY

## 2024-10-09 SDOH — ECONOMIC STABILITY: FOOD INSECURITY

## 2024-10-09 SDOH — ECONOMIC STABILITY: GENERAL

## 2024-10-09 ASSESSMENT — COGNITIVE AND FUNCTIONAL STATUS - GENERAL
DAILY ACTIVITIY SCORE: 24
MOBILITY SCORE: 24

## 2024-10-09 ASSESSMENT — PAIN SCALES - WONG BAKER
WONGBAKER_NUMERICALRESPONSE: NO HURT
WONGBAKER_NUMERICALRESPONSE: HURTS WHOLE LOT

## 2024-10-09 ASSESSMENT — PAIN DESCRIPTION - LOCATION: LOCATION: ARM

## 2024-10-09 ASSESSMENT — PAIN - FUNCTIONAL ASSESSMENT: PAIN_FUNCTIONAL_ASSESSMENT: 0-10

## 2024-10-09 ASSESSMENT — PAIN SCALES - GENERAL
PAINLEVEL_OUTOF10: 7
PAINLEVEL_OUTOF10: 0 - NO PAIN
PAINLEVEL_OUTOF10: 2
PAINLEVEL_OUTOF10: 0 - NO PAIN
PAINLEVEL_OUTOF10: 0 - NO PAIN
PAINLEVEL_OUTOF10: 8

## 2024-10-09 ASSESSMENT — PAIN DESCRIPTION - ORIENTATION: ORIENTATION: RIGHT

## 2024-10-09 NOTE — PROGRESS NOTES
REVIEWED PT INFO AS CORRECT.   DX: MSSA Bacteremia  REVIEWED ALLERGIES: Sulfa, PCN, Epi, Tape, Latex., Meperidine  PMH: Afib, ANTONIO, DM, CKD III  NO MEDICATION INTERACTIONS.  REVIEWED RELEVANT BASELINE VALUES  LABS: Weekly to Dr Mayo cbc/bmp  PT HAS SL TUNNELED IJ  FLUSH PER [facility] PROTOCOL.     CEFAZOLIN 2G iv PUSH Q8H x4 Weeks At Home    CARE PLAN DONE TODAY    Patient is a 69 year old male admitted to  [facility] to complete course of Cefazolin for MSSA Bacteremia/Right Arm Cellulitis     ABT appropriate for Culture results  GFR 48 - no dose change needed until less than 30    Dispense x21 doses of Cefazolin 2g IV push q8h for cmpd and delivery on 10/9  Infusions 10/10 through 10/16    NF 10/15 OVN check labs and progress

## 2024-10-09 NOTE — CONSULTS
Called to get follow up appointment scheduled at the wound clinic and had to leave a VM, will wait to see if they are able to call back before pt is discharged. Left instructions on pt discharge paperwork to call if he doesn't hear from them by tomorrow, included clinic phone number.   [phone]- received call back from [facility] with pt appointment info- 9AM on 10/22/24. Added to pt discharge instructions, pt RN and CT coordinator also notified via Secure Chat.   Wound Care Consult     Visit Date: 10/9/2024      Patient Name: Manny Reveles         MRN: [id number]           YOB: 1955     Reason for Consult:         Wound History:      Pertinent Labs:   Albumin   Date Value Ref Range Status   10/09/2024 3.3 (L) 3.4 - 5.0 g/dL Final     ALBUMIN (MG/L) IN URINE   Date Value Ref Range Status   12/11/2021 200.9 Not Established mg/L Final     Albumin, Urine Random   Date Value Ref Range Status   10/02/2024 18.9 Not established mg/L Final       Wound Assessment:  Wound 09/30/24 Puncture Leg Proximal;Right;Upper;Anterior (Active)   Site Assessment Clean;Intact 10/09/24 1100   Elly-Wound Assessment Clean;Dry;Intact 10/09/24 1100   Shape surgical incision 10/09/24 1100   State of Healing Closed wound edges;Healing ridge 10/02/24 0136   Margins Attached edges;Well-defined edges 10/02/24 0136   Closure Approximated 10/02/24 0136   Treatments Site care 10/02/24 0136   Drainage Description None 10/02/24 0136   Drainage Amount None 10/02/24 0136   Dressing Moist to dry 10/08/24 0900   Dressing Changed Changed 10/08/24 0900   Dressing Status Clean;Dry 10/02/24 0136       Wound 10/02/24 Incision Arm Right (Active)   Wound Image   10/02/24 0136   Site Assessment Clean;Dry;Intact 10/09/24 1100   Shape surgical incision 10/09/24 1100   Margins Attached edges 10/03/24 2050   Treatments Heat applied 10/03/24 2050   Drainage Description None 10/09/24 1100   Drainage Amount None 10/09/24 1100   Dressing Moist to dry 10/09/24  1100   Dressing Changed Changed 10/09/24 1100   Dressing Status Clean;Dry 10/09/24 1100       Wound Team Summary Assessment:      Wound Team Plan:      Rufina Watson RN  10/9/2024  2:09 PM

## 2024-10-09 NOTE — NURSING NOTE
At 1455, Doctor Werner was in to assess patient and to discuss plan of care. Wife was at bedside at time of visit.

## 2024-10-09 NOTE — PROGRESS NOTES
Renal Progress Note  Assessment/  69 y.o. year old male who presented to the emergency department for further evaluation and management chest pain preceded by right upper extremity cellulitis the site of IV line as patient was briefly in the hospital for heart catheterization   Patient does have preadmission chronic comorbidity of hypertension diabetes type 2 coronary artery disease recently during the heart catheterization the patient did receive stenting obstructive sleep apnea is a chronic kidney disease stage III with a baseline creatinine of 1.7 and estimated GFR of 40     Stage II acute kidney injury multifactorial possible contrast-induced nephropathy patient is not on any nonsteroidal anti-inflammatory hemodynamically stable afebrile nonoliguric none polyuric no associated electrolyte or acid-base imbalance up today     In the presence of IV line site cellulitis with white blood cells of 16.1 and left shift kidney involvement in occult infection/bacteremia not essentially postinfectious GN could be a possibility however C3 and C4 are normal     The patient is clinically euvolemic with no clinical evidence of increased extracellular fluid volume     Hemodynamically stable afebrile nonoliguric none polyuric with [place] urine output no dysuria     Urine sediment is UTI type urine sediment nonnephrotic nonnephrotic  Blood culture pending     Plan/  Patient can be discharged from the kidney standpoint to follow-up within a week after discharge   outpatient follow up from renal standpoint: Dr. Weller  Subjective:   Admit Date: 10/1/2024    Interval History: Seen and examined uneventful night no uremic related or fluid volume overload related symptoms alert oriented follow command in no apparent pain or distress      Medications:   Scheduled Meds:amLODIPine, 5 mg, oral, Daily  [Held by provider] apixaban, 5 mg, oral, q12h  [Held by provider] atorvastatin, 40 mg, oral, Nightly  ceFAZolin, 2 g, intravenous,  "q8h  cetirizine, 10 mg, oral, Daily  [Held by provider] clopidogrel, 75 mg, oral, Daily  finasteride, 5 mg, oral, Daily  [Held by provider] insulin degludec, 104 Units, subcutaneous, q AM  insulin glargine, 30 Units, subcutaneous, BID AC  insulin lispro, 0-15 Units, subcutaneous, TID  lidocaine, 5 mL, infiltration, Once  metoprolol succinate XL, 25 mg, oral, Daily  pantoprazole, 40 mg, oral, Daily  ranolazine, 500 mg, oral, q12h  tamsulosin, 0.4 mg, oral, Daily  triamterene-hydrochlorothiazid, 1 tablet, oral, Daily  [Held by provider] valsartan, 40 mg, oral, Daily      Continuous Infusions:     CBC:   Lab Results   Component Value Date    HGB 10.9 (L) 10/09/2024    HGB 10.3 (L) 10/08/2024    WBC 9.8 10/09/2024    WBC 9.7 10/08/2024     10/09/2024     10/08/2024      Anemia:  No results found for: \"FERRITIN\", \"IRON\", \"TIBC\"   BMP:    Lab Results   Component Value Date     10/09/2024     (L) 10/08/2024    K 4.1 10/09/2024    K 4.1 10/08/2024     10/09/2024     10/08/2024    CO2 25 10/09/2024    CO2 24 10/08/2024    BUN 25 (H) 10/09/2024    BUN 28 (H) 10/08/2024    CREATININE 1.55 (H) 10/09/2024    CREATININE 1.84 (H) 10/08/2024      Bone disease: No results found for: \"PHOS\", \"PTH\", \"VITD25\"   Urinalysis:  No results found for: \"ROMEO\", \"PROTUR\", \"GLUCOSEU\", \"BLOODU\", \"KETONESU\", \"BILIRUBINU\", \"NITRITEU\", \"LEUKOCYTESU\", \"UTPCR\"     Objective:   Vitals: /81   Pulse 62   Temp 36.8 °C (98.2 °F)   Resp 16   Ht 1.829 m (6')   Wt 121 kg (266 lb 5.1 oz)   SpO2 94%   BMI 36.12 kg/m²    Wt Readings from Last 3 Encounters:   10/04/24 121 kg (266 lb 5.1 oz)   09/28/24 122 kg (268 lb 8.3 oz)   09/25/24 122 kg (268 lb 1.3 oz)      24HR INTAKE/OUTPUT:    Intake/Output Summary (Last 24 hours) at 10/9/2024 1246  Last data filed at 10/9/2024 1230  Gross per 24 hour   Intake 400 ml   Output 900 ml   Net -500 ml     Admission weight:  Weight: 122 kg (268 lb 15.4 oz)      Constitutional:  " Alert, awake, no apparent distress   Skin:normal, no rash  HEENT:sclera anicteric.  Head atraumatic normocephalic  Neck:supple with no thyromegally  Cardiovascular:  S1, S2 without m/r/g   Respiratory:  CTA B without w/r/r   Abdomen: +bs, soft, nt  Ext: no LE edema  Musculoskeletal:Intact  Neuro:Alert and oriented with no deficit      Electronically signed by Deven Mathis MD on 10/9/2024 at 12:46 PM

## 2024-10-09 NOTE — DISCHARGE SUMMARY
Discharge Diagnosis  Chest pain    Issues Requiring Follow-Up      Discharge Meds     Medication List      START taking these medications     ceFAZolin IVPB; Commonly known as: Ancef; Infuse 100 mL (2 g) over 30   minutes into a venous catheter every 8 hours for 28 days.   oxyCODONE-acetaminophen 5-325 mg tablet; Commonly known as: Percocet;   Take 1 tablet by mouth every 8 hours if needed for severe pain (7 - 10)   (G89.1) for up to 5 days.     CHANGE how you take these medications     insulin degludec 200 unit/mL (3 mL) injection; Commonly known as:   Tresiba FlexTouch U-200; Inject 104 Units under the skin once daily at   bedtime.; What changed: when to take this     CONTINUE taking these medications     amLODIPine 5 mg tablet; Commonly known as: Norvasc; TAKE 1 TABLET (5 MG)   BY MOUTH ONCE DAILY AS DIRECTED   aspirin 81 mg chewable tablet   atorvastatin 40 mg tablet; Commonly known as: Lipitor; Take 1 tablet (40   mg) by mouth once daily at bedtime.   cetirizine 10 mg tablet; Commonly known as: ZyrTEC   cholecalciferol 50,000 unit capsule; Commonly known as: Vitamin D-3;   TAKE 1 CAPSULE (75526 UNITS) BY MOUTH ONE TIME PER WEEK   clopidogrel 75 mg tablet; Commonly known as: Plavix; Take 1 tablet (75   mg) by mouth once daily.   Colace 100 mg capsule; Generic drug: docusate sodium   CRANBERRY ORAL   finasteride 5 mg tablet; Commonly known as: Proscar; Take 1 tablet (5   mg) by mouth once daily.   icosapent ethyL 1 gram capsule; Commonly known as: Vascepa; Take 2   capsules (2 g) by mouth 2 times a day.   ketoconazole 2 % shampoo; Commonly known as: NIZOral; Apply topically 2   times a week. Apply to scalp in shower. Lather, leave on 5 minutes then   rinse   metoprolol succinate XL 25 mg 24 hr tablet; Commonly known as:   Toprol-XL; TAKE 1 TABLET BY MOUTH EVERY DAY   nitroglycerin 0.4 mg SL tablet; Commonly known as: Nitrostat; Place 1   tablet (0.4 mg) under the tongue every 5 minutes if needed for chest pain.    NovoLOG Flexpen U-100 Insulin 100 unit/mL (3 mL) pen; Generic drug:   insulin aspart   potassium chloride CR 20 mEq ER tablet; Commonly known as: Klor-Con M20;   Take 1 tablet (20 mEq) by mouth once daily.   PROBIOTIC ORAL   ranolazine 500 mg 12 hr tablet; Commonly known as: Ranexa; TAKE 1 TABLET   BY MOUTH EVERY 12 HOURS   Rybelsus 14 mg tablet tablet; Generic drug: semaglutide   tamsulosin 0.4 mg 24 hr capsule; Commonly known as: Flomax; Take 1   capsule (0.4 mg) by mouth once daily.   telmisartan 20 mg tablet; Commonly known as: MIcarDIS; TAKE 1 TABLET (20   MG) BY MOUTH ONCE EVERY 24 HOURS.   triamterene-hydrochlorothiazid 37.5-25 mg tablet; Commonly known as:   Maxzide-25; Take half tablet po daily       Test Results Pending At Discharge  Pending Labs       No current pending labs.            Hospital Course   69-year-old male with past medical history of hypertension, dyslipidemia, diabetes type 2, coronary artery disease status post multiple PCI's, PAZ, CKD stage III who presented to the [place] [facility] with chest pain and altered mental status.  He was found to have sepsis and right upper extremity cellulitis with superficial thrombophlebitis.  He was initially bacteremic with MSSA, DUONG did not show any vegetations.  Repeat blood cultures have remained negative, he has been afebrile, initially was on vancomycin and then changed to cefazolin, linezolid.  Plan is to discharge on Cipro Zolam for 4 weeks.  ID on board.  Will get a small bore central catheter placed after which patient can be discharged with home health care.  Resume regular blood pressure.  He did undergo cardioversion, has remained in sinus rhythm a Holter monitor has been ordered.    Pertinent Physical Exam At Time of Discharge  Physical Exam  Eyes:      Extraocular Movements: Extraocular movements intact.      Conjunctiva/sclera: Conjunctivae normal.   Cardiovascular:      Rate and Rhythm: Normal rate and regular rhythm.      Pulses: Normal pulses.       Heart sounds: Normal heart sounds.   Pulmonary:      Effort: Pulmonary effort is normal.      Breath sounds: Normal breath sounds.   Abdominal:      General: Bowel sounds are normal.      Palpations: Abdomen is soft.   Musculoskeletal:         General: Normal range of motion.      Cervical back: Normal range of motion and neck supple.   Skin:     General: Skin is warm and dry.   Neurological:      General: No focal deficit present.   Outpatient Follow-Up  Future Appointments   Date Time Provider Department Center   10/14/2024  2:30 PM Armando Antonio MD [id number] [facility]   11/7/2024  9:00 AM TUSHAR [id number] ECG/HOLTER [id number] [facility]   11/27/2024  2:00 PM Sherwin Mayo MD [id number] [facility]   12/4/2024  1:15 PM Evan Hansen MD [id number] [facility]   1/8/2025  2:00 PM Stephanie Carney MD [id number] [facility]   2/4/2025  2:15 PM Tan Mchugh MD [id number] [facility]   3/12/2025 10:00 AM Armando Antonio MD [id number] [facility]   5/1/2025  2:00 PM Stephanie Carney MD [id number] [facility]   9/9/2025  1:30 PM Stephanie Carney MD [id number] [facility]         Tima Keenan MD

## 2024-10-09 NOTE — PROGRESS NOTES
Infectious Disease Progress Note       10/9/2024    Patient is a followup regarding MSSA bacteremia,   Feels okay at the present time.         pain and induration of the R arm. Had surgery yesterday  Lab Results   Component Value Date    WBC 9.8 10/09/2024    HGB 10.9 (L) 10/09/2024    HCT 32.0 (L) 10/09/2024    MCV 91 10/09/2024     10/09/2024     Lab Results   Component Value Date    GLUCOSE 178 (H) 10/09/2024    CALCIUM 8.6 10/09/2024     10/09/2024    K 4.1 10/09/2024    CO2 25 10/09/2024     10/09/2024    BUN 25 (H) 10/09/2024    CREATININE 1.55 (H) 10/09/2024       WBC trends are being monitored. Antibiotic doses are being adjusted per most recent renal labs.     Vitals:    10/08/24 2356   BP:    Pulse: 61   Resp:    Temp:    SpO2:      Patient is awake and alert  NAD  Neck supple  Heart S1S2  Chest: Equal expansion, bilaterally clear to auscultation  Abdomen: soft, ND, NTTP, no guarding  Extrem:  photo uploaded - worsening swelling and blister with streaking cellulitis now up the arm despite the cefazolin  Skin: no rashes, no diaphoresis  Neuro: CNS intact  Affect appropriate and patient is interactive      Patient Active Problem List   Diagnosis    Stage 3 chronic kidney disease (Multi)    Obstructive sleep apnea syndrome    Essential hypertriglyceridemia    CAD S/P percutaneous coronary angioplasty    Benign prostatic hyperplasia with urinary obstruction    Benign essential hypertension    Calculus of kidney    Incomplete bladder emptying    Squamous cell carcinoma of skin of other parts of face    Type 2 diabetes mellitus without retinopathy (Multi)    Class 2 severe obesity due to excess calories with serious comorbidity and body mass index (BMI) of 35.0 to 35.9 in adult    Hypokalemia    Uses self-applied continuous glucose monitoring device    Never smoked tobacco    Unstable angina (Multi)    Chest pain         ASSESSMENT:  Right arm thrombophlebitis with cellulitis complicated by  MSSA bacteremia s/p attempted aspiration. Now with worsening induration and swelling.   ANTONIO -creatinine clearance steadily improving  Atrial fib on anticoagulation    PLAN:  Continue cefazolin    Will likely need to go home on IV antibiotics due to Staph aureus bacteremia.  4 weeks, current dose  Line to be placed    Will need weekly CBC , BMP  Patient can follow-up with primary care physician to monitor lab work, nephrology    Estimated Creatinine Clearance: 60.4 mL/min (A) (by C-G formula based on SCr of 1.55 mg/dL (H)).    Kiko Addison MD

## 2024-10-09 NOTE — POST-PROCEDURE NOTE
Interventional Radiology Brief Postprocedure Note    Attending: Schoenberger    Assistant: None    Diagnosis: Renal Insuffieciency. Cellulitis    Description of procedure: US Flouro Guided Small Bore Central Venous Catheter     Anesthesia:  Local    Complications: None    Estimated Blood Loss: minimal    Medications  As of 10/09/24 1435      lactated Ringer's bolus 1,000 mL (mL/hr) Total volume:  Not documented* Dosing weight:  122   *Total volume has not been documented. View each administration to see the amount administered.     Date/Time Rate/Dose/Volume Action       10/01/24  2106 1,000 mL - 500 mL/hr (over 120 min) New Bag     10/02/24  0017  (over 120 min) Stopped      0022 1,000 mL - 999 mL/hr (over 60 min) New Bag      0130 1,000 mL Stopped               acetaminophen (Tylenol) tablet 975 mg (mg) Total dose:  975 mg Dosing weight:  122      Date/Time Rate/Dose/Volume Action       10/01/24  2104 975 mg Given               acetaminophen (Tylenol) tablet 650 mg (mg) Total dose:  1,950 mg Dosing weight:  122      Date/Time Rate/Dose/Volume Action       10/02/24  0449 650 mg Given     10/03/24  2054 650 mg Given     10/04/24  0437 650 mg Given               magnesium sulfate 2 g in sterile water for injection 50 mL (mL/hr) Total dose:  2.12 g* Dosing weight:  122   *From user-documented volume     Date/Time Rate/Dose/Volume Action       10/02/24  0022 2 g - 25 mL/hr (over 120 min) New Bag      0229  (over 120 min) Stopped               cefepime (Maxipime) in dextrose 5% IV 2 g (g) Total dose:  2 g Dosing weight:  122      Date/Time Rate/Dose/Volume Action       10/01/24  2332 2 g (over 30 min) New Bag     10/02/24  0012  (over 30 min) Stopped               vancomycin (Vancocin) 2 g in dextrose 5% 500 mL IV (mL/hr) Total dose:  1.85 g* Dosing weight:  122   *From user-documented volume     Date/Time Rate/Dose/Volume Action       10/02/24  0022 2 g - 270 mL/hr (over 120 min) New Bag      0229 500 mL [vol] Stopped                heparin bolus from bag 4,000 Units (Units) Total dose:  Cannot be calculated* Dosing weight:  122   *Total user-documented volume 190.87 mL may contain volume from other administrations     Date/Time Rate/Dose/Volume Action       10/01/24  2325 4,000 Units Bolus from Bag               heparin bolus from bag 2,000-4,000 Units (Units) Total dose:  Cannot be calculated* Dosing weight:  122   *Total user-documented volume 190.87 mL may contain volume from other administrations     Date/Time Rate/Dose/Volume Action       10/02/24  0415 2,000 Units Bolus from Bag               heparin 25,000 Units in dextrose 5% 250 mL (100 Units/mL) infusion (premix) (Units/hr) Total dose:  Cannot be calculated* Dosing weight:  122   *Total user-documented volume 190.87 mL may contain volume from other administrations     Date/Time Rate/Dose/Volume Action       10/01/24  2322 1,000 Units/hr - 10 mL/hr New Bag     10/02/24  0053 1,000 Units/hr - 10 mL/hr Rate Verify      0409 1,200 Units/hr - 12 mL/hr New Bag      0938 1,200 Units/hr - 12 mL/hr Rate Verify      1128  Stopped               pantoprazole (ProtoNix) EC tablet 40 mg (mg) Total dose:  280 mg* Dosing weight:  122   *Administration not included in total     Date/Time Rate/Dose/Volume Action       10/02/24  0528 40 mg Given     10/03/24  0610 40 mg Given     10/04/24  0511 40 mg Given     10/05/24  0614 40 mg Given     10/06/24  0543 *40 mg Missed      1353 *Not included in total MAR Hold      1622 *Not included in total MAR Unhold     10/07/24  0504 40 mg Given     10/08/24  0543 40 mg Given     10/09/24  0558 40 mg Given               pantoprazole (ProtoNix) injection 40 mg (mg) Total dose:  Cannot be calculated* Dosing weight:  122   *Administration dose not documented     Date/Time Rate/Dose/Volume Action       10/02/24  0528 *Not included in total See Alternative     10/03/24  0610 *Not included in total See Alternative     10/04/24  0511 *Not included in total  See Alternative     10/05/24  0614 *Not included in total See Alternative     10/06/24  0543 *Not included in total See Alternative     10/07/24  0504 *Not included in total See Alternative     10/08/24  0543 *Not included in total See Alternative     10/09/24  0558 *Not included in total See Alternative               acetaminophen (Tylenol) oral liquid 650 mg (mg) Total dose:  Cannot be calculated* Dosing weight:  122   *Administration dose not documented     Date/Time Rate/Dose/Volume Action       10/02/24  0449 *Not included in total See Alternative     10/03/24  2054 *Not included in total See Alternative     10/04/24  0437 *Not included in total See Alternative               acetaminophen (Tylenol) suppository 650 mg (mg) Total dose:  Cannot be calculated* Dosing weight:  122   *Administration dose not documented     Date/Time Rate/Dose/Volume Action       10/02/24  0449 *Not included in total See Alternative     10/03/24  2054 *Not included in total See Alternative     10/04/24  0437 *Not included in total See Alternative               ondansetron (Zofran) tablet 4 mg (mg) Total dose:  Cannot be calculated* Dosing weight:  122   *Administration dose not documented     Date/Time Rate/Dose/Volume Action       10/06/24  1353 *Not included in total MAR Hold      1622 *Not included in total MAR Unhold               ondansetron (Zofran) injection 4 mg (mg) Total dose:  Cannot be calculated* Dosing weight:  122   *Administration dose not documented     Date/Time Rate/Dose/Volume Action       10/06/24  1353 *Not included in total MAR Hold      1622 *Not included in total MAR Unhold               melatonin tablet 3 mg (mg) Total dose:  12 mg Dosing weight:  122      Date/Time Rate/Dose/Volume Action       10/02/24  2034 3 mg Given     10/05/24  2224 3 mg Given     10/06/24  1353 *Not included in total MAR Hold      1622 *Not included in total MAR Unhold     10/07/24  2253 3 mg Given     10/08/24  2039 3 mg Given                sodium chloride 0.9% infusion (mL/hr) Total volume:  308.33 mL* Dosing weight:  122   *From user-documented volume     Date/Time Rate/Dose/Volume Action       10/02/24  0147 100 mL/hr New Bag      0452 308.33 mL       1138 100 mL/hr New Bag               vancomycin (Vancocin) 1,000 mg in dextrose 5%  mL (mL/hr) Total dose:  1,000 mg* Dosing weight:  121   *From user-documented volume     Date/Time Rate/Dose/Volume Action       10/02/24  2313 1,000 mg - 200 mL/hr (over 60 min) New Bag     10/03/24  0000 200 mL Stopped      1149 0 mL                aspirin chewable tablet 81 mg (mg) Total dose:  243 mg      Date/Time Rate/Dose/Volume Action       10/02/24  0957 81 mg Given     10/03/24  0801 81 mg Given     10/04/24  0825 81 mg Given               atorvastatin (Lipitor) tablet 40 mg (mg) Total dose:  80 mg* Dosing weight:  121   *Administration not included in total     Date/Time Rate/Dose/Volume Action       10/02/24  2033 40 mg Given     10/03/24  2054 40 mg Given      2157 *Not included in total Held by provider     10/04/24  2100 *40 mg Missed     10/05/24  2100 *40 mg Missed     10/06/24  2100 *40 mg Missed     10/07/24  2100 *40 mg Missed     10/08/24  2100 *40 mg Missed               clopidogrel (Plavix) tablet 75 mg (mg) Total dose:  450 mg* Dosing weight:  121   *Administration not included in total     Date/Time Rate/Dose/Volume Action       10/02/24  0957 75 mg Given     10/03/24  0801 75 mg Given     10/04/24  0825 75 mg Given     10/05/24  0833 75 mg Given     10/06/24  1057 *75 mg Missed      1353 *Not included in total MAR Hold      1622 *Not included in total MAR Unhold     10/07/24  0818 75 mg Given     10/08/24  0808 75 mg Given      1358 *Not included in total Held by provider     10/09/24  0900 *75 mg Missed               finasteride (Proscar) tablet 5 mg (mg) Total dose:  35 mg* Dosing weight:  121   *Administration not included in total     Date/Time Rate/Dose/Volume Action        10/02/24  0956 5 mg Given     10/03/24  0801 5 mg Given     10/04/24  0825 5 mg Given     10/05/24  0834 5 mg Given     10/06/24  1058 *5 mg Missed      1353 *Not included in total MAR Hold      1622 *Not included in total MAR Unhold     10/07/24  0819 5 mg Given     10/08/24  0808 5 mg Given     10/09/24  0843 5 mg Given               metoprolol succinate XL (Toprol-XL) 24 hr tablet 25 mg (mg) Total dose:  175 mg*   *Administration not included in total     Date/Time Rate/Dose/Volume Action       10/02/24  0957 25 mg Given     10/03/24  0801 25 mg Given     10/04/24  0825 25 mg Given     10/05/24  0834 25 mg Given     10/06/24  1058 *25 mg Missed      1353 *Not included in total MAR Hold      1622 *Not included in total MAR Unhold     10/07/24  0818 25 mg Given     10/08/24  0808 25 mg Given     10/09/24  0843 25 mg Given               ranolazine (Ranexa) 12 hr tablet 500 mg (mg) Total dose:  7,000 mg* Dosing weight:  121   *Administration not included in total     Date/Time Rate/Dose/Volume Action       10/02/24  0957 500 mg Given      2033 500 mg Given     10/03/24  0801 500 mg Given      2053 500 mg Given     10/04/24  0825 500 mg Given      2016 500 mg Given     10/05/24  0833 500 mg Given      2053 500 mg Given     10/06/24  1059 *500 mg Missed      1353 *Not included in total MAR Hold      1622 *Not included in total MAR Unhold      2004 500 mg Given     10/07/24  0819 500 mg Given      2000 500 mg Given     10/08/24  0808 500 mg Given      2039 500 mg Given     10/09/24  0843 500 mg Given               tamsulosin (Flomax) 24 hr capsule 0.4 mg (mg) Total dose:  2.8 mg* Dosing weight:  121   *Administration not included in total     Date/Time Rate/Dose/Volume Action       10/02/24  0957 0.4 mg Given     10/03/24  0801 0.4 mg Given     10/04/24  0825 0.4 mg Given     10/05/24  0833 0.4 mg Given     10/06/24  1100 *0.4 mg Missed      1353 *Not included in total MAR Hold      1622 *Not included in total MAR  Unhold     10/07/24  0818 0.4 mg Given     10/08/24  0808 0.4 mg Given     10/09/24  0843 0.4 mg Given               valsartan (Diovan) tablet 40 mg (mg) Total dose:  Cannot be calculated* Dosing weight:  121   *Administration dose not documented     Date/Time Rate/Dose/Volume Action       10/02/24  0903 *Not included in total Held by provider      1002 *40 mg Missed     10/03/24  0900 *40 mg Missed     10/04/24  0900 *40 mg Missed     10/05/24  0900 *40 mg Missed     10/06/24  0900 *40 mg Missed     10/07/24  0900 *40 mg Missed     10/08/24  0900 *40 mg Missed     10/09/24  0900 *40 mg Missed               glucagon (Glucagen) injection 1 mg (mg) Total dose:  Cannot be calculated* Dosing weight:  121   *Administration dose not documented     Date/Time Rate/Dose/Volume Action       10/06/24  1353 *Not included in total MAR Hold      1622 *Not included in total MAR Unhold               glucagon (Glucagen) injection 1 mg (mg) Total dose:  Cannot be calculated* Dosing weight:  121   *Administration dose not documented     Date/Time Rate/Dose/Volume Action       10/06/24  1353 *Not included in total MAR Hold      1622 *Not included in total MAR Unhold               dextrose 50 % injection 25 g (g) Total dose:  Cannot be calculated* Dosing weight:  121   *Administration dose not documented     Date/Time Rate/Dose/Volume Action       10/06/24  1353 *Not included in total MAR Hold      1622 *Not included in total MAR Unhold               dextrose 50 % injection 12.5 g (g) Total dose:  Cannot be calculated* Dosing weight:  121   *Administration dose not documented     Date/Time Rate/Dose/Volume Action       10/06/24  1353 *Not included in total MAR Hold      1622 *Not included in total MAR Unhold               insulin glargine (Lantus) injection 25 Units (Units) Total dose:  150 Units* Dosing weight:  121   *Administration not included in total     Date/Time Rate/Dose/Volume Action       10/02/24  1701 25 Units Given      10/03/24  0801 25 Units Given      1608 25 Units Given     10/04/24  0817 *25 Units Missed      1629 25 Units Given     10/05/24  0753 25 Units Given      1632 25 Units Given     10/06/24  1056 *25 Units Missed               insulin glargine (Lantus) injection 30 Units (Units) Total dose:  240 Units Dosing weight:  121      Date/Time Rate/Dose/Volume Action       10/06/24  1353 *Not included in total MAR Hold      1600 30 Units Given      1622 *Not included in total MAR Unhold      1639 30 Units Given      1725 30 Units Given     10/07/24  0819 30 Units Given      1703 30 Units Given     10/08/24  0808 30 Units Given      1639 30 Units Given     10/09/24  0851 30 Units Given               insulin lispro (HumaLOG) injection 0-15 Units (Units) Total dose:  87 Units Dosing weight:  121      Date/Time Rate/Dose/Volume Action       10/02/24  0957 6 Units Given      1139 6 Units Given      1702 6 Units Given     10/03/24  0756 *Not included in total Missed      1101 6 Units Given      1609 3 Units Given     10/04/24  0817 *Not included in total Missed      1145 *Not included in total Missed      1629 3 Units Given     10/05/24  0752 6 Units Given      1130 6 Units Given      1632 9 Units Given     10/06/24  1056 *Not included in total Missed      1130 *Not included in total Missed      1353 *Not included in total MAR Hold      1622 *Not included in total MAR Unhold      1631 *Not included in total Missed     10/07/24  0758 *Not included in total Missed      1209 6 Units Given      1703 6 Units Given     10/08/24  0758 *Not included in total Missed      1139 6 Units Given      1639 9 Units Given     10/09/24  0843 *Not included in total Missed      1151 9 Units Given               cefTRIAXone (Rocephin) 1 g in dextrose (iso) IV 50 mL (mL/hr) Total dose:  1 g* Dosing weight:  121   *From user-documented volume     Date/Time Rate/Dose/Volume Action       10/02/24  1322 1 g - 100 mL/hr (over 30 min) New Bag      1417  (over  30 min) Stopped     10/03/24  1149 50 mL                ceFAZolin (Ancef) 2 g in dextrose (iso)  mL (g) Total dose:  28 g* Dosing weight:  121   *From user-documented volume     Date/Time Rate/Dose/Volume Action       10/04/24  1148 2 g (over 30 min) New Bag      1155  (over 30 min) Stopped      2016 2 g (over 30 min) New Bag      2057 200 mL Stopped     10/05/24  0412 2 g (over 30 min) New Bag      0452 100 mL Stopped      1129 2 g (over 30 min) New Bag      1207  (over 30 min) Stopped      2053 2 g (over 30 min) New Bag      2149 200 mL Stopped     10/06/24  0414 2 g (over 30 min) New Bag      0448 100 mL Stopped      1308 2 g (over 30 min) New Bag      1345 100 mL Stopped      1353 *Not included in total MAR Hold      1622 *Not included in total MAR Unhold      2004 2 g (over 30 min) - 100 mL New Bag      2005  (over 30 min) Stopped     10/07/24  0400 2 g (over 30 min) - 100 mL New Bag      0401  (over 30 min) Stopped      1300 2 g (over 30 min) New Bag      1556  (over 30 min) Stopped      2000 2 g (over 30 min) New Bag      2035  (over 30 min) Stopped     10/08/24  0301 2 g (over 30 min) New Bag      0336 300 mL Stopped      1139 2 g (over 30 min) New Bag      1331 100 mL Stopped      2039 2 g (over 30 min) New Bag      2118  (over 30 min) Stopped     10/09/24  1139 2 g (over 30 min) New Bag      1356 *0 g - 0 mL/hr (over 30 min) Incomplete               butamben-tetracaine-benzocaine (Cetacaine) spray (spray) Total dose:  2 spray      Date/Time Rate/Dose/Volume Action       10/04/24  0914 2 spray Given               lidocaine 2 % mucosal jelly (Uro-Jet) (Application) Total dose:  1 Application      Date/Time Rate/Dose/Volume Action       10/04/24  0920 1 Application Given               midazolam (Versed) injection (mg) Total dose:  1 mg      Date/Time Rate/Dose/Volume Action       10/04/24  0943 1 mg Given               fentaNYL PF (Sublimaze) injection (mcg) Total dose:  50 mcg      Date/Time  Rate/Dose/Volume Action       10/04/24  0943 25 mcg Given      0947 25 mcg Given               apixaban (Eliquis) tablet 5 mg (mg) Total dose:  40 mg* Dosing weight:  121   *Administration not included in total     Date/Time Rate/Dose/Volume Action       10/04/24  1149 5 mg Given      2213 5 mg Given     10/05/24  1129 5 mg Given      2225 5 mg Given     10/06/24  1308 *5 mg Missed      1353 *Not included in total MAR Hold      1622 *Not included in total MAR Unhold      2313 5 mg Given     10/07/24  1038 5 mg Given      2253 5 mg Given     10/08/24  1139 5 mg Given      1358 *Not included in total Held by provider      2330 *5 mg Missed     10/09/24  1130 *5 mg Missed               lidocaine (Xylocaine) 10 mg/mL (1 %) injection 20 mL (mL) Total volume:  20 mL Dosing weight:  121      Date/Time Rate/Dose/Volume Action       10/04/24  1545 20 mL Given               lidocaine (Xylocaine) 10 mg/mL (1 %) injection 30 mL (mL) Total volume:  30 mL Dosing weight:  121      Date/Time Rate/Dose/Volume Action       10/04/24  1545 30 mL Given               LORazepam (Ativan) tablet 0.5 mg (mg) Total dose:  0.5 mg Dosing weight:  121      Date/Time Rate/Dose/Volume Action       10/04/24  2213 0.5 mg Given               linezolid (Zyvox)  mg in 300 mL (mg) Total dose:  3,600 mg* Dosing weight:  121   *From user-documented volume     Date/Time Rate/Dose/Volume Action       10/05/24  2329 600 mg (over 120 min) New Bag     10/06/24  0129 300 mL Stopped      1113 600 mg (over 120 min) New Bag      1307 300 mL Stopped      1353 *Not included in total MAR Hold      1622 *Not included in total MAR Unhold      2313 600 mg (over 120 min) New Bag     10/07/24  0036 300 mL Stopped      1038 600 mg (over 120 min) New Bag      1340  (over 120 min) Stopped      2253 600 mg (over 120 min) New Bag     10/08/24  0019 600 mL Stopped      1139 600 mg (over 120 min) New Bag      1337 300 mL Stopped               oxyCODONE-acetaminophen  (Percocet) 5-325 mg per tablet 1 tablet (tablet) Total dose:  7 tablet Dosing weight:  121      Date/Time Rate/Dose/Volume Action       10/07/24  0257 1 tablet Given      1038 1 tablet Given      2000 1 tablet Given     10/08/24  0301 1 tablet Given      0920 1 tablet Given      1703 1 tablet Given     10/09/24  1002 1 tablet Given               insulin degludec (Tresiba FlexTouch) injection 104 Units (Units) Total dose:  Cannot be calculated*   *Administration dose not documented     Date/Time Rate/Dose/Volume Action       10/09/24  1235 *Not included in total Held by provider      1300 *Not included in total Automatically Held                   No specimens collected      See detailed result report with images in PACS.    The patient tolerated the procedure well without incident or complication and is in stable condition.

## 2024-10-09 NOTE — PROGRESS NOTES
Patient will be discharging with [facility]. SOC is set for 10/10 at 0900. Patient to receive 6 pm dose of Ancef this evening, if surgery feels patient can return home, he can then be discharged. If surgery holds patient, will notify home care in the morning that patient is still inpatient. CT team will continue to follow.

## 2024-10-09 NOTE — PROGRESS NOTES
General Surgery Progress Note    Patient: Manny Reveles  Unit/Bed: 1010/1010-A  YOB: 1955  MRN: [id number]  Acct: [id number]   Admitting Diagnosis: Hypomagnesemia [E83.42]  Chest pain [R07.9]  NSTEMI (non-ST elevated myocardial infarction) (Multi) [I21.4]  Cellulitis of right upper extremity [L03.113]  Acute kidney injury superimposed on CKD (CMS-HCC) [N17.9, N18.9]  Chest pain, unspecified type [R07.9]  Sepsis, due to unspecified organism, unspecified whether acute organ dysfunction present (Multi) [A41.9]  Date:  10/1/2024  Hospital Day: 8  Attending: Tima Keenan MD    Complaint:  Chief Complaint   Patient presents with    Chest Pain     Patient c/o of chest pain following Stent placement 9/28.       Subjective  Patient states that he had come to the ER with chest pain which radiated to his left jaw and left upper arm on a previous admission.  He had taken 2 nitroglycerin at home and was given 2 more nitroglycerin in the ER.  IV's were placed in the ER.  He had a heart cath on a Monday which showed an 80% blockage and a stent was placed.  He stated that he was told that he would have to be on blood thinners for 6 months.  He had a total of 4 IV lines.  He was getting IV heparin and it was infusing in the left arm but it started to beep so his nurse moved it to the IV in the right antecubital.  He told the nurse that the IV was hurting him and it was red as well.  He stated that the nurse took moved the drip back to the left arm.   His right antecubital region remained sore and red.  He got the flu shot in the right arm and was discharged home on a Tuesday.  On Wednesday the squad was called to his home but they would not bring him to the emergency room.  The squad helped to load him into his own vehicle so his wife could bring him into the emergency room.  He said that when he arrived another ambulance squad helped him out of his car.   He came in because the old IV site on the right  antecubital region was very red and swollen.  He said that Dr. George opened it up and cauterizing knife and put some kind of powder in it, and that was on a Sunday at 320 pm.  On Monday night infectious disease asked the patient to move his freestyle dandre 2 to the left arm from the right arm.  He says that he was taking 2 antibiotics because his liver and creatinine levels were elevated.  He is concerned because he has not been taking the anticoagulant or the antibiotics.       PHYSICAL EXAM:  Physical Exam  Vitals and nursing note reviewed.   Constitutional:       General: He is not in acute distress.     Appearance: Normal appearance.   HENT:      Head: Normocephalic.      Nose: Nose normal.      Mouth/Throat:      Mouth: Mucous membranes are moist.   Cardiovascular:      Rate and Rhythm: Normal rate.   Pulmonary:      Effort: Pulmonary effort is normal.   Skin:     General: Skin is warm.      Capillary Refill: Capillary refill takes less than 2 seconds.      Findings: Erythema and wound present.             Comments: Induration, wound bed covered with surgical powder/paste.   Neurological:      General: No focal deficit present.      Mental Status: He is alert and oriented to person, place, and time.   Psychiatric:         Mood and Affect: Mood normal.       Vital signs in last 24 hours:  Vitals:    10/09/24 1424   BP: 165/84   Pulse: 62   Resp: 18   Temp:    SpO2: 94%     Intake/Output this shift:    Intake/Output Summary (Last 24 hours) at 10/9/2024 1838  Last data filed at 10/9/2024 1356  Gross per 24 hour   Intake 100 ml   Output 900 ml   Net -800 ml      Allergies:  Allergies   Allergen Reactions    Adhesive Tape-Silicones Hives and Itching    Epinephrine Syncope    Latex Other    Meperidine Other    Penicillins Other     Tolerated cephalosporins this admission on 10/2024    Promethazine Other    Sulfa (Sulfonamide Antibiotics) Other      Medications:  Scheduled medications  amLODIPine, 5 mg, oral,  Daily  [Held by provider] apixaban, 5 mg, oral, q12h  [Held by provider] atorvastatin, 40 mg, oral, Nightly  ceFAZolin, 2 g, intravenous, q8h  cetirizine, 10 mg, oral, Daily  [Held by provider] clopidogrel, 75 mg, oral, Daily  finasteride, 5 mg, oral, Daily  [Held by provider] insulin degludec, 104 Units, subcutaneous, q AM  insulin glargine, 30 Units, subcutaneous, BID AC  insulin lispro, 0-15 Units, subcutaneous, TID  lidocaine, 5 mL, infiltration, Once  metoprolol succinate XL, 25 mg, oral, Daily  pantoprazole, 40 mg, oral, Daily  ranolazine, 500 mg, oral, q12h  tamsulosin, 0.4 mg, oral, Daily  triamterene-hydrochlorothiazid, 0.5 tablet, oral, Daily  [Held by provider] valsartan, 40 mg, oral, Daily      Continuous medications     PRN medications  PRN medications: alteplase, dextrose, dextrose, glucagon, glucagon, hydrALAZINE, melatonin, ondansetron **OR** ondansetron, oxyCODONE-acetaminophen  Labs:  Results for orders placed or performed during the hospital encounter of 10/01/24 (from the past 24 hour(s))   Comprehensive metabolic panel   Result Value Ref Range    Glucose 178 (H) 74 - 99 mg/dL    Sodium 136 136 - 145 mmol/L    Potassium 4.1 3.5 - 5.3 mmol/L    Chloride 104 98 - 107 mmol/L    Bicarbonate 25 21 - 32 mmol/L    Anion Gap 11 10 - 20 mmol/L    Urea Nitrogen 25 (H) 6 - 23 mg/dL    Creatinine 1.55 (H) 0.50 - 1.30 mg/dL    eGFR 48 (L) >60 mL/min/1.73m*2    Calcium 8.6 8.6 - 10.3 mg/dL    Albumin 3.3 (L) 3.4 - 5.0 g/dL    Alkaline Phosphatase 207 (H) 33 - 136 U/L    Total Protein 6.8 6.4 - 8.2 g/dL    AST 23 9 - 39 U/L    Bilirubin, Total 1.3 (H) 0.0 - 1.2 mg/dL    ALT 33 10 - 52 U/L   CBC and Auto Differential   Result Value Ref Range    WBC 9.8 4.4 - 11.3 x10*3/uL    nRBC 0.0 0.0 - 0.0 /100 WBCs    RBC 3.51 (L) 4.50 - 5.90 x10*6/uL    Hemoglobin 10.9 (L) 13.5 - 17.5 g/dL    Hematocrit 32.0 (L) 41.0 - 52.0 %    MCV 91 80 - 100 fL    MCH 31.1 26.0 - 34.0 pg    MCHC 34.1 32.0 - 36.0 g/dL    RDW 12.5 11.5 -  14.5 %    Platelets 197 150 - 450 x10*3/uL    Immature Granulocytes %, Automated 9.1 (H) 0.0 - 0.9 %    Immature Granulocytes Absolute, Automated 0.89 (H) 0.00 - 0.70 x10*3/uL   Magnesium   Result Value Ref Range    Magnesium 1.96 1.60 - 2.40 mg/dL   SST TOP   Result Value Ref Range    Extra Tube Hold for add-ons.    Manual Differential   Result Value Ref Range    Neutrophils %, Manual 66.0 40.0 - 80.0 %    Lymphocytes %, Manual 24.0 13.0 - 44.0 %    Monocytes %, Manual 6.0 2.0 - 10.0 %    Eosinophils %, Manual 4.0 0.0 - 6.0 %    Basophils %, Manual 0.0 0.0 - 2.0 %    Seg Neutrophils Absolute, Manual 6.47 1.20 - 7.00 x10*3/uL    Lymphocytes Absolute, Manual 2.35 1.20 - 4.80 x10*3/uL    Monocytes Absolute, Manual 0.59 0.10 - 1.00 x10*3/uL    Eosinophils Absolute, Manual 0.39 0.00 - 0.70 x10*3/uL    Basophils Absolute, Manual 0.00 0.00 - 0.10 x10*3/uL    Total Cells Counted 100     RBC Morphology See Below     Polychromasia Mild     Teardrop Cells Few     Basophilic Stippling Present    POCT GLUCOSE   Result Value Ref Range    POCT Glucose 146 (H) 74 - 99 mg/dL   ECG 12 Lead   Result Value Ref Range    Ventricular Rate 55 BPM    Atrial Rate 55 BPM    AK Interval 152 ms    QRS Duration 100 ms    QT Interval 434 ms    QTC Calculation(Bazett) 415 ms    P Axis -7 degrees    R Axis 8 degrees    T Axis 17 degrees    QRS Count 9 beats    Q Onset 215 ms    P Onset 139 ms    P Offset 201 ms    T Offset 432 ms    QTC Fredericia 421 ms   POCT GLUCOSE   Result Value Ref Range    POCT Glucose 285 (H) 74 - 99 mg/dL   POCT GLUCOSE   Result Value Ref Range    POCT Glucose 211 (H) 74 - 99 mg/dL      Imaging:  IR CVC tunneled    Result Date: 10/9/2024  Interpreted By:  Schoenberger, Joseph, STUDY: IR CVC TUNNELED; ;  10/9/2024 2:43 pm   INDICATION: Signs/Symptoms:for small bore centeral cathether.     COMPARISON: None.   ACCESSION NUMBER(S): [id number]   ORDERING CLINICIAN: BOSSMAN PULIDO   CONSENT: The procedure, its indication, its  risks, and alternatives were explained to the patient who understands and consents to the procedure. A time-out is completed prior to the procedure to confirm patient identity and procedure to be performed.   MODALITIES: ultrasound, flouroscopy   TECHNIQUE: All personnel in the procedure suite used appropriate mask and cap. Additionally, the performing physician and assistant used maximum barrier technique to include a sterile gown and gloves as per standard hospital protocol. The base of the right neck and right upper chest wall was sterilely prepped with chlorhexidine in the usual manner. A large sterile barrier was used to to completely draped the patient is outside of the sterilely prepped area in the usual manner per standard hospital protocol. Local anesthetic was administered lateral to the right internal jugular vein. This was cannulated under direct ultrasound visualization using the Seldinger technique with. The needle tip was visualized within the lumen of the vein. Using fluoroscopy guidance, a guidewire was advanced to the right atrium. The needle was removed and a tiny incision made at the wire insertion site. The introducer for a 5 Samoan small bore central venous catheter was then placed. An appropriate subclavicular tunnel exit was then localized and local anesthetic administered and tiny incision made. The subcutaneous tissues between the 2 incisions were then anesthetized. A 5 Samoan single lumen cuffed small bore central venous infusion catheter was then pulled through the tunnel exit to the jugular access site. The catheter was then cut to 24 cm. It was advanced through the introducer. It was positioned such that its distal tip was in the upper right atrium and is retention cuff was 5 mm deep to tunnel exit. Catheter freely aspirated and flushed. A fluoroscopic image documenting catheter placement was obtained. Accommodation Dermabond and Steri-Strips was used to close the tiny incision at the  base of the neck. Phalanges of the catheter were anchored to the catheter using silk ties. His it to a Prolene suture was used anchor the flange to the patient's skin. An appropriate dressing was placed. The patient tolerated the procedure well. There was no immediate complication.   FINDINGS: See discussion above       Successful ultrasound and fluoroscopy guided placement of a cuffed tunneled small bore central venous infusion catheter. Catheter is ready to use for venous infusion is     MACRO: None   Signed by: Joseph Schoenberger 10/9/2024 3:20 PM Dictation workstation:   [id number]    ECG 12 Lead    Result Date: 10/9/2024  Sinus bradycardia Minimal voltage criteria for LVH, may be normal variant Borderline ECG When compared with ECG of 08-OCT-2024 06:36, (unconfirmed) No significant change was found    ECG 12 Lead    Result Date: 10/8/2024  Sinus bradycardia with sinus arrhythmia Otherwise normal ECG When compared with ECG of 07-OCT-2024 06:29, No significant change was found      Assessment  S/p incision and drainage to right AC d/t infiltration that caused extraversion of IV     Dressing removed.  Induration and erythema around wound noted.  Wound bed covered in surgical powder/paste.  Wet to dry dressing applied.       Plan  -NPO after MN  -Pain control  -Nausea control  -Encourage ambulation  -Continue care per primary team       Further recommendations per Dr. Ariel Velasquez, APRN-CNP    Time spent  45  minutes obtaining labs, imaging, recommendations, interview, assessment, examination, medication review/ordering, and EMR review.    Plan of care was discussed extensively with patient. Patient verbalized understanding through teach back method. All questions and concerns addressed upon examination.     Of note, this documentation is completed using the Dragon Dictation system (voice recognition software). There may be spelling and/or grammatical errors that were not corrected prior to final  submission.

## 2024-10-09 NOTE — CARE PLAN
Problem: Pain - Adult  Goal: Verbalizes/displays adequate comfort level or baseline comfort level  Outcome: Progressing     Problem: Safety - Adult  Goal: Free from fall injury  Outcome: Progressing     Problem: Discharge Planning  Goal: Discharge to home or other facility with appropriate resources  Outcome: Progressing     Problem: Chronic Conditions and Co-morbidities  Goal: Patient's chronic conditions and co-morbidity symptoms are monitored and maintained or improved  Outcome: Progressing     Problem: Fall/Injury  Goal: Not fall by end of shift  Outcome: Progressing  Goal: Be free from injury by end of the shift  Outcome: Progressing  Goal: Verbalize understanding of personal risk factors for fall in the hospital  Outcome: Progressing  Goal: Verbalize understanding of risk factor reduction measures to prevent injury from fall in the home  Outcome: Progressing  Goal: Use assistive devices by end of the shift  Outcome: Progressing  Goal: Pace activities to prevent fatigue by end of the shift  Outcome: Progressing     Problem: Diabetes  Goal: Achieve decreasing blood glucose levels by end of shift  Outcome: Progressing  Goal: Increase stability of blood glucose readings by end of shift  Outcome: Progressing  Goal: Decrease in ketones present in urine by end of shift  Outcome: Progressing  Goal: Maintain electrolyte levels within acceptable range throughout shift  Outcome: Progressing  Goal: Maintain glucose levels >70mg/dl to <250mg/dl throughout shift  Outcome: Progressing  Goal: No changes in neurological exam by end of shift  Outcome: Progressing  Goal: Learn about and adhere to nutrition recommendations by end of shift  Outcome: Progressing  Goal: Vital signs within normal range for age by end of shift  Outcome: Progressing  Goal: Increase self care and/or family involovement by end of shift  Outcome: Progressing  Goal: Receive DSME education by end of shift  Outcome: Progressing     Problem: Skin  Goal:  Decreased wound size/increased tissue granulation at next dressing change  Outcome: Progressing  Goal: Participates in plan/prevention/treatment measures  Outcome: Progressing  Goal: Prevent/manage excess moisture  Outcome: Progressing  Goal: Prevent/minimize sheer/friction injuries  Outcome: Progressing  Goal: Promote/optimize nutrition  Outcome: Progressing  Goal: Promote skin healing  Outcome: Progressing     Problem: Pain  Goal: Takes deep breaths with improved pain control throughout the shift  Outcome: Progressing  Goal: Turns in bed with improved pain control throughout the shift  Outcome: Progressing  Goal: Walks with improved pain control throughout the shift  Outcome: Progressing  Goal: Performs ADL's with improved pain control throughout shift  Outcome: Progressing  Goal: Participates in PT with improved pain control throughout the shift  Outcome: Progressing  Goal: Free from opioid side effects throughout the shift  Outcome: Progressing  Goal: Free from acute confusion related to pain meds throughout the shift  Outcome: Progressing   The patient's goals for the shift include rest    The clinical goals for the shift include Patient will remain Hemodynamically stable throughout shift

## 2024-10-10 ENCOUNTER — APPOINTMENT (OUTPATIENT)
Dept: CARDIOLOGY | Facility: HOSPITAL | Age: 69
End: 2024-10-10
Payer: MEDICARE

## 2024-10-10 ENCOUNTER — HOME CARE VISIT (OUTPATIENT)
Dept: HOME HEALTH SERVICES | Facility: HOME HEALTH | Age: 69
End: 2024-10-10
Payer: MEDICARE

## 2024-10-10 VITALS
HEART RATE: 63 BPM | RESPIRATION RATE: 18 BRPM | SYSTOLIC BLOOD PRESSURE: 150 MMHG | HEIGHT: 72 IN | BODY MASS INDEX: 36.07 KG/M2 | OXYGEN SATURATION: 98 % | WEIGHT: 266.32 LBS | TEMPERATURE: 95.4 F | DIASTOLIC BLOOD PRESSURE: 72 MMHG

## 2024-10-10 LAB
ALBUMIN SERPL BCP-MCNC: 3.2 G/DL (ref 3.4–5)
ALP SERPL-CCNC: 195 U/L (ref 33–136)
ALT SERPL W P-5'-P-CCNC: 29 U/L (ref 10–52)
ANION GAP SERPL CALC-SCNC: 12 MMOL/L (ref 10–20)
AST SERPL W P-5'-P-CCNC: 23 U/L (ref 9–39)
ATRIAL RATE: 55 BPM
ATRIAL RATE: 56 BPM
ATRIAL RATE: 59 BPM
BASOPHILS # BLD MANUAL: 0.19 X10*3/UL (ref 0–0.1)
BASOPHILS NFR BLD MANUAL: 2 %
BILIRUB SERPL-MCNC: 1.3 MG/DL (ref 0–1.2)
BUN SERPL-MCNC: 26 MG/DL (ref 6–23)
CALCIUM SERPL-MCNC: 8.7 MG/DL (ref 8.6–10.3)
CHLORIDE SERPL-SCNC: 103 MMOL/L (ref 98–107)
CO2 SERPL-SCNC: 25 MMOL/L (ref 21–32)
CREAT SERPL-MCNC: 1.65 MG/DL (ref 0.5–1.3)
EGFRCR SERPLBLD CKD-EPI 2021: 45 ML/MIN/1.73M*2
EOSINOPHIL # BLD MANUAL: 0.1 X10*3/UL (ref 0–0.7)
EOSINOPHIL NFR BLD MANUAL: 1 %
ERYTHROCYTE [DISTWIDTH] IN BLOOD BY AUTOMATED COUNT: 12.5 % (ref 11.5–14.5)
GLUCOSE BLD MANUAL STRIP-MCNC: 156 MG/DL (ref 74–99)
GLUCOSE BLD MANUAL STRIP-MCNC: 241 MG/DL (ref 74–99)
GLUCOSE SERPL-MCNC: 172 MG/DL (ref 74–99)
HCT VFR BLD AUTO: 32.2 % (ref 41–52)
HGB BLD-MCNC: 10.8 G/DL (ref 13.5–17.5)
HOLD SPECIMEN: NORMAL
IMM GRANULOCYTES # BLD AUTO: 0.94 X10*3/UL (ref 0–0.7)
IMM GRANULOCYTES NFR BLD AUTO: 9.8 % (ref 0–0.9)
LYMPHOCYTES # BLD MANUAL: 1.06 X10*3/UL (ref 1.2–4.8)
LYMPHOCYTES NFR BLD MANUAL: 11 %
MAGNESIUM SERPL-MCNC: 2.01 MG/DL (ref 1.6–2.4)
MCH RBC QN AUTO: 30.7 PG (ref 26–34)
MCHC RBC AUTO-ENTMCNC: 33.5 G/DL (ref 32–36)
MCV RBC AUTO: 92 FL (ref 80–100)
METAMYELOCYTES # BLD MANUAL: 0.19 X10*3/UL
METAMYELOCYTES NFR BLD MANUAL: 2 %
MONOCYTES # BLD MANUAL: 0.48 X10*3/UL (ref 0.1–1)
MONOCYTES NFR BLD MANUAL: 5 %
NEUTROPHILS # BLD MANUAL: 7.58 X10*3/UL (ref 1.2–7.7)
NEUTS BAND # BLD MANUAL: 0.19 X10*3/UL (ref 0–0.7)
NEUTS BAND NFR BLD MANUAL: 2 %
NEUTS SEG # BLD MANUAL: 7.39 X10*3/UL (ref 1.2–7)
NEUTS SEG NFR BLD MANUAL: 77 %
NRBC BLD-RTO: 0 /100 WBCS (ref 0–0)
P AXIS: -1 DEGREES
P AXIS: -7 DEGREES
P AXIS: 12 DEGREES
P OFFSET: 197 MS
P OFFSET: 199 MS
P OFFSET: 201 MS
P ONSET: 133 MS
P ONSET: 139 MS
P ONSET: 143 MS
PLATELET # BLD AUTO: 219 X10*3/UL (ref 150–450)
POTASSIUM SERPL-SCNC: 4 MMOL/L (ref 3.5–5.3)
PR INTERVAL: 142 MS
PR INTERVAL: 152 MS
PR INTERVAL: 164 MS
PROT SERPL-MCNC: 6.6 G/DL (ref 6.4–8.2)
Q ONSET: 214 MS
Q ONSET: 215 MS
Q ONSET: 215 MS
QRS COUNT: 10 BEATS
QRS COUNT: 9 BEATS
QRS COUNT: 9 BEATS
QRS DURATION: 100 MS
QRS DURATION: 104 MS
QRS DURATION: 104 MS
QT INTERVAL: 434 MS
QT INTERVAL: 438 MS
QT INTERVAL: 440 MS
QTC CALCULATION(BAZETT): 415 MS
QTC CALCULATION(BAZETT): 422 MS
QTC CALCULATION(BAZETT): 435 MS
QTC FREDERICIA: 421 MS
QTC FREDERICIA: 428 MS
QTC FREDERICIA: 437 MS
R AXIS: 14 DEGREES
R AXIS: 17 DEGREES
R AXIS: 8 DEGREES
RBC # BLD AUTO: 3.52 X10*6/UL (ref 4.5–5.9)
RBC MORPH BLD: ABNORMAL
SODIUM SERPL-SCNC: 136 MMOL/L (ref 136–145)
T AXIS: 17 DEGREES
T AXIS: 28 DEGREES
T AXIS: 33 DEGREES
T OFFSET: 432 MS
T OFFSET: 434 MS
T OFFSET: 434 MS
TOTAL CELLS COUNTED BLD: 100
VENTRICULAR RATE: 55 BPM
VENTRICULAR RATE: 56 BPM
VENTRICULAR RATE: 59 BPM
WBC # BLD AUTO: 9.6 X10*3/UL (ref 4.4–11.3)

## 2024-10-10 PROCEDURE — 169592 NO-PAY CLAIM PROCEDURE

## 2024-10-10 PROCEDURE — 2500000001 HC RX 250 WO HCPCS SELF ADMINISTERED DRUGS (ALT 637 FOR MEDICARE OP): Performed by: SURGERY

## 2024-10-10 PROCEDURE — 2500000004 HC RX 250 GENERAL PHARMACY W/ HCPCS (ALT 636 FOR OP/ED): Mod: JZ | Performed by: STUDENT IN AN ORGANIZED HEALTH CARE EDUCATION/TRAINING PROGRAM

## 2024-10-10 PROCEDURE — 2500000002 HC RX 250 W HCPCS SELF ADMINISTERED DRUGS (ALT 637 FOR MEDICARE OP, ALT 636 FOR OP/ED): Performed by: SURGERY

## 2024-10-10 PROCEDURE — 84075 ASSAY ALKALINE PHOSPHATASE: CPT | Performed by: INTERNAL MEDICINE

## 2024-10-10 PROCEDURE — 85027 COMPLETE CBC AUTOMATED: CPT | Performed by: INTERNAL MEDICINE

## 2024-10-10 PROCEDURE — 83735 ASSAY OF MAGNESIUM: CPT | Performed by: INTERNAL MEDICINE

## 2024-10-10 PROCEDURE — 2500000001 HC RX 250 WO HCPCS SELF ADMINISTERED DRUGS (ALT 637 FOR MEDICARE OP): Performed by: STUDENT IN AN ORGANIZED HEALTH CARE EDUCATION/TRAINING PROGRAM

## 2024-10-10 PROCEDURE — 93010 ELECTROCARDIOGRAM REPORT: CPT | Performed by: INTERNAL MEDICINE

## 2024-10-10 PROCEDURE — 93005 ELECTROCARDIOGRAM TRACING: CPT

## 2024-10-10 PROCEDURE — G0299 HHS/HOSPICE OF RN EA 15 MIN: HCPCS | Mod: HHH

## 2024-10-10 PROCEDURE — 82947 ASSAY GLUCOSE BLOOD QUANT: CPT

## 2024-10-10 PROCEDURE — 85007 BL SMEAR W/DIFF WBC COUNT: CPT | Performed by: INTERNAL MEDICINE

## 2024-10-10 PROCEDURE — 99232 SBSQ HOSP IP/OBS MODERATE 35: CPT | Performed by: STUDENT IN AN ORGANIZED HEALTH CARE EDUCATION/TRAINING PROGRAM

## 2024-10-10 ASSESSMENT — COGNITIVE AND FUNCTIONAL STATUS - GENERAL
MOBILITY SCORE: 24
DAILY ACTIVITIY SCORE: 24

## 2024-10-10 ASSESSMENT — ENCOUNTER SYMPTOMS
PAIN: 1
PAIN LOCATION - PAIN SEVERITY: 5/10
PAIN LOCATION: RIGHT ELBOW
PERSON REPORTING PAIN: PATIENT
LIMITED RANGE OF MOTION: 1
HYPERTENSION: 1
PAIN LOCATION: HEAD
LOWER EXTREMITY EDEMA: 1
FEVER: 1
PAIN LOCATION - PAIN SEVERITY: 2/10
APPETITE LEVEL: FAIR
CHANGE IN APPETITE: DECREASED

## 2024-10-10 ASSESSMENT — PAIN DESCRIPTION - ORIENTATION: ORIENTATION: RIGHT

## 2024-10-10 ASSESSMENT — ACTIVITIES OF DAILY LIVING (ADL)
OASIS_M1830: 03
ENTERING_EXITING_HOME: MINIMUM ASSIST
CURRENT_FUNCTION: STAND BY ASSIST
AMBULATION ASSISTANCE: STAND BY ASSIST

## 2024-10-10 ASSESSMENT — PAIN SCALES - WONG BAKER: WONGBAKER_NUMERICALRESPONSE: HURTS WHOLE LOT

## 2024-10-10 ASSESSMENT — PAIN DESCRIPTION - LOCATION: LOCATION: ARM

## 2024-10-10 ASSESSMENT — PAIN SCALES - GENERAL
PAINLEVEL_OUTOF10: 7
PAINLEVEL_OUTOF10: 8
PAINLEVEL_OUTOF10: 4

## 2024-10-10 ASSESSMENT — PAIN - FUNCTIONAL ASSESSMENT: PAIN_FUNCTIONAL_ASSESSMENT: 0-10

## 2024-10-10 NOTE — PROGRESS NOTES
Infectious Disease Progress Note       10/10/2024    Patient is a followup regarding MSSA bacteremia,   Feels okay at the present time.         pain and induration of the R arm. Had surgery yesterday  Lab Results   Component Value Date    WBC 9.6 10/10/2024    HGB 10.8 (L) 10/10/2024    HCT 32.2 (L) 10/10/2024    MCV 92 10/10/2024     10/10/2024     Lab Results   Component Value Date    GLUCOSE 172 (H) 10/10/2024    CALCIUM 8.7 10/10/2024     10/10/2024    K 4.0 10/10/2024    CO2 25 10/10/2024     10/10/2024    BUN 26 (H) 10/10/2024    CREATININE 1.65 (H) 10/10/2024       WBC trends are being monitored. Antibiotic doses are being adjusted per most recent renal labs.     Vitals:    10/10/24 0715   BP: 150/70   Pulse:    Resp: 19   Temp: 36.7 °C (98.1 °F)   SpO2: 94%     Patient is awake and alert  NAD  Neck supple  Heart S1S2  Chest: Equal expansion, bilaterally clear to auscultation  Abdomen: soft, ND, NTTP, no guarding  Extrem:  right antecubital area with induration 2 surgical wounds, with dark eschar, no crepitus around  Skin: no rashes, no diaphoresis  Neuro: CNS intact  Affect appropriate and patient is interactive      Patient Active Problem List   Diagnosis    Stage 3 chronic kidney disease (Multi)    Obstructive sleep apnea syndrome    Essential hypertriglyceridemia    CAD S/P percutaneous coronary angioplasty    Benign prostatic hyperplasia with urinary obstruction    Benign essential hypertension    Calculus of kidney    Incomplete bladder emptying    Squamous cell carcinoma of skin of other parts of face    Type 2 diabetes mellitus without retinopathy (Multi)    Class 2 severe obesity due to excess calories with serious comorbidity and body mass index (BMI) of 35.0 to 35.9 in adult    Hypokalemia    Uses self-applied continuous glucose monitoring device    Never smoked tobacco    Unstable angina (Multi)         ASSESSMENT:  Right arm thrombophlebitis with cellulitis complicated by MSSA  bacteremia s/p attempted aspiration. Now with worsening induration and swelling.   ANTONIO -creatinine clearance steadily improving  Atrial fib on anticoagulation    PLAN:  Continue cefazolin    Will likely need to go home on IV antibiotics due to Staph aureus bacteremia.  4 weeks, current dose  Line placed.   He may need longer course of oral therapy following this depending on arm appearance and given recent cardiac stent    Will need wound care regarding arm, assuming will need surgical follow up    Will need weekly CBC , BMP      Patient can follow-up with primary care physician to monitor lab work, nephrology    Estimated Creatinine Clearance: 56.8 mL/min (A) (by C-G formula based on SCr of 1.65 mg/dL (H)).    Kiko Addison MD

## 2024-10-10 NOTE — PROGRESS NOTES
General Surgery Progress Note    Patient: Manny Reveles  Unit/Bed: 1010/1010-A  YOB: 1955  MRN: [id number]  Acct: [id number]   Admitting Diagnosis: Hypomagnesemia [E83.42]  Chest pain [R07.9]  NSTEMI (non-ST elevated myocardial infarction) (Multi) [I21.4]  Cellulitis of right upper extremity [L03.113]  Acute kidney injury superimposed on CKD (CMS-HCC) [N17.9, N18.9]  Chest pain, unspecified type [R07.9]  Sepsis, due to unspecified organism, unspecified whether acute organ dysfunction present (Multi) [A41.9]  Date:  10/1/2024  Hospital Day: 9  Attending: Tima Keenan MD    Complaint:  Chief Complaint   Patient presents with    Chest Pain     Patient c/o of chest pain following Stent placement 9/28.       Subjective  Patient states that arm looks better this morning when the dressing was changed at 0600.  Patient shared pictures he took of the wound.  Patient says that he feels that his arm is a little less swollen today.  Patient states that he has had to use restroom to void multiple times throughout the night and feels that he is getting the extra fluid out of his body.      PHYSICAL EXAM:  Physical Exam  Vitals and nursing note reviewed.   Constitutional:       General: He is awake.      Appearance: Normal appearance. He is overweight.   HENT:      Head: Normocephalic.      Nose: Nose normal.      Mouth/Throat:      Mouth: Mucous membranes are moist.   Cardiovascular:      Rate and Rhythm: Normal rate.   Pulmonary:      Effort: Pulmonary effort is normal.   Skin:     General: Skin is warm and dry.      Capillary Refill: Capillary refill takes less than 2 seconds.      Findings: Wound present.             Comments: Wound covered in dry and intact dressing   Neurological:      General: No focal deficit present.      Mental Status: He is alert and oriented to person, place, and time.   Psychiatric:         Mood and Affect: Mood normal.         Behavior: Behavior is cooperative.       Vital  signs in last 24 hours:  Vitals:    10/10/24 1053   BP: 150/72   Pulse: 63   Resp: 18   Temp: 35.2 °C (95.4 °F)   SpO2: 98%     Intake/Output this shift:    Intake/Output Summary (Last 24 hours) at 10/10/2024 1141  Last data filed at 10/10/2024 0715  Gross per 24 hour   Intake 850 ml   Output 2400 ml   Net -1550 ml      Allergies:  Allergies   Allergen Reactions    Adhesive Tape-Silicones Hives and Itching    Epinephrine Syncope    Latex Other    Meperidine Other    Penicillins Other     Tolerated cephalosporins this admission on 10/2024    Promethazine Other    Sulfa (Sulfonamide Antibiotics) Other      Medications:  Scheduled medications  amLODIPine, 5 mg, oral, Daily  [Held by provider] apixaban, 5 mg, oral, q12h  [Held by provider] atorvastatin, 40 mg, oral, Nightly  ceFAZolin, 2 g, intravenous, q8h  cetirizine, 10 mg, oral, Daily  [Held by provider] clopidogrel, 75 mg, oral, Daily  finasteride, 5 mg, oral, Daily  [Held by provider] insulin degludec, 104 Units, subcutaneous, q AM  insulin glargine, 30 Units, subcutaneous, BID AC  insulin lispro, 0-15 Units, subcutaneous, TID  lidocaine, 5 mL, infiltration, Once  metoprolol succinate XL, 25 mg, oral, Daily  pantoprazole, 40 mg, oral, Daily  ranolazine, 500 mg, oral, q12h  tamsulosin, 0.4 mg, oral, Daily  triamterene-hydrochlorothiazid, 0.5 tablet, oral, Daily  [Held by provider] valsartan, 40 mg, oral, Daily      Continuous medications     PRN medications  PRN medications: alteplase, dextrose, dextrose, glucagon, glucagon, hydrALAZINE, melatonin, ondansetron **OR** ondansetron, oxyCODONE-acetaminophen    Labs:  Results for orders placed or performed during the hospital encounter of 10/01/24 (from the past 24 hour(s))   POCT GLUCOSE   Result Value Ref Range    POCT Glucose 211 (H) 74 - 99 mg/dL   SST TOP   Result Value Ref Range    Extra Tube Hold for add-ons.    Comprehensive metabolic panel   Result Value Ref Range    Glucose 172 (H) 74 - 99 mg/dL    Sodium 136  136 - 145 mmol/L    Potassium 4.0 3.5 - 5.3 mmol/L    Chloride 103 98 - 107 mmol/L    Bicarbonate 25 21 - 32 mmol/L    Anion Gap 12 10 - 20 mmol/L    Urea Nitrogen 26 (H) 6 - 23 mg/dL    Creatinine 1.65 (H) 0.50 - 1.30 mg/dL    eGFR 45 (L) >60 mL/min/1.73m*2    Calcium 8.7 8.6 - 10.3 mg/dL    Albumin 3.2 (L) 3.4 - 5.0 g/dL    Alkaline Phosphatase 195 (H) 33 - 136 U/L    Total Protein 6.6 6.4 - 8.2 g/dL    AST 23 9 - 39 U/L    Bilirubin, Total 1.3 (H) 0.0 - 1.2 mg/dL    ALT 29 10 - 52 U/L   CBC and Auto Differential   Result Value Ref Range    WBC 9.6 4.4 - 11.3 x10*3/uL    nRBC 0.0 0.0 - 0.0 /100 WBCs    RBC 3.52 (L) 4.50 - 5.90 x10*6/uL    Hemoglobin 10.8 (L) 13.5 - 17.5 g/dL    Hematocrit 32.2 (L) 41.0 - 52.0 %    MCV 92 80 - 100 fL    MCH 30.7 26.0 - 34.0 pg    MCHC 33.5 32.0 - 36.0 g/dL    RDW 12.5 11.5 - 14.5 %    Platelets 219 150 - 450 x10*3/uL    Immature Granulocytes %, Automated 9.8 (H) 0.0 - 0.9 %    Immature Granulocytes Absolute, Automated 0.94 (H) 0.00 - 0.70 x10*3/uL   Magnesium   Result Value Ref Range    Magnesium 2.01 1.60 - 2.40 mg/dL   Manual Differential   Result Value Ref Range    Neutrophils %, Manual 77.0 40.0 - 80.0 %    Bands %, Manual 2.0 0.0 - 5.0 %    Lymphocytes %, Manual 11.0 13.0 - 44.0 %    Monocytes %, Manual 5.0 2.0 - 10.0 %    Eosinophils %, Manual 1.0 0.0 - 6.0 %    Basophils %, Manual 2.0 0.0 - 2.0 %    Metamyelocytes %, Manual 2.0 0.0 - 0.0 %    Seg Neutrophils Absolute, Manual 7.39 (H) 1.20 - 7.00 x10*3/uL    Bands Absolute, Manual 0.19 0.00 - 0.70 x10*3/uL    Lymphocytes Absolute, Manual 1.06 (L) 1.20 - 4.80 x10*3/uL    Monocytes Absolute, Manual 0.48 0.10 - 1.00 x10*3/uL    Eosinophils Absolute, Manual 0.10 0.00 - 0.70 x10*3/uL    Basophils Absolute, Manual 0.19 (H) 0.00 - 0.10 x10*3/uL    Metamyelocytes Absolute, Manual 0.19 0.00 - 0.00 x10*3/uL    Total Cells Counted 100     Neutrophils Absolute, Manual 7.58 1.20 - 7.70 x10*3/uL    RBC Morphology No significant RBC  morphology present    POCT GLUCOSE   Result Value Ref Range    POCT Glucose 156 (H) 74 - 99 mg/dL   ECG 12 Lead   Result Value Ref Range    Ventricular Rate 59 BPM    Atrial Rate 59 BPM    DC Interval 142 ms    QRS Duration 104 ms    QT Interval 440 ms    QTC Calculation(Bazett) 435 ms    P Axis -1 degrees    R Axis 14 degrees    T Axis 28 degrees    QRS Count 10 beats    Q Onset 214 ms    P Onset 143 ms    P Offset 199 ms    T Offset 434 ms    QTC Fredericia 437 ms   POCT GLUCOSE   Result Value Ref Range    POCT Glucose 241 (H) 74 - 99 mg/dL      Imaging:  ECG 12 Lead    Result Date: 10/10/2024  Sinus bradycardia Otherwise normal ECG When compared with ECG of 09-OCT-2024 07:21, (unconfirmed) No significant change was found Confirmed by Romain Ann ([id number]) on 10/10/2024 9:48:50 AM    ECG 12 Lead    Result Date: 10/10/2024  Sinus bradycardia Minimal voltage criteria for LVH, may be normal variant Borderline ECG When compared with ECG of 08-OCT-2024 06:36, (unconfirmed) No significant change was found Confirmed by Romain Ann ([id number]) on 10/10/2024 9:20:14 AM    IR CVC tunneled    Result Date: 10/9/2024  Interpreted By:  Schoenberger, Joseph, STUDY: IR CVC TUNNELED; ;  10/9/2024 2:43 pm   INDICATION: Signs/Symptoms:for small bore centeral cathether.     COMPARISON: None.   ACCESSION NUMBER(S): [id number]   ORDERING CLINICIAN: BOSSMAN PULIDO   CONSENT: The procedure, its indication, its risks, and alternatives were explained to the patient who understands and consents to the procedure. A time-out is completed prior to the procedure to confirm patient identity and procedure to be performed.   MODALITIES: ultrasound, flouroscopy   TECHNIQUE: All personnel in the procedure suite used appropriate mask and cap. Additionally, the performing physician and assistant used maximum barrier technique to include a sterile gown and gloves as per standard hospital protocol. The base of the right neck and right upper chest wall  was sterilely prepped with chlorhexidine in the usual manner. A large sterile barrier was used to to completely draped the patient is outside of the sterilely prepped area in the usual manner per standard hospital protocol. Local anesthetic was administered lateral to the right internal jugular vein. This was cannulated under direct ultrasound visualization using the Seldinger technique with. The needle tip was visualized within the lumen of the vein. Using fluoroscopy guidance, a guidewire was advanced to the right atrium. The needle was removed and a tiny incision made at the wire insertion site. The introducer for a 5 Mongolian small bore central venous catheter was then placed. An appropriate subclavicular tunnel exit was then localized and local anesthetic administered and tiny incision made. The subcutaneous tissues between the 2 incisions were then anesthetized. A 5 Mongolian single lumen cuffed small bore central venous infusion catheter was then pulled through the tunnel exit to the jugular access site. The catheter was then cut to 24 cm. It was advanced through the introducer. It was positioned such that its distal tip was in the upper right atrium and is retention cuff was 5 mm deep to tunnel exit. Catheter freely aspirated and flushed. A fluoroscopic image documenting catheter placement was obtained. Accommodation Dermabond and Steri-Strips was used to close the tiny incision at the base of the neck. Phalanges of the catheter were anchored to the catheter using silk ties. His it to a Prolene suture was used anchor the flange to the patient's skin. An appropriate dressing was placed. The patient tolerated the procedure well. There was no immediate complication.   FINDINGS: See discussion above       Successful ultrasound and fluoroscopy guided placement of a cuffed tunneled small bore central venous infusion catheter. Catheter is ready to use for venous infusion is     MACRO: None   Signed by: David  Schoenberger 10/9/2024 3:20 PM Dictation workstation:   [id number]      Assessment  S/p incision and drainage to right AC d/t IV infiltration that caused extraversion of skin    Dressing dry and intact.  Trace edema noted to right arm.  Right arm elevated on 3 pillows.      Plan  -Spoke with Dr. George, she states that wound bed is fine, to follow up with wound clinic on Tuesday and continue daily wet to dry dressing changes.  -Ok to discharge from General Surgery standpoint.  -Continue care per primary team     Further recommendations per Dr. Ariel Velasquez, APRN-CNP    Time spent  37  minutes obtaining labs, imaging, recommendations, interview, assessment, examination, medication review/ordering, and EMR review.    Plan of care was discussed extensively with patient. Patient verbalized understanding through teach back method. All questions and concerns addressed upon examination.     Of note, this documentation is completed using the Dragon Dictation system (voice recognition software). There may be spelling and/or grammatical errors that were not corrected prior to final submission.

## 2024-10-10 NOTE — CARE PLAN
The patient's goals for the shift include Labs WNL    The clinical goals for the shift include pt will remain HDS this shift

## 2024-10-10 NOTE — PROGRESS NOTES
Renal Progress Note  Assessment/  69 y.o. year old male who presented to the emergency department for further evaluation and management chest pain preceded by right upper extremity cellulitis the site of IV line as patient was briefly in the hospital for heart catheterization   Patient does have preadmission chronic comorbidity of hypertension diabetes type 2 coronary artery disease recently during the heart catheterization the patient did receive stenting obstructive sleep apnea is a chronic kidney disease stage III with a baseline creatinine of 1.7 and estimated GFR of 40     Stage II acute kidney injury multifactorial possible contrast-induced nephropathy patient is not on any nonsteroidal anti-inflammatory hemodynamically stable afebrile nonoliguric none polyuric no associated electrolyte or acid-base imbalance up today     In the presence of IV line site cellulitis with white blood cells of 16.1 and left shift kidney involvement in occult infection/bacteremia not essentially postinfectious GN could be a possibility however C3 and C4 are normal     The patient is clinically euvolemic with no clinical evidence of increased extracellular fluid volume     Hemodynamically stable afebrile nonoliguric none polyuric with Dickinson urine output no dysuria     Urine sediment is UTI type urine sediment nonnephrotic nonnephrotic  Blood culture pending     Plan/  Patient can be discharged from the kidney standpoint to follow-up within a week after discharge   outpatient follow up from renal standpoint: Dr. Weller    Subjective:   Admit Date: 10/1/2024    Interval History: Patient seen and examined uneventful night no uremic related or fluid volume overload related symptoms alert oriented follow command in no apparent pain or distress      Medications:   Scheduled Meds:amLODIPine, 5 mg, oral, Daily  [Held by provider] apixaban, 5 mg, oral, q12h  [Held by provider] atorvastatin, 40 mg, oral, Nightly  ceFAZolin, 2 g,  "intravenous, q8h  cetirizine, 10 mg, oral, Daily  [Held by provider] clopidogrel, 75 mg, oral, Daily  finasteride, 5 mg, oral, Daily  [Held by provider] insulin degludec, 104 Units, subcutaneous, q AM  insulin glargine, 30 Units, subcutaneous, BID AC  insulin lispro, 0-15 Units, subcutaneous, TID  lidocaine, 5 mL, infiltration, Once  metoprolol succinate XL, 25 mg, oral, Daily  pantoprazole, 40 mg, oral, Daily  ranolazine, 500 mg, oral, q12h  tamsulosin, 0.4 mg, oral, Daily  triamterene-hydrochlorothiazid, 0.5 tablet, oral, Daily  [Held by provider] valsartan, 40 mg, oral, Daily      Continuous Infusions:     CBC:   Lab Results   Component Value Date    HGB 10.8 (L) 10/10/2024    HGB 10.9 (L) 10/09/2024    WBC 9.6 10/10/2024    WBC 9.8 10/09/2024     10/10/2024     10/09/2024      Anemia:  No results found for: \"FERRITIN\", \"IRON\", \"TIBC\"   BMP:    Lab Results   Component Value Date     10/10/2024     10/09/2024    K 4.0 10/10/2024    K 4.1 10/09/2024     10/10/2024     10/09/2024    CO2 25 10/10/2024    CO2 25 10/09/2024    BUN 26 (H) 10/10/2024    BUN 25 (H) 10/09/2024    CREATININE 1.65 (H) 10/10/2024    CREATININE 1.55 (H) 10/09/2024      Bone disease: No results found for: \"PHOS\", \"PTH\", \"VITD25\"   Urinalysis:  No results found for: \"ROMEO\", \"PROTUR\", \"GLUCOSEU\", \"BLOODU\", \"KETONESU\", \"BILIRUBINU\", \"NITRITEU\", \"LEUKOCYTESU\", \"UTPCR\"     Objective:   Vitals: /72 (BP Location: Left arm, Patient Position: Sitting)   Pulse 63   Temp 35.2 °C (95.4 °F) (Temporal)   Resp 18   Ht 1.829 m (6')   Wt 121 kg (266 lb 5.1 oz)   SpO2 98%   BMI 36.12 kg/m²    Wt Readings from Last 3 Encounters:   10/04/24 121 kg (266 lb 5.1 oz)   09/28/24 122 kg (268 lb 8.3 oz)   09/25/24 122 kg (268 lb 1.3 oz)      24HR INTAKE/OUTPUT:    Intake/Output Summary (Last 24 hours) at 10/10/2024 1217  Last data filed at 10/10/2024 0715  Gross per 24 hour   Intake 850 ml   Output 2400 ml   Net -1550 ml "     Admission weight:  Weight: 122 kg (268 lb 15.4 oz)      Constitutional:  Alert, awake, no apparent distress   Skin:normal, no rash  HEENT:sclera anicteric.  Head atraumatic normocephalic  Neck:supple with no thyromegally  Cardiovascular:  S1, S2 without m/r/g   Respiratory:  CTA B without w/r/r   Abdomen: +bs, soft, nt  Ext: no LE edema  Musculoskeletal:Intact  Neuro:Alert and oriented with no deficit      Electronically signed by Deven Mathis MD on 10/10/2024 at 12:17 PM

## 2024-10-10 NOTE — PROGRESS NOTES
Manny Reveles is a 69 y.o. male on day 9 of admission presenting with Chest pain.    Subjective   No complaints.  Ongoing discomfort in the right upper extremity but improved from surgery.  Anticipating discharge.  Called to reevaluate wound in anticipation of discharge    Both incisions of the right antecubital fossa are noted to be granulating in the base with significant residual cautery artifact and residual Surgicel powder/paste in the base applied at the time of surgery for hemostasis.  Induration and cellulitis have nearly completely resolved.  Good range of motion.  Compartments soft.    Cleared for discharge from surgery standpoint.  Wound care follow-up arranged.  Home health care arranged.  Patient will continue daily normal saline wet-to-dry dressings to the area.  Activity as tolerated.       Objective     Physical Exam    Last Recorded Vitals  Blood pressure 150/72, pulse 63, temperature 35.2 °C (95.4 °F), temperature source Temporal, resp. rate 18, height 1.829 m (6'), weight 121 kg (266 lb 5.1 oz), SpO2 98%.  Intake/Output last 3 Shifts:  I/O last 3 completed shifts:  In: 100 (0.8 mL/kg) [IV Piggyback:100]  Out: 2075 (17.2 mL/kg) [Urine:2075 (0.5 mL/kg/hr)]  Weight: 120.8 kg     Relevant Results               This patient has a central line   Reason for the central line remaining today?                  Assessment/Plan   Assessment & Plan    As above       I spent  minutes in the professional and overall care of this patient.      Christie George MD

## 2024-10-10 NOTE — CARE PLAN
The patient's goals for the shift include Labs WNL    The clinical goals for the shift include pt will remain HDS this shift      Problem: Pain - Adult  Goal: Verbalizes/displays adequate comfort level or baseline comfort level  Outcome: Progressing     Problem: Safety - Adult  Goal: Free from fall injury  Outcome: Met

## 2024-10-10 NOTE — PROGRESS NOTES
Manny Reveles is a 69 y.o. male on day 9 of admission presenting with Chest pain.      Subjective   Patient seen and examined.  Feels much better, pain is getting better, right forearm swelling has improved.  No fevers or chills.  Objective     Last Recorded Vitals  /72 (BP Location: Left arm, Patient Position: Sitting)   Pulse 63   Temp 35.2 °C (95.4 °F) (Temporal)   Resp 18   Wt 121 kg (266 lb 5.1 oz)   SpO2 98%   Intake/Output last 3 Shifts:    Intake/Output Summary (Last 24 hours) at 10/10/2024 1131  Last data filed at 10/10/2024 0715  Gross per 24 hour   Intake 850 ml   Output 2400 ml   Net -1550 ml       Admission Weight  Weight: 122 kg (268 lb 15.4 oz) (10/01/24 2028)    Daily Weight  10/04/24 : 121 kg (266 lb 5.1 oz)    Image Results  ECG 12 Lead  Sinus bradycardia  Otherwise normal ECG  When compared with ECG of 09-OCT-2024 07:21, (unconfirmed)  No significant change was found  Confirmed by Romain Ann ([id number]) on 10/10/2024 9:48:50 AM  ECG 12 Lead  Sinus bradycardia  Minimal voltage criteria for LVH, may be normal variant  Borderline ECG  When compared with ECG of 08-OCT-2024 06:36, (unconfirmed)  No significant change was found  Confirmed by Romain Ann ([id number]) on 10/10/2024 9:20:14 AM  ECG 12 Lead  Sinus bradycardia with sinus arrhythmia  Otherwise normal ECG  When compared with ECG of 07-OCT-2024 06:29,  No significant change was found  Confirmed by Romain Ann ([id number]) on 10/10/2024 8:53:53 AM      Physical Exam  HENT:      Head: Normocephalic and atraumatic.      Nose: Nose normal.      Mouth/Throat:      Mouth: Mucous membranes are dry.   Eyes:      Extraocular Movements: Extraocular movements intact.      Conjunctiva/sclera: Conjunctivae normal.   Cardiovascular:      Rate and Rhythm: Normal rate and regular rhythm.      Pulses: Normal pulses.      Heart sounds: Normal heart sounds.   Pulmonary:      Effort: Pulmonary effort is normal.      Breath sounds: Normal breath sounds.    Abdominal:      General: Bowel sounds are normal.      Palpations: Abdomen is soft.   Musculoskeletal:         General: Normal range of motion.      Cervical back: Normal range of motion and neck supple.   Skin:     General: Skin is warm and dry.   Neurological:      General: No focal deficit present.      Mental Status: He is alert. Mental status is at baseline.   Psychiatric:         Mood and Affect: Mood normal.         Relevant Results               Assessment/Plan          Manny Reveles is a 69 y.o. male with a significant past medical history of HTN, DLD, DM2, CAD s/p multiple PCI, PAZ, CKD3 presenting to [place] ER with wife for complaints of chest pain and altered mental status admitted with sepsis and right upper extremity cellulitis.  Patient with MSSA bacteremia.  New onset atrial fibrillation underwent DUONG that did not reveal vegetation endocarditis or thrombus.  The patient with new onset atrial fibrillation, did have thrombocytopenia that is improving, also had ANTONIO renal functions improved to baseline, also had transaminitis with improving liver functions.  Blood cultures have come back negative for past 3 days and the patient to possibly get PICC line/midline depending on ID recommendations of antibiotics and length.      Assessment & Plan    Bacteremia with MSSA  Right arm cellulitis- old IV site  Sepsis present on admission in setting of fever hypotension, altered mental status, likely source cellulitis and thrombophlebitis    Patient was medically cleared for discharge yesterday  ID wanted the patient to be seen by general surgery before discharge  General Surgery saw the patient no surgical intervention indicated  Patient will be discharged on IV cefazolin  Home health care has been arranged  Instructions provided  Patient is back to his baseline mental status  Creatinine has been improving, platelet counts have normalized  He is medically ready to be discharged will be discharged after he  received his second dose of cefazolin for the day  Continue current medical management as long as he is in the hospital       Tima Keenan MD

## 2024-10-11 ENCOUNTER — HOME CARE VISIT (OUTPATIENT)
Dept: HOME HEALTH SERVICES | Facility: HOME HEALTH | Age: 69
End: 2024-10-11
Payer: MEDICARE

## 2024-10-11 ENCOUNTER — PATIENT OUTREACH (OUTPATIENT)
Dept: CARDIOLOGY | Facility: CLINIC | Age: 69
End: 2024-10-11
Payer: MEDICARE

## 2024-10-11 PROCEDURE — G0299 HHS/HOSPICE OF RN EA 15 MIN: HCPCS | Mod: HHH

## 2024-10-11 ASSESSMENT — ENCOUNTER SYMPTOMS
PERSON REPORTING PAIN: PATIENT
PAIN LOCATION: RIGHT ELBOW
PAIN LOCATION - PAIN SEVERITY: 4/10
APPETITE LEVEL: FAIR
SUBJECTIVE PAIN PROGRESSION: GRADUALLY IMPROVING
CONSTIPATION: 1
LAST BOWEL MOVEMENT: 67124
CHANGE IN APPETITE: UNCHANGED
PAIN: 1
LIMITED RANGE OF MOTION: 1
LOWER EXTREMITY EDEMA: 1
MUSCLE WEAKNESS: 1
FREQUENCY: 1

## 2024-10-11 ASSESSMENT — ACTIVITIES OF DAILY LIVING (ADL)
AMBULATION ASSISTANCE: STAND BY ASSIST
CURRENT_FUNCTION: STAND BY ASSIST

## 2024-10-11 NOTE — PROGRESS NOTES
Discharge Facility:  [facility]    Discharge Diagnosis:  chest pain   Bacteremia with MSSA  Right arm cellulitis- old IV site    Admission Date:  10/2/24  Discharge Date:   10/10/24    PCP Appointment Date:   Visit Type: Primary Care Established          Date: 10/14/2024                  Dept: [facility] Internal Medicine                  Provider: Sherwin Mayo                  Time: 2:00 PM  Specialist Appointment Date:   Visit Type: Cardiology Hospital Discharge          Date: 10/14/2024                  Dept: [facility]                  Provider: Armando Antonio                  Time: 1430    Woundcare Cinic Visit  with Fernando Hawthorne   Wednesday Oct 16, 2024 1:30 PM     Visit Type: Infectious Disease Hospital Discharge          Date: 10/21/2024                  Dept: [facility]                  Provider: Lacey Dominguez                  Time: 3:40 PM    HOLTER Thursday Nov 7, 2024 9:00 AM     Cardiology Clinic Visit  with Evan Hansen   Wednesday Dec 4, 2024 1:15 PM   Hospital Encounter and Summary Linked: Yes    See discharge assessment below for further details  Medications  Medications reviewed with patient/caregiver?: Yes (10/11/2024  3:32 PM)  Is the patient having any side effects they believe may be caused by any medication additions or changes?: No (10/11/2024  3:32 PM)  Does the patient have all medications ordered at discharge?: Yes (10/11/2024  3:32 PM)  Prescription Comments: START taking: ceFAZolin (Ancef) oxyCODONE-acetaminophen (Percocet)- ceFAZolin IVPB; Commonly known as: Ancef; Infuse 100 mL (2 g) over 30   minutes into a venous catheter every 8 hours for 28 days. (10/11/2024  3:32 PM)  Is the patient taking all medications as directed (includes completed medication regime)?: Yes (10/11/2024  3:32 PM)  Medication Comments: CM discussed referencing discharge paperwork to follow detailed daily medication schedule. Reminded to bring  all medications to future appointments. Patient endorses understanding & compliance with medications. (10/11/2024  3:32 PM)    Appointments  Does the patient have a primary care provider?: Yes (10/11/2024  3:32 PM)  Care Management Interventions: Verified appointment date/time/provider (10/11/2024  3:32 PM)  Has the patient kept scheduled appointments due by today?: Yes (10/11/2024  3:32 PM)  Care Management Interventions: Educated on importance of keeping appointment (Reviewed upcoming appts -PCP cardio ID Wound & Holter placement. Pt was not aware of needing a holter so will recieve with cardio.) (10/11/2024  3:32 PM)    Self Management  What is the home health agency?: [facility] [phone] (10/11/2024  3:32 PM)  Has home health visited the patient within 72 hours of discharge?: Yes (10/11/2024  3:32 PM)  What Durable Medical Equipment (DME) was ordered?: na (10/11/2024  3:32 PM)    Patient Teaching  Does the patient have access to their discharge instructions?: Yes (10/11/2024  3:32 PM)  Care Management Interventions: Reviewed instructions with patient; Educated on MyChart (10/11/2024  3:32 PM)  What is the patient's perception of their health status since discharge?: Same (10/11/2024  3:32 PM)  Is the patient/caregiver able to teach back the hierarchy of who to call/visit for symptoms/problems? PCP, Specialist, Home Health nurse, Urgent Care, ED, 911: Yes (10/11/2024  3:32 PM)  Patient/Caregiver Education Comments: patient worried that he may run out of pain medication before his appts on Monday if he needs to take as directed. I informed him to call PCP office answering service if he needs to. (10/11/2024  3:32 PM)    Wrap Up  Wrap Up Additional Comments: Successful transition of care outreach with patient. CM introduced myself and the TCM program to Manny Reveles. Reviewed hospital stay and answered any questions. Patient denies any further discharge questions/needs at this  time. CM gave my contact information and encouraged to call if needing assistance or has any further non-emergent questions prior to my next outreach. (10/11/2024  3:32 PM)

## 2024-10-12 PROCEDURE — A6252 ABSORPT DRG >16 <=48 W/O BDR: HCPCS | Mod: HHH

## 2024-10-13 ENCOUNTER — HOME CARE VISIT (OUTPATIENT)
Dept: HOME HEALTH SERVICES | Facility: HOME HEALTH | Age: 69
End: 2024-10-13
Payer: MEDICARE

## 2024-10-13 PROCEDURE — G0299 HHS/HOSPICE OF RN EA 15 MIN: HCPCS | Mod: HHH

## 2024-10-13 ASSESSMENT — ENCOUNTER SYMPTOMS
PERSON REPORTING PAIN: PATIENT
PAIN: 1
PAIN LOCATION - PAIN SEVERITY: 5/10
PAIN LOCATION: RIGHT ARM

## 2024-10-14 ENCOUNTER — DOCUMENTATION (OUTPATIENT)
Dept: INFECTIOUS DISEASES | Facility: HOSPITAL | Age: 69
End: 2024-10-14

## 2024-10-14 ENCOUNTER — HOME CARE VISIT (OUTPATIENT)
Dept: HOME HEALTH SERVICES | Facility: HOME HEALTH | Age: 69
End: 2024-10-14
Payer: MEDICARE

## 2024-10-14 ENCOUNTER — HOME INFUSION (OUTPATIENT)
Dept: INFUSION THERAPY | Age: 69
End: 2024-10-14

## 2024-10-14 ENCOUNTER — OFFICE VISIT (OUTPATIENT)
Dept: PRIMARY CARE | Facility: CLINIC | Age: 69
End: 2024-10-14
Payer: MEDICARE

## 2024-10-14 ENCOUNTER — APPOINTMENT (OUTPATIENT)
Dept: CARDIOLOGY | Facility: CLINIC | Age: 69
End: 2024-10-14
Payer: MEDICARE

## 2024-10-14 VITALS
TEMPERATURE: 96.2 F | WEIGHT: 262 LBS | HEIGHT: 72 IN | DIASTOLIC BLOOD PRESSURE: 85 MMHG | HEART RATE: 73 BPM | BODY MASS INDEX: 35.49 KG/M2 | SYSTOLIC BLOOD PRESSURE: 140 MMHG

## 2024-10-14 VITALS
HEART RATE: 74 BPM | DIASTOLIC BLOOD PRESSURE: 80 MMHG | OXYGEN SATURATION: 96 % | TEMPERATURE: 97.9 F | SYSTOLIC BLOOD PRESSURE: 146 MMHG | RESPIRATION RATE: 14 BRPM

## 2024-10-14 VITALS
BODY MASS INDEX: 35.55 KG/M2 | DIASTOLIC BLOOD PRESSURE: 74 MMHG | WEIGHT: 262.1 LBS | HEART RATE: 80 BPM | SYSTOLIC BLOOD PRESSURE: 130 MMHG

## 2024-10-14 DIAGNOSIS — Z78.9 NEVER SMOKED TOBACCO: ICD-10-CM

## 2024-10-14 DIAGNOSIS — I10 BENIGN ESSENTIAL HYPERTENSION: ICD-10-CM

## 2024-10-14 DIAGNOSIS — E11.9 TYPE 2 DIABETES MELLITUS WITHOUT RETINOPATHY (MULTI): ICD-10-CM

## 2024-10-14 DIAGNOSIS — E78.2 MIXED HYPERLIPIDEMIA: ICD-10-CM

## 2024-10-14 DIAGNOSIS — L03.113 CELLULITIS OF RIGHT UPPER EXTREMITY: ICD-10-CM

## 2024-10-14 DIAGNOSIS — R78.81 BACTEREMIA ASSOCIATED WITH INTRAVASCULAR LINE, SEQUELA: Primary | ICD-10-CM

## 2024-10-14 DIAGNOSIS — R78.81 MSSA BACTEREMIA: ICD-10-CM

## 2024-10-14 DIAGNOSIS — N18.32 STAGE 3B CHRONIC KIDNEY DISEASE (MULTI): ICD-10-CM

## 2024-10-14 DIAGNOSIS — Z98.61 CAD S/P PERCUTANEOUS CORONARY ANGIOPLASTY: ICD-10-CM

## 2024-10-14 DIAGNOSIS — T82.7XXS BACTEREMIA ASSOCIATED WITH INTRAVASCULAR LINE, SEQUELA: Primary | ICD-10-CM

## 2024-10-14 DIAGNOSIS — I25.10 CAD S/P PERCUTANEOUS CORONARY ANGIOPLASTY: ICD-10-CM

## 2024-10-14 DIAGNOSIS — I48.91 ATRIAL FIBRILLATION, UNSPECIFIED TYPE (MULTI): ICD-10-CM

## 2024-10-14 DIAGNOSIS — E66.01 CLASS 2 SEVERE OBESITY DUE TO EXCESS CALORIES WITH SERIOUS COMORBIDITY AND BODY MASS INDEX (BMI) OF 35.0 TO 35.9 IN ADULT: ICD-10-CM

## 2024-10-14 DIAGNOSIS — E66.812 CLASS 2 SEVERE OBESITY DUE TO EXCESS CALORIES WITH SERIOUS COMORBIDITY AND BODY MASS INDEX (BMI) OF 35.0 TO 35.9 IN ADULT: ICD-10-CM

## 2024-10-14 DIAGNOSIS — B95.61 MSSA BACTEREMIA: ICD-10-CM

## 2024-10-14 PROCEDURE — 1111F DSCHRG MED/CURRENT MED MERGE: CPT | Performed by: INTERNAL MEDICINE

## 2024-10-14 PROCEDURE — 1159F MED LIST DOCD IN RCRD: CPT | Performed by: INTERNAL MEDICINE

## 2024-10-14 PROCEDURE — 3077F SYST BP >= 140 MM HG: CPT | Performed by: INTERNAL MEDICINE

## 2024-10-14 PROCEDURE — 3044F HG A1C LEVEL LT 7.0%: CPT | Performed by: INTERNAL MEDICINE

## 2024-10-14 PROCEDURE — 3061F NEG MICROALBUMINURIA REV: CPT | Performed by: INTERNAL MEDICINE

## 2024-10-14 PROCEDURE — 3075F SYST BP GE 130 - 139MM HG: CPT | Performed by: INTERNAL MEDICINE

## 2024-10-14 PROCEDURE — 1036F TOBACCO NON-USER: CPT | Performed by: INTERNAL MEDICINE

## 2024-10-14 PROCEDURE — 3048F LDL-C <100 MG/DL: CPT | Performed by: INTERNAL MEDICINE

## 2024-10-14 PROCEDURE — 3008F BODY MASS INDEX DOCD: CPT | Performed by: INTERNAL MEDICINE

## 2024-10-14 PROCEDURE — 99496 TRANSJ CARE MGMT HIGH F2F 7D: CPT | Performed by: INTERNAL MEDICINE

## 2024-10-14 PROCEDURE — G0151 HHCP-SERV OF PT,EA 15 MIN: HCPCS | Mod: HHH

## 2024-10-14 PROCEDURE — 1160F RVW MEDS BY RX/DR IN RCRD: CPT | Performed by: INTERNAL MEDICINE

## 2024-10-14 PROCEDURE — 3079F DIAST BP 80-89 MM HG: CPT | Performed by: INTERNAL MEDICINE

## 2024-10-14 PROCEDURE — 4010F ACE/ARB THERAPY RXD/TAKEN: CPT | Performed by: INTERNAL MEDICINE

## 2024-10-14 PROCEDURE — 3078F DIAST BP <80 MM HG: CPT | Performed by: INTERNAL MEDICINE

## 2024-10-14 PROCEDURE — 99214 OFFICE O/P EST MOD 30 MIN: CPT | Performed by: INTERNAL MEDICINE

## 2024-10-14 RX ORDER — NITROGLYCERIN 0.4 MG/1
0.4 TABLET SUBLINGUAL EVERY 5 MIN PRN
Qty: 25 TABLET | Refills: 5 | Status: SHIPPED | OUTPATIENT
Start: 2024-10-14 | End: 2025-10-14

## 2024-10-14 SDOH — HEALTH STABILITY: PHYSICAL HEALTH: PHYSICAL EXERCISE: 15

## 2024-10-14 SDOH — HEALTH STABILITY: PHYSICAL HEALTH: EXERCISE ACTIVITY: MARCHING

## 2024-10-14 SDOH — HEALTH STABILITY: PHYSICAL HEALTH: PHYSICAL EXERCISE: STANDING

## 2024-10-14 SDOH — HEALTH STABILITY: PHYSICAL HEALTH: EXERCISE ACTIVITY: MINI SQUATS

## 2024-10-14 SDOH — HEALTH STABILITY: PHYSICAL HEALTH: EXERCISE ACTIVITIES SETS: 1

## 2024-10-14 SDOH — HEALTH STABILITY: PHYSICAL HEALTH: EXERCISE ACTIVITY: HEEL RAISES/TOE RAISES

## 2024-10-14 SDOH — HEALTH STABILITY: PHYSICAL HEALTH: EXERCISE COMMENTS: PT GIVEN HANDOUT FOR STANDING LE EXERCISES AND ENCOURAGED PT TO PERFORM AT LEAST ONCE DAILY

## 2024-10-14 SDOH — HEALTH STABILITY: PHYSICAL HEALTH: EXERCISE ACTIVITY: HS CURLS

## 2024-10-14 SDOH — HEALTH STABILITY: PHYSICAL HEALTH: EXERCISE ACTIVITY: HIP EXTENSION

## 2024-10-14 SDOH — HEALTH STABILITY: PHYSICAL HEALTH: EXERCISE TYPE: LE EXERCISES

## 2024-10-14 SDOH — HEALTH STABILITY: PHYSICAL HEALTH: EXERCISE ACTIVITY: HIP ABDUCTION

## 2024-10-14 ASSESSMENT — GAIT ASSESSMENTS
TRUNK: 2 - NO SWAY, NO FLEXION, NO USE OF ARMS, NO WALKING AID
STEP CONTINUITY: 1 - STEPS APPEAR CONTINUOUS
WALKING STANCE: 1 - HEELS ALMOST TOUCHING WHILE WALKING
INITIATION OF GAIT IMMEDIATELY AFTER GO: 1 - NO HESITANCY
GAIT SCORE: 12
PATH: 2 - STRAIGHT WITHOUT WALKING AID
TRUNK SCORE: 2
BALANCE AND GAIT SCORE: 27
STEP SYMMETRY: 1 - RIGHT AND LEFT STEP LENGTH APPEAR EQUAL
PATH SCORE: 2

## 2024-10-14 ASSESSMENT — ENCOUNTER SYMPTOMS
RESPIRATORY NEGATIVE: 1
ARTHRALGIAS: 0
SUBJECTIVE PAIN PROGRESSION: GRADUALLY IMPROVING
LOWEST PAIN SEVERITY IN PAST 24 HOURS: 0/10
WOUND: 1
PAIN LOCATION - PAIN FREQUENCY: INTERMITTENT
CONSTITUTIONAL NEGATIVE: 1
PAIN LOCATION - EXACERBATING FACTORS: MOVEMENT
DIZZINESS: 0
CARDIOVASCULAR NEGATIVE: 1
PAIN LOCATION: RIGHT ARM
PAIN: 1
PAIN SEVERITY GOAL: 0/10
COLOR CHANGE: 1
PERSON REPORTING PAIN: PATIENT
PAIN LOCATION - PAIN SEVERITY: 5/10
OCCASIONAL FEELINGS OF UNSTEADINESS: 1
HIGHEST PAIN SEVERITY IN PAST 24 HOURS: 6/10
PAIN LOCATION - PAIN QUALITY: SHARP
GASTROINTESTINAL NEGATIVE: 1

## 2024-10-14 ASSESSMENT — BALANCE ASSESSMENTS
ATTEMPTS TO ARISE: 2 - ABLE TO RISE, ONE ATTEMPT
IMMEDIATE STANDING BALANCE FIRST 5 SECONDS: 2 - STEADY WITHOUT WALKER OR OTHER SUPPORT
SITTING BALANCE: 1 - STEADY, SAFE
EYES CLOSED AT MAXIMUM POSITION NUDGED: 1 - STEADY
ARISES: 2 - ABLE WITHOUT USING ARMS
NUDGED SCORE: 2
SITTING DOWN: 2 - SAFE, SMOOTH MOTION
ARISING SCORE: 2
NUDGED: 2 - STEADY
TURNING 360 DEGREES STEPS: 1 - CONTINUOUS STEPS
BALANCE SCORE: 15
STANDING BALANCE: 1 - STEADY BUT WIDE STANCE AND USES CANE OR OTHER SUPPORT

## 2024-10-14 ASSESSMENT — ACTIVITIES OF DAILY LIVING (ADL)
AMBULATION ASSISTANCE ON FLAT SURFACES: 1
AMBULATION_DISTANCE/DURATION_TOLERATED: 150 FT

## 2024-10-14 NOTE — PROGRESS NOTES
"Patient: Manny Reveles  : 1955  PCP: Sherwin Mayo MD  MRN: [id number]  Program: Transitional Care Management  Status: Enrolled  Effective Dates: 10/11/2024 - present  Responsible Staff: Traci Pelayo LPN  Social Determinants to be Addressed: No information to display         Manny Reveles is a 69 y.o. male presenting today for follow-up after being discharged from the hospital 4 days ago. The main problem requiring admission was cellulitis and bacteremia. The discharge summary and/or Transitional Care Management documentation was reviewed. Medication reconciliation was performed as indicated via the \"Cristino as Reviewed\" timestamp.   This patient was admitted for chest pain, they found mid LAD lesion, it was not in-stent stenosis, patient has a 9 PCI, he was discharged home but he has a IV on the cubital fossa on the right upper extremity, before they took out the IV area was red and inflamed, patient told hospital personnel about this redness and patient was discharged, next day patient become drowsy lethargic, patient was bacteremic, Staphylococcus aureus was found on the blood culture which is MSSA, he was admitted for cellulitis and bacteremia, now patient is on cefazolin, he will be on cefazolin for 4 weeks, they did a DUONG, there was no vegetation this is simply superficial thrombophlebitis leading to bacteremia because of localized thrombophlebitis and infection.  He came with her daughter and spouse.  Creatinine remains abnormal, he has a tunneled Brar catheter on the right chest wall, he does not show any evidence of inflammation or cellulitis at the site, he has I&D done, he has dressings on the right upper extremity.  Manny Reveles was contacted by Transitional Care Management services two days after his discharge. This encounter and supporting documentation was reviewed.    Review of Systems   Constitutional: Negative.    Respiratory: Negative.     Cardiovascular: Negative.  "   Gastrointestinal: Negative.    Musculoskeletal:  Negative for arthralgias.   Skin:  Positive for color change and wound.   Neurological:  Negative for dizziness.       /85 (BP Location: Left arm, Patient Position: Sitting, BP Cuff Size: Adult)   Pulse 73   Temp 35.7 °C (96.2 °F) (Temporal)   Ht 1.829 m (6')   Wt 119 kg (262 lb)   BMI 35.53 kg/m²     Physical Exam  Vitals reviewed.   Constitutional:       Appearance: Normal appearance. He is obese.   HENT:      Head: Normocephalic.   Eyes:      Conjunctiva/sclera: Conjunctivae normal.   Cardiovascular:      Rate and Rhythm: Normal rate and regular rhythm.      Pulses: Normal pulses.   Pulmonary:      Effort: Pulmonary effort is normal.      Breath sounds: Normal breath sounds.   Abdominal:      Palpations: Abdomen is soft.   Musculoskeletal:         General: Tenderness present.      Cervical back: Neck supple.   Skin:     General: Skin is warm and dry.   Neurological:      General: No focal deficit present.   Psychiatric:         Mood and Affect: Mood normal.       The complexity of medical decision making for this patient's transitional care is high.    Assessment/Plan   Problem List Items Addressed This Visit             ICD-10-CM    CAD S/P percutaneous coronary angioplasty I25.10, Z98.61     Other Visit Diagnoses         Codes    Bacteremia associated with intravascular line, sequela    -  Primary T82.7XXS, R78.81    MSSA bacteremia     R78.81, B95.61    Cellulitis of right upper extremity     L03.113        Recent hospitalization data and information reviewed, all reports, labs, radiological investigations and consultations and follow ups reviewed during this visit. Pt is doing well, precautions to be taken in the future for acute ailments or acute illness and change of condition are discussed, will continue to have close follow up, medication list was reviewed and updated and necessary changes were applied.  Discussed with infectious disease who  took care of this patient in the hospital, infectious disease does not have a local office.  I will follow his labs, I reviewed the last week's labs, we will include CRP, so far he has been tolerating cefazolin without any problems.  I will see him back in 2 weeks, no need to follow-up with the infectious disease far away in [place] where it is very inconvenient to do so.  Periodically will discuss with infectious disease.  Bacteremia has been subsided repeat blood cultures were negative, we will follow the laboratories this Thursday from home health care and add a CRP.  Daughter will send me the picture of the wound later on today.  He has a 9 PCI already so 2 hospitalization complicated hospitalization second time for iatrogenic reason.  This is a transitional care related visit we have performed and went through all the report and information.

## 2024-10-14 NOTE — CASE COMMUNICATION
Patient evaluated by Physical Therapy this am and patient and therapist both felt OT services were not indicated at this time. Called patient to confirm and cancelled OT evaluation per patient request.

## 2024-10-14 NOTE — PROGRESS NOTES
Called by patient's primary care physician.  He will be following patient's labs and seeing the patient for follow-up.  Per primary patient will be canceling his follow-up with infectious disease.    Nae Uriostegui, DO

## 2024-10-14 NOTE — PROGRESS NOTES
Manny Reveles is receiving IV cefazolin 2g q8h for MSSA bacteremia through 11/5/24. Followed by Dr. Mayo (PCP)  Labs ordered include CBC/BMP    No homecare labs to review yet  Line SL TUNNELED IJ    RX contacted patient, infusions going well. Has 9 doses as of 430pm 10/14/24, to last through Thursday morning. Agreeable to delivery of a week of medications with supplies to match Wednesday any time.  to call on the way.    Dispensing 10/15 with supplies and flushes to match for OVN delivery:  21x cefazolin 1g syringes  DOS 10/17-10/23    Follow up 10/22 check labs, progress, OVN

## 2024-10-14 NOTE — PROGRESS NOTES
CARDIOLOGY OFFICE VISIT      CHIEF COMPLAINT      HISTORY OF PRESENT ILLNESS  The patient states that since I saw him recently in the Cath Lab on 9/30/2024 where he underwent stenting of the mid LAD with a ANTOINE that he developed infection which is quite significant and an IV site in his right arm.  He developed septicemia and some worsening of his chronic kidney disease.  He had to have debridement done in surgery.  He has a PICC line and to get IV antibiotics which she is going to get for 1 month.  He states that when he first presented he was very weak and could not really function.  He was not thinking clearly.  He was placed on Eliquis in the hospital by Dr. JARAMILLO for atrial fibrillation.  However he was never sent home on the Eliquis.  I told him he needs to be on Eliquis.  I am going to stop his aspirin.  He denies any chest discomfort.  He has his usual chronic dyspnea secondary to COPD.  He has not had a prolonged significant palpitations.  He denies presyncope and syncope.  Drug-eluting stent of mid LAD      IMPRESSION:   1. Chronic kidney disease, stage III, followed by Dr Weller  2. Coronary artery disease, no angina.  3. Multivessel PCI, most recently ANTOINE of mid LAD 9/30/2024  4. Essential hypertension.  5. Mixed hyperlipidemia.  6. Diabetes mellitus type 2.  7. Obesity.  8. Paroxysmal atrial fibrillation, will give patient samples of Eliquis and contact clinical pharmacy at  to see if he can afford Eliquis, 48-hour heart monitor in the near future and then follow-up with Dr. Hansen  Septicemia secondary to infected IV site right forearm, October 2024     Please excuse any errors in grammar or translation related to this dictation. Voice recognition software was utilized to prepare this document.             Past Medical History  History reviewed. No pertinent past medical history.    Social History  Social History     Tobacco Use    Smoking status: Never     Passive exposure: Never    Smokeless tobacco:  Never   Vaping Use    Vaping status: Never Used   Substance Use Topics    Alcohol use: Not Currently    Drug use: Never       Family History     Family History   Problem Relation Name Age of Onset    Other (bone/cartilage disorder) Mother      Diabetes Mother      Gallbladder disease Mother      Diabetes Father      Other (cardiac disorder) Father      Nephrolithiasis Father      Prostate cancer Father      Other (bladder cancer) Father      Heart attack Father      Rheum arthritis Father      Skin cancer Father      Kidney nephrosis Father      Diabetes Paternal Grandfather      Skin cancer Paternal Grandfather          Allergies:  Allergies   Allergen Reactions    Adhesive Tape-Silicones Hives and Itching    Epinephrine Syncope    Latex Other    Meperidine Other    Penicillins Other     Tolerated cephalosporins this admission on 10/2024    Promethazine Other    Sulfa (Sulfonamide Antibiotics) Other        Outpatient Medications:  Current Outpatient Medications   Medication Instructions    amLODIPine (NORVASC) 5 mg, oral, Daily, as directed    apixaban (ELIQUIS) 5 mg, oral, 2 times daily    atorvastatin (LIPITOR) 40 mg, oral, Nightly    cetirizine (ZYRTEC) 10 mg, Daily    clopidogrel (PLAVIX) 75 mg, oral, Daily    CRANBERRY ORAL 2 tablets, Daily    docusate sodium (COLACE) 100 mg, 2 times daily    finasteride (PROSCAR) 5 mg, oral, Daily    icosapent ethyL (VASCEPA) 2 g, oral, 2 times daily    insulin aspart (NovoLOG FlexPen U-100 Insulin) 100 unit/mL (3 mL) pen Inject under the skin. Inject 20 units plus coverage for snacks    insulin degludec (TRESIBA FLEXTOUCH U-200) 104 Units, subcutaneous, Nightly    ketoconazole (NIZOral) 2 % shampoo Topical, 2 times weekly, Apply to scalp in shower. Lather, leave on 5 minutes then rinse    Lactobacillus acidophilus (PROBIOTIC ORAL) 1 tablet, Daily    metoprolol succinate XL (TOPROL-XL) 25 mg, oral, Daily    nitroglycerin (NITROSTAT) 0.4 mg, sublingual, Every 5 min PRN     potassium chloride CR (Klor-Con M20) 20 mEq ER tablet 20 mEq, oral, Daily    ranolazine (RANEXA) 500 mg, oral, Every 12 hours    semaglutide (Rybelsus) 14 mg tablet tablet 1 tablet, Daily    tamsulosin (FLOMAX) 0.4 mg, oral, Daily    telmisartan (MICARDIS) 20 mg, oral, Every 24 hours    triamterene-hydrochlorothiazid (Maxzide-25) 37.5-25 mg tablet Take half tablet po daily          REVIEW OF SYSTEMS  Review of Systems   All other systems reviewed and are negative.        VITALS  Vitals:    10/14/24 1453   BP: 130/74   Pulse: 80       PHYSICAL EXAM  Constitutional:       Appearance: Healthy appearance. Not in distress.   Eyes:      Conjunctiva/sclera: Conjunctivae normal.      Pupils: Pupils are equal, round, and reactive to light.   Neck:      Vascular: No JVR. JVD normal.   Pulmonary:      Effort: Pulmonary effort is normal.      Breath sounds: Normal breath sounds. No wheezing. No rhonchi. No rales.   Chest:      Chest wall: Not tender to palpatation.   Cardiovascular:      PMI at left midclavicular line. Normal rate. Regular rhythm. Normal S1. Normal S2.       Murmurs: There is no murmur.      No gallop.  No click. No rub.   Pulses:     Intact distal pulses.   Edema:     Peripheral edema absent.   Abdominal:      Tenderness: There is no abdominal tenderness.   Musculoskeletal: Normal range of motion.         General: No tenderness.      Cervical back: Normal range of motion. Skin:     General: Skin is warm and dry.   Neurological:      General: No focal deficit present.      Mental Status: Alert and oriented to person, place and time.           ASSESSMENT AND PLAN  Diagnoses and all orders for this visit:  Paroxysmal A-fib (Multi)  CAD S/P percutaneous coronary angioplasty  -     nitroglycerin (Nitrostat) 0.4 mg SL tablet; Place 1 tablet (0.4 mg) under the tongue every 5 minutes if needed for chest pain.  Benign essential hypertension  Mixed hyperlipidemia  Type 2 diabetes mellitus without retinopathy  (Multi)  Stage 3b chronic kidney disease (Multi)  Class 2 severe obesity due to excess calories with serious comorbidity and body mass index (BMI) of 35.0 to 35.9 in adult  Never smoked tobacco  Atrial fibrillation, unspecified type (Multi)  -     Referral to Clinical Pharmacy; Future      [unfilled]

## 2024-10-14 NOTE — PATIENT INSTRUCTIONS
STOP Aspirin   START Eliquis 5 mg twice  a day  Follow up with Dr. Evan Hansen 1 week after Holter monitor  Follow up office visit in 2 months with Dr. Armando Antonio     Continue same medications/treatment.  Patient educated on proper medication use.  Patient educated on risk factor modification.  Please bring any lab results from other providers / physicians to your next appointment.    Please bring all medicines, vitamins and herbal supplements with you when you come to the office.    Prescriptions will not be filled unless you are compliant with your follow up appointments or have a follow up  appointment scheduled as per instruction of your physician.  Refills should be requested at the time of  Your visit.    IMelani LPN, am scribing for and in the presence of Dr. Armando Antonio MD, FACC     Complex Repair And Split-Thickness Skin Graft Text: The defect edges were debeveled with a #15 scalpel blade.  The primary defect was closed partially with a complex linear closure.  Given the location of the defect, shape of the defect and the proximity to free margins a split thickness skin graft was deemed most appropriate to repair the remaining defect.  The graft was trimmed to fit the size of the remaining defect.  The graft was then placed in the primary defect, oriented appropriately, and sutured into place.

## 2024-10-15 ENCOUNTER — OFFICE VISIT (OUTPATIENT)
Dept: WOUND CARE | Facility: CLINIC | Age: 69
End: 2024-10-15
Payer: MEDICARE

## 2024-10-15 ENCOUNTER — APPOINTMENT (OUTPATIENT)
Dept: UROLOGY | Facility: CLINIC | Age: 69
End: 2024-10-15
Payer: MEDICARE

## 2024-10-15 ENCOUNTER — HOME CARE VISIT (OUTPATIENT)
Dept: HOME HEALTH SERVICES | Facility: HOME HEALTH | Age: 69
End: 2024-10-15
Payer: MEDICARE

## 2024-10-15 PROCEDURE — 99213 OFFICE O/P EST LOW 20 MIN: CPT | Mod: 25

## 2024-10-15 PROCEDURE — 11045 DBRDMT SUBQ TISS EACH ADDL: CPT

## 2024-10-15 PROCEDURE — 11042 DBRDMT SUBQ TIS 1ST 20SQCM/<: CPT

## 2024-10-15 NOTE — DOCUMENTATION CLARIFICATION NOTE
"    PATIENT:               HILARIO FUENTES  ACCT #:                  [id number]  MRN:                       [id number]  :                       1955  ADMIT DATE:       10/1/2024 8:26 PM  DISCH DATE:        10/10/2024 2:31 PM  RESPONDING PROVIDER #:        [id number]          PROVIDER RESPONSE TEXT:    Pt did not have NSTEMI during 2024 admission    CDI QUERY TEXT:    Clarification    Instruction:    Based on your assessment of the patient and the clinical information, please provide the requested documentation by clicking on the appropriate radio button and enter any additional information if prompted.    Question: Based on your medical judgment, can you please clarify which of these conditions is the most clinically supported    When answering this query, please exercise your independent professional judgment. The fact that a question is being asked, does not imply that any particular answer is desired or expected.    The patient's clinical indicators include:  Clinical Information: There is conflicting documentation in the medical record which requires clarification.    -The diagnosis of \"presented to the ER on 2024 with chest pain.  He was diagnosed with NSTEMI\" was documented on 10/3/24 by Kaylene Morrison PA-C.    -The diagnosis of \"2024 initial cardiology consulted present with unstable angina\" was documented on 10/4/2024 by Dr. Savage.    Clinical Indicators:  The patient is a 69 year old male who presented to the ED with weakness, fevers and confusion.  He was found to have a right arm cellulitis/thrombophlebitis of a previous IV site with sepsis.  His PMH includes HTN, DM, CKD3, PAZ, CAD with recent stent placement.    General Surgery Consult 10/3 \"presented to the ER on 2024 with chest pain.  He was diagnosed with NSTEMI, cardiology was consulted and recommended heparin and admission.  On 2024 he underwent cardiac catheterization with PCI and drug-eluting stent.\"    Cardiology PN 10/4 " "\"9/29/2024 initial cardiology consultation presented with unstable angina chronic kidney disease known coronary disease\"  \"Coronary artery disease: No recurrent angina now less than 1 week status post PTCA which was done on a semiurgent basis but no acute ST elevation infarct.\"    Historical Information from Admission 9/28-9/30/2024  Discharge Summary 9/30 \"Unstable angina s/p PCI.\"  \"Serial trops were neg, EKGs were unchanged from prior. Cardiology saw patient, took for [facility] that showed 80 stenosis in mid LAD, patent previous stents in LAD, Lcx, RCA, mild disease elsewhere, LVEF 60. Underwent PCI with ANTOINE x1 to mid LAD lesion. Post procedure, CP had resolved.\"    Troponin Labs:  9/28:  7, 5, 6  9/29:  9  9/30:  9    Treatment:  Cardiology Consult, IV Heparin, DUONG on 10/3    Risk Factors:  Recent stent placement on 9/30, unstable angina  Options provided:  -- Pt did not have NSTEMI during 9/28/2024 admission  -- Pt did have NSTEMI during 9/28/2024 admission  -- Other - I will add my own diagnosis  -- Refer to Clinical Documentation Reviewer    Query created by: Zunilda Farley on 10/5/2024 7:25 AM      Electronically signed by:  ISAIAS LIZARRAGA MD 10/15/2024 2:20 PM          "

## 2024-10-16 ENCOUNTER — APPOINTMENT (OUTPATIENT)
Dept: WOUND CARE | Facility: CLINIC | Age: 69
End: 2024-10-16
Payer: MEDICARE

## 2024-10-17 ENCOUNTER — LAB REQUISITION (OUTPATIENT)
Dept: LAB | Facility: LAB | Age: 69
End: 2024-10-17
Payer: MEDICARE

## 2024-10-17 ENCOUNTER — HOME CARE VISIT (OUTPATIENT)
Dept: HOME HEALTH SERVICES | Facility: HOME HEALTH | Age: 69
End: 2024-10-17
Payer: MEDICARE

## 2024-10-17 VITALS
OXYGEN SATURATION: 96 % | SYSTOLIC BLOOD PRESSURE: 114 MMHG | RESPIRATION RATE: 18 BRPM | HEART RATE: 89 BPM | TEMPERATURE: 97.2 F | DIASTOLIC BLOOD PRESSURE: 73 MMHG

## 2024-10-17 DIAGNOSIS — L03.113 CELLULITIS OF RIGHT UPPER LIMB: ICD-10-CM

## 2024-10-17 LAB
ALBUMIN SERPL BCP-MCNC: 3.9 G/DL (ref 3.4–5)
ALP SERPL-CCNC: 106 U/L (ref 33–136)
ALT SERPL W P-5'-P-CCNC: 3 U/L (ref 10–52)
ANION GAP SERPL CALC-SCNC: 15 MMOL/L (ref 10–20)
AST SERPL W P-5'-P-CCNC: 10 U/L (ref 9–39)
BILIRUB SERPL-MCNC: 2.1 MG/DL (ref 0–1.2)
BUN SERPL-MCNC: 35 MG/DL (ref 6–23)
CALCIUM SERPL-MCNC: 9.2 MG/DL (ref 8.6–10.3)
CHLORIDE SERPL-SCNC: 102 MMOL/L (ref 98–107)
CO2 SERPL-SCNC: 23 MMOL/L (ref 21–32)
CREAT SERPL-MCNC: 1.79 MG/DL (ref 0.5–1.3)
CRP SERPL-MCNC: 2.4 MG/DL
EGFRCR SERPLBLD CKD-EPI 2021: 41 ML/MIN/1.73M*2
ERYTHROCYTE [DISTWIDTH] IN BLOOD BY AUTOMATED COUNT: 12.6 % (ref 11.5–14.5)
GLUCOSE SERPL-MCNC: 127 MG/DL (ref 74–99)
HCT VFR BLD AUTO: 35.7 % (ref 41–52)
HGB BLD-MCNC: 11.7 G/DL (ref 13.5–17.5)
MCH RBC QN AUTO: 30.9 PG (ref 26–34)
MCHC RBC AUTO-ENTMCNC: 32.8 G/DL (ref 32–36)
MCV RBC AUTO: 94 FL (ref 80–100)
NRBC BLD-RTO: 0 /100 WBCS (ref 0–0)
PLATELET # BLD AUTO: 222 X10*3/UL (ref 150–450)
POTASSIUM SERPL-SCNC: 4.6 MMOL/L (ref 3.5–5.3)
PROT SERPL-MCNC: 7 G/DL (ref 6.4–8.2)
RBC # BLD AUTO: 3.79 X10*6/UL (ref 4.5–5.9)
SODIUM SERPL-SCNC: 135 MMOL/L (ref 136–145)
WBC # BLD AUTO: 12.7 X10*3/UL (ref 4.4–11.3)

## 2024-10-17 PROCEDURE — 80053 COMPREHEN METABOLIC PANEL: CPT

## 2024-10-17 PROCEDURE — G0299 HHS/HOSPICE OF RN EA 15 MIN: HCPCS | Mod: HHH

## 2024-10-17 PROCEDURE — 85027 COMPLETE CBC AUTOMATED: CPT

## 2024-10-17 PROCEDURE — 86140 C-REACTIVE PROTEIN: CPT

## 2024-10-17 ASSESSMENT — ENCOUNTER SYMPTOMS
PAIN LOCATION - PAIN QUALITY: SHARP
LOWEST PAIN SEVERITY IN PAST 24 HOURS: 1/10
PERSON REPORTING PAIN: PATIENT
PAIN LOCATION - EXACERBATING FACTORS: MOVEMENT
HIGHEST PAIN SEVERITY IN PAST 24 HOURS: 6/10
SUBJECTIVE PAIN PROGRESSION: GRADUALLY IMPROVING
PAIN: 1
PAIN LOCATION - PAIN SEVERITY: 3/10
PAIN LOCATION - RELIEVING FACTORS: APAP
PAIN LOCATION: RIGHT ARM

## 2024-10-18 ENCOUNTER — ANCILLARY PROCEDURE (OUTPATIENT)
Dept: CARDIOLOGY | Facility: CLINIC | Age: 69
End: 2024-10-18
Payer: MEDICARE

## 2024-10-18 DIAGNOSIS — I48.0 PAROXYSMAL ATRIAL FIBRILLATION (MULTI): ICD-10-CM

## 2024-10-18 PROCEDURE — 93227 XTRNL ECG REC<48 HR R&I: CPT | Performed by: INTERNAL MEDICINE

## 2024-10-18 PROCEDURE — 93225 XTRNL ECG REC<48 HRS REC: CPT | Performed by: INTERNAL MEDICINE

## 2024-10-18 ASSESSMENT — ENCOUNTER SYMPTOMS
APPETITE LEVEL: FAIR
CHANGE IN APPETITE: UNCHANGED

## 2024-10-21 ENCOUNTER — APPOINTMENT (OUTPATIENT)
Dept: INFECTIOUS DISEASES | Facility: CLINIC | Age: 69
End: 2024-10-21
Payer: MEDICARE

## 2024-10-22 ENCOUNTER — HOME INFUSION (OUTPATIENT)
Dept: INFUSION THERAPY | Age: 69
End: 2024-10-22
Payer: MEDICARE

## 2024-10-22 ENCOUNTER — APPOINTMENT (OUTPATIENT)
Dept: WOUND CARE | Facility: CLINIC | Age: 69
End: 2024-10-22
Payer: MEDICARE

## 2024-10-22 ENCOUNTER — OFFICE VISIT (OUTPATIENT)
Dept: WOUND CARE | Facility: CLINIC | Age: 69
End: 2024-10-22
Payer: MEDICARE

## 2024-10-22 PROCEDURE — 11045 DBRDMT SUBQ TISS EACH ADDL: CPT

## 2024-10-22 PROCEDURE — 11042 DBRDMT SUBQ TIS 1ST 20SQCM/<: CPT

## 2024-10-22 PROCEDURE — 11045 DBRDMT SUBQ TISS EACH ADDL: CPT | Performed by: SURGERY

## 2024-10-22 PROCEDURE — 11042 DBRDMT SUBQ TIS 1ST 20SQCM/<: CPT | Performed by: SURGERY

## 2024-10-22 NOTE — PROGRESS NOTES
Reviewed chart mark Manny Reveles is receiving IV cefazolin 2g q8h for MSSA bacteremia through 11/5/24. Followed by Dr. Mayo (PCP)  Labs ordered include CBC/BMP     Homecare labs from 10/17 and remarkablefor elevted CRPto review yet  Line SL TUNNELED IJ     RX contacted patient, infusions going well and inventory correct. Agreeable to delivery of a week of medications with supplies to match Wednesday between 3pm and 8pm.  to call on the way. OK to leave if not home.     Dispensing 10/23 with supplies and flushes to match for straight delivery:  21x cefazolin 1g syringes  DOS 10/24-10/30     Follow up 10/29 check labs, progress and inventory, refill remainder of cefazolin for OVN delivery.

## 2024-10-23 ENCOUNTER — LAB REQUISITION (OUTPATIENT)
Dept: LAB | Facility: LAB | Age: 69
End: 2024-10-23
Payer: MEDICARE

## 2024-10-23 ENCOUNTER — APPOINTMENT (OUTPATIENT)
Dept: CARDIOLOGY | Facility: CLINIC | Age: 69
End: 2024-10-23
Payer: MEDICARE

## 2024-10-23 ENCOUNTER — HOME CARE VISIT (OUTPATIENT)
Dept: HOME HEALTH SERVICES | Facility: HOME HEALTH | Age: 69
End: 2024-10-23
Payer: MEDICARE

## 2024-10-23 VITALS
HEIGHT: 72 IN | BODY MASS INDEX: 35.49 KG/M2 | WEIGHT: 262 LBS | DIASTOLIC BLOOD PRESSURE: 74 MMHG | SYSTOLIC BLOOD PRESSURE: 114 MMHG | HEART RATE: 70 BPM

## 2024-10-23 DIAGNOSIS — E78.1 ESSENTIAL HYPERTRIGLYCERIDEMIA: ICD-10-CM

## 2024-10-23 DIAGNOSIS — I48.91 ATRIAL FIBRILLATION, UNSPECIFIED TYPE (MULTI): Primary | ICD-10-CM

## 2024-10-23 DIAGNOSIS — E66.812 CLASS 2 SEVERE OBESITY DUE TO EXCESS CALORIES WITH SERIOUS COMORBIDITY AND BODY MASS INDEX (BMI) OF 35.0 TO 35.9 IN ADULT: ICD-10-CM

## 2024-10-23 DIAGNOSIS — Z78.9 NEVER SMOKED TOBACCO: ICD-10-CM

## 2024-10-23 DIAGNOSIS — Z98.61 CAD S/P PERCUTANEOUS CORONARY ANGIOPLASTY: ICD-10-CM

## 2024-10-23 DIAGNOSIS — E66.01 CLASS 2 SEVERE OBESITY DUE TO EXCESS CALORIES WITH SERIOUS COMORBIDITY AND BODY MASS INDEX (BMI) OF 35.0 TO 35.9 IN ADULT: ICD-10-CM

## 2024-10-23 DIAGNOSIS — I25.10 CAD S/P PERCUTANEOUS CORONARY ANGIOPLASTY: ICD-10-CM

## 2024-10-23 DIAGNOSIS — L03.113 CELLULITIS OF RIGHT UPPER LIMB: ICD-10-CM

## 2024-10-23 LAB
ALBUMIN SERPL BCP-MCNC: 3.9 G/DL (ref 3.4–5)
ALP SERPL-CCNC: 110 U/L (ref 33–136)
ALT SERPL W P-5'-P-CCNC: 4 U/L (ref 10–52)
ANION GAP SERPL CALC-SCNC: 16 MMOL/L (ref 10–20)
AST SERPL W P-5'-P-CCNC: 11 U/L (ref 9–39)
BILIRUB SERPL-MCNC: 1.5 MG/DL (ref 0–1.2)
BUN SERPL-MCNC: 51 MG/DL (ref 6–23)
CALCIUM SERPL-MCNC: 9.1 MG/DL (ref 8.6–10.3)
CHLORIDE SERPL-SCNC: 103 MMOL/L (ref 98–107)
CO2 SERPL-SCNC: 21 MMOL/L (ref 21–32)
CREAT SERPL-MCNC: 1.8 MG/DL (ref 0.5–1.3)
CRP SERPL-MCNC: 0.54 MG/DL
EGFRCR SERPLBLD CKD-EPI 2021: 40 ML/MIN/1.73M*2
ERYTHROCYTE [DISTWIDTH] IN BLOOD BY AUTOMATED COUNT: 12.3 % (ref 11.5–14.5)
GLUCOSE SERPL-MCNC: 77 MG/DL (ref 74–99)
HCT VFR BLD AUTO: 35 % (ref 41–52)
HGB BLD-MCNC: 11.1 G/DL (ref 13.5–17.5)
MCH RBC QN AUTO: 30.2 PG (ref 26–34)
MCHC RBC AUTO-ENTMCNC: 31.7 G/DL (ref 32–36)
MCV RBC AUTO: 95 FL (ref 80–100)
NRBC BLD-RTO: 0 /100 WBCS (ref 0–0)
PLATELET # BLD AUTO: 255 X10*3/UL (ref 150–450)
POTASSIUM SERPL-SCNC: 4.5 MMOL/L (ref 3.5–5.3)
PROT SERPL-MCNC: 7 G/DL (ref 6.4–8.2)
RBC # BLD AUTO: 3.68 X10*6/UL (ref 4.5–5.9)
SODIUM SERPL-SCNC: 135 MMOL/L (ref 136–145)
WBC # BLD AUTO: 7 X10*3/UL (ref 4.4–11.3)

## 2024-10-23 PROCEDURE — 4010F ACE/ARB THERAPY RXD/TAKEN: CPT | Performed by: INTERNAL MEDICINE

## 2024-10-23 PROCEDURE — G0299 HHS/HOSPICE OF RN EA 15 MIN: HCPCS | Mod: HHH

## 2024-10-23 PROCEDURE — 3048F LDL-C <100 MG/DL: CPT | Performed by: INTERNAL MEDICINE

## 2024-10-23 PROCEDURE — 1159F MED LIST DOCD IN RCRD: CPT | Performed by: INTERNAL MEDICINE

## 2024-10-23 PROCEDURE — 1111F DSCHRG MED/CURRENT MED MERGE: CPT | Performed by: INTERNAL MEDICINE

## 2024-10-23 PROCEDURE — 85027 COMPLETE CBC AUTOMATED: CPT

## 2024-10-23 PROCEDURE — 86140 C-REACTIVE PROTEIN: CPT

## 2024-10-23 PROCEDURE — 3078F DIAST BP <80 MM HG: CPT | Performed by: INTERNAL MEDICINE

## 2024-10-23 PROCEDURE — 99215 OFFICE O/P EST HI 40 MIN: CPT | Performed by: INTERNAL MEDICINE

## 2024-10-23 PROCEDURE — 1036F TOBACCO NON-USER: CPT | Performed by: INTERNAL MEDICINE

## 2024-10-23 PROCEDURE — 3074F SYST BP LT 130 MM HG: CPT | Performed by: INTERNAL MEDICINE

## 2024-10-23 PROCEDURE — 3061F NEG MICROALBUMINURIA REV: CPT | Performed by: INTERNAL MEDICINE

## 2024-10-23 PROCEDURE — 3044F HG A1C LEVEL LT 7.0%: CPT | Performed by: INTERNAL MEDICINE

## 2024-10-23 PROCEDURE — 3008F BODY MASS INDEX DOCD: CPT | Performed by: INTERNAL MEDICINE

## 2024-10-23 PROCEDURE — 80053 COMPREHEN METABOLIC PANEL: CPT

## 2024-10-23 ASSESSMENT — ENCOUNTER SYMPTOMS
PERSON REPORTING PAIN: PATIENT
PAIN LOCATION: RIGHT ELBOW
PAIN: 1
CONSTIPATION: 1
PAIN LOCATION - PAIN SEVERITY: 4/10
PALPITATIONS: 0
CHANGE IN APPETITE: UNCHANGED
HYPERTENSION: 1
DYSPNEA ON EXERTION: 0
APPETITE LEVEL: GOOD

## 2024-10-23 NOTE — PATIENT INSTRUCTIONS
Continue same medications/treatment.  Patient educated on proper medication use.  Patient educated on risk factor modification.  Please bring any lab results from other providers/physicians to your next appointment.    Please bring all medicines, vitamins, and herbal supplements with you when you come to the office.    Prescriptions will not be filled unless you are compliant with your follow up appointments or have a follow up appointment scheduled as per instruction of your physician. Refills should be requested at the time of your visit.    CHECK with your pharmacy and see if xarelto 20mg daily or pradaxa 150mg twice daily would be cheaper than Eliquis for you.  Follow up with Dr. Cruz in 3 months    Janel WALKER RN, AM SCRIBING FOR, AND IN THE PRESENCE OF DR. ARVIN CRUZ MD

## 2024-10-23 NOTE — PROGRESS NOTES
CARDIOLOGY OFFICE VISIT      CHIEF COMPLAINT  Chief Complaint   Patient presents with    Follow-up     Pt is here today following up after 6 weeks to review recent holter results        HISTORY OF PRESENT ILLNESS  HPI  69-year-old male with a past medical history of cholesterolemia, coronary artery disease status post percutaneous core interventions in the past.  Patient was admitted for fever and also chills.  Patient had swelling and erythema of the right antecubital region from the intravenous place during the past hospitalization.  Patient also had troponin elevation.  EKG remained completely normal.  No significant ST elevation.    Patient underwent a cardiac catheterization September 30, 2024  CONCLUSIONS:   1. The entire Left Main: <10% stenosis.   2. Proximal LAD Lesion: The percent stenosis is 10-30%.   3. Circumflex Coronary Artery: contains patent previously placed stents.   4. Mid and distal CX Lesion: The percent stenosis is 30%.   5. Right Coronary Artery: contains patent previously placed stents and fills via collaterals.   6. Mid and distal RCA Lesion: The percent stenosis is 10-30%.   7. Prox PDA RCA Lesion: The percent stenosis is 40%.   8. Prox PLVB RCA Lesion: The percent stenosis is 100%.   9. The Left Ventricular Ejection Fraction is 60%.  10. Mid LAD Lesion: The percent stenosis is 80%.  11. Mid LAD Lesion: pre-dilation Resolute Gaudencio 2.75x22: 0% residual stenosis.  Due to evidence of fever and possible endocarditis, DUONG was performed.  DUONG showed    No pericardial effusion  Mild aortic sclerosis without hemodynamically significant stenosis normal aortic valve function  Mitral valve normal appearance 1+ MR  Pulmonic valve normal with 2+ PI normal dimension pulmonary artery  Tricuspid valve normal no significant TR  No valvular vegetations  Normal intracavitary chamber dimensions  No left atrial or left atrial appendage thrombi  Normal LV and RV systolic function LVEF 60%  Atrial fibrillation  throughout the study  Also during DUONG, there was evidence of atrial fibrillation.  Patient was kept in telemetry.  He maintained atrial fibrillation for almost 3 days and he self converted back to sinus rhythm.  He was placed on anticoagulation therapy.    He underwent incisional debridement of the cellulitis/abscess area in the right arm.    Patient states that since the discharge from the hospital he has been doing well.  Denies any symptoms of chest pain or shortness breath or palpitations.  A Holter monitor was ordered.      Holter monitor October 18, 2024  This is a Holter monitor for 24 hours.     The underlying rhythm was sinus tachycardia rate of 112 bpm.  Sinus rhythm/sinus tachycardia were the main rhythm during this study     The minimum heart rate was 55 bpm, the maximum rate was 115 bpm average heart of 78 bpm.     There were occasional isolated PACs and PVCs but no any sustained atrial or ventricular arrhythmias.     No significant pauses were evidence of high-grade AV block were seen during this study.     Patient did not report symptoms during the study.     Conclusion     Underlying rhythm was sinus rhythm.     No significant or sustained atrial or ventricular arrhythmia seen during this study    Past Medical History  No past medical history on file.    Social History  Social History     Tobacco Use    Smoking status: Never     Passive exposure: Never    Smokeless tobacco: Never   Vaping Use    Vaping status: Never Used   Substance Use Topics    Alcohol use: Not Currently    Drug use: Never       Family History     Family History   Problem Relation Name Age of Onset    Other (bone/cartilage disorder) Mother      Diabetes Mother      Gallbladder disease Mother      Diabetes Father      Other (cardiac disorder) Father      Nephrolithiasis Father      Prostate cancer Father      Other (bladder cancer) Father      Heart attack Father      Rheum arthritis Father      Skin cancer Father      Kidney  nephrosis Father      Diabetes Paternal Grandfather      Skin cancer Paternal Grandfather          Allergies:  Allergies   Allergen Reactions    Adhesive Tape-Silicones Hives and Itching    Epinephrine Syncope    Latex Other    Meperidine Other    Penicillins Other     Tolerated cephalosporins this admission on 10/2024    Promethazine Other    Sulfa (Sulfonamide Antibiotics) Other        Outpatient Medications:  Current Outpatient Medications   Medication Instructions    amLODIPine (NORVASC) 5 mg, oral, Daily, as directed    apixaban (ELIQUIS) 5 mg, oral, 2 times daily, LOT # [id number]  Exp  1/2026    atorvastatin (LIPITOR) 40 mg, oral, Nightly    ceFAZolin (Ancef) IVPB 2 g, intravenous, Every 8 hours    ceFAZolin (ANCEF) 2 g, intravenous, Every 8 hours    cetirizine (ZYRTEC) 10 mg, Daily    cholecalciferol (VITAMIN D-3) 50,000 Units, oral, Once Weekly    clopidogrel (PLAVIX) 75 mg, oral, Daily    CRANBERRY ORAL 2 tablets, Daily    docusate sodium (COLACE) 100 mg, 2 times daily    finasteride (PROSCAR) 5 mg, oral, Daily    heparin sodium,porcine (HEPARIN LOCK FLUSH, PORCINE, IV) 10 Units, 3 times daily    icosapent ethyL (VASCEPA) 2 g, oral, 2 times daily    insulin aspart (NovoLOG FlexPen U-100 Insulin) 100 unit/mL (3 mL) pen Inject under the skin. Inject 20 units plus coverage for snacks    insulin degludec (TRESIBA FLEXTOUCH U-200) 104 Units, subcutaneous, Nightly    ketoconazole (NIZOral) 2 % shampoo Topical, 2 times weekly, Apply to scalp in shower. Lather, leave on 5 minutes then rinse    Lactobacillus acidophilus (PROBIOTIC ORAL) 1 tablet, Daily    metoprolol succinate XL (TOPROL-XL) 25 mg, oral, Daily    nitroglycerin (NITROSTAT) 0.4 mg, sublingual, Every 5 min PRN    potassium chloride CR (Klor-Con M20) 20 mEq ER tablet 20 mEq, oral, Daily    ranolazine (RANEXA) 500 mg, oral, Every 12 hours    semaglutide (Rybelsus) 14 mg tablet tablet 1 tablet, Daily    sodium chloride 0.9 %, flush, (SALINE FLUSH INJ) 10 mL,  6 times daily    tamsulosin (FLOMAX) 0.4 mg, oral, Daily    telmisartan (MICARDIS) 20 mg, oral, Every 24 hours    triamterene-hydrochlorothiazid (Maxzide-25) 37.5-25 mg tablet Take half tablet po daily          REVIEW OF SYSTEMS  Review of Systems   Cardiovascular:  Negative for chest pain, dyspnea on exertion and palpitations.   All other systems reviewed and are negative.        VITALS  Vitals:    10/23/24 1427   BP: 114/74   Pulse: 70       PHYSICAL EXAM  Constitutional:       General: Awake.      Appearance: Normal and healthy appearance. Well-developed and not in distress. Obese.   Neck:      Vascular: No JVR. JVD normal.   Pulmonary:      Effort: Pulmonary effort is normal.      Breath sounds: Normal breath sounds. No wheezing. No rhonchi. No rales.   Chest:      Chest wall: Not tender to palpatation.   Cardiovascular:      PMI at left midclavicular line. Normal rate. Regular rhythm. Normal S1. Normal S2.       Murmurs: There is no murmur.      No gallop.  No click. No rub.   Pulses:     Intact distal pulses.   Edema:     Peripheral edema absent.   Abdominal:      Tenderness: There is no abdominal tenderness.   Musculoskeletal: Normal range of motion.         General: No tenderness. Skin:     General: Skin is warm and dry.   Neurological:      General: No focal deficit present.      Mental Status: Alert and oriented to person, place and time.           ASSESSMENT AND PLAN    Chest pain/unstable angina  ASHD class II  Remote multivessel angioplasty and stenting  Stage III kidney disease  Sleep apnea  Type 2 diabetes  Moderate obesity  Renal calculus in the past  Dyslipidemia  New diagnosed atrial fibrillation during this admission  Bacteremia with MSSA  Sepsis on admission with fever, hypotension, altered mental status.    Plan recommendations    Patient is still in normal rhythm.  He is doing well.  From the cardiology electrophysiologist on point she should continue with beta-blocker therapy and also Eliquis  therapy.    Follow my office in 3 months or sooner.    We discussed the option of loop recorder implantation for long-term monitoring of burden of atrial fibrillation.  We will reassess him during the next office visit.    Continue Eliquis therapy.    Risk factor modification and lifestyle modification discussed with patient. Diet , exercise and hydration discussed with patient.    I have personally review with patient during this office visit, laboratory data, echocardiogram results, stress test results, Holter-event monitor results prior and after the last electrophysiology visit. All questions has been answered.    Please excuse any errors in grammar or translation related to this dictation.  Voice recognition software was utilized to prepare this document.

## 2024-10-24 ENCOUNTER — PATIENT OUTREACH (OUTPATIENT)
Dept: PRIMARY CARE | Facility: CLINIC | Age: 69
End: 2024-10-24
Payer: MEDICARE

## 2024-10-24 NOTE — PROGRESS NOTES
Call regarding appt. with PCP & cardio after hospitalization.  At time of outreach call the patient feels as if their condition has improved since last visit.  Reviewed the appointments with the pt and addressed any questions or concerns.  Sheri Bryant the patient [facility] [phone]- he asked where he could make a grievance against the hospital at.

## 2024-10-28 ENCOUNTER — HOME INFUSION (OUTPATIENT)
Dept: INFUSION THERAPY | Age: 69
End: 2024-10-28
Payer: MEDICARE

## 2024-10-28 ENCOUNTER — TELEPHONE (OUTPATIENT)
Dept: PRIMARY CARE | Facility: CLINIC | Age: 69
End: 2024-10-28
Payer: MEDICARE

## 2024-10-29 ENCOUNTER — OFFICE VISIT (OUTPATIENT)
Dept: WOUND CARE | Facility: CLINIC | Age: 69
End: 2024-10-29
Payer: MEDICARE

## 2024-10-29 DIAGNOSIS — I20.0 UNSTABLE ANGINA (MULTI): ICD-10-CM

## 2024-10-29 DIAGNOSIS — Z98.61 CORONARY ANGIOPLASTY STATUS: ICD-10-CM

## 2024-10-29 DIAGNOSIS — I25.10 ATHEROSCLEROTIC HEART DISEASE OF NATIVE CORONARY ARTERY WITHOUT ANGINA PECTORIS: ICD-10-CM

## 2024-10-29 PROCEDURE — 11042 DBRDMT SUBQ TIS 1ST 20SQCM/<: CPT

## 2024-10-29 PROCEDURE — 11042 DBRDMT SUBQ TIS 1ST 20SQCM/<: CPT | Performed by: SURGERY

## 2024-10-30 ENCOUNTER — APPOINTMENT (OUTPATIENT)
Dept: PRIMARY CARE | Facility: CLINIC | Age: 69
End: 2024-10-30
Payer: MEDICARE

## 2024-10-30 VITALS
DIASTOLIC BLOOD PRESSURE: 80 MMHG | HEART RATE: 67 BPM | BODY MASS INDEX: 35.08 KG/M2 | WEIGHT: 259 LBS | SYSTOLIC BLOOD PRESSURE: 120 MMHG | TEMPERATURE: 97.7 F | HEIGHT: 72 IN

## 2024-10-30 DIAGNOSIS — I48.0 PAF (PAROXYSMAL ATRIAL FIBRILLATION) (MULTI): ICD-10-CM

## 2024-10-30 DIAGNOSIS — B95.61 MSSA BACTEREMIA: ICD-10-CM

## 2024-10-30 DIAGNOSIS — R78.81 BACTEREMIA ASSOCIATED WITH INTRAVASCULAR LINE, SEQUELA: Primary | ICD-10-CM

## 2024-10-30 DIAGNOSIS — T82.7XXS BACTEREMIA ASSOCIATED WITH INTRAVASCULAR LINE, SEQUELA: Primary | ICD-10-CM

## 2024-10-30 DIAGNOSIS — R78.81 MSSA BACTEREMIA: ICD-10-CM

## 2024-10-30 DIAGNOSIS — S51.001S: ICD-10-CM

## 2024-10-30 PROCEDURE — 3074F SYST BP LT 130 MM HG: CPT | Performed by: INTERNAL MEDICINE

## 2024-10-30 PROCEDURE — 3079F DIAST BP 80-89 MM HG: CPT | Performed by: INTERNAL MEDICINE

## 2024-10-30 PROCEDURE — 3008F BODY MASS INDEX DOCD: CPT | Performed by: INTERNAL MEDICINE

## 2024-10-30 PROCEDURE — A6252 ABSORPT DRG >16 <=48 W/O BDR: HCPCS | Mod: HHH

## 2024-10-30 PROCEDURE — 4010F ACE/ARB THERAPY RXD/TAKEN: CPT | Performed by: INTERNAL MEDICINE

## 2024-10-30 PROCEDURE — 99214 OFFICE O/P EST MOD 30 MIN: CPT | Performed by: INTERNAL MEDICINE

## 2024-10-30 PROCEDURE — 1159F MED LIST DOCD IN RCRD: CPT | Performed by: INTERNAL MEDICINE

## 2024-10-30 PROCEDURE — 3048F LDL-C <100 MG/DL: CPT | Performed by: INTERNAL MEDICINE

## 2024-10-30 PROCEDURE — 1036F TOBACCO NON-USER: CPT | Performed by: INTERNAL MEDICINE

## 2024-10-30 PROCEDURE — 1111F DSCHRG MED/CURRENT MED MERGE: CPT | Performed by: INTERNAL MEDICINE

## 2024-10-30 PROCEDURE — 3044F HG A1C LEVEL LT 7.0%: CPT | Performed by: INTERNAL MEDICINE

## 2024-10-30 PROCEDURE — 1160F RVW MEDS BY RX/DR IN RCRD: CPT | Performed by: INTERNAL MEDICINE

## 2024-10-30 PROCEDURE — 3061F NEG MICROALBUMINURIA REV: CPT | Performed by: INTERNAL MEDICINE

## 2024-10-30 RX ORDER — RANOLAZINE 500 MG/1
500 TABLET, EXTENDED RELEASE ORAL EVERY 12 HOURS
Qty: 180 TABLET | Refills: 3 | Status: SHIPPED | OUTPATIENT
Start: 2024-10-30

## 2024-10-30 ASSESSMENT — ENCOUNTER SYMPTOMS
NEUROLOGICAL NEGATIVE: 1
WEAKNESS: 0
WOUND: 1
CHILLS: 0
CONSTITUTIONAL NEGATIVE: 1
FATIGUE: 0
MUSCULOSKELETAL NEGATIVE: 1
RESPIRATORY NEGATIVE: 1
CARDIOVASCULAR NEGATIVE: 1
COUGH: 0
GASTROINTESTINAL NEGATIVE: 1

## 2024-10-31 ENCOUNTER — HOME CARE VISIT (OUTPATIENT)
Dept: HOME HEALTH SERVICES | Facility: HOME HEALTH | Age: 69
End: 2024-10-31
Payer: MEDICARE

## 2024-10-31 PROCEDURE — G0299 HHS/HOSPICE OF RN EA 15 MIN: HCPCS | Mod: HHH

## 2024-10-31 PROCEDURE — 86140 C-REACTIVE PROTEIN: CPT

## 2024-10-31 PROCEDURE — 85027 COMPLETE CBC AUTOMATED: CPT

## 2024-10-31 PROCEDURE — 80053 COMPREHEN METABOLIC PANEL: CPT

## 2024-10-31 ASSESSMENT — ENCOUNTER SYMPTOMS
HYPERTENSION: 1
LAST BOWEL MOVEMENT: 67144
APPETITE LEVEL: GOOD
HEADACHES: 1
STOOL FREQUENCY: LESS THAN DAILY
SUBJECTIVE PAIN PROGRESSION: UNCHANGED
CONSTIPATION: 1
CHANGE IN APPETITE: UNCHANGED
PERSON REPORTING PAIN: PATIENT
PAIN LOCATION: RIGHT ELBOW
PAIN LOCATION - PAIN SEVERITY: 2/10
PAIN: 1

## 2024-11-01 DIAGNOSIS — B95.61 MSSA BACTEREMIA: ICD-10-CM

## 2024-11-01 DIAGNOSIS — R78.81 MSSA BACTEREMIA: ICD-10-CM

## 2024-11-01 DIAGNOSIS — S51.001S: ICD-10-CM

## 2024-11-02 ENCOUNTER — APPOINTMENT (OUTPATIENT)
Dept: UROLOGY | Facility: CLINIC | Age: 69
End: 2024-11-02
Payer: MEDICARE

## 2024-11-05 ENCOUNTER — HOME CARE VISIT (OUTPATIENT)
Dept: HOME HEALTH SERVICES | Facility: HOME HEALTH | Age: 69
End: 2024-11-05
Payer: MEDICARE

## 2024-11-05 ENCOUNTER — OFFICE VISIT (OUTPATIENT)
Dept: WOUND CARE | Facility: CLINIC | Age: 69
End: 2024-11-05
Payer: MEDICARE

## 2024-11-05 ENCOUNTER — HOME INFUSION (OUTPATIENT)
Dept: INFUSION THERAPY | Age: 69
End: 2024-11-05
Payer: MEDICARE

## 2024-11-05 ENCOUNTER — LAB (OUTPATIENT)
Dept: LAB | Facility: LAB | Age: 69
End: 2024-11-05
Payer: MEDICARE

## 2024-11-05 DIAGNOSIS — R78.81 MSSA BACTEREMIA: ICD-10-CM

## 2024-11-05 DIAGNOSIS — N18.32 STAGE 3B CHRONIC KIDNEY DISEASE (MULTI): ICD-10-CM

## 2024-11-05 DIAGNOSIS — R78.81 BACTEREMIA ASSOCIATED WITH INTRAVASCULAR LINE, SEQUELA: ICD-10-CM

## 2024-11-05 DIAGNOSIS — B95.61 MSSA BACTEREMIA: ICD-10-CM

## 2024-11-05 DIAGNOSIS — T82.7XXS BACTEREMIA ASSOCIATED WITH INTRAVASCULAR LINE, SEQUELA: ICD-10-CM

## 2024-11-05 DIAGNOSIS — E11.9 TYPE 2 DIABETES MELLITUS WITHOUT RETINOPATHY (MULTI): ICD-10-CM

## 2024-11-05 DIAGNOSIS — L03.113 CELLULITIS OF RIGHT UPPER EXTREMITY: ICD-10-CM

## 2024-11-05 LAB
ALBUMIN SERPL BCP-MCNC: 3.9 G/DL (ref 3.4–5)
ALP SERPL-CCNC: 70 U/L (ref 33–136)
ALT SERPL W P-5'-P-CCNC: 6 U/L (ref 10–52)
ANION GAP SERPL CALC-SCNC: 13 MMOL/L (ref 10–20)
AST SERPL W P-5'-P-CCNC: 13 U/L (ref 9–39)
BASOPHILS # BLD AUTO: 0.09 X10*3/UL (ref 0–0.1)
BASOPHILS NFR BLD AUTO: 1.2 %
BILIRUB SERPL-MCNC: 1.3 MG/DL (ref 0–1.2)
BUN SERPL-MCNC: 27 MG/DL (ref 6–23)
CALCIUM SERPL-MCNC: 9.5 MG/DL (ref 8.6–10.3)
CHLORIDE SERPL-SCNC: 105 MMOL/L (ref 98–107)
CO2 SERPL-SCNC: 26 MMOL/L (ref 21–32)
CREAT SERPL-MCNC: 1.56 MG/DL (ref 0.5–1.3)
CRP SERPL-MCNC: <0.1 MG/DL
EGFRCR SERPLBLD CKD-EPI 2021: 48 ML/MIN/1.73M*2
EOSINOPHIL # BLD AUTO: 0.09 X10*3/UL (ref 0–0.7)
EOSINOPHIL NFR BLD AUTO: 1.2 %
ERYTHROCYTE [DISTWIDTH] IN BLOOD BY AUTOMATED COUNT: 13 % (ref 11.5–14.5)
GLUCOSE SERPL-MCNC: 133 MG/DL (ref 74–99)
HCT VFR BLD AUTO: 33.9 % (ref 41–52)
HGB BLD-MCNC: 10.8 G/DL (ref 13.5–17.5)
IMM GRANULOCYTES # BLD AUTO: 0.03 X10*3/UL (ref 0–0.7)
IMM GRANULOCYTES NFR BLD AUTO: 0.4 % (ref 0–0.9)
LYMPHOCYTES # BLD AUTO: 1.76 X10*3/UL (ref 1.2–4.8)
LYMPHOCYTES NFR BLD AUTO: 23 %
MCH RBC QN AUTO: 30.3 PG (ref 26–34)
MCHC RBC AUTO-ENTMCNC: 31.9 G/DL (ref 32–36)
MCV RBC AUTO: 95 FL (ref 80–100)
MONOCYTES # BLD AUTO: 0.56 X10*3/UL (ref 0.1–1)
MONOCYTES NFR BLD AUTO: 7.3 %
NEUTROPHILS # BLD AUTO: 5.13 X10*3/UL (ref 1.2–7.7)
NEUTROPHILS NFR BLD AUTO: 66.9 %
NRBC BLD-RTO: 0 /100 WBCS (ref 0–0)
PLATELET # BLD AUTO: 152 X10*3/UL (ref 150–450)
POTASSIUM SERPL-SCNC: 4.5 MMOL/L (ref 3.5–5.3)
PROT SERPL-MCNC: 6.7 G/DL (ref 6.4–8.2)
RBC # BLD AUTO: 3.56 X10*6/UL (ref 4.5–5.9)
SODIUM SERPL-SCNC: 139 MMOL/L (ref 136–145)
WBC # BLD AUTO: 7.7 X10*3/UL (ref 4.4–11.3)

## 2024-11-05 PROCEDURE — 80053 COMPREHEN METABOLIC PANEL: CPT

## 2024-11-05 PROCEDURE — 86140 C-REACTIVE PROTEIN: CPT

## 2024-11-05 PROCEDURE — 11042 DBRDMT SUBQ TIS 1ST 20SQCM/<: CPT

## 2024-11-05 PROCEDURE — G0299 HHS/HOSPICE OF RN EA 15 MIN: HCPCS | Mod: HHH

## 2024-11-05 PROCEDURE — 11042 DBRDMT SUBQ TIS 1ST 20SQCM/<: CPT | Performed by: SURGERY

## 2024-11-05 PROCEDURE — 85025 COMPLETE CBC W/AUTO DIFF WBC: CPT

## 2024-11-05 ASSESSMENT — ENCOUNTER SYMPTOMS
STOOL FREQUENCY: DAILY
PAIN LOCATION - RELIEVING FACTORS: PERCOCET
PAIN LOCATION: LEFT ELBOW
PAIN: 1
PERSON REPORTING PAIN: PATIENT
DIARRHEA: 1
LAST BOWEL MOVEMENT: 67149
SUBJECTIVE PAIN PROGRESSION: WAXING AND WANING

## 2024-11-05 NOTE — PROGRESS NOTES
SPOKE WITH DR. DUDLEY TODAY AND HE CONFIRMED PATIENT WILL BE STOPPING IV CEFAZOLIN AFTER TODAY.  MD OFFICE IS ARRANGING FOR TUNNELED PICC TO BE REMOVED.  PATIENT ONLY HAS A FEW FLUSHES LEFT.  PHARMACY TO SEND OUT 7 SALINE AND 7 HEPARIN FLUSHES ON WEDNESDAY.  RPH TO F/U WITH PATIENT ON FRIDAY AND SEE WHEN LINE REMOVAL IS, IF ITS AFTER TUESDAY 11/12 PATIENT MAY NEED ANOTHER DRESSING KIT AND FLUSHES.  DSL

## 2024-11-06 PROCEDURE — A6252 ABSORPT DRG >16 <=48 W/O BDR: HCPCS | Mod: HHH

## 2024-11-06 NOTE — NURSING NOTE
Order received to discontinue SBCC. Order received from Dr Mayo to hold Eliquis for 2 doses and plavix for 5 days. When speaking to the patient to get this procedure scheduled the pt states he just had a cardiac stent placed on 9/29/2024 and he was told not to hold the plavix for 6 months. Discussed this with Dr Schoenberger and he states ok to schedule the pt for this Friday and not hold the plavix, please hold the eliquis. Instructions given to hold pressure for 10 mins when SBCC discontinued and to notify the patient he may bruise with the discontinuation of this line.

## 2024-11-07 ENCOUNTER — APPOINTMENT (OUTPATIENT)
Dept: CARDIOLOGY | Facility: CLINIC | Age: 69
End: 2024-11-07
Payer: MEDICARE

## 2024-11-07 ENCOUNTER — HOME INFUSION (OUTPATIENT)
Dept: INFUSION THERAPY | Age: 69
End: 2024-11-07

## 2024-11-07 NOTE — PROGRESS NOTES
Patient scheduled to have tunneled line removed 11/8, patient will no longer have IV needs    Chart forwarded to patient care rep to discharge from pharmacy service   17-Dec-2023

## 2024-11-08 ENCOUNTER — HOSPITAL ENCOUNTER (OUTPATIENT)
Dept: RADIOLOGY | Facility: HOSPITAL | Age: 69
Discharge: HOME | End: 2024-11-08
Payer: MEDICARE

## 2024-11-08 DIAGNOSIS — R78.81 BACTEREMIA ASSOCIATED WITH INTRAVASCULAR LINE, SEQUELA: ICD-10-CM

## 2024-11-08 DIAGNOSIS — T82.7XXS BACTEREMIA ASSOCIATED WITH INTRAVASCULAR LINE, SEQUELA: ICD-10-CM

## 2024-11-08 DIAGNOSIS — L03.113 CELLULITIS OF RIGHT UPPER EXTREMITY: ICD-10-CM

## 2024-11-08 ASSESSMENT — PAIN SCALES - GENERAL: PAINLEVEL_OUTOF10: 0 - NO PAIN

## 2024-11-12 ENCOUNTER — OFFICE VISIT (OUTPATIENT)
Dept: WOUND CARE | Facility: CLINIC | Age: 69
End: 2024-11-12
Payer: MEDICARE

## 2024-11-12 PROCEDURE — 11042 DBRDMT SUBQ TIS 1ST 20SQCM/<: CPT | Performed by: SURGERY

## 2024-11-12 PROCEDURE — 11042 DBRDMT SUBQ TIS 1ST 20SQCM/<: CPT

## 2024-11-13 ENCOUNTER — APPOINTMENT (OUTPATIENT)
Dept: PRIMARY CARE | Facility: CLINIC | Age: 69
End: 2024-11-13
Payer: MEDICARE

## 2024-11-13 VITALS
HEART RATE: 77 BPM | HEIGHT: 72 IN | TEMPERATURE: 97.1 F | WEIGHT: 263 LBS | SYSTOLIC BLOOD PRESSURE: 120 MMHG | BODY MASS INDEX: 35.62 KG/M2 | DIASTOLIC BLOOD PRESSURE: 77 MMHG

## 2024-11-13 DIAGNOSIS — Z00.00 ROUTINE GENERAL MEDICAL EXAMINATION AT HEALTH CARE FACILITY: Primary | ICD-10-CM

## 2024-11-13 DIAGNOSIS — E66.01 CLASS 2 SEVERE OBESITY WITH SERIOUS COMORBIDITY AND BODY MASS INDEX (BMI) OF 35.0 TO 35.9 IN ADULT, UNSPECIFIED OBESITY TYPE: ICD-10-CM

## 2024-11-13 DIAGNOSIS — E11.9 TYPE 2 DIABETES MELLITUS WITHOUT RETINOPATHY (MULTI): ICD-10-CM

## 2024-11-13 DIAGNOSIS — E66.812 CLASS 2 SEVERE OBESITY WITH SERIOUS COMORBIDITY AND BODY MASS INDEX (BMI) OF 35.0 TO 35.9 IN ADULT, UNSPECIFIED OBESITY TYPE: ICD-10-CM

## 2024-11-13 DIAGNOSIS — E55.9 VITAMIN D DEFICIENCY DISEASE: ICD-10-CM

## 2024-11-13 PROCEDURE — 3061F NEG MICROALBUMINURIA REV: CPT | Performed by: INTERNAL MEDICINE

## 2024-11-13 PROCEDURE — 1123F ACP DISCUSS/DSCN MKR DOCD: CPT | Performed by: INTERNAL MEDICINE

## 2024-11-13 PROCEDURE — 1160F RVW MEDS BY RX/DR IN RCRD: CPT | Performed by: INTERNAL MEDICINE

## 2024-11-13 PROCEDURE — 1159F MED LIST DOCD IN RCRD: CPT | Performed by: INTERNAL MEDICINE

## 2024-11-13 PROCEDURE — G0447 BEHAVIOR COUNSEL OBESITY 15M: HCPCS | Performed by: INTERNAL MEDICINE

## 2024-11-13 PROCEDURE — 3044F HG A1C LEVEL LT 7.0%: CPT | Performed by: INTERNAL MEDICINE

## 2024-11-13 PROCEDURE — 4010F ACE/ARB THERAPY RXD/TAKEN: CPT | Performed by: INTERNAL MEDICINE

## 2024-11-13 PROCEDURE — 3008F BODY MASS INDEX DOCD: CPT | Performed by: INTERNAL MEDICINE

## 2024-11-13 PROCEDURE — 1036F TOBACCO NON-USER: CPT | Performed by: INTERNAL MEDICINE

## 2024-11-13 PROCEDURE — 3074F SYST BP LT 130 MM HG: CPT | Performed by: INTERNAL MEDICINE

## 2024-11-13 PROCEDURE — 3048F LDL-C <100 MG/DL: CPT | Performed by: INTERNAL MEDICINE

## 2024-11-13 PROCEDURE — 3078F DIAST BP <80 MM HG: CPT | Performed by: INTERNAL MEDICINE

## 2024-11-13 PROCEDURE — 1170F FXNL STATUS ASSESSED: CPT | Performed by: INTERNAL MEDICINE

## 2024-11-13 PROCEDURE — G0439 PPPS, SUBSEQ VISIT: HCPCS | Performed by: INTERNAL MEDICINE

## 2024-11-13 ASSESSMENT — ACTIVITIES OF DAILY LIVING (ADL)
HEARING - LEFT EAR: FUNCTIONAL
TOILETING: INDEPENDENT
ADEQUATE_TO_COMPLETE_ADL: YES
STIL DRIVING: YES
USING TRANSPORTATION: INDEPENDENT
DRESSING YOURSELF: INDEPENDENT
EATING: INDEPENDENT
GROCERY SHOPPING: INDEPENDENT
TAKING MEDICATION: INDEPENDENT
NEEDS ASSISTANCE WITH FOOD: INDEPENDENT
WALKS IN HOME: INDEPENDENT
HEARING - RIGHT EAR: FUNCTIONAL
JUDGMENT_ADEQUATE_SAFELY_COMPLETE_DAILY_ACTIVITIES: YES
BATHING: INDEPENDENT
PATIENT'S MEMORY ADEQUATE TO SAFELY COMPLETE DAILY ACTIVITIES?: YES
USING TELEPHONE: INDEPENDENT
PILL BOX USED: NO
MANAGING FINANCES: INDEPENDENT
PREPARING MEALS: INDEPENDENT
FEEDING YOURSELF: INDEPENDENT
DOING HOUSEWORK: INDEPENDENT
GROOMING: INDEPENDENT

## 2024-11-13 ASSESSMENT — PATIENT HEALTH QUESTIONNAIRE - PHQ9
1. LITTLE INTEREST OR PLEASURE IN DOING THINGS: NOT AT ALL
SUM OF ALL RESPONSES TO PHQ9 QUESTIONS 1 AND 2: 0
2. FEELING DOWN, DEPRESSED OR HOPELESS: NOT AT ALL
1. LITTLE INTEREST OR PLEASURE IN DOING THINGS: NOT AT ALL
2. FEELING DOWN, DEPRESSED OR HOPELESS: NOT AT ALL
SUM OF ALL RESPONSES TO PHQ9 QUESTIONS 1 AND 2: 0

## 2024-11-13 ASSESSMENT — ENCOUNTER SYMPTOMS
WOUND: 1
BRUISES/BLEEDS EASILY: 0
MUSCULOSKELETAL NEGATIVE: 1
ADENOPATHY: 0
NEUROLOGICAL NEGATIVE: 1
RESPIRATORY NEGATIVE: 1
LOSS OF SENSATION IN FEET: 0
CONSTITUTIONAL NEGATIVE: 1
WEAKNESS: 0
CARDIOVASCULAR NEGATIVE: 1
DEPRESSION: 0
GASTROINTESTINAL NEGATIVE: 1
OCCASIONAL FEELINGS OF UNSTEADINESS: 0

## 2024-11-13 NOTE — PROGRESS NOTES
Subjective   Reason for Visit: Manny Reveles is an 69 y.o. male here for a Medicare Wellness visit.     Past Medical, Surgical, and Family History reviewed and updated in chart.    Reviewed all medications by prescribing practitioner or clinical pharmacist (such as prescriptions, OTCs, herbal therapies and supplements) and documented in the medical record.    69-year-old male patient came year for wellness exam.  Finally his wounds are healing, these are the phlebotomy wounds, it has led to MSSA bacteremia.  There was a tunnel catheter which was removed by radiology, IV cefazolin is completed, last set of laboratories showed normalization of WBC count, mild anemia, chronic kidney disease, blood sugars are slightly fluctuating, remains on high dose of insulin.  Latest a wound care follow-up and the pictures of the wounds were reviewed.  He came with the spouse, he is up-to-date with all the vaccinations, he is compliant with his medical care and follow-up, he was found to have atrial fibrillation with second hospitalization and he is on lifelong Eliquis, aspirin has been taken away, he feels great, all the infection related problems has been gone, home health care has been gone, he has a diabetes, CAD, multiple PCI, high triglycerides, obesity, history of renal stones, obstructive sleep apnea.  He has excellent family support, he has up-to-date living will.        Patient Care Team:  Sherwin Mayo MD as PCP - General  [facility] MD [facility]  Marielle Rudolph MA as Care Manager (Case Management)  Armando Antonio MD as Cardiologist (Interventional Cardiology)  Tima Keenan MD as Referring Physician (Hospitalist)  Sherwin Mayo MD as Primary Care Provider (Internal Medicine)  Lacey Dominguez PA-C as Physician Assistant (Infectious Diseases)  Traci Pelayo LPN as Care Manager (Case Management)  Evan Hansen MD as Cardiologist (Electrophysiology)      Review of Systems   Constitutional: Negative.    Respiratory: Negative.     Cardiovascular: Negative.    Gastrointestinal: Negative.    Musculoskeletal: Negative.    Skin:  Positive for wound.   Neurological: Negative.  Negative for weakness.   Hematological:  Negative for adenopathy. Does not bruise/bleed easily.       Objective   Vitals:  Temp 36.2 °C (97.1 °F) (Temporal)   Ht 1.829 m (6')   Wt 119 kg (263 lb)   BMI 35.67 kg/m²       Physical Exam  Constitutional:       Appearance: Normal appearance. He is obese.   HENT:      Head: Normocephalic.   Eyes:      Conjunctiva/sclera: Conjunctivae normal.   Cardiovascular:      Rate and Rhythm: Normal rate and regular rhythm.      Pulses: Normal pulses.      Heart sounds: Normal heart sounds.   Pulmonary:      Effort: Pulmonary effort is normal.      Breath sounds: Normal breath sounds.   Abdominal:      General: Abdomen is flat.      Palpations: Abdomen is soft.   Musculoskeletal:         General: Normal range of motion.      Cervical back: Neck supple.   Skin:     General: Skin is warm and dry.      Findings: Wound present. No bruising.   Neurological:      General: No focal deficit present.      Mental Status: He is oriented to person, place, and time. Mental status is at baseline.   Psychiatric:         Mood and Affect: Mood normal.         Assessment & Plan  Class 2 severe obesity with serious comorbidity and body mass index (BMI) of 35.0 to 35.9 in adult, unspecified obesity type         Routine general medical examination at health care facility       I spent <15 minutes face to face with individual providing recommendations for nutrition choices and exercise plan to help achieve weight reduction goals. Obesity is systemic disorder and it can bring devastating morbidities in furture. It is a matter of calorie gain and loss, keeping bodybank in negative calorie balance mode is the way to sustain weight loss.Diet has a big role in reducing excess body wt.  Scheduled and well planned meals and food intake with watchfulness and understanding of calorie portion and distribution is key to understand. Bariatric surgery is not an option if sustained wt loss is not achieved and faced with one or more comorbidities with morbid obesity. Weigh yourself twice a week to understand and follow wt loss goals.   Wellness exam was done after he has been really well after having that MSSA bacteremia.  I do not have to see him for 3 months but I see that he is on high-dose of vitamin D for long time now, I told him to stop high-dose vitamin D, we will check vitamin D on the next lab work, triglycerides creatinine A1c will be checked.  Dressings are noticed, physical exam revealed morbidly obese body habitus, there is no no new wound, there is no abnormal skin findings, visceral obesity is substantial, no chest pain, no angina, home conditions are excellent, family support is excellent, compliance is absolutely excellent, he will return back in 3 months, this will serve as a wellness exam for this year in month of November, any issues or concerns will be directed to me, longitudinal care has been provided by me.

## 2024-11-14 ENCOUNTER — HOME CARE VISIT (OUTPATIENT)
Dept: HOME HEALTH SERVICES | Facility: HOME HEALTH | Age: 69
End: 2024-11-14
Payer: MEDICARE

## 2024-11-14 VITALS
SYSTOLIC BLOOD PRESSURE: 140 MMHG | RESPIRATION RATE: 18 BRPM | DIASTOLIC BLOOD PRESSURE: 72 MMHG | OXYGEN SATURATION: 98 % | HEART RATE: 68 BPM | TEMPERATURE: 98.6 F

## 2024-11-14 PROCEDURE — G0299 HHS/HOSPICE OF RN EA 15 MIN: HCPCS | Mod: HHH

## 2024-11-14 ASSESSMENT — ENCOUNTER SYMPTOMS
APPETITE LEVEL: GOOD
LAST BOWEL MOVEMENT: 67157
DENIES PAIN: 1
CHANGE IN APPETITE: UNCHANGED
LOWER EXTREMITY EDEMA: 1

## 2024-11-19 ENCOUNTER — OFFICE VISIT (OUTPATIENT)
Dept: WOUND CARE | Facility: CLINIC | Age: 69
End: 2024-11-19
Payer: MEDICARE

## 2024-11-19 ENCOUNTER — HOME CARE VISIT (OUTPATIENT)
Dept: HOME HEALTH SERVICES | Facility: HOME HEALTH | Age: 69
End: 2024-11-19
Payer: MEDICARE

## 2024-11-19 PROCEDURE — 11042 DBRDMT SUBQ TIS 1ST 20SQCM/<: CPT | Performed by: SURGERY

## 2024-11-19 PROCEDURE — 11042 DBRDMT SUBQ TIS 1ST 20SQCM/<: CPT

## 2024-11-20 ENCOUNTER — APPOINTMENT (OUTPATIENT)
Dept: PHARMACY | Facility: HOSPITAL | Age: 69
End: 2024-11-20
Payer: COMMERCIAL

## 2024-11-20 DIAGNOSIS — I48.91 ATRIAL FIBRILLATION, UNSPECIFIED TYPE (MULTI): ICD-10-CM

## 2024-11-20 PROBLEM — I48.0 PAROXYSMAL A-FIB (MULTI): Status: ACTIVE | Noted: 2024-11-20

## 2024-11-20 NOTE — PROGRESS NOTES
"  Pharmacist Clinic: Cardiology Management    Manny Reveles is a 69 y.o. male was referred to Clinical Pharmacy Team for anticoagulation management.     Referring Provider: Armando Antonio *    THIS IS A NEW PATIENT APPOINTMENT. PATIENT WILL BE ESTABLISHING CARE WITH CLINICAL PHARMACY.    Appointment was completed by the patient who was reached at .    REVIEW OF PAST APPNT (IF APPLICABLE):   N/A    Allergies Reviewed? Yes    Allergies   Allergen Reactions    Adhesive Tape-Silicones Hives and Itching    Epinephrine Syncope    Latex Hives and Itching    Meperidine Other    Penicillins Other     Reports redness and feeling hot; Tolerated cephalosporins this admission on 10/2024    Promethazine Unknown    Sulfa (Sulfonamide Antibiotics) Other     \"Flushed\"       No past medical history on file.    Current Outpatient Medications on File Prior to Visit   Medication Sig Dispense Refill    amLODIPine (Norvasc) 5 mg tablet TAKE 1 TABLET (5 MG) BY MOUTH ONCE DAILY AS DIRECTED 90 tablet 3    apixaban (Eliquis) 5 mg tablet Take 1 tablet (5 mg) by mouth 2 times a day. 180 tablet 3    atorvastatin (Lipitor) 40 mg tablet Take 1 tablet (40 mg) by mouth once daily at bedtime. 90 tablet 3    cetirizine (ZyrTEC) 10 mg tablet Take 1 tablet (10 mg) by mouth once daily.      clopidogrel (Plavix) 75 mg tablet Take 1 tablet (75 mg) by mouth once daily. 90 tablet 3    CRANBERRY ORAL Take 2 tablets by mouth once daily.      docusate sodium (Colace) 100 mg capsule Take 1 capsule (100 mg) by mouth 2 times a day.      finasteride (Proscar) 5 mg tablet Take 1 tablet (5 mg) by mouth once daily. 90 tablet 3    icosapent ethyL (Vascepa) 1 gram capsule Take 2 capsules (2 g) by mouth 2 times a day. 360 capsule 3    insulin aspart (NovoLOG FlexPen U-100 Insulin) 100 unit/mL (3 mL) pen Inject under the skin. Inject 20 units plus coverage for snacks      insulin degludec (Tresiba FlexTouch U-200) 200 unit/mL (3 mL) injection " Inject 104 Units under the skin once daily at bedtime.      ketoconazole (NIZOral) 2 % shampoo APPLY TOPICALLY 2 TIMES A WEEK. APPLY TO SCALP IN SHOWER. LATHER, LEAVE ON 5 MINUTES THEN RINSE 120 mL 11    Lactobacillus acidophilus (PROBIOTIC ORAL) Take 1 tablet by mouth early in the morning..      metoprolol succinate XL (Toprol-XL) 25 mg 24 hr tablet TAKE 1 TABLET BY MOUTH EVERY DAY 90 tablet 1    nitroglycerin (Nitrostat) 0.4 mg SL tablet Place 1 tablet (0.4 mg) under the tongue every 5 minutes if needed for chest pain. 25 tablet 5    potassium chloride CR (Klor-Con M20) 20 mEq ER tablet Take 1 tablet (20 mEq) by mouth once daily. 90 tablet 3    ranolazine (Ranexa) 500 mg 12 hr tablet TAKE 1 TABLET BY MOUTH EVERY 12 HOURS 180 tablet 3    semaglutide (Rybelsus) 14 mg tablet tablet Take 1 tablet (14 mg) by mouth once daily.      tamsulosin (Flomax) 0.4 mg 24 hr capsule Take 1 capsule (0.4 mg) by mouth once daily. 90 capsule 3    telmisartan (MIcarDIS) 20 mg tablet TAKE 1 TABLET (20 MG) BY MOUTH ONCE EVERY 24 HOURS. 90 tablet 2    triamterene-hydrochlorothiazid (Maxzide-25) 37.5-25 mg tablet Take half tablet po daily 30 tablet 5     No current facility-administered medications on file prior to visit.         RELEVANT LAB RESULTS:  Lab Results   Component Value Date    BILITOT 1.3 (H) 11/05/2024    CALCIUM 9.5 11/05/2024    CO2 26 11/05/2024     11/05/2024    CREATININE 1.56 (H) 11/05/2024    GLUCOSE 133 (H) 11/05/2024    ALKPHOS 70 11/05/2024    K 4.5 11/05/2024    PROT 6.7 11/05/2024     11/05/2024    AST 13 11/05/2024    ALT 6 (L) 11/05/2024    BUN 27 (H) 11/05/2024    ANIONGAP 13 11/05/2024    MG 2.01 10/10/2024    PHOS 2.2 (L) 05/19/2022    ALBUMIN 3.9 11/05/2024    LIPASE 29 09/28/2024    GFRF CANCELED 02/26/2023    GFRMALE 48 (A) 07/06/2023     Lab Results   Component Value Date    TRIG 372 (H) 08/23/2024    CHOL 147 08/23/2024    LDLCALC 44 08/23/2024    HDL 28.7 08/23/2024     No results found for:  "\"BMCBC\", \"CBCDIF\"     PHARMACEUTICAL ASSESSMENT:    MEDICATION RECONCILIATION    Home Pharmacy Reviewed? Yes, describe: [facility]; [place]. Receives insulin and Rybelsus through  PAP    Added:  - None  Changed:  - None  Removed:  - None    Drug Interactions? Yes, describe: Potassium may enhance the hyperkalemic effect of triamterence, concurrent use is not recommended. However, patient's last CMP on 11/5/2024 shows potassium of 4.5 mmol/L. No action needed at this time, recommend continued monitoring.     Medication Documentation Review Audit       Reviewed by Mary Ann Morgan PharmD (Pharmacist) on 11/20/24 at 1447      Medication Order Taking? Sig Documenting Provider Last Dose Status   amLODIPine (Norvasc) 5 mg tablet [id number] Yes TAKE 1 TABLET (5 MG) BY MOUTH ONCE DAILY AS DIRECTED Sherwin Mayo MD Taking Active   apixaban (Eliquis) 5 mg tablet [id number] Yes Take 1 tablet (5 mg) by mouth 2 times a day. Evan Hansen MD  Active   atorvastatin (Lipitor) 40 mg tablet [id number] Yes Take 1 tablet (40 mg) by mouth once daily at bedtime. Armando Antonio MD Taking Active   cetirizine (ZyrTEC) 10 mg tablet [id number] Yes Take 1 tablet (10 mg) by mouth once daily. Historical Provider, MD Taking Active   clopidogrel (Plavix) 75 mg tablet [id number] Yes Take 1 tablet (75 mg) by mouth once daily. Armando Antonio MD Taking Active   CRANBERRY ORAL [id number] Yes Take 2 tablets by mouth once daily. Historical Provider, MD Taking Active   docusate sodium (Colace) 100 mg capsule [id number] Yes Take 1 capsule (100 mg) by mouth 2 times a day. Historical Provider, MD Taking Active   finasteride (Proscar) 5 mg tablet [id number] Yes Take 1 tablet (5 mg) by mouth once daily. Brandon Perdue MD Taking Active   icosapent ethyL (Vascepa) 1 gram capsule [id number] Yes Take 2 capsules (2 g) by mouth 2 times a day. Sherwin Mayo MD Taking Active   insulin aspart (NovoLOG FlexPen U-100 Insulin) 100 unit/mL " (3 mL) pen [id number] Yes Inject under the skin. Inject 20 units plus coverage for snacks Historical Provider, MD Taking Active   insulin degludec (Tresiba FlexTouch U-200) 200 unit/mL (3 mL) injection [id number] Yes Inject 104 Units under the skin once daily at bedtime. Sherwin Mayo MD Taking Active   ketoconazole (NIZOral) 2 % shampoo [id number] Yes APPLY TOPICALLY 2 TIMES A WEEK. APPLY TO SCALP IN SHOWER. LATHER, LEAVE ON 5 MINUTES THEN RINSE Tan Mchugh MD  Active   Lactobacillus acidophilus (PROBIOTIC ORAL) [id number] Yes Take 1 tablet by mouth early in the morning.. Historical Provider, MD Taking Active   metoprolol succinate XL (Toprol-XL) 25 mg 24 hr tablet [id number] Yes TAKE 1 TABLET BY MOUTH EVERY DAY Sherwin Mayo MD Taking Active   nitroglycerin (Nitrostat) 0.4 mg SL tablet [id number] Yes Place 1 tablet (0.4 mg) under the tongue every 5 minutes if needed for chest pain. Armando Antonio MD  Active   potassium chloride CR (Klor-Con M20) 20 mEq ER tablet [id number] Yes Take 1 tablet (20 mEq) by mouth once daily. Sherwin Mayo MD Taking Active   ranolazine (Ranexa) 500 mg 12 hr tablet [id number] Yes TAKE 1 TABLET BY MOUTH EVERY 12 HOURS Armando Antonio MD  Active   semaglutide (Rybelsus) 14 mg tablet tablet [id number] Yes Take 1 tablet (14 mg) by mouth once daily. Historical Provider, MD Taking Active   tamsulosin (Flomax) 0.4 mg 24 hr capsule [id number] Yes Take 1 capsule (0.4 mg) by mouth once daily. Brandon Perdue MD Taking Active   telmisartan (MIcarDIS) 20 mg tablet [id number] Yes TAKE 1 TABLET (20 MG) BY MOUTH ONCE EVERY 24 HOURS. Sherwin Mayo MD Taking Active   triamterene-hydrochlorothiazid (Maxzide-25) 37.5-25 mg tablet [id number] Yes Take half tablet po daily Sherwin Mayo MD Taking Active                    DISEASE MANAGEMENT ASSESSMENT:     ANTICOAGULATION ASSESSMENT    The ASCVD Risk score (Dhruv MCCULLOUGH, et al., 2019) failed to calculate for the following reasons:     Risk score cannot be calculated because patient has a medical history suggesting prior/existing ASCVD    DIAGNOSIS: prevention of nonvalvular atrial fibrilliation stroke and systemic embolism  - Patient is projected to be on anticoagulation long term  - [id number] Score: [3] (only included if diagnosis is atrial fibrillation)   Age: [<65 (0)] [65-74 (+1)] [> 75 (+2)]: 1  Sex: [Male/Female (+1)]: 0  CHF history: [No/Yes(+1)]: 0  Hypertension history: [No/Yes(+1)]: 1  Stroke/TIA/thromboembolism history: [No/Yes(+2)]: 0  Vascular disease history (prior MI, peripheral artery disease, aortic plaque): [No/Yes(+1)]: 0  Diabetes history: [No/Yes(+1)]: 1    CURRENT PHARMACOTHERAPY:   Eliquis 5 mg BID  68 y/o  119 kg  Scr 1.56 mg/dL (11/5/2024)    RELEVANT PAST MEDICAL HISTORY:   HLD, HTN, T2DM    Affordability/Accessibility: Eliquis is expensive through patient's insurance (>$700/90 days at last fill)  Adherence/Organization: Confirms taking Eliquis twice daily (~5:30AM and 6:00PM); patient uses twice daily pill box for organization  Adverse Reactions: patient reports increased bleeding at wound care once per week. Patient reports they cauterize the area to prevent bleeding. Reports he also bruises easily, states some bruises on his hands that have been present for ~5 weeks, others heal in 1-2 weeks. Bruising is evaluated weekly at wound care per patient.  Recent Hospitalizations: Yes, prior to last cardiology appointment. Admitted 9/28/2024 for stent and developed significant infection and septicemia. Was started on Eliquis for atrial fibrillation during hospital admission, but not discharged on the medication. Was re-prescribed at cardiology appointment on 10/14/2024.  Recent Falls/Trauma: None  Changes in Tobacco or Alcohol Intake:   Tobacco: None, does not use  Alcohol: On occasion will have a half of a glass of wine; has not had any in the past 6 months    EDUCATION/COUNSELING:   - Counseled patient on MOA,  expectations, duration of therapy, contraindications, administration, and monitoring parameters  - Counseled patient of side effects that are indicative of bleeding such as dark tarry stool, unexplainable bruising, or vomiting up a coffee ground like substance      DISCUSSION/NOTES:   Today's appointment was initial visit to establish care with Clinical Pharmacy. Manny reports bruising easily, and increased bleeding at wound care appointments. Some bruising heals in 1-2 weeks, while others have been present for about 5 weeks. Reports this is evaluated at wound care appointments weekly. No recent falls to report, no hospitalizations since last cardiology appointment.  Unfortunately, at this time, Manny Reveles is ineligible to receive financial assistance through  Patient Assistance Program because income exceeds program requirements.  Patient was notified of ineligibility and given the following options: patient to evaluate Medicare part D plans during open enrollment to determine if an alternative plan would be more cost effective. Will also plan to re-screen for  PAP after he files his taxes for 2024, as he states his income was increased on his last taxes due to an inheritance and may be lower for 2024 taxes.  Will follow up in 4 months to rescreen patient for  PAP.    ASSESSMENT:    Assessment/Plan   Problem List Items Addressed This Visit    None  Visit Diagnoses       Atrial fibrillation, unspecified type (Multi)        Relevant Orders    Referral to Clinical Pharmacy              RECOMMENDATIONS/PLAN:    Continue: Eliquis 5 mg BID  Follow up: 4 months    Last Appnt with Referring Provider: 10/14/2024  Next Appnt with Referring Provider: 12/16/2024  Clinical Pharmacist follow up: 3/20/2025  VAF/Application Expiration: No  Type of Encounter: Jose Morgan PharmD    Verbal consent to manage patient's drug therapy was obtained from the patient . They were informed they may decline to  participate or withdraw from participation in pharmacy services at any time.    Continue all meds under the continuation of care with the referring provider and clinical pharmacy team.

## 2024-11-20 NOTE — Clinical Note
Michele Antonio, Today's appointment was initial visit to establish care with Clinical Pharmacy. Manny reports bruising easily, and increased bleeding at wound care appointments. Some bruising heals in 1-2 weeks, while others have been present for about 5 weeks. Reports this is evaluated at wound care appointments weekly. Unfortunately he is ineligible for [facility] PAP at this time as income exceeds the program requirements. Recommended he evaluate Medicare part D plans during open enrollment to determine if an alternative plan would be more cost effective. Will also plan to re-screen for [facility] PAP after he files his taxes for 2024. Will follow up in 4 months.

## 2024-11-21 ENCOUNTER — HOME CARE VISIT (OUTPATIENT)
Dept: HOME HEALTH SERVICES | Facility: HOME HEALTH | Age: 69
End: 2024-11-21
Payer: MEDICARE

## 2024-11-21 VITALS
DIASTOLIC BLOOD PRESSURE: 70 MMHG | HEART RATE: 68 BPM | SYSTOLIC BLOOD PRESSURE: 116 MMHG | TEMPERATURE: 98.4 F | OXYGEN SATURATION: 98 %

## 2024-11-21 PROCEDURE — G0299 HHS/HOSPICE OF RN EA 15 MIN: HCPCS | Mod: HHH

## 2024-11-21 ASSESSMENT — ENCOUNTER SYMPTOMS
PAIN: 1
PERSON REPORTING PAIN: PATIENT
PAIN LOCATION - PAIN SEVERITY: 4/10
SUBJECTIVE PAIN PROGRESSION: GRADUALLY IMPROVING
PAIN LOCATION: RIGHT ELBOW

## 2024-11-21 ASSESSMENT — ACTIVITIES OF DAILY LIVING (ADL)
OASIS_M1830: 00
HOME_HEALTH_OASIS: 00

## 2024-11-26 ENCOUNTER — OFFICE VISIT (OUTPATIENT)
Dept: WOUND CARE | Facility: CLINIC | Age: 69
End: 2024-11-26
Payer: MEDICARE

## 2024-11-26 PROCEDURE — 11042 DBRDMT SUBQ TIS 1ST 20SQCM/<: CPT | Performed by: SURGERY

## 2024-11-26 PROCEDURE — 11042 DBRDMT SUBQ TIS 1ST 20SQCM/<: CPT

## 2024-11-27 ENCOUNTER — APPOINTMENT (OUTPATIENT)
Dept: PRIMARY CARE | Facility: CLINIC | Age: 69
End: 2024-11-27
Payer: MEDICARE

## 2024-12-04 ENCOUNTER — PATIENT OUTREACH (OUTPATIENT)
Dept: CARDIOLOGY | Facility: CLINIC | Age: 69
End: 2024-12-04

## 2024-12-04 ENCOUNTER — APPOINTMENT (OUTPATIENT)
Dept: CARDIOLOGY | Facility: CLINIC | Age: 69
End: 2024-12-04
Payer: MEDICARE

## 2024-12-10 ENCOUNTER — OFFICE VISIT (OUTPATIENT)
Dept: WOUND CARE | Facility: CLINIC | Age: 69
End: 2024-12-10
Payer: MEDICARE

## 2024-12-10 PROCEDURE — 99212 OFFICE O/P EST SF 10 MIN: CPT

## 2024-12-15 DIAGNOSIS — I10 ESSENTIAL (PRIMARY) HYPERTENSION: ICD-10-CM

## 2024-12-15 RX ORDER — TRIAMTERENE/HYDROCHLOROTHIAZID 37.5-25 MG
TABLET ORAL
Qty: 45 TABLET | Refills: 3 | Status: SHIPPED | OUTPATIENT
Start: 2024-12-15

## 2024-12-15 RX ORDER — ICOSAPENT ETHYL 1 G/1
CAPSULE ORAL 2 TIMES DAILY
Qty: 360 CAPSULE | Refills: 3 | Status: SHIPPED | OUTPATIENT
Start: 2024-12-15

## 2024-12-16 ENCOUNTER — APPOINTMENT (OUTPATIENT)
Dept: CARDIOLOGY | Facility: CLINIC | Age: 69
End: 2024-12-16
Payer: COMMERCIAL

## 2024-12-16 VITALS
SYSTOLIC BLOOD PRESSURE: 144 MMHG | HEART RATE: 60 BPM | BODY MASS INDEX: 36.18 KG/M2 | WEIGHT: 266.8 LBS | DIASTOLIC BLOOD PRESSURE: 70 MMHG

## 2024-12-16 DIAGNOSIS — I48.0 PAROXYSMAL A-FIB (MULTI): ICD-10-CM

## 2024-12-16 DIAGNOSIS — E78.2 MIXED HYPERLIPIDEMIA: ICD-10-CM

## 2024-12-16 DIAGNOSIS — I25.10 CAD S/P PERCUTANEOUS CORONARY ANGIOPLASTY: ICD-10-CM

## 2024-12-16 DIAGNOSIS — N18.32 STAGE 3B CHRONIC KIDNEY DISEASE (MULTI): ICD-10-CM

## 2024-12-16 DIAGNOSIS — Z98.61 CAD S/P PERCUTANEOUS CORONARY ANGIOPLASTY: ICD-10-CM

## 2024-12-16 DIAGNOSIS — E11.9 TYPE 2 DIABETES MELLITUS WITHOUT RETINOPATHY (MULTI): ICD-10-CM

## 2024-12-16 DIAGNOSIS — Z78.9 NEVER SMOKED TOBACCO: ICD-10-CM

## 2024-12-16 DIAGNOSIS — E78.1 ESSENTIAL HYPERTRIGLYCERIDEMIA: ICD-10-CM

## 2024-12-16 PROCEDURE — 3078F DIAST BP <80 MM HG: CPT | Performed by: INTERNAL MEDICINE

## 2024-12-16 PROCEDURE — 4010F ACE/ARB THERAPY RXD/TAKEN: CPT | Performed by: INTERNAL MEDICINE

## 2024-12-16 PROCEDURE — 1036F TOBACCO NON-USER: CPT | Performed by: INTERNAL MEDICINE

## 2024-12-16 PROCEDURE — 99214 OFFICE O/P EST MOD 30 MIN: CPT | Performed by: INTERNAL MEDICINE

## 2024-12-16 PROCEDURE — 3061F NEG MICROALBUMINURIA REV: CPT | Performed by: INTERNAL MEDICINE

## 2024-12-16 PROCEDURE — 1123F ACP DISCUSS/DSCN MKR DOCD: CPT | Performed by: INTERNAL MEDICINE

## 2024-12-16 PROCEDURE — 3044F HG A1C LEVEL LT 7.0%: CPT | Performed by: INTERNAL MEDICINE

## 2024-12-16 PROCEDURE — 1159F MED LIST DOCD IN RCRD: CPT | Performed by: INTERNAL MEDICINE

## 2024-12-16 PROCEDURE — 3077F SYST BP >= 140 MM HG: CPT | Performed by: INTERNAL MEDICINE

## 2024-12-16 PROCEDURE — 3048F LDL-C <100 MG/DL: CPT | Performed by: INTERNAL MEDICINE

## 2024-12-16 NOTE — PATIENT INSTRUCTIONS
Patient to follow up in 6 months with Dr. Armando Castañeda MD      In terms of kidney stone procedure- would prefer you hold off 6 months from stent placement before holding blood thinners.     Samples of Eliquis given today, 4 boxes.   4 boxes, LOT- [id number], Exp- 10/2025    No other changes today.   Continue same medications and treatments.   Patient educated on proper medication use.   Patient educated on risk factor modification.   Please bring any lab results from other providers / physicians to your next appointment.     Please bring all medicines, vitamins, and herbal supplements with you when you come to the office.     Prescriptions will not be filled unless you are compliant with your follow up appointments or have a follow up appointment scheduled as per instruction of your physician. Refills should be requested at the time of your visit.    IFernando RN am scribing for and in the presence of Dr. Armando Antonio MD

## 2024-12-16 NOTE — PROGRESS NOTES
CARDIOLOGY OFFICE VISIT      CHIEF COMPLAINT      HISTORY OF PRESENT ILLNESS  The patient states he is doing much better since I saw him last in the middle of October.  He has done with his 6-week course of antibiotics for his severe sepsis.  His renal function has improved.  His most recent GFR is up to 48.  He has not had any chest discomfort.  He denies dyspnea.  He has not had any problem with bleeding.  He states he does have renal stones and Dr. Crocker and would like to do a procedure on that.  I told him I would like him to be on his current antiplatelet and anticoagulation therapy for 6 months after his stent and his new onset atrial fibrillation.  This will place him to the end of March.  I will see him around that time and make a recommendation about what to do with those medications so he can have his renal stones addressed.    IMPRESSION:   1. Chronic kidney disease, stage III, followed by Dr Weller. Seeing Dr. Perdue due to bilateral kidney stones, pending treatment   2. Coronary artery disease, no angina.  3. Multivessel PCI, most recently ANTOINE of mid LAD 9/30/2024  4. Essential hypertension.  5. Mixed hyperlipidemia.  6. Diabetes mellitus type 2.  7. Obesity.  8. Paroxysmal atrial fibrillation, will give patient samples of Eliquis. Will re evaluate with Clinical Pharmacy in April 2025  9. Septicemia secondary to infected IV site right forearm, October 2024. Resolved     Please excuse any errors in grammar or translation related to this dictation. Voice recognition software was utilized to prepare this document.     Past Medical History  History reviewed. No pertinent past medical history.    Social History  Social History     Tobacco Use    Smoking status: Never     Passive exposure: Never    Smokeless tobacco: Never   Vaping Use    Vaping status: Never Used   Substance Use Topics    Alcohol use: Not Currently    Drug use: Never       Family History     Family History   Problem Relation Name Age of  "Onset    Other (bone/cartilage disorder) Mother      Diabetes Mother      Gallbladder disease Mother      Diabetes Father      Other (cardiac disorder) Father      Nephrolithiasis Father      Prostate cancer Father      Other (bladder cancer) Father      Heart attack Father      Rheum arthritis Father      Skin cancer Father      Kidney nephrosis Father      Diabetes Paternal Grandfather      Skin cancer Paternal Grandfather          Allergies:  Allergies   Allergen Reactions    Adhesive Tape-Silicones Hives and Itching    Epinephrine Syncope    Latex Hives and Itching    Meperidine Other    Penicillins Other     Reports redness and feeling hot; Tolerated cephalosporins this admission on 10/2024    Promethazine Unknown    Sulfa (Sulfonamide Antibiotics) Other     \"Flushed\"        Outpatient Medications:  Current Outpatient Medications   Medication Instructions    amLODIPine (NORVASC) 5 mg, oral, Daily, as directed    apixaban (ELIQUIS) 5 mg, oral, 2 times daily    atorvastatin (LIPITOR) 40 mg, oral, Nightly    cetirizine (ZYRTEC) 10 mg, Daily    clopidogrel (PLAVIX) 75 mg, oral, Daily    CRANBERRY ORAL 2 tablets, Daily    docusate sodium (COLACE) 100 mg, 2 times daily    finasteride (PROSCAR) 5 mg, oral, Daily    icosapent ethyL (Vascepa) 1 gram capsule oral, 2 times daily    insulin aspart (NovoLOG FlexPen U-100 Insulin) 100 unit/mL (3 mL) pen Inject under the skin. Inject 20 units plus coverage for snacks    insulin degludec (TRESIBA FLEXTOUCH U-200) 104 Units, subcutaneous, Nightly    ketoconazole (NIZOral) 2 % shampoo Topical, 2 times weekly, Apply to scalp in shower. Lather, leave on 5 minutes then rinse    Lactobacillus acidophilus (PROBIOTIC ORAL) 1 tablet, Daily    metoprolol succinate XL (TOPROL-XL) 25 mg, oral, Daily    nitroglycerin (NITROSTAT) 0.4 mg, sublingual, Every 5 min PRN    potassium chloride CR (Klor-Con M20) 20 mEq ER tablet 20 mEq, oral, Daily    ranolazine (RANEXA) 500 mg, oral, Every 12 " hours    semaglutide (Rybelsus) 14 mg tablet tablet 1 tablet, Daily    tamsulosin (FLOMAX) 0.4 mg, oral, Daily    telmisartan (MICARDIS) 20 mg, oral, Every 24 hours    triamterene-hydrochlorothiazid (Maxzide-25) 37.5-25 mg tablet TAKE HALF TABLET PO DAILY          REVIEW OF SYSTEMS  Review of Systems   All other systems reviewed and are negative.        VITALS  Vitals:    12/16/24 1443   BP: 144/70   Pulse: 60       PHYSICAL EXAM  Vitals reviewed.   Constitutional:       Appearance: Normal and healthy appearance. Well-developed and not in distress.   Eyes:      Conjunctiva/sclera: Conjunctivae normal.      Pupils: Pupils are equal, round, and reactive to light.   Neck:      Vascular: No JVR. JVD normal.   Pulmonary:      Effort: Pulmonary effort is normal.      Breath sounds: Normal breath sounds. No wheezing. No rhonchi. No rales.   Chest:      Chest wall: Not tender to palpatation.   Cardiovascular:      PMI at left midclavicular line. Normal rate. Regular rhythm. Normal S1. Normal S2.       Murmurs: There is no murmur.      No gallop.  No click. No rub.   Pulses:     Intact distal pulses.   Edema:     Peripheral edema absent.   Abdominal:      Tenderness: There is no abdominal tenderness.   Musculoskeletal: Normal range of motion.         General: No tenderness.      Cervical back: Normal range of motion. Skin:     General: Skin is warm and dry.   Neurological:      General: No focal deficit present.      Mental Status: Alert and oriented to person, place and time.   Psychiatric:         Behavior: Behavior is cooperative.           ASSESSMENT AND PLAN  Diagnoses and all orders for this visit:  CAD S/P percutaneous coronary angioplasty  Type 2 diabetes mellitus without retinopathy (Multi)  Stage 3b chronic kidney disease (Multi)  Essential hypertriglyceridemia  Mixed hyperlipidemia  BMI 36.0-36.9,adult  Paroxysmal A-fib (Multi)  Never smoked tobacco      [unfilled]

## 2025-01-07 ENCOUNTER — PATIENT OUTREACH (OUTPATIENT)
Dept: PRIMARY CARE | Facility: CLINIC | Age: 70
End: 2025-01-07
Payer: MEDICARE

## 2025-01-08 ENCOUNTER — APPOINTMENT (OUTPATIENT)
Dept: ENDOCRINOLOGY | Facility: CLINIC | Age: 70
End: 2025-01-08
Payer: MEDICARE

## 2025-01-08 VITALS
SYSTOLIC BLOOD PRESSURE: 146 MMHG | HEIGHT: 72 IN | WEIGHT: 271 LBS | DIASTOLIC BLOOD PRESSURE: 74 MMHG | BODY MASS INDEX: 36.7 KG/M2

## 2025-01-08 DIAGNOSIS — E78.2 MIXED HYPERLIPIDEMIA: ICD-10-CM

## 2025-01-08 DIAGNOSIS — E11.9 TYPE 2 DIABETES MELLITUS WITHOUT RETINOPATHY (MULTI): Primary | ICD-10-CM

## 2025-01-08 DIAGNOSIS — N18.32 STAGE 3B CHRONIC KIDNEY DISEASE (MULTI): ICD-10-CM

## 2025-01-08 LAB
POC FINGERSTICK BLOOD GLUCOSE: 152 MG/DL (ref 70–100)
POC HEMOGLOBIN A1C: 6.7 % (ref 4.2–6.5)

## 2025-01-08 PROCEDURE — 1159F MED LIST DOCD IN RCRD: CPT | Performed by: HOSPITALIST

## 2025-01-08 PROCEDURE — 83036 HEMOGLOBIN GLYCOSYLATED A1C: CPT | Performed by: HOSPITALIST

## 2025-01-08 PROCEDURE — 3008F BODY MASS INDEX DOCD: CPT | Performed by: HOSPITALIST

## 2025-01-08 PROCEDURE — 1123F ACP DISCUSS/DSCN MKR DOCD: CPT | Performed by: HOSPITALIST

## 2025-01-08 PROCEDURE — 95251 CONT GLUC MNTR ANALYSIS I&R: CPT | Performed by: HOSPITALIST

## 2025-01-08 PROCEDURE — 3078F DIAST BP <80 MM HG: CPT | Performed by: HOSPITALIST

## 2025-01-08 PROCEDURE — 3077F SYST BP >= 140 MM HG: CPT | Performed by: HOSPITALIST

## 2025-01-08 PROCEDURE — 99214 OFFICE O/P EST MOD 30 MIN: CPT | Performed by: HOSPITALIST

## 2025-01-08 PROCEDURE — 82962 GLUCOSE BLOOD TEST: CPT | Performed by: HOSPITALIST

## 2025-01-08 PROCEDURE — 4010F ACE/ARB THERAPY RXD/TAKEN: CPT | Performed by: HOSPITALIST

## 2025-01-08 ASSESSMENT — ENCOUNTER SYMPTOMS
LIGHT-HEADEDNESS: 0
DYSURIA: 0
TROUBLE SWALLOWING: 0
ARTHRALGIAS: 0
PALPITATIONS: 0
EYE ITCHING: 0
PHOTOPHOBIA: 0
DIARRHEA: 0
TREMORS: 0
CHEST TIGHTNESS: 0
NAUSEA: 0
ABDOMINAL PAIN: 0
NERVOUS/ANXIOUS: 0
AGITATION: 0
VOICE CHANGE: 0
FREQUENCY: 0
HEADACHES: 0
SHORTNESS OF BREATH: 0
SORE THROAT: 0
ABDOMINAL DISTENTION: 0
CONSTIPATION: 0
SLEEP DISTURBANCE: 0
VOMITING: 0
CONSTITUTIONAL NEGATIVE: 1

## 2025-01-08 NOTE — PROGRESS NOTES
Subjective   Patient ID: Manny Reveles is a 69 y.o. male who presents for Diabetes (Manny Reveles is a 69 y.o. male who presents for Diabetes (PCP: Dr. Mayo/podiatry: does not see one/eye exam: twice a year /Patient testing glucose 4 times daily with freestyle hugh 2 READER/Due to fluctuating blood glucose, hypoglycemia and 4 insulin injections daily. Pt adhering and benefiting from cgm treatment. /)   Lab Results   Component Value Date    HGBA1C 6.7 (A) 01/08/2025      HPI    Review of Systems   Constitutional: Negative.    HENT:  Negative for sore throat, trouble swallowing and voice change.    Eyes:  Negative for photophobia, itching and visual disturbance.   Respiratory:  Negative for chest tightness and shortness of breath.    Cardiovascular:  Negative for chest pain and palpitations.   Gastrointestinal:  Negative for abdominal distention, abdominal pain, constipation, diarrhea, nausea and vomiting.   Endocrine: Negative for cold intolerance, heat intolerance and polyuria.   Genitourinary:  Negative for dysuria and frequency.   Musculoskeletal:  Negative for arthralgias.   Skin:  Negative for pallor.   Allergic/Immunologic: Negative for environmental allergies.   Neurological:  Negative for tremors, light-headedness and headaches.   Psychiatric/Behavioral:  Negative for agitation and sleep disturbance. The patient is not nervous/anxious.        Objective   Physical Exam  Constitutional:       Appearance: Normal appearance.   HENT:      Head: Normocephalic.      Nose: Nose normal.      Mouth/Throat:      Mouth: Mucous membranes are moist.   Eyes:      Extraocular Movements: Extraocular movements intact.   Cardiovascular:      Rate and Rhythm: Normal rate.   Pulmonary:      Effort: Pulmonary effort is normal. No respiratory distress.   Abdominal:      General: There is no distension.   Musculoskeletal:         General: Normal range of motion.      Cervical back: Normal range of motion and neck supple.    Skin:     General: Skin is warm and dry.   Neurological:      Mental Status: He is alert and oriented to person, place, and time.   Psychiatric:         Mood and Affect: Mood normal.      Visit Vitals  /74   Ht 1.829 m (6')   Wt 123 kg (271 lb)   BMI 36.75 kg/m²   Smoking Status Never   BSA 2.5 m²        Assessment/Plan   Problem List Items Addressed This Visit       Stage 3 chronic kidney disease (Multi)    Type 2 diabetes mellitus without retinopathy (Multi) - Primary    Relevant Orders    POCT glycosylated hemoglobin (Hb A1C) manually resulted (Completed)    POCT fingerstick glucose manually resulted (Completed)    Mixed hyperlipidemia                       68 yo man with h/o CAd s/p stents, CKD came for follow up   he does have h/o CAD s/p PCI in 2019  Dx ~ 10 yrs ago. Used to follow Dr. Doherty as OP.      Current regimen :   Tresiba U 200 ) 102-0-0-0  (52+52)   Novolog 20-18-25-0 ,ssi 2: 50 > 150 ( takes 2-4 times a day )  for snacks takes 18 units occ.   Rybelsus 14 mg  (constipation improved after taking Colace twice a day)       HE has Freestyle Maira 2 which was  downloaded - reviewed it and last 14days active time 87 %, TIR 39 % low < 1 %   Occ ovn and pre dinner low BG   Tries to take NovoLog before every   avoids artificial sweetners given his CKD     A1c at goal but on reviewing of maira his blood sugars are high than target range a lot   He mentions in the past he as on metformin which was stopped due to his CKD, GFR more than 45 currently  He has h/o multiple UTIs in past, no h/o pancreatitis in past      PLAN :   Given his blood sugar readings discussed either reintroducing 1 metformin tablet given GFR is above 45 or trying Actos 15 mg which is an insulin sensitizer  He wants to hold off adding any other medication at this time  Increase Tresiba to 108 units.  He is tolerating Rybelsus okay at this time we can discuss trying Mounjaro next visit  Can continue to avoid candy  Discussed diabetic  meals  No change in NovoLog  - in past discussed snack with carbs > 30 g- take 10 units novolog before  snack.   - take novolog 15 min before eating.   - continue rybelsus-continue Colace twice a day  - has CKD ,  his GFR~41,  will hold off adding metformin at this time      -given he has multiple UTIs in past and urine incontinence rarely with hesitancy occ- would avoid SGLT2 inh  - continue statin vascepa, TG improved, LDL at goal   - continue ARB, BP at goal   - annual eye exam     off note rybelsus covered with rodger patient assisstance. he will benefit from continuing the GLP 1 agonist given his h/o CAD      RTC 4m     SH- daughter and GS moved in,. GS he is~ 3yr old NETTE.  He started  recently. and he has been helping taking care of him . he is more active now.  He takes care of him 2 times a week.   he is in [place]. daughter had kidney failure?   Wife started on insulin as well

## 2025-01-29 ENCOUNTER — APPOINTMENT (OUTPATIENT)
Dept: CARDIOLOGY | Facility: CLINIC | Age: 70
End: 2025-01-29
Payer: MEDICARE

## 2025-02-03 NOTE — PATIENT INSTRUCTIONS
"WHAT TO LOOK FOR: ABCDES OF MELANOMA:    A is for Asymmetry  One half of the spot is unlike the other half.    B is for Border  The spot has an irregular, scalloped, or poorly defined border.    C is for Color  The spot has varying colors from one area to the next, such as shades of tan, brown or black, or areas of white, red, or blue.    D is for Diameter  While melanomas are usually greater than 6 millimeters, or about the size of a pencil eraser, when diagnosed, they can be smaller.    E is for Evolving  The spot looks different from the rest or is changing in size, shape, or color.    SUNSCREEN AND SUN PROTECTION    Ultraviolet radiation from the sun is the main cause of skin cancer as well as sun damage (brown spots, wrinkles and more).  Your best protection from the sun is to stay out of the mid-day sun (from 10am-3pm), seek shade, and cover your skin with clothing and hats.  Wear a swim shirt when swimming.  Sunscreen should be used to areas that aren't covered, including lips.    We prefer sunscreens that are SPF 30 or higher.  Sunscreens should be applied liberally and reapplied every 2 hours, more often when swimming or sweating.    If you will be sweating or swimming, choose a sunscreen that is labeled \"Water resistant 80 minutes\".  This is the highest waterproof rating from the FDA.      Use a moisturizer with sunscreen daily to protect your sun-exposed areas such as the face, neck and backs of hands.  Some drugstore brands to try are Neutrogena Healthy Defense Daily Moisturizer (PureScreen) SPF 50 or CeraVe Face Lotion Invisible Zinc SPF 50.  Elta MD products are slightly more expensive and must be ordered through Amazon or the Elta website.  We like their UV Daily or UV Clear.      For body sunscreen when doing outdoor activity, some to try include Sun Bum products, Aveeno Baby Continuous Protection SPF 50 for sensitive skin, Blue Lizard SPF 30+, All Good sport sunscreen SPF 50, or Banana Boat Simply " Protect Sport Sunscreen lotion spf 50.  Sticks, gels, and sprays are also great and can be used for areas of the body that are difficult to cover with lotion.    If you have brown spots such as melasma or lentigenes, choose a tinted sunscreen.  There are ingredients in tinted sunscreens (iron oxide) that do a better job blocking certain types of light that cause brown spots.  We like Elta MD UV Clear tinted or Elta MD UV Daily tinted, which can be ordered on [employer] or from [employer]. You can also try Coola Mineral Face Matte Moisturizer SPF 30 or Australian Gold Botaniacal Suncreen SPF 50 Tinted Face Mineral Lotion.    There are two types of sunscreens: Chemical sunscreens, such as those that contain the ingredients avobenzone and oxybenzone, and Physical sunscreens, such as those that contain Zinc oxide and Titanium dioxide. Chemical sunscreens absorb light and absorb into the skin.  They must be applied 15 minutes before sun exposure.  Physical sunscreens reflect the light and are not absorbed into the skin.  They should be applied 5 minutes before sun exposure.  Some patients worry about the effects of sunscreens that are absorbed into the skin.  If you are worried about this, use the physical (zinc/titanium sunscreens)- look at the label before buying.  There is lots of scientific evidence that sunlight causes cancer, but there is no direct evidence that sunscreens are harmful.  However, the FDA has asked for further study of the chemical sunscreens to make sure they do not have any health effects on humans.

## 2025-02-03 NOTE — PROGRESS NOTES
Subjective   Prince Reveles is a 69 y.o. male who presents for the following: Skin Check (LV: 7/31/24: FBSE. H/o SCC DO NOT USE EPI)    Skin Cancer History  BCC - anterior neck s/p ED&C   SCC - left central cheek s/p Mohs 10/27/21  AK's     Family History of Skin Cancer  None     The following portions of the chart were reviewed this encounter and updated as appropriate:         Review of Systems: Pertinent items are noted in HPI.    Objective   Well appearing patient in no apparent distress; mood and affect are within normal limits.    A full examination was performed including scalp, head, eyes, ears, nose, lips, neck, chest, axillae, abdomen, back, buttocks, bilateral upper extremities, bilateral lower extremities, hands, feet, fingers, toes, fingernails, and toenails. All findings within normal limits unless otherwise noted below.    Scattered cherry-red papule(s).    Generalized, Left Central Cheek  Well healed scar(s) at site(s) of prior treatment    Scattered tan macules in sun-exposed areas.    Stuck on verrucous, tan-brown papules and plaques.      Scattered, uniform and benign-appearing, regular brown melanocytic papules and macules.    Scalp  No Erythema with overlying greasy scale.    Left Buccal Cheek, Left Parotid Area, Left Temple, Right Parotid Area, Right Temple, Right Zygomatic Area  Erythematous macules with gritty scale.      Assessment/Plan   Hemangioma of skin    Reassured of benign nature of lesions    Personal history of skin cancer (2)  Left Central Cheek; Generalized    No evidence of recurrence at site(s) of prior treatment  Continue photoprotection with sun-protective clothing and sunscreen SPF 30+ daily  Continue routine self-skin examination  Continue to follow up every 6 months for routine FBSE or earlier prn for new/changing/concerning lesions       Related Procedures  Follow Up In Dermatology - Established Patient  Follow Up In Dermatology - Established Patient    Lentigo    Reassured  of benign nature of lesions    Seborrheic dermatitis  Scalp    Well controlled on current regimen  Continue treatment with ketconazole 2% shampoo 2-3 times per week     Related Medications  ketoconazole (NIZOral) 2 % shampoo  APPLY TOPICALLY 2 TIMES A WEEK. APPLY TO SCALP IN SHOWER. LATHER, LEAVE ON 5 MINUTES THEN RINSE    Actinic keratosis (6)  Left Temple; Right Scientologist; Right Zygomatic Area; Left Parotid Area; Right Parotid Area; Left Buccal Cheek    Discussed precancerous nature of condition and relationship with sun-exposure.   Recommended treatment today with LN2. Discussed r/b of procedure including risk of pain, temporary redness, and dyspigmentation. Patient verbalized understanding and agreement with plan for treatment today.    Destr of lesion - Left Buccal Cheek, Left Parotid Area, Left Temple, Right Parotid Area, Right Temple, Right Zygomatic Area  Complexity: simple    Destruction method: cryotherapy    Informed consent: discussed and consent obtained    Lesion destroyed using liquid nitrogen: Yes    Region frozen until ice ball extended beyond lesion: Yes    Cryotherapy cycles:  1  Outcome: patient tolerated procedure well with no complications    Post-procedure details: wound care instructions given      Seborrheic keratosis    Reassured of benign nature of lesions    Multiple benign nevi    Reassured of benign nature of lesions    Continue to follow up every 6 months for routine FBSE or earlier prn for new/changing/concerning lesions     Scribe Attestation  By signing my name below, Rachelle WALKER LPN , Scribe   attest that this documentation has been prepared under the direction and in the presence of Tan Mchugh MD.

## 2025-02-04 ENCOUNTER — APPOINTMENT (OUTPATIENT)
Dept: DERMATOLOGY | Facility: CLINIC | Age: 70
End: 2025-02-04
Payer: MEDICARE

## 2025-02-04 DIAGNOSIS — L82.1 SEBORRHEIC KERATOSIS: ICD-10-CM

## 2025-02-04 DIAGNOSIS — L57.0 ACTINIC KERATOSIS: ICD-10-CM

## 2025-02-04 DIAGNOSIS — D18.01 HEMANGIOMA OF SKIN: Primary | ICD-10-CM

## 2025-02-04 DIAGNOSIS — L81.4 LENTIGO: ICD-10-CM

## 2025-02-04 DIAGNOSIS — D22.9 MULTIPLE BENIGN NEVI: ICD-10-CM

## 2025-02-04 DIAGNOSIS — Z85.828 PERSONAL HISTORY OF SKIN CANCER: ICD-10-CM

## 2025-02-04 DIAGNOSIS — L21.9 SEBORRHEIC DERMATITIS: ICD-10-CM

## 2025-02-04 PROCEDURE — 1159F MED LIST DOCD IN RCRD: CPT | Performed by: STUDENT IN AN ORGANIZED HEALTH CARE EDUCATION/TRAINING PROGRAM

## 2025-02-04 PROCEDURE — 17000 DESTRUCT PREMALG LESION: CPT | Performed by: STUDENT IN AN ORGANIZED HEALTH CARE EDUCATION/TRAINING PROGRAM

## 2025-02-04 PROCEDURE — 1123F ACP DISCUSS/DSCN MKR DOCD: CPT | Performed by: STUDENT IN AN ORGANIZED HEALTH CARE EDUCATION/TRAINING PROGRAM

## 2025-02-04 PROCEDURE — 17003 DESTRUCT PREMALG LES 2-14: CPT | Performed by: STUDENT IN AN ORGANIZED HEALTH CARE EDUCATION/TRAINING PROGRAM

## 2025-02-04 PROCEDURE — 99213 OFFICE O/P EST LOW 20 MIN: CPT | Performed by: STUDENT IN AN ORGANIZED HEALTH CARE EDUCATION/TRAINING PROGRAM

## 2025-02-04 PROCEDURE — 4010F ACE/ARB THERAPY RXD/TAKEN: CPT | Performed by: STUDENT IN AN ORGANIZED HEALTH CARE EDUCATION/TRAINING PROGRAM

## 2025-02-13 ENCOUNTER — APPOINTMENT (OUTPATIENT)
Dept: PRIMARY CARE | Facility: CLINIC | Age: 70
End: 2025-02-13
Payer: MEDICARE

## 2025-02-19 ENCOUNTER — TELEPHONE (OUTPATIENT)
Dept: ENDOCRINOLOGY | Facility: CLINIC | Age: 70
End: 2025-02-19
Payer: MEDICARE

## 2025-02-19 DIAGNOSIS — E11.9 TYPE 2 DIABETES MELLITUS WITHOUT RETINOPATHY (MULTI): Primary | ICD-10-CM

## 2025-02-19 DIAGNOSIS — E11.9 TYPE 2 DIABETES MELLITUS WITHOUT RETINOPATHY (MULTI): ICD-10-CM

## 2025-02-19 RX ORDER — INSULIN DEGLUDEC 200 U/ML
INJECTION, SOLUTION SUBCUTANEOUS
Qty: 18 ML | Refills: 0 | Status: SHIPPED | OUTPATIENT
Start: 2025-02-19

## 2025-02-19 RX ORDER — INSULIN ASPART 100 [IU]/ML
INJECTION, SOLUTION INTRAVENOUS; SUBCUTANEOUS
Qty: 30 ML | Refills: 0 | Status: SHIPPED | OUTPATIENT
Start: 2025-02-19 | End: 2025-02-20

## 2025-02-19 RX ORDER — PEN NEEDLE, DIABETIC 29 G X1/2"
NEEDLE, DISPOSABLE MISCELLANEOUS
Qty: 150 EACH | Refills: 0 | Status: SHIPPED | OUTPATIENT
Start: 2025-02-19

## 2025-02-19 NOTE — TELEPHONE ENCOUNTER
"Patient lmovm that his refills aren't available through [facility]\" however he is getting a 30 day voucher for refills and needs the followin) Rybelsus 14 mg 1 daily 2) Novalog u100 before meals on a floating scale 3) Novafine 6mm needles for use with Novalog and Tresiba  4) Tresiba 108 units every morning.    Pharmacy is [place], [place]  "

## 2025-02-20 DIAGNOSIS — E11.9 TYPE 2 DIABETES MELLITUS WITHOUT RETINOPATHY (MULTI): ICD-10-CM

## 2025-02-20 DIAGNOSIS — E11.9 TYPE 2 DIABETES MELLITUS WITHOUT RETINOPATHY (MULTI): Primary | ICD-10-CM

## 2025-02-20 RX ORDER — INSULIN ASPART INJECTION 100 [IU]/ML
INJECTION, SOLUTION SUBCUTANEOUS
Qty: 30 ML | Refills: 0 | Status: SHIPPED | OUTPATIENT
Start: 2025-02-20 | End: 2025-02-20 | Stop reason: SDUPTHER

## 2025-02-20 RX ORDER — INSULIN ASPART 100 [IU]/ML
INJECTION, SOLUTION INTRAVENOUS; SUBCUTANEOUS
Qty: 30 ML | Refills: 0 | Status: SHIPPED | OUTPATIENT
Start: 2025-02-20

## 2025-02-20 RX ORDER — INSULIN ASPART INJECTION 100 [IU]/ML
INJECTION, SOLUTION SUBCUTANEOUS
Qty: 30 ML | Refills: 0 | Status: SHIPPED | OUTPATIENT
Start: 2025-02-20 | End: 2025-02-20 | Stop reason: ALTCHOICE

## 2025-02-20 NOTE — PROGRESS NOTES
Fiasp switched back to Novolog per Dr. Carney as cost of Fiasp was more than Novolog. Patient informed of change and acknowledged.    Carleen Knox, PharmD

## 2025-02-23 LAB
25(OH)D3+25(OH)D2 SERPL-MCNC: 49 NG/ML (ref 30–100)
ALBUMIN SERPL-MCNC: 4.5 G/DL (ref 3.6–5.1)
ALP SERPL-CCNC: 58 U/L (ref 35–144)
ALT SERPL-CCNC: 21 U/L (ref 9–46)
ANION GAP SERPL CALCULATED.4IONS-SCNC: 10 MMOL/L (CALC) (ref 7–17)
AST SERPL-CCNC: 12 U/L (ref 10–35)
BILIRUB SERPL-MCNC: 2 MG/DL (ref 0.2–1.2)
BUN SERPL-MCNC: 35 MG/DL (ref 7–25)
CALCIUM SERPL-MCNC: 9.5 MG/DL (ref 8.6–10.3)
CHLORIDE SERPL-SCNC: 106 MMOL/L (ref 98–110)
CHOLEST SERPL-MCNC: 136 MG/DL
CHOLEST/HDLC SERPL: 4.4 (CALC)
CO2 SERPL-SCNC: 24 MMOL/L (ref 20–32)
CREAT SERPL-MCNC: 1.65 MG/DL (ref 0.7–1.35)
EGFRCR SERPLBLD CKD-EPI 2021: 45 ML/MIN/1.73M2
EST. AVERAGE GLUCOSE BLD GHB EST-MCNC: 154 MG/DL
EST. AVERAGE GLUCOSE BLD GHB EST-SCNC: 8.5 MMOL/L
GLUCOSE SERPL-MCNC: 150 MG/DL (ref 65–99)
HBA1C MFR BLD: 7 % OF TOTAL HGB
HDLC SERPL-MCNC: 31 MG/DL
LDLC SERPL CALC-MCNC: 70 MG/DL (CALC)
NONHDLC SERPL-MCNC: 105 MG/DL (CALC)
POTASSIUM SERPL-SCNC: 4.9 MMOL/L (ref 3.5–5.3)
PROT SERPL-MCNC: 7 G/DL (ref 6.1–8.1)
SODIUM SERPL-SCNC: 140 MMOL/L (ref 135–146)
TRIGL SERPL-MCNC: 293 MG/DL

## 2025-03-12 ENCOUNTER — OFFICE VISIT (OUTPATIENT)
Dept: PRIMARY CARE | Facility: CLINIC | Age: 70
End: 2025-03-12
Payer: MEDICARE

## 2025-03-12 ENCOUNTER — APPOINTMENT (OUTPATIENT)
Dept: CARDIOLOGY | Facility: CLINIC | Age: 70
End: 2025-03-12
Payer: MEDICARE

## 2025-03-12 VITALS
DIASTOLIC BLOOD PRESSURE: 78 MMHG | WEIGHT: 272 LBS | HEART RATE: 67 BPM | SYSTOLIC BLOOD PRESSURE: 120 MMHG | BODY MASS INDEX: 36.84 KG/M2 | TEMPERATURE: 96.8 F | HEIGHT: 72 IN

## 2025-03-12 DIAGNOSIS — E66.01 MORBID (SEVERE) OBESITY DUE TO EXCESS CALORIES (MULTI): ICD-10-CM

## 2025-03-12 DIAGNOSIS — E11.9 TYPE 2 DIABETES MELLITUS WITHOUT COMPLICATIONS (MULTI): ICD-10-CM

## 2025-03-12 DIAGNOSIS — I25.10 ATHEROSCLEROTIC HEART DISEASE OF NATIVE CORONARY ARTERY WITHOUT ANGINA PECTORIS: ICD-10-CM

## 2025-03-12 DIAGNOSIS — I48.91 ATRIAL FIBRILLATION, UNSPECIFIED TYPE (MULTI): ICD-10-CM

## 2025-03-12 DIAGNOSIS — I10 ESSENTIAL (PRIMARY) HYPERTENSION: ICD-10-CM

## 2025-03-12 DIAGNOSIS — E87.6 HYPOKALEMIA: ICD-10-CM

## 2025-03-12 DIAGNOSIS — E78.1 ESSENTIAL HYPERTRIGLYCERIDEMIA: ICD-10-CM

## 2025-03-12 DIAGNOSIS — E87.6 HYPOKALEMIA: Primary | ICD-10-CM

## 2025-03-12 DIAGNOSIS — Z98.61 CORONARY ANGIOPLASTY STATUS: ICD-10-CM

## 2025-03-12 PROBLEM — E11.628 TYPE 2 DIABETES MELLITUS WITH OTHER SKIN COMPLICATIONS: Status: ACTIVE | Noted: 2025-03-12

## 2025-03-12 PROCEDURE — 3074F SYST BP LT 130 MM HG: CPT | Performed by: INTERNAL MEDICINE

## 2025-03-12 PROCEDURE — 4010F ACE/ARB THERAPY RXD/TAKEN: CPT | Performed by: INTERNAL MEDICINE

## 2025-03-12 PROCEDURE — 99213 OFFICE O/P EST LOW 20 MIN: CPT | Performed by: INTERNAL MEDICINE

## 2025-03-12 PROCEDURE — 1123F ACP DISCUSS/DSCN MKR DOCD: CPT | Performed by: INTERNAL MEDICINE

## 2025-03-12 PROCEDURE — 1160F RVW MEDS BY RX/DR IN RCRD: CPT | Performed by: INTERNAL MEDICINE

## 2025-03-12 PROCEDURE — 1036F TOBACCO NON-USER: CPT | Performed by: INTERNAL MEDICINE

## 2025-03-12 PROCEDURE — 3078F DIAST BP <80 MM HG: CPT | Performed by: INTERNAL MEDICINE

## 2025-03-12 PROCEDURE — 1159F MED LIST DOCD IN RCRD: CPT | Performed by: INTERNAL MEDICINE

## 2025-03-12 PROCEDURE — 3008F BODY MASS INDEX DOCD: CPT | Performed by: INTERNAL MEDICINE

## 2025-03-12 RX ORDER — METOPROLOL SUCCINATE 25 MG/1
25 TABLET, EXTENDED RELEASE ORAL DAILY
Qty: 90 TABLET | Refills: 3 | Status: SHIPPED | OUTPATIENT
Start: 2025-03-12 | End: 2025-03-12 | Stop reason: ENTERED-IN-ERROR

## 2025-03-12 RX ORDER — METOPROLOL SUCCINATE 25 MG/1
25 TABLET, EXTENDED RELEASE ORAL DAILY
Qty: 90 TABLET | Refills: 3 | Status: SHIPPED | OUTPATIENT
Start: 2025-03-12 | End: 2025-03-12

## 2025-03-12 RX ORDER — POTASSIUM CHLORIDE 20 MEQ/1
20 TABLET, EXTENDED RELEASE ORAL DAILY
Qty: 90 TABLET | Refills: 3 | Status: SHIPPED | OUTPATIENT
Start: 2025-03-12

## 2025-03-12 RX ORDER — AMLODIPINE BESYLATE 5 MG/1
5 TABLET ORAL DAILY
Qty: 90 TABLET | Refills: 3 | Status: SHIPPED | OUTPATIENT
Start: 2025-03-12

## 2025-03-12 RX ORDER — POTASSIUM CHLORIDE 20 MEQ/1
20 TABLET, EXTENDED RELEASE ORAL DAILY
Qty: 90 TABLET | Refills: 3 | Status: SHIPPED | OUTPATIENT
Start: 2025-03-12 | End: 2025-03-12 | Stop reason: SDUPTHER

## 2025-03-12 RX ORDER — CLOPIDOGREL BISULFATE 75 MG/1
75 TABLET ORAL DAILY
Qty: 90 TABLET | Refills: 3 | Status: SHIPPED | OUTPATIENT
Start: 2025-03-12 | End: 2026-03-12

## 2025-03-12 RX ORDER — METOPROLOL SUCCINATE 25 MG/1
25 TABLET, EXTENDED RELEASE ORAL DAILY
Qty: 90 TABLET | Refills: 3 | Status: SHIPPED | OUTPATIENT
Start: 2025-03-12

## 2025-03-12 RX ORDER — TELMISARTAN 20 MG/1
20 TABLET ORAL EVERY 24 HOURS
Qty: 90 TABLET | Refills: 3 | Status: SHIPPED | OUTPATIENT
Start: 2025-03-12

## 2025-03-12 ASSESSMENT — ENCOUNTER SYMPTOMS
CONSTITUTIONAL NEGATIVE: 1
LOSS OF SENSATION IN FEET: 0
EYES NEGATIVE: 1
MUSCULOSKELETAL NEGATIVE: 1
GASTROINTESTINAL NEGATIVE: 1
RESPIRATORY NEGATIVE: 1
OCCASIONAL FEELINGS OF UNSTEADINESS: 0
DEPRESSION: 0
NEUROLOGICAL NEGATIVE: 1
CARDIOVASCULAR NEGATIVE: 1

## 2025-03-12 ASSESSMENT — COLUMBIA-SUICIDE SEVERITY RATING SCALE - C-SSRS
2. HAVE YOU ACTUALLY HAD ANY THOUGHTS OF KILLING YOURSELF?: NO
6. HAVE YOU EVER DONE ANYTHING, STARTED TO DO ANYTHING, OR PREPARED TO DO ANYTHING TO END YOUR LIFE?: NO
1. IN THE PAST MONTH, HAVE YOU WISHED YOU WERE DEAD OR WISHED YOU COULD GO TO SLEEP AND NOT WAKE UP?: NO

## 2025-03-12 NOTE — TELEPHONE ENCOUNTER
Received request for prescription refill for patient.  Patient follows with Dr. Armando Antonio MD     Request is for Plavix   Is patient currently on medication- yes    Last OV- 12/16/24  Next OV- 6/25/25    Pended for signing and sent to provider.

## 2025-03-12 NOTE — PROGRESS NOTES
"Subjective   Patient ID: Manny Reveles \"Forrest" is a 69 y.o. male who presents for Follow-up (3 mo fu).  HPI  Hyperlipidemia  This is a chronic problem. The current episode started more than 1 year ago. The problem is resistant. Recent lipid tests were reviewed and are variable. Exacerbating diseases include chronic renal disease, diabetes and obesity. Current antihyperlipidemic treatment includes statins and fibric acid derivatives.     Diabetes  He presents for his follow-up diabetic visit. He has type 2 diabetes mellitus. His disease course has been stable. There are no hypoglycemic associated symptoms. Pertinent negatives for diabetes include no blurred vision, no chest pain, no fatigue and no weakness. There are no hypoglycemic complications. Symptoms are stable. Diabetic complications include nephropathy. Pertinent negatives for diabetic complications include no autonomic neuropathy or heart disease. Risk factors for coronary artery disease include diabetes mellitus, male sex, obesity, stress and sedentary lifestyle. He is compliant with treatment all of the time. He is following a diabetic and generally healthy diet. He rarely participates in exercise. His home blood glucose trend is fluctuating minimally. An ACE inhibitor/angiotensin II receptor blocker is being taken. He does not see a podiatrist.Eye exam is current.   Heart Problem  This is a chronic problem. The current episode started more than 1 year ago. The problem occurs rarely. Pertinent negatives include no chest pain, chills, congestion, fatigue, myalgias, numbness, vertigo or weakness. The treatment provided significant relief.   Hypertension  This is a chronic problem. The current episode started more than 1 year ago. The problem has been resolved since onset. The problem is controlled. Pertinent negatives include no blurred vision or chest pain. Risk factors for coronary artery disease include sedentary lifestyle, dyslipidemia and diabetes " "mellitus. Past treatments include angiotensin blockers, beta blockers, calcium channel blockers and diuretics. The current treatment provides significant improvement. Hypertensive end-organ damage includes kidney disease and CAD/MI. Identifiable causes of hypertension include chronic renal disease.   Past Medical History  History reviewed. No pertinent past medical history.    Social History  Social History     Tobacco Use    Smoking status: Never     Passive exposure: Never    Smokeless tobacco: Never   Vaping Use    Vaping status: Never Used   Substance Use Topics    Alcohol use: Not Currently    Drug use: Never       Family History     Family History   Problem Relation Name Age of Onset    Other (bone/cartilage disorder) Mother      Diabetes Mother      Gallbladder disease Mother      Diabetes Father      Other (cardiac disorder) Father      Nephrolithiasis Father      Prostate cancer Father      Other (bladder cancer) Father      Heart attack Father      Rheum arthritis Father      Skin cancer Father      Kidney nephrosis Father      Diabetes Paternal Grandfather      Skin cancer Paternal Grandfather         Allergies:  Allergies   Allergen Reactions    Adhesive Tape-Silicones Hives and Itching    Epinephrine Syncope    Latex Hives and Itching    Meperidine Other    Penicillins Other     Reports redness and feeling hot; Tolerated cephalosporins this admission on 10/2024    Promethazine Unknown    Sulfa (Sulfonamide Antibiotics) Other     \"Flushed\"        Outpatient Medications:  Current Outpatient Medications   Medication Instructions    amLODIPine (NORVASC) 5 mg, oral, Daily, as directed    apixaban (ELIQUIS) 5 mg, oral, 2 times daily    atorvastatin (LIPITOR) 40 mg, oral, Nightly    cetirizine (ZYRTEC) 10 mg, Daily    clopidogrel (PLAVIX) 75 mg, oral, Daily    CRANBERRY ORAL 2 tablets, Daily    docusate sodium (COLACE) 100 mg, 2 times daily    finasteride (PROSCAR) 5 mg, oral, Daily    icosapent ethyL " "(Vascepa) 1 gram capsule oral, 2 times daily    insulin aspart (NovoLOG Flexpen U-100 Insulin) 100 unit/mL (3 mL) pen Inject 20 units before breakfast, 18 units before lunch, and 25 units before dinner plus sliding scale (MDD 93 units)    insulin degludec (Tresiba FlexTouch U-200) 200 unit/mL (3 mL) injection Inject 108 units under the skin daily as directed    ketoconazole (NIZOral) 2 % shampoo Topical, 2 times weekly, Apply to scalp in shower. Lather, leave on 5 minutes then rinse    Lactobacillus acidophilus (PROBIOTIC ORAL) 1 tablet, Daily    metoprolol succinate XL (TOPROL-XL) 25 mg, oral, Daily    nitroglycerin (NITROSTAT) 0.4 mg, sublingual, Every 5 min PRN    pen needle, diabetic 31 gauge x 1/4\" needle Use to administer insulin up to 5 times daily    potassium chloride CR 20 mEq ER tablet 20 mEq, oral, Daily    ranolazine (RANEXA) 500 mg, oral, Every 12 hours    semaglutide (RYBELSUS) 14 mg, oral, Daily    tamsulosin (FLOMAX) 0.4 mg, oral, Daily    telmisartan (MICARDIS) 20 mg, oral, Every 24 hours    triamterene-hydrochlorothiazid (Maxzide-25) 37.5-25 mg tablet TAKE HALF TABLET PO DAILY        Review of Systems   Constitutional: Negative.    Eyes: Negative.    Respiratory: Negative.     Cardiovascular: Negative.    Gastrointestinal: Negative.    Musculoskeletal: Negative.    Neurological: Negative.          Objective       Physical Exam  Vitals reviewed.   Constitutional:       Appearance: Normal appearance. He is obese.   HENT:      Head: Normocephalic and atraumatic.   Eyes:      Conjunctiva/sclera: Conjunctivae normal.   Cardiovascular:      Rate and Rhythm: Normal rate and regular rhythm.      Pulses: Normal pulses.   Pulmonary:      Effort: Pulmonary effort is normal.      Breath sounds: Normal breath sounds.   Abdominal:      General: Abdomen is protuberant.      Palpations: Abdomen is soft.   Musculoskeletal:         General: Normal range of motion.      Cervical back: Neck supple.   Skin:     " "General: Skin is warm and dry.      Comments: Scar rt cubital fossa   Neurological:      General: No focal deficit present.   Psychiatric:         Mood and Affect: Mood normal.     /78 (BP Location: Left arm, Patient Position: Sitting, BP Cuff Size: Adult)   Pulse 67   Temp 36 °C (96.8 °F) (Temporal)   Ht 1.835 m (6' 0.25\")   Wt 123 kg (272 lb)   BMI 36.63 kg/m²      Assessment/Plan   Problem List Items Addressed This Visit       Hypokalemia - Primary    Relevant Medications    potassium chloride CR 20 mEq ER tablet     Other Visit Diagnoses       Type 2 diabetes mellitus without complications (Multi)        Relevant Medications    amLODIPine (Norvasc) 5 mg tablet    Essential (primary) hypertension        Relevant Medications    telmisartan (MIcarDIS) 20 mg tablet    metoprolol succinate XL (Toprol-XL) 25 mg 24 hr tablet    Atrial fibrillation, unspecified type (Multi)        Relevant Medications    amLODIPine (Norvasc) 5 mg tablet    metoprolol succinate XL (Toprol-XL) 25 mg 24 hr tablet    Morbid (severe) obesity due to excess calories (Multi)            He is doing better, that wound has been healed, it was a phlebitis or thrombophlebitis or septic phlebitis.  His potassium level is normal, vitamin D level was reviewed, hemoglobin A1c 7, triglycerides are in 200 range, BP readings are excellent, atrial fibrillation happen when he was having septic thrombophlebitis it never recurred, he is in sinus rhythm, he will be requiring lifelong Eliquis.  Patient is morbidly obese with BMI of 36 which has been unchanged and patient is known to me for several years, we have gone through this process before.  She he continue to follow-up with endocrine.  He continue to follow-up with cardiology, no chest pains, no angina, Vascepa therapy to be continued, we will continue to have a periodic checkup and follow-up.  Set of laboratories next time, in the future we should be considering another colonoscopy, last " colonoscopy was poor preparation.  Follow-up in 4 months.

## 2025-03-20 ENCOUNTER — APPOINTMENT (OUTPATIENT)
Dept: PHARMACY | Facility: HOSPITAL | Age: 70
End: 2025-03-20
Payer: MEDICARE

## 2025-03-20 DIAGNOSIS — E11.9 TYPE 2 DIABETES MELLITUS WITHOUT RETINOPATHY (MULTI): ICD-10-CM

## 2025-03-20 RX ORDER — INSULIN DEGLUDEC 200 U/ML
INJECTION, SOLUTION SUBCUTANEOUS
Qty: 54 ML | Refills: 1 | Status: SHIPPED | OUTPATIENT
Start: 2025-03-20

## 2025-03-20 RX ORDER — ORAL SEMAGLUTIDE 14 MG/1
TABLET ORAL
Qty: 90 TABLET | Refills: 1 | Status: SHIPPED | OUTPATIENT
Start: 2025-03-20

## 2025-04-08 NOTE — PROGRESS NOTES
"  Pharmacist Clinic: Cardiology Management    Manny Reveles \"Prince\" is a 69 y.o. male was referred to Clinical Pharmacy Team for anticoagulation management.     Referring Provider: Armando Antonio *    THIS IS A FOLLOW UP PATIENT APPOINTMENT. AT LAST VISIT ON 11/20/2024 WITH PHARMACIST (Mary Ann Morgan).    Appointment was completed by the patient who was reached at .    REVIEW OF PAST APPNT (IF APPLICABLE):   Today's appointment was initial visit to establish care with Clinical Pharmacy. Manny reports bruising easily, and increased bleeding at wound care appointments. Some bruising heals in 1-2 weeks, while others have been present for about 5 weeks. Reports this is evaluated at wound care appointments weekly. No recent falls to report, no hospitalizations since last cardiology appointment.  Unfortunately, at this time, Manny Reveles is ineligible to receive financial assistance through  Patient Assistance Program because income exceeds program requirements.  Patient was notified of ineligibility and given the following options: patient to evaluate Medicare part D plans during open enrollment to determine if an alternative plan would be more cost effective. Will also plan to re-screen for  PAP after he files his taxes for 2024, as he states his income was increased on his last taxes due to an inheritance and may be lower for 2024 taxes.  Will follow up in 4 months to rescreen patient for  PAP.    Allergies   Allergen Reactions    Adhesive Tape-Silicones Hives and Itching    Epinephrine Syncope    Latex Hives and Itching    Meperidine Other    Penicillins Other     Reports redness and feeling hot; Tolerated cephalosporins this admission on 10/2024    Promethazine Unknown    Sulfa (Sulfonamide Antibiotics) Other     \"Flushed\"       No past medical history on file.    Current Outpatient Medications on File Prior to Visit   Medication Sig Dispense Refill    amLODIPine (Norvasc) 5 mg tablet " "Take 1 tablet (5 mg) by mouth once daily. as directed 90 tablet 3    apixaban (Eliquis) 5 mg tablet Take 1 tablet (5 mg) by mouth 2 times a day. 180 tablet 3    atorvastatin (Lipitor) 40 mg tablet Take 1 tablet (40 mg) by mouth once daily at bedtime. 90 tablet 3    cetirizine (ZyrTEC) 10 mg tablet Take 1 tablet (10 mg) by mouth once daily.      clopidogrel (Plavix) 75 mg tablet Take 1 tablet (75 mg) by mouth once daily. 90 tablet 3    CRANBERRY ORAL Take 2 tablets by mouth once daily.      docusate sodium (Colace) 100 mg capsule Take 1 capsule (100 mg) by mouth 2 times a day.      finasteride (Proscar) 5 mg tablet Take 1 tablet (5 mg) by mouth once daily. 90 tablet 3    icosapent ethyL (Vascepa) 1 gram capsule TAKE 2 CAPSULES (2 G) BY MOUTH 2 TIMES A DAY. 360 capsule 3    insulin aspart (NovoLOG Flexpen U-100 Insulin) 100 unit/mL (3 mL) pen Inject 20 units before breakfast, 18 units before lunch, and 25 units before dinner plus sliding scale (MDD 93 units) 30 mL 0    insulin degludec (Tresiba FlexTouch U-200) 200 unit/mL (3 mL) pen INJECT 108 UNITS UNDER THE SKIN DAILY AS DIRECTED 54 mL 1    ketoconazole (NIZOral) 2 % shampoo APPLY TOPICALLY 2 TIMES A WEEK. APPLY TO SCALP IN SHOWER. LATHER, LEAVE ON 5 MINUTES THEN RINSE 120 mL 11    Lactobacillus acidophilus (PROBIOTIC ORAL) Take 1 tablet by mouth early in the morning..      metoprolol succinate XL (Toprol-XL) 25 mg 24 hr tablet Take 1 tablet (25 mg) by mouth once daily. 90 tablet 3    nitroglycerin (Nitrostat) 0.4 mg SL tablet Place 1 tablet (0.4 mg) under the tongue every 5 minutes if needed for chest pain. 25 tablet 5    pen needle, diabetic 31 gauge x 1/4\" needle Use to administer insulin up to 5 times daily 150 each 0    potassium chloride CR 20 mEq ER tablet Take 1 tablet (20 mEq) by mouth once daily. 90 tablet 3    ranolazine (Ranexa) 500 mg 12 hr tablet TAKE 1 TABLET BY MOUTH EVERY 12 HOURS 180 tablet 3    semaglutide (Rybelsus) 14 mg tablet tablet TAKE 1 " "TABLET (14 MG) BY MOUTH ONCE DAILY. 90 tablet 1    tamsulosin (Flomax) 0.4 mg 24 hr capsule Take 1 capsule (0.4 mg) by mouth once daily. 90 capsule 3    telmisartan (MIcarDIS) 20 mg tablet Take 1 tablet (20 mg) by mouth once every 24 hours. 90 tablet 3    triamterene-hydrochlorothiazid (Maxzide-25) 37.5-25 mg tablet TAKE HALF TABLET PO DAILY 45 tablet 3     No current facility-administered medications on file prior to visit.         RELEVANT LAB RESULTS:  Lab Results   Component Value Date    BILITOT 2.0 (H) 02/22/2025    CALCIUM 9.5 02/22/2025    CO2 24 02/22/2025     02/22/2025    CREATININE 1.65 (H) 02/22/2025    GLUCOSE 150 (H) 02/22/2025    ALKPHOS 58 02/22/2025    K 4.9 02/22/2025    PROT 7.0 02/22/2025     02/22/2025    AST 12 02/22/2025    ALT 21 02/22/2025    BUN 35 (H) 02/22/2025    ANIONGAP 10 02/22/2025    MG 2.01 10/10/2024    PHOS 2.2 (L) 05/19/2022    ALBUMIN 4.5 02/22/2025    LIPASE 29 09/28/2024    GFRF CANCELED 02/26/2023    GFRMALE 48 (A) 07/06/2023     Lab Results   Component Value Date    TRIG 293 (H) 02/22/2025    CHOL 136 02/22/2025    LDLCALC 70 02/22/2025    HDL 31 (L) 02/22/2025     No results found for: \"BMCBC\", \"CBCDIF\"     PHARMACEUTICAL ASSESSMENT:      Drug Interactions? Yes, describe: Potassium may enhance the hyperkalemic effect of triamterence, concurrent use is not recommended. However, patient's last CMP on 2/22/2025 shows potassium of 4.9 mmol/L. No action needed at this time, recommend continued monitoring.     Medication Documentation Review Audit       Reviewed by Sherwin Mayo MD (Physician) on 03/12/25 at 1628      Medication Order Taking? Sig Documenting Provider Last Dose Status   Discontinued 03/12/25 1628   amLODIPine (Norvasc) 5 mg tablet [id number]  Take 1 tablet (5 mg) by mouth once daily. as directed Sherwin Mayo MD  Active   apixaban (Eliquis) 5 mg tablet [id number]  Take 1 tablet (5 mg) by mouth 2 times a day. Evan Hansen MD  Active   apixaban " (Eliquis) 5 mg tablet [id number]  Take 1 tablet (5 mg) by mouth 2 times a day for 28 days. Armando Antonio MD  Active   atorvastatin (Lipitor) 40 mg tablet [id number] No Take 1 tablet (40 mg) by mouth once daily at bedtime. Armando Antonio MD Taking Active   cetirizine (ZyrTEC) 10 mg tablet [id number] No Take 1 tablet (10 mg) by mouth once daily. Historical Provider, MD Taking Active   clopidogrel (Plavix) 75 mg tablet [id number] No Take 1 tablet (75 mg) by mouth once daily. Armando Antonio MD Taking Active   CRANBERRY ORAL [id number] No Take 2 tablets by mouth once daily. Historical Provider, MD Taking Active   docusate sodium (Colace) 100 mg capsule [id number] No Take 1 capsule (100 mg) by mouth 2 times a day. Historical Provider, MD Taking Active   finasteride (Proscar) 5 mg tablet [id number] No Take 1 tablet (5 mg) by mouth once daily. Brandon Perdue MD Taking Active   icosapent ethyL (Vascepa) 1 gram capsule [id number]  TAKE 2 CAPSULES (2 G) BY MOUTH 2 TIMES A DAY. Sherwni Mayo MD  Active   insulin aspart (NovoLOG Flexpen U-100 Insulin) 100 unit/mL (3 mL) pen [id number]  Inject 20 units before breakfast, 18 units before lunch, and 25 units before dinner plus sliding scale (MDD 93 units) Stephanie Carnye MD  Active   insulin degludec (Tresiba FlexTouch U-200) 200 unit/mL (3 mL) injection [id number]  Inject 108 units under the skin daily as directed Stephanie Carney MD  Active   ketoconazole (NIZOral) 2 % shampoo [id number]  APPLY TOPICALLY 2 TIMES A WEEK. APPLY TO SCALP IN SHOWER. LATHER, LEAVE ON 5 MINUTES THEN RINSE Tan Kahn MD  Active   Lactobacillus acidophilus (PROBIOTIC ORAL) [id number] No Take 1 tablet by mouth early in the morning.. Historical Provider, MD Taking Active   Discontinued 03/12/25 1628   metoprolol succinate XL (Toprol-XL) 25 mg 24 hr tablet [id number]  Take 1 tablet (25 mg) by mouth once daily. Sherwin Mayo MD  Active   nitroglycerin (Nitrostat) 0.4 mg SL  "tablet [id number]  Place 1 tablet (0.4 mg) under the tongue every 5 minutes if needed for chest pain. Armando Antonio MD  Active   pen needle, diabetic 31 gauge x 1/4\" needle [id number]  Use to administer insulin up to 5 times daily Stephanie Carney MD  Active   Discontinued 03/12/25 1045     Discontinued 03/12/25 1628   potassium chloride CR 20 mEq ER tablet [id number]  Take 1 tablet (20 mEq) by mouth once daily. Sherwin Mayo MD  Active   ranolazine (Ranexa) 500 mg 12 hr tablet [id number]  TAKE 1 TABLET BY MOUTH EVERY 12 HOURS Armando Antonio MD  Active   semaglutide (Rybelsus) 14 mg tablet tablet [id number]  Take 1 tablet (14 mg) by mouth once daily. Stephanie Carney MD  Active   tamsulosin (Flomax) 0.4 mg 24 hr capsule [id number] No Take 1 capsule (0.4 mg) by mouth once daily. Brandon Perdue MD Taking Active   Discontinued 03/12/25 1628   telmisartan (MIcarDIS) 20 mg tablet [id number]  Take 1 tablet (20 mg) by mouth once every 24 hours. Sherwin Mayo MD  Active   triamterene-hydrochlorothiazid (Maxzide-25) 37.5-25 mg tablet [id number]  TAKE HALF TABLET PO DAILY Sherwin Mayo MD  Active                    DISEASE MANAGEMENT ASSESSMENT:     ANTICOAGULATION ASSESSMENT    The ASCVD Risk score (Dhruv MCCULLOUGH, et al., 2019) failed to calculate for the following reasons:    Risk score cannot be calculated because patient has a medical history suggesting prior/existing ASCVD    DIAGNOSIS: prevention of nonvalvular atrial fibrilliation stroke and systemic embolism  - Patient is projected to be on anticoagulation 6 months after his stent (end of March 2025)  - [id number] Score: [3] (only included if diagnosis is atrial fibrillation)   Age: [<65 (0)] [65-74 (+1)] [> 75 (+2)]: 1  Sex: [Male/Female (+1)]: 0  CHF history: [No/Yes(+1)]: 0  Hypertension history: [No/Yes(+1)]: 1  Stroke/TIA/thromboembolism history: [No/Yes(+2)]: 0  Vascular disease history (prior MI, peripheral artery disease, aortic " plaque): [No/Yes(+1)]: 0  Diabetes history: [No/Yes(+1)]: 1    CURRENT PHARMACOTHERAPY:   Eliquis 5 mg BID  70 y/o  123 kg  Scr 1.65 mg/dL (2/22/2025)    RELEVANT PAST MEDICAL HISTORY:   HLD, HTN, T2DM    Affordability/Accessibility: Eliquis creates cost burden for patient   Adherence/Organization: Confirms taking Eliquis twice daily (~5:30AM and 6:00PM); patient uses twice daily pill box for organization  Adverse Reactions: reports bruising around the area where he does his Tresiba/Novolog injections (confirms rotating sites), usually start healing in a few days and resolve in 1-2 weeks. Has one bruise on his stomach and his hand that have persisted for about 4 weeks. Bruises are not increasing in size. Denies any dark/tarry stools, does have some constipation from Rybelsus but no blood in the toilet bowl. No hematemesis.   Recent Hospitalizations: None  Recent Falls/Trauma: None  Changes in Tobacco or Alcohol Intake:   Tobacco: None, does not use  Alcohol: None, does not use     EDUCATION/COUNSELING:   - Counseled patient on MOA, expectations, duration of therapy, contraindications, administration, and monitoring parameters  - Counseled patient of side effects that are indicative of bleeding such as dark tarry stool, unexplainable bruising, or vomiting up a coffee ground like substance       DISCUSSION/NOTES:   Today's appointment was follow up regarding anticoagulation with Eliquis.   Manny reports increased bruising around his insulin injection sites, as well as his hands. Most bruising starts to heal within a few days, and is gone within 1-2 weeks. However he does note two bruises on his abdomen and hand that have persisted for ~1 month. Is unsure if he has been repeatedly bumping his hand that is causing that bruising. Denies any dark/tarry stools or hematemesis.  Unfortunately, at this time, Manny Reveles is ineligible to receive financial assistance through  Patient Assistance Program because income  exceeds program requirements.  Patient has a follow up appointment with Dr. Hansen next week, will be discussing if he is to continue on anticoagulation. Recommended discussion regarding Pradaxa if therapy is to be continued, as this would be less expensive through his insurance. Also recommended evaluating prescription plans during open enrollment if he were to continue on anticoagulation.  No follow up scheduled at this time, patient to call for any future questions/concerns.     ASSESSMENT:    Assessment/Plan   Problem List Items Addressed This Visit       Paroxysmal A-fib (Multi) - Primary     Manny continues on anticoagulation with Eliquis. No dosage adjustment needed for age, weight, and renal function.          Other Visit Diagnoses       Atrial fibrillation, unspecified type (Multi)              RECOMMENDATIONS/PLAN:    Continue:  Eliquis 5 mg BID  Follow up: None at this time    Last Appnt with Referring Provider: 12/16/2024  Next Appnt with Referring Provider: 6/25/2025  Clinical Pharmacist follow up: None at this time  VAF/Application Expiration: No  Type of Encounter: Jose Morgan PharmD    Verbal consent to manage patient's drug therapy was obtained from the patient . They were informed they may decline to participate or withdraw from participation in pharmacy services at any time.    Continue all meds under the continuation of care with the referring provider and clinical pharmacy team.

## 2025-04-09 ENCOUNTER — APPOINTMENT (OUTPATIENT)
Dept: PHARMACY | Facility: HOSPITAL | Age: 70
End: 2025-04-09
Payer: MEDICARE

## 2025-04-09 DIAGNOSIS — I48.91 ATRIAL FIBRILLATION, UNSPECIFIED TYPE (MULTI): ICD-10-CM

## 2025-04-09 DIAGNOSIS — I48.0 PAROXYSMAL A-FIB (MULTI): Primary | ICD-10-CM

## 2025-04-09 NOTE — Clinical Note
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 Most bruising starts to heal within a few days, and is gone within 1-2 weeks. However he does note two bruises on his abdomen and hand that have persisted for ~1 month. Is unsure if he has been repeatedly bumping his hand that is causing that bruising. Denies any dark/tarry stools or hematemesis. Unfortunately, he is not eligible for  PAP for Eliquis due to income. He will be following up with Dr. Hansen next week to discuss if he will be continuing anticoagulation. He will be discussing Pradaxa with him as well, as this is showing as less expensive through his insurance if he needs indefinite anticoagulation. No follow up scheduled at this time, patient to call for any future questions/concerns.

## 2025-04-09 NOTE — ASSESSMENT & PLAN NOTE
Manny continues on anticoagulation with Eliquis. No dosage adjustment needed for age, weight, and renal function.

## 2025-04-17 ENCOUNTER — APPOINTMENT (OUTPATIENT)
Dept: CARDIOLOGY | Facility: CLINIC | Age: 70
End: 2025-04-17
Payer: MEDICARE

## 2025-04-17 VITALS
DIASTOLIC BLOOD PRESSURE: 60 MMHG | BODY MASS INDEX: 35.92 KG/M2 | SYSTOLIC BLOOD PRESSURE: 116 MMHG | WEIGHT: 271 LBS | HEIGHT: 73 IN | HEART RATE: 74 BPM

## 2025-04-17 DIAGNOSIS — I48.0 PAROXYSMAL A-FIB (MULTI): ICD-10-CM

## 2025-04-17 DIAGNOSIS — Z98.61 CAD S/P PERCUTANEOUS CORONARY ANGIOPLASTY: Primary | ICD-10-CM

## 2025-04-17 DIAGNOSIS — I25.10 CAD S/P PERCUTANEOUS CORONARY ANGIOPLASTY: Primary | ICD-10-CM

## 2025-04-17 DIAGNOSIS — Z78.9 NEVER SMOKED TOBACCO: ICD-10-CM

## 2025-04-17 PROCEDURE — 93000 ELECTROCARDIOGRAM COMPLETE: CPT | Performed by: INTERNAL MEDICINE

## 2025-04-17 PROCEDURE — 4010F ACE/ARB THERAPY RXD/TAKEN: CPT | Performed by: INTERNAL MEDICINE

## 2025-04-17 PROCEDURE — 3074F SYST BP LT 130 MM HG: CPT | Performed by: INTERNAL MEDICINE

## 2025-04-17 PROCEDURE — 1123F ACP DISCUSS/DSCN MKR DOCD: CPT | Performed by: INTERNAL MEDICINE

## 2025-04-17 PROCEDURE — 3044F HG A1C LEVEL LT 7.0%: CPT | Performed by: INTERNAL MEDICINE

## 2025-04-17 PROCEDURE — 3008F BODY MASS INDEX DOCD: CPT | Performed by: INTERNAL MEDICINE

## 2025-04-17 PROCEDURE — 1159F MED LIST DOCD IN RCRD: CPT | Performed by: INTERNAL MEDICINE

## 2025-04-17 PROCEDURE — 1036F TOBACCO NON-USER: CPT | Performed by: INTERNAL MEDICINE

## 2025-04-17 PROCEDURE — 99214 OFFICE O/P EST MOD 30 MIN: CPT | Performed by: INTERNAL MEDICINE

## 2025-04-17 PROCEDURE — 3078F DIAST BP <80 MM HG: CPT | Performed by: INTERNAL MEDICINE

## 2025-04-17 ASSESSMENT — ENCOUNTER SYMPTOMS
SHORTNESS OF BREATH: 0
PND: 0
CLAUDICATION: 0
SNORING: 0
WHEEZING: 0
CARDIOVASCULAR NEGATIVE: 1
SYNCOPE: 0
NEAR-SYNCOPE: 0
IRREGULAR HEARTBEAT: 0
COUGH: 0
ORTHOPNEA: 0

## 2025-04-17 NOTE — PATIENT INSTRUCTIONS
You may hold medicines as discussed with Dr. Hansen for your procedure with the eye doctor.  To monitor your rhythm at home, you can use an Apple iPhone watch (the one that takes EKG's), or a FindIt device.  These are available through [employer].  There are no prescriptions, these are devices you choose to buy on your own.  The benefit is if you have symptoms, you can send EKG's from those devices to Dr. Hansen to review outside of office visits.  Follow up in 6 months  Continue same medications and treatments.   Patient educated on proper medication use.   Patient educated on risk factor modification.   Please bring any lab results from other providers / physicians to your next appointment.     Please bring all medicines, vitamins, and herbal supplements with you when you come to the office.     Prescriptions will not be filled unless you are compliant with your follow up appointments or have a follow up appointment scheduled as per instruction of your physician. Refills should be requested at the time of your visit.

## 2025-04-17 NOTE — PROGRESS NOTES
CARDIOLOGY OFFICE VISIT      CHIEF COMPLAINT  Chief Complaint   Patient presents with    Follow-up     3 month visit . Atrial Fib       HISTORY OF PRESENT ILLNESS  HPI  69-year-old male with a past medical history of cholesterolemia, coronary artery disease status post percutaneous core interventions in the past.  Patient was admitted for fever and also chills.  Patient had swelling and erythema of the right antecubital region from the intravenous place during the past hospitalization.  Patient also had troponin elevation.  EKG remained completely normal.  No significant ST elevation.     Patient underwent a cardiac catheterization September 30, 2024  CONCLUSIONS:   1. The entire Left Main: <10% stenosis.   2. Proximal LAD Lesion: The percent stenosis is 10-30%.   3. Circumflex Coronary Artery: contains patent previously placed stents.   4. Mid and distal CX Lesion: The percent stenosis is 30%.   5. Right Coronary Artery: contains patent previously placed stents and fills via collaterals.   6. Mid and distal RCA Lesion: The percent stenosis is 10-30%.   7. Prox PDA RCA Lesion: The percent stenosis is 40%.   8. Prox PLVB RCA Lesion: The percent stenosis is 100%.   9. The Left Ventricular Ejection Fraction is 60%.  10. Mid LAD Lesion: The percent stenosis is 80%.  11. Mid LAD Lesion: pre-dilation [place]olute [place] 2.75x22: 0% residual stenosis.  Due to evidence of fever and possible endocarditis, DUONG was performed.  DUONG showed     No pericardial effusion  Mild aortic sclerosis without hemodynamically significant stenosis normal aortic valve function  Mitral valve normal appearance 1+ MR  Pulmonic valve normal with 2+ PI normal dimension pulmonary artery  Tricuspid valve normal no significant TR  No valvular vegetations  Normal intracavitary chamber dimensions  No left atrial or left atrial appendage thrombi  Normal LV and RV systolic function LVEF 60%  Atrial fibrillation throughout the study  Also during DUONG, there was  evidence of atrial fibrillation.  Patient was kept in telemetry.  He maintained atrial fibrillation for almost 3 days and he self converted back to sinus rhythm.  He was placed on anticoagulation therapy.     He underwent incisional debridement of the cellulitis/abscess area in the right arm.         Holter monitor October 18, 2024  This is a Holter monitor for 24 hours.     The underlying rhythm was sinus tachycardia rate of 112 bpm.  Sinus rhythm/sinus tachycardia were the main rhythm during this study     The minimum heart rate was 55 bpm, the maximum rate was 115 bpm average heart of 78 bpm.     There were occasional isolated PACs and PVCs but no any sustained atrial or ventricular arrhythmias.     No significant pauses were evidence of high-grade AV block were seen during this study.     Patient did not report symptoms during the study.     Conclusion     Underlying rhythm was sinus rhythm.     No significant or sustained atrial or ventricular arrhythmia seen during this study    Since the last office visit he has been doing well.  He is scheduling for cataract surgery in the next week.    Patient denies any chest pain or shortness of breath.    EKG performed today shows sinus bradycardia with PACs at a rate of 56 bpm QRS duration 108 ms QT corrected 407 ms.  Rhythm strip shows the same pattern.        Past Medical History  Medical History[1]    Social History  Social History[2]    Family History   Family History[3]     Allergies:  RX Allergies[4]     Outpatient Medications:  Current Outpatient Medications   Medication Instructions    amLODIPine (NORVASC) 5 mg, oral, Daily, as directed    apixaban (ELIQUIS) 5 mg, oral, 2 times daily    atorvastatin (LIPITOR) 40 mg, oral, Nightly    cetirizine (ZYRTEC) 10 mg, Daily    clopidogrel (PLAVIX) 75 mg, oral, Daily    CRANBERRY ORAL 2 tablets, Daily    docusate sodium (COLACE) 100 mg, 2 times daily    finasteride (PROSCAR) 5 mg, oral, Daily    icosapent ethyL (Vascepa)  "1 gram capsule oral, 2 times daily    insulin aspart (NovoLOG Flexpen U-100 Insulin) 100 unit/mL (3 mL) pen Inject 20 units before breakfast, 18 units before lunch, and 25 units before dinner plus sliding scale (MDD 93 units)    insulin degludec (Tresiba FlexTouch U-200) 200 unit/mL (3 mL) pen INJECT 108 UNITS UNDER THE SKIN DAILY AS DIRECTED    ketoconazole (NIZOral) 2 % shampoo Topical, 2 times weekly, Apply to scalp in shower. Lather, leave on 5 minutes then rinse    Lactobacillus acidophilus (PROBIOTIC ORAL) 1 tablet, Daily    metoprolol succinate XL (TOPROL-XL) 25 mg, oral, Daily    nitroglycerin (NITROSTAT) 0.4 mg, sublingual, Every 5 min PRN    pen needle, diabetic 31 gauge x 1/4\" needle Use to administer insulin up to 5 times daily    potassium chloride CR 20 mEq ER tablet 20 mEq, oral, Daily    ranolazine (RANEXA) 500 mg, oral, Every 12 hours    semaglutide (Rybelsus) 14 mg tablet tablet TAKE 1 TABLET (14 MG) BY MOUTH ONCE DAILY.    tamsulosin (FLOMAX) 0.4 mg, oral, Daily    telmisartan (MICARDIS) 20 mg, oral, Every 24 hours    triamterene-hydrochlorothiazid (Maxzide-25) 37.5-25 mg tablet TAKE HALF TABLET PO DAILY          REVIEW OF SYSTEMS  Review of Systems   Constitutional: Negative for malaise/fatigue.   Cardiovascular: Negative.  Negative for claudication, cyanosis, irregular heartbeat, leg swelling, near-syncope, orthopnea, paroxysmal nocturnal dyspnea and syncope.   Respiratory:  Negative for cough, shortness of breath, snoring and wheezing.    All other systems reviewed and are negative.        VITALS  Vitals:    04/17/25 1411   BP: 116/60   Pulse: 74       PHYSICAL EXAM  Constitutional:       Appearance: Normal and healthy appearance. Well-developed and not in distress. Obese.   Neck:      Vascular: No JVR. JVD normal.   Pulmonary:      Effort: Pulmonary effort is normal.      Breath sounds: Normal breath sounds. No wheezing. No rhonchi. No rales.   Chest:      Chest wall: Not tender to " palpatation.   Cardiovascular:      PMI at left midclavicular line. Normal rate. Regular rhythm. Normal S1. Normal S2.       Murmurs: There is no murmur.      No gallop.  No click. No rub.   Pulses:     Intact distal pulses.   Edema:     Peripheral edema absent.   Abdominal:      Tenderness: There is no abdominal tenderness.   Musculoskeletal: Normal range of motion.         General: No tenderness. Skin:     General: Skin is warm and dry.   Neurological:      General: No focal deficit present.      Mental Status: Alert and oriented to person, place and time.           ASSESSMENT AND PLAN      Chest pain/unstable angina  ASHD class II  Remote multivessel angioplasty and stenting  Stage III kidney disease  Sleep apnea  Type 2 diabetes  Moderate obesity  Renal calculus in the past  Dyslipidemia  New diagnosed atrial fibrillation during this admission  Bacteremia with MSSA  Sepsis on admission with fever, hypotension, altered mental status.    Plan recommendation    From the electrophysiologist on point he is stable.  Continue with beta-blocker therapy.    Continue with Eliquis therapy.    Okay to hold Eliquis and Plavix therapy dose prior to cataract surgery per ophthalmology instructions.    Follow my office in 6 months or sooner if needed.    Risk factor modification and lifestyle modification discussed with patient. Diet , exercise and hydration discussed with patient.    I have personally review with patient during this office visit, laboratory data, echocardiogram results, stress test results, Holter-event monitor results prior and after the last electrophysiology visit. All questions has been answered.    Please excuse any errors in grammar or translation related to this dictation.  Voice recognition software was utilized to prepare this document.       JJ WALKER LPN, AM SCRIBING FOR, AND IN THE PRESENCE OF MD DENISE GIL Dr. Diaz, personally performed the services described in the  "documentation as scribed by the nurse in my presence, and confirm it is both accurate and complete.            [1] History reviewed. No pertinent past medical history.  [2]   Social History  Tobacco Use    Smoking status: Never     Passive exposure: Never    Smokeless tobacco: Never   Vaping Use    Vaping status: Never Used   Substance Use Topics    Alcohol use: Not Currently    Drug use: Never   [3]   Family History  Problem Relation Name Age of Onset    Other (bone/cartilage disorder) Mother      Diabetes Mother      Gallbladder disease Mother      Diabetes Father      Other (cardiac disorder) Father      Nephrolithiasis Father      Prostate cancer Father      Other (bladder cancer) Father      Heart attack Father      Rheum arthritis Father      Skin cancer Father      Kidney nephrosis Father      Diabetes Paternal Grandfather      Skin cancer Paternal Grandfather     [4]   Allergies  Allergen Reactions    Adhesive Tape-Silicones Hives and Itching    Epinephrine Syncope    Latex Hives and Itching    Meperidine Other    Penicillins Other     Reports redness and feeling hot; Tolerated cephalosporins this admission on 10/2024    Promethazine Unknown    Sulfa (Sulfonamide Antibiotics) Other     \"Flushed\"     "

## 2025-05-01 ENCOUNTER — APPOINTMENT (OUTPATIENT)
Dept: ENDOCRINOLOGY | Facility: CLINIC | Age: 70
End: 2025-05-01
Payer: MEDICARE

## 2025-05-01 VITALS
BODY MASS INDEX: 35.25 KG/M2 | HEIGHT: 73 IN | DIASTOLIC BLOOD PRESSURE: 64 MMHG | SYSTOLIC BLOOD PRESSURE: 110 MMHG | WEIGHT: 266 LBS

## 2025-05-01 DIAGNOSIS — E11.9 TYPE 2 DIABETES MELLITUS WITHOUT RETINOPATHY (MULTI): Primary | ICD-10-CM

## 2025-05-01 DIAGNOSIS — I10 BENIGN ESSENTIAL HYPERTENSION: ICD-10-CM

## 2025-05-01 DIAGNOSIS — E78.2 MIXED HYPERLIPIDEMIA: ICD-10-CM

## 2025-05-01 LAB
POC FINGERSTICK BLOOD GLUCOSE: 178 MG/DL (ref 70–100)
POC HEMOGLOBIN A1C: 6.3 % (ref 4.2–6.5)

## 2025-05-01 PROCEDURE — 83036 HEMOGLOBIN GLYCOSYLATED A1C: CPT | Performed by: HOSPITALIST

## 2025-05-01 PROCEDURE — 82962 GLUCOSE BLOOD TEST: CPT | Performed by: HOSPITALIST

## 2025-05-01 PROCEDURE — 4010F ACE/ARB THERAPY RXD/TAKEN: CPT | Performed by: HOSPITALIST

## 2025-05-01 PROCEDURE — 99214 OFFICE O/P EST MOD 30 MIN: CPT | Performed by: HOSPITALIST

## 2025-05-01 PROCEDURE — 3008F BODY MASS INDEX DOCD: CPT | Performed by: HOSPITALIST

## 2025-05-01 PROCEDURE — 3074F SYST BP LT 130 MM HG: CPT | Performed by: HOSPITALIST

## 2025-05-01 PROCEDURE — 1123F ACP DISCUSS/DSCN MKR DOCD: CPT | Performed by: HOSPITALIST

## 2025-05-01 PROCEDURE — 3078F DIAST BP <80 MM HG: CPT | Performed by: HOSPITALIST

## 2025-05-01 PROCEDURE — 3044F HG A1C LEVEL LT 7.0%: CPT | Performed by: HOSPITALIST

## 2025-05-01 RX ORDER — BLOOD-GLUCOSE,RECEIVER,CONT
EACH MISCELLANEOUS
Qty: 1 EACH | Refills: 0 | Status: SHIPPED | OUTPATIENT
Start: 2025-05-01

## 2025-05-01 RX ORDER — BLOOD-GLUCOSE SENSOR
EACH MISCELLANEOUS
Qty: 5 EACH | Refills: 2 | Status: SHIPPED | OUTPATIENT
Start: 2025-05-01

## 2025-05-01 ASSESSMENT — ENCOUNTER SYMPTOMS
ARTHRALGIAS: 0
PALPITATIONS: 0
NAUSEA: 0
PHOTOPHOBIA: 0
EYE ITCHING: 0
FREQUENCY: 0
ABDOMINAL PAIN: 0
AGITATION: 0
SHORTNESS OF BREATH: 0
VOMITING: 0
CONSTITUTIONAL NEGATIVE: 1
SORE THROAT: 0
ABDOMINAL DISTENTION: 0
DYSURIA: 0
NERVOUS/ANXIOUS: 0
TREMORS: 0
SLEEP DISTURBANCE: 0
CONSTIPATION: 0
VOICE CHANGE: 0
LIGHT-HEADEDNESS: 0
DIARRHEA: 0
TROUBLE SWALLOWING: 0
CHEST TIGHTNESS: 0
HEADACHES: 0

## 2025-05-01 NOTE — PROGRESS NOTES
Subjective       Patient ID:  (PCP: Dr. Mayo/podiatry: does not see one/eye exam: twice a year /Patient testing glucose 4 times daily with freestyle hugh 2 READER/Due to fluctuating blood glucose, hypoglycemia and 4 insulin injections daily. Pt adhering and benefiting from cgm treatment. / S/P right eye cataract surgery 4/28/22 , Pt on eye gtts  Lab Results   Component Value Date    HGBA1C 6.3 05/01/2025      HPI    Review of Systems   Constitutional: Negative.    HENT:  Negative for sore throat, trouble swallowing and voice change.    Eyes:  Negative for photophobia, itching and visual disturbance.   Respiratory:  Negative for chest tightness and shortness of breath.    Cardiovascular:  Negative for chest pain and palpitations.   Gastrointestinal:  Negative for abdominal distention, abdominal pain, constipation, diarrhea, nausea and vomiting.   Endocrine: Negative for cold intolerance, heat intolerance and polyuria.   Genitourinary:  Negative for dysuria and frequency.   Musculoskeletal:  Negative for arthralgias.   Skin:  Negative for pallor.   Allergic/Immunologic: Negative for environmental allergies.   Neurological:  Negative for tremors, light-headedness and headaches.   Psychiatric/Behavioral:  Negative for agitation and sleep disturbance. The patient is not nervous/anxious.        Objective   Physical Exam  Constitutional:       Appearance: Normal appearance.   HENT:      Head: Normocephalic.      Nose: Nose normal.      Mouth/Throat:      Mouth: Mucous membranes are moist.   Eyes:      Extraocular Movements: Extraocular movements intact.   Cardiovascular:      Rate and Rhythm: Normal rate.   Pulmonary:      Effort: Pulmonary effort is normal. No respiratory distress.   Abdominal:      General: There is no distension.   Musculoskeletal:         General: Normal range of motion.      Cervical back: Normal range of motion and neck supple.   Skin:     General: Skin is warm and dry.   Neurological:      Mental  "Status: He is alert and oriented to person, place, and time.   Psychiatric:         Mood and Affect: Mood normal.    Visit Vitals  /64   Ht 1.854 m (6' 1\")   Wt 121 kg (266 lb)   BMI 35.09 kg/m²   Smoking Status Never   BSA 2.5 m²        Assessment/Plan   Problem List Items Addressed This Visit       Type 2 diabetes mellitus without retinopathy (Multi)    Relevant Orders    POCT glycosylated hemoglobin (Hb A1C) manually resulted (Completed)    POCT fingerstick glucose manually resulted (Completed)                       68 yo man with h/o CAd s/p stents, CKD came for follow up   he does have h/o CAD s/p PCI in 2019  Dx ~ 10 yrs ago. Used to follow Dr. Doherty as OP.      Current regimen :   Tresiba U 200 ) 104-0-0-0  (52+52)   Novolog 20-18-25-0 ,ssi 2: 50 > 150 ( takes 2-4 times a day )  for snacks takes 18 units occ. Taking 20-22 for dinner   Rybelsus 14 mg  (constipation improved after taking Colace twice a day)       HE has Freestyle Maira 2 which was  downloaded - reviewed it and last 14days active time 86%, TIR 44% low < 1 %   Occ ovn and pre dinner low BG   Tries to take NovoLog before every   avoids artificial sweetners given his CKD     A1c at goal but on reviewing of maira his blood sugars are high than target range a lot   He mentions in the past he as on metformin which was stopped due to his CKD, GFR more than 45 currently  He has h/o multiple UTIs in past, no h/o pancreatitis in past       Recent cataract surgery     PLAN :    Increase dinner novolog to 22 units , change novolog pen as the one he is using is  since 3/ 2024  He is tolerating Rybelsus okay at this time we can discuss trying Mounjaro next visit  Can continue to avoid candy  Discussed diabetic meals  - in past discussed snack with carbs > 30 g- take 10 units novolog before  snack.   - take novolog 15 min before eating.   - continue rybelsus-continue Colace twice a day  - has CKD ,  his GFR~45,  will hold off adding metformin at " this time      -given he has multiple UTIs in past and urine incontinence rarely with hesitancy occ- would avoid SGLT2 inh  - continue statin vascepa, TG improved, LDL at goal   - continue ARB, BP at goal   - annual eye exam     off note rybelsus covered with rodger patient assisstance. he will benefit from continuing the GLP 1 agonist given his h/o CAD      RTC 4m     SH- daughter and GS moved in,.  he is~ 3yr old NETTE.  He started  . and he has been helping taking care of him . he is more active now.  He takes care of him 2 times a week.    Nette has been having URI since march   he is in [place]. daughter had kidney failure?   Wife on insulin

## 2025-06-09 ENCOUNTER — TELEPHONE (OUTPATIENT)
Dept: PRIMARY CARE | Facility: CLINIC | Age: 70
End: 2025-06-09
Payer: MEDICARE

## 2025-06-10 ENCOUNTER — HOSPITAL ENCOUNTER (OUTPATIENT)
Dept: RADIOLOGY | Facility: HOSPITAL | Age: 70
Discharge: HOME | End: 2025-06-10
Payer: MEDICARE

## 2025-06-10 DIAGNOSIS — M25.551 RIGHT HIP PAIN: ICD-10-CM

## 2025-06-10 PROCEDURE — 73502 X-RAY EXAM HIP UNI 2-3 VIEWS: CPT | Mod: RT

## 2025-06-10 PROCEDURE — 73502 X-RAY EXAM HIP UNI 2-3 VIEWS: CPT | Mod: RIGHT SIDE | Performed by: RADIOLOGY

## 2025-06-11 ENCOUNTER — TELEPHONE (OUTPATIENT)
Dept: PRIMARY CARE | Facility: CLINIC | Age: 70
End: 2025-06-11
Payer: MEDICARE

## 2025-06-11 NOTE — TELEPHONE ENCOUNTER
Can review x ray films and advise. Pt called wanting results advised that had not been resulted yet.

## 2025-06-12 ENCOUNTER — OFFICE VISIT (OUTPATIENT)
Dept: PRIMARY CARE | Facility: CLINIC | Age: 70
End: 2025-06-12
Payer: MEDICARE

## 2025-06-12 ENCOUNTER — APPOINTMENT (OUTPATIENT)
Dept: PRIMARY CARE | Facility: CLINIC | Age: 70
End: 2025-06-12
Payer: MEDICARE

## 2025-06-12 ENCOUNTER — HOSPITAL ENCOUNTER (OUTPATIENT)
Dept: RADIOLOGY | Facility: CLINIC | Age: 70
Discharge: HOME | End: 2025-06-12
Payer: MEDICARE

## 2025-06-12 VITALS
HEIGHT: 73 IN | BODY MASS INDEX: 35.12 KG/M2 | SYSTOLIC BLOOD PRESSURE: 120 MMHG | WEIGHT: 265 LBS | DIASTOLIC BLOOD PRESSURE: 78 MMHG | HEART RATE: 78 BPM | TEMPERATURE: 96.4 F

## 2025-06-12 DIAGNOSIS — E11.22 TYPE 2 DIABETES MELLITUS WITH STAGE 3A CHRONIC KIDNEY DISEASE, WITH LONG-TERM CURRENT USE OF INSULIN (MULTI): ICD-10-CM

## 2025-06-12 DIAGNOSIS — E11.628 TYPE 2 DIABETES MELLITUS WITH OTHER SKIN COMPLICATIONS: ICD-10-CM

## 2025-06-12 DIAGNOSIS — N18.31 TYPE 2 DIABETES MELLITUS WITH STAGE 3A CHRONIC KIDNEY DISEASE, WITH LONG-TERM CURRENT USE OF INSULIN (MULTI): ICD-10-CM

## 2025-06-12 DIAGNOSIS — M25.551 PAIN OF RIGHT HIP: ICD-10-CM

## 2025-06-12 DIAGNOSIS — Z79.4 TYPE 2 DIABETES MELLITUS WITH STAGE 3A CHRONIC KIDNEY DISEASE, WITH LONG-TERM CURRENT USE OF INSULIN (MULTI): ICD-10-CM

## 2025-06-12 DIAGNOSIS — M79.18 GLUTEAL PAIN: ICD-10-CM

## 2025-06-12 DIAGNOSIS — M25.551 PAIN OF RIGHT HIP: Primary | ICD-10-CM

## 2025-06-12 PROBLEM — E66.09 CLASS 1 OBESITY DUE TO EXCESS CALORIES WITH SERIOUS COMORBIDITY AND BODY MASS INDEX (BMI) OF 34.0 TO 34.9 IN ADULT: Status: ACTIVE | Noted: 2023-08-24

## 2025-06-12 PROCEDURE — 1159F MED LIST DOCD IN RCRD: CPT | Performed by: INTERNAL MEDICINE

## 2025-06-12 PROCEDURE — 1160F RVW MEDS BY RX/DR IN RCRD: CPT | Performed by: INTERNAL MEDICINE

## 2025-06-12 PROCEDURE — G8433 SCR FOR DEP NOT CPT DOC RSN: HCPCS | Performed by: INTERNAL MEDICINE

## 2025-06-12 PROCEDURE — 99213 OFFICE O/P EST LOW 20 MIN: CPT | Performed by: INTERNAL MEDICINE

## 2025-06-12 PROCEDURE — 3078F DIAST BP <80 MM HG: CPT | Performed by: INTERNAL MEDICINE

## 2025-06-12 PROCEDURE — 4010F ACE/ARB THERAPY RXD/TAKEN: CPT | Performed by: INTERNAL MEDICINE

## 2025-06-12 PROCEDURE — 3074F SYST BP LT 130 MM HG: CPT | Performed by: INTERNAL MEDICINE

## 2025-06-12 PROCEDURE — 73721 MRI JNT OF LWR EXTRE W/O DYE: CPT | Mod: RT

## 2025-06-12 PROCEDURE — 1036F TOBACCO NON-USER: CPT | Performed by: INTERNAL MEDICINE

## 2025-06-12 PROCEDURE — 3044F HG A1C LEVEL LT 7.0%: CPT | Performed by: INTERNAL MEDICINE

## 2025-06-12 PROCEDURE — 3008F BODY MASS INDEX DOCD: CPT | Performed by: INTERNAL MEDICINE

## 2025-06-12 ASSESSMENT — PATIENT HEALTH QUESTIONNAIRE - PHQ9
SUM OF ALL RESPONSES TO PHQ9 QUESTIONS 1 AND 2: 0
2. FEELING DOWN, DEPRESSED OR HOPELESS: NOT AT ALL
1. LITTLE INTEREST OR PLEASURE IN DOING THINGS: NOT AT ALL

## 2025-06-12 ASSESSMENT — ENCOUNTER SYMPTOMS
CONSTITUTIONAL NEGATIVE: 1
LOSS OF MOTION: 0
MUSCLE WEAKNESS: 0
OCCASIONAL FEELINGS OF UNSTEADINESS: 0
DEPRESSION: 0
CARDIOVASCULAR NEGATIVE: 1
LOSS OF SENSATION IN FEET: 0
HIP PAIN: 1
ARTHRALGIAS: 0
WOUND: 0
NUMBNESS: 0
GASTROINTESTINAL NEGATIVE: 1
INABILITY TO BEAR WEIGHT: 0
RESPIRATORY NEGATIVE: 1

## 2025-06-12 ASSESSMENT — COLUMBIA-SUICIDE SEVERITY RATING SCALE - C-SSRS
6. HAVE YOU EVER DONE ANYTHING, STARTED TO DO ANYTHING, OR PREPARED TO DO ANYTHING TO END YOUR LIFE?: NO
1. IN THE PAST MONTH, HAVE YOU WISHED YOU WERE DEAD OR WISHED YOU COULD GO TO SLEEP AND NOT WAKE UP?: NO
2. HAVE YOU ACTUALLY HAD ANY THOUGHTS OF KILLING YOURSELF?: NO

## 2025-06-12 NOTE — PROGRESS NOTES
"Subjective   Patient ID: Manny Reveles \"Prince\" is a 70 y.o. male who presents for Hip Pain (Rt hip pain x3 weeks).  Hip Pain   The incident occurred more than 1 week ago. There was no injury mechanism. The pain is at a severity of 4/10. The pain is mild. The pain has been Fluctuating since onset. Pertinent negatives include no inability to bear weight, loss of motion, muscle weakness or numbness. The treatment provided no relief.       Past Medical History  Medical History[1]    Social History  Social History[2]    Family History   Family History[3]    Allergies:  RX Allergies[4]     Outpatient Medications:  Current Outpatient Medications   Medication Instructions    amLODIPine (NORVASC) 5 mg, oral, Daily, as directed    apixaban (ELIQUIS) 5 mg, oral, 2 times daily    atorvastatin (LIPITOR) 40 mg, oral, Nightly    blood-glucose sensor (FreeStyle Maira 3 Plus Sensor) device Change every 15 days    cetirizine (ZYRTEC) 10 mg, Daily    clopidogrel (PLAVIX) 75 mg, oral, Daily    CRANBERRY ORAL 2 tablets, Daily    docusate sodium (COLACE) 100 mg, 2 times daily    finasteride (PROSCAR) 5 mg, oral, Daily    FreeStyle Maira 3 Carlotta misc Use as instructed    icosapent ethyL (Vascepa) 1 gram capsule oral, 2 times daily    insulin aspart (NovoLOG Flexpen U-100 Insulin) 100 unit/mL (3 mL) pen Inject 20 units before breakfast, 18 units before lunch, and 25 units before dinner plus sliding scale (MDD 93 units)    insulin degludec (Tresiba FlexTouch U-200) 200 unit/mL (3 mL) pen INJECT 108 UNITS UNDER THE SKIN DAILY AS DIRECTED    ketoconazole (NIZOral) 2 % shampoo Topical, 2 times weekly, Apply to scalp in shower. Lather, leave on 5 minutes then rinse    Lactobacillus acidophilus (PROBIOTIC ORAL) 1 tablet, Daily    metoprolol succinate XL (TOPROL-XL) 25 mg, oral, Daily    nitroglycerin (NITROSTAT) 0.4 mg, sublingual, Every 5 min PRN    pen needle, diabetic 31 gauge x 1/4\" needle Use to administer insulin up to 5 times daily    " "potassium chloride CR 20 mEq ER tablet 20 mEq, oral, Daily    ranolazine (RANEXA) 500 mg, oral, Every 12 hours    semaglutide (Rybelsus) 14 mg tablet tablet TAKE 1 TABLET (14 MG) BY MOUTH ONCE DAILY.    tamsulosin (FLOMAX) 0.4 mg, oral, Daily    telmisartan (MICARDIS) 20 mg, oral, Every 24 hours    triamterene-hydrochlorothiazid (Maxzide-25) 37.5-25 mg tablet TAKE HALF TABLET PO DAILY        Review of Systems   Constitutional: Negative.    Respiratory: Negative.     Cardiovascular: Negative.    Gastrointestinal: Negative.    Musculoskeletal:  Negative for arthralgias.   Skin:  Negative for wound.   Neurological:  Negative for numbness.         Objective       Physical Exam  Vitals reviewed.   Constitutional:       Appearance: Normal appearance. He is normal weight.   HENT:      Head: Normocephalic.   Eyes:      Conjunctiva/sclera: Conjunctivae normal.   Cardiovascular:      Rate and Rhythm: Normal rate and regular rhythm.      Pulses: Normal pulses.   Pulmonary:      Effort: Pulmonary effort is normal.      Breath sounds: Normal breath sounds.   Abdominal:      General: Abdomen is protuberant.      Palpations: Abdomen is soft.   Musculoskeletal:         General: Tenderness present. No swelling, deformity or signs of injury.      Cervical back: Neck supple.   Skin:     General: Skin is warm and dry.   Neurological:      Mental Status: Mental status is at baseline.     /78 (BP Location: Right arm, Patient Position: Sitting, BP Cuff Size: Adult)   Pulse 78   Temp 35.8 °C (96.4 °F) (Temporal)   Ht 1.854 m (6' 1\")   Wt 120 kg (265 lb)   BMI 34.96 kg/m²      Assessment/Plan   Problem List Items Addressed This Visit       Type 2 diabetes mellitus with other skin complications     Other Visit Diagnoses         Pain of right hip    -  Primary      Type 2 diabetes mellitus with stage 3a chronic kidney disease, with long-term current use of insulin (Multi)          Gluteal pain            There are few messages " "back-and-forth about hip pain, we did a plain radiograph, it shows some acetabular spurring, he says that pain is deep-seated in the right gluteal region, when he sits for prolonged duration of time it hurts when he tries to get up it hurts and then once his pain goes away, this is going on for more than 2 months, conservative treatments has been tried, pain is not going away, it is interfering with his daily activities, there is a tenderness at the gluteal crease, range of motion is full at the hip, this is a soft tissue problem, tendinopathy cannot be ruled out, he has a some loss of musculature in the gluteal region, SLR is unlimited, diagnosis is not clear, we need MRI of hip without contrast       [1] History reviewed. No pertinent past medical history.  [2]   Social History  Tobacco Use    Smoking status: Never     Passive exposure: Never    Smokeless tobacco: Never   Vaping Use    Vaping status: Never Used   Substance Use Topics    Alcohol use: Not Currently    Drug use: Never   [3]   Family History  Problem Relation Name Age of Onset    Other (bone/cartilage disorder) Mother      Diabetes Mother      Gallbladder disease Mother      Diabetes Father      Other (cardiac disorder) Father      Nephrolithiasis Father      Prostate cancer Father      Other (bladder cancer) Father      Heart attack Father      Rheum arthritis Father      Skin cancer Father      Kidney nephrosis Father      Diabetes Paternal Grandfather      Skin cancer Paternal Grandfather     [4]   Allergies  Allergen Reactions    Adhesive Tape-Silicones Hives and Itching    Epinephrine Syncope    Latex Hives and Itching    Meperidine Other    Penicillins Other     Reports redness and feeling hot; Tolerated cephalosporins this admission on 10/2024    Promethazine Unknown    Sulfa (Sulfonamide Antibiotics) Other     \"Flushed\"     "

## 2025-06-13 ENCOUNTER — OFFICE VISIT (OUTPATIENT)
Dept: ORTHOPEDIC SURGERY | Facility: CLINIC | Age: 70
End: 2025-06-13
Payer: MEDICARE

## 2025-06-13 DIAGNOSIS — S76.319A STRAIN OF INSERTION OF TENDON OF HAMSTRING MUSCLE: Primary | ICD-10-CM

## 2025-06-13 NOTE — PROGRESS NOTES
"  Acute Injury New Patient Visit    CC:   Chief Complaint   Patient presents with    Left Hip - Pain    Right Hip - Pain       HPI: Manny \"Forrest" is a 70 y.o. male presents today for evaluation for bilateral hip pain which started about a month ago. He states that he felt a pop in his right hip as he was trying to restrain a grandson from fall of his bike. He had a recent MRI done. He is here for initial evaluation.  Is currently on Eliquis for atrial fibrillation.  Also has a past medical history of diabetes and chronic kidney disease.        Review of Systems   GENERAL: Negative for malaise, significant weight loss, fever  MUSCULOSKELETAL: See HPI  NEURO:  Negative for numbness / tingling     Past Medical History  Medical History[1]    Medication review  Medication Documentation Review Audit       Reviewed by Nicole Burdick MA (Medical Assistant) on 06/13/25 at 0823      Medication Order Taking? Sig Documenting Provider Last Dose Status   amLODIPine (Norvasc) 5 mg tablet [id number]  Take 1 tablet (5 mg) by mouth once daily. as directed Sherwin Mayo MD  Active   apixaban (Eliquis) 5 mg tablet [id number]  Take 1 tablet (5 mg) by mouth 2 times a day. Evan Hansen MD  Active   atorvastatin (Lipitor) 40 mg tablet [id number] No Take 1 tablet (40 mg) by mouth once daily at bedtime. Armando Antonio MD Taking Active   blood-glucose sensor (FreeStyle Maira 3 Plus Sensor) device [id number]  Change every 15 days Stephanie Carney MD  Active   cetirizine (ZyrTEC) 10 mg tablet [id number] No Take 1 tablet (10 mg) by mouth once daily. Historical Provider, MD Taking Active   clopidogrel (Plavix) 75 mg tablet [id number]  Take 1 tablet (75 mg) by mouth once daily. Armando Antonio MD  Active   CRANBERRY ORAL [id number] No Take 2 tablets by mouth once daily. Historical Provider, MD Taking Active   docusate sodium (Colace) 100 mg capsule [id number] No Take 1 capsule (100 mg) by mouth 2 times a day. Historical Provider, " "MD Taking Active   finasteride (Proscar) 5 mg tablet [id number] No Take 1 tablet (5 mg) by mouth once daily. Brandon Perdue MD Taking Active   FreeStyle Maira 3 [place] misc [id number]  Use as instructed Stephanie Carney MD  Active   icosapent ethyL (Vascepa) 1 gram capsule [id number]  TAKE 2 CAPSULES (2 G) BY MOUTH 2 TIMES A DAY. Sherwin Mayo MD  Active   insulin aspart (NovoLOG Flexpen U-100 Insulin) 100 unit/mL (3 mL) pen [id number]  Inject 20 units before breakfast, 18 units before lunch, and 25 units before dinner plus sliding scale (MDD 93 units) Stephanie Carney MD  Active   insulin degludec (Tresiba FlexTouch U-200) 200 unit/mL (3 mL) pen [id number]  INJECT 108 UNITS UNDER THE SKIN DAILY AS DIRECTED Stephanie Carney MD  Active   ketoconazole (NIZOral) 2 % shampoo [id number]  APPLY TOPICALLY 2 TIMES A WEEK. APPLY TO SCALP IN SHOWER. LATHER, LEAVE ON 5 MINUTES THEN RINSE Tan Kahn MD  Active   Lactobacillus acidophilus (PROBIOTIC ORAL) [id number] No Take 1 tablet by mouth early in the morning.. Maris Champagne MD Taking Active   metoprolol succinate XL (Toprol-XL) 25 mg 24 hr tablet [id number]  Take 1 tablet (25 mg) by mouth once daily. Sherwin Mayo MD  Active   nitroglycerin (Nitrostat) 0.4 mg SL tablet [id number]  Place 1 tablet (0.4 mg) under the tongue every 5 minutes if needed for chest pain. Armando Antonio MD  Active   pen needle, diabetic 31 gauge x 1/4\" needle [id number]  Use to administer insulin up to 5 times daily Stephanie Carney MD  Active   potassium chloride CR 20 mEq ER tablet [id number]  Take 1 tablet (20 mEq) by mouth once daily. Sherwin Mayo MD  Active   ranolazine (Ranexa) 500 mg 12 hr tablet [id number]  TAKE 1 TABLET BY MOUTH EVERY 12 HOURS Armando Antonio MD  Active   semaglutide (Rybelsus) 14 mg tablet tablet [id number]  TAKE 1 TABLET (14 MG) BY MOUTH ONCE DAILY. Stephanie Carney MD  Active   tamsulosin (Flomax) 0.4 mg 24 hr capsule [id number] No Take 1 " capsule (0.4 mg) by mouth once daily. Brandon Perdue MD Taking Active   telmisartan (MIcarDIS) 20 mg tablet [id number]  Take 1 tablet (20 mg) by mouth once every 24 hours. Sherwin Mayo MD  Active   triamterene-hydrochlorothiazid (Maxzide-25) 37.5-25 mg tablet [id number]  TAKE HALF TABLET PO DAILY Sherwin Mayo MD  Active                    Allergies  RX Allergies[2]    Social History  Social History     Socioeconomic History    Marital status:      Spouse name: Not on file    Number of children: Not on file    Years of education: Not on file    Highest education level: Not on file   Occupational History    Not on file   Tobacco Use    Smoking status: Never     Passive exposure: Never    Smokeless tobacco: Never   Vaping Use    Vaping status: Never Used   Substance and Sexual Activity    Alcohol use: Not Currently    Drug use: Never    Sexual activity: Defer   Other Topics Concern    Not on file   Social History Narrative    Not on file     Social Drivers of Health     Financial Resource Strain: Low Risk  (10/2/2024)    Overall Financial Resource Strain (CARDIA)     Difficulty of Paying Living Expenses: Not hard at all   Food Insecurity: No Food Insecurity (10/2/2024)    Hunger Vital Sign     Worried About Running Out of Food in the Last Year: Never true     Ran Out of Food in the Last Year: Never true   Transportation Needs: No Transportation Needs (11/21/2024)    OASIS : Transportation     Lack of Transportation (Medical): No     Lack of Transportation (Non-Medical): No     Patient Unable or Declines to Respond: No   Physical Activity: Inactive (10/2/2024)    Exercise Vital Sign     Days of Exercise per Week: 0 days     Minutes of Exercise per Session: 0 min   Stress: No Stress Concern Present (10/2/2024)    [employer] - Occupational Stress Questionnaire     Feeling of Stress : Not at all   Social Connections: Feeling Socially Integrated (11/21/2024)    OASIS :  Social Isolation     Frequency of experiencing loneliness or isolation: Never   Intimate Partner Violence: Not At Risk (10/2/2024)    Humiliation, Afraid, Rape, and Kick questionnaire     Fear of Current or Ex-Partner: No     Emotionally Abused: No     Physically Abused: No     Sexually Abused: No   Housing Stability: Low Risk  (10/2/2024)    Housing Stability Vital Sign     Unable to Pay for Housing in the Last Year: No     Number of Times Moved in the Last Year: 0     Homeless in the Last Year: No       Surgical History  Surgical History[3]    Physical Exam:  GENERAL:  Patient is awake, alert, and oriented to person place and time.  Patient appears well nourished and well kept.  Affect Calm, Not Acutely Distressed.  HEENT:  Normocephalic, Atraumatic, EOMI  CARDIOVASCULAR:  Hemodynamically stable.  RESPIRATORY:  Normal respirations with unlabored breathing.  Extremity: Right hip shows skin is intact.  There is no bruising or ecchymosis.  No obvious hematoma.  He can flex the right hip at 90 degrees.  Mild pain with internal and external rotation.  Quadricep strength is 5/5 on the right, compared to 5/5 on the left.  Mild pain at the hamstring tendon at its origin of the ischial tuberosity.  Satisfactory strength with flexion of the right knee.  He is able to get up from his chair with no difficulties.  He is walking with no significant antalgic gait.  Distal pulses are palpable.  He is neurovascularly intact.      Diagnostics: MRI reviewed  MR hip right wo IV contrast  Narrative: Interpreted By:  Chon Gifford,   STUDY:  MRI of the  right hip  without IV contrast;  6/12/2025 4:39 pm      INDICATION:  Signs/Symptoms:persistent hip pain.      ,M25.551 Pain in right hip,M79.18 Myalgia, other site      COMPARISON:  06/10/2025      ACCESSION NUMBER(S):  [id number]      ORDERING CLINICIAN:  RENEE DUDLEY      TECHNIQUE:  MR imaging of the right hip was obtained  without IV contrast.      FINDINGS:  TENDONS:  There is  "a near full-thickness tear of the right hamstring tendon  origin with only a few fibers remain intact. Associated retraction of  the majority of the torn fibers with a fluid-filled gap of up to 2.5  cm. There is tendinopathy of the left hamstring tendons as well  without a tear.      There is tendinopathy and partial-thickness undersurface tearing of  the distal hip abductor tendons bilaterally. The iliopsoas tendon  insertions are intact. The adductor tendon origin are intact  bilaterally.      JOINTS:  No significant cartilage loss or osteophytosis is seen in the hip  joint. There is no hip joint effusion. The SI joints are within  normal limits.      OSSEOUS STRUCTURES:  No focal marrow replacing lesions are identified. There is no  fracture.      SOFT TISSUES:  No muscle atrophy or tear is seen.      INTERNAL ORGANS:  Evaluation of the internal organs of the pelvis is limited on this  small field of view study tailored for evaluation of the  musculoskeletal system. No gross abnormality is seen in the pelvic  organs.      Impression: 1.  High-grade near full-thickness tear of the right hamstring tendon  origin with retraction and a fluid-filled gap of approximately 2.5  cm. Only a few fibers remain intact posteriorly.  2. Tendinopathy without tearing of the left hamstring tendons as well.  3. Tendinopathy and partial-thickness undersurface tearing of the  distal hip abductor tendons bilaterally.          I personally reviewed the images/study and I agree with the findings  as stated. This study was interpreted at [place], [place].      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 MRI was discussed in detail.  Has a history, " physical examination and MRI findings of acute on chronic hamstring tendon tear.  We discussed both surgical and nonsurgical option.  We discussed he would be high risk for surgery due to his multiple comorbidities, and recommended nonsurgical treatment with physical therapy with activity modification, as he is clinically doing fairly well today with ambulating and getting up from his chair.  There is no significant bruising suggesting more of a chronic hamstring tendon tear.  He will follow-up in 4 to 5 weeks and reevaluate.    No orders of the defined types were placed in this encounter.     At the conclusion of the visit there were no further questions by the patient/family regarding their plan of care.  Patient was instructed to call or return with any issues, questions, or concerns regarding their injury and/or treatment plan described above.     06/13/25 at 8:27 AM - Julian Murray MD  Scribe Attestation  By signing my name below, Juan WALKER Scribe   attest that this documentation has been prepared under the direction and in the presence of Julian Murray MD.    Office: [phone]    We already utilize the scribe attestation, Juan WALKER am scribing for, and in the presence of Dr. Murray.    Julian WALKER MD personally performed the services described in the documentation as scribed by Juan Norris in my presence, and confirm it is both accurate and complete.    This note was prepared using voice recognition software.  The details of this note are correct and have been reviewed, and corrected to the best of my ability.  Some grammatical errors may persist related to the Dragon software.         [1] History reviewed. No pertinent past medical history.  [2]   Allergies  Allergen Reactions    Adhesive Tape-Silicones Hives and Itching    Epinephrine Syncope    Latex Hives and Itching    Meperidine Other    Penicillins Other     Reports redness and feeling hot; Tolerated cephalosporins  "this admission on 10/2024    Promethazine Unknown    Sulfa (Sulfonamide Antibiotics) Other     \"Flushed\"   [3]   Past Surgical History:  Procedure Laterality Date    CARDIAC CATHETERIZATION N/A 9/30/2024    Procedure: Left Heart Cath with Coronary Angiography and LV;  Surgeon: Armando Antonio MD;  Location: [facility];  Service: Cardiovascular;  Laterality: N/A;    CARDIAC CATHETERIZATION N/A 9/30/2024    Procedure: PCI ANTOINE Stent- Coronary;  Surgeon: Armando Antonio MD;  Location: [facility];  Service: Cardiovascular;  Laterality: N/A;    OTHER SURGICAL HISTORY  12/12/2018    Cardiac catheterization with stent placement    OTHER SURGICAL HISTORY  12/12/2018    Renal lithotripsy    OTHER SURGICAL HISTORY  12/12/2018    Shoulder surgery    OTHER SURGICAL HISTORY  12/12/2018    Knee reconstruction    OTHER SURGICAL HISTORY  12/12/2018    Hernia repair    OTHER SURGICAL HISTORY  02/28/2022    Colonoscopy    OTHER SURGICAL HISTORY  02/28/2022    Dental surgery    OTHER SURGICAL HISTORY  02/28/2022    Inguinal hernia repair    OTHER SURGICAL HISTORY  02/28/2022    Percutaneous transluminal coronary angioplasty    OTHER SURGICAL HISTORY  03/09/2022    Dermatological surgery     "

## 2025-06-18 ENCOUNTER — APPOINTMENT (OUTPATIENT)
Dept: CARDIOLOGY | Facility: CLINIC | Age: 70
End: 2025-06-18
Payer: MEDICARE

## 2025-06-25 ENCOUNTER — APPOINTMENT (OUTPATIENT)
Dept: CARDIOLOGY | Facility: CLINIC | Age: 70
End: 2025-06-25
Payer: MEDICARE

## 2025-06-25 VITALS
DIASTOLIC BLOOD PRESSURE: 72 MMHG | HEART RATE: 72 BPM | SYSTOLIC BLOOD PRESSURE: 124 MMHG | BODY MASS INDEX: 35.44 KG/M2 | WEIGHT: 268.6 LBS

## 2025-06-25 DIAGNOSIS — Z98.61 CAD S/P PERCUTANEOUS CORONARY ANGIOPLASTY: ICD-10-CM

## 2025-06-25 DIAGNOSIS — I48.0 PAROXYSMAL A-FIB (MULTI): ICD-10-CM

## 2025-06-25 DIAGNOSIS — N18.32 STAGE 3B CHRONIC KIDNEY DISEASE (MULTI): ICD-10-CM

## 2025-06-25 DIAGNOSIS — G47.33 OBSTRUCTIVE SLEEP APNEA SYNDROME: ICD-10-CM

## 2025-06-25 DIAGNOSIS — Z01.818 PRE-OPERATIVE CLEARANCE: ICD-10-CM

## 2025-06-25 DIAGNOSIS — E78.1 ESSENTIAL HYPERTRIGLYCERIDEMIA: ICD-10-CM

## 2025-06-25 DIAGNOSIS — I10 BENIGN ESSENTIAL HYPERTENSION: ICD-10-CM

## 2025-06-25 DIAGNOSIS — E11.9 TYPE 2 DIABETES MELLITUS WITHOUT RETINOPATHY (MULTI): ICD-10-CM

## 2025-06-25 DIAGNOSIS — Z78.9 NEVER SMOKED TOBACCO: ICD-10-CM

## 2025-06-25 DIAGNOSIS — E78.2 MIXED HYPERLIPIDEMIA: ICD-10-CM

## 2025-06-25 DIAGNOSIS — I25.10 CAD S/P PERCUTANEOUS CORONARY ANGIOPLASTY: ICD-10-CM

## 2025-06-25 PROBLEM — E66.811 CLASS 1 OBESITY DUE TO EXCESS CALORIES WITH SERIOUS COMORBIDITY AND BODY MASS INDEX (BMI) OF 34.0 TO 34.9 IN ADULT: Status: RESOLVED | Noted: 2023-08-24 | Resolved: 2025-06-25

## 2025-06-25 PROBLEM — E66.09 CLASS 1 OBESITY DUE TO EXCESS CALORIES WITH SERIOUS COMORBIDITY AND BODY MASS INDEX (BMI) OF 34.0 TO 34.9 IN ADULT: Status: RESOLVED | Noted: 2023-08-24 | Resolved: 2025-06-25

## 2025-06-25 PROCEDURE — 1159F MED LIST DOCD IN RCRD: CPT | Performed by: INTERNAL MEDICINE

## 2025-06-25 PROCEDURE — 3078F DIAST BP <80 MM HG: CPT | Performed by: INTERNAL MEDICINE

## 2025-06-25 PROCEDURE — 3074F SYST BP LT 130 MM HG: CPT | Performed by: INTERNAL MEDICINE

## 2025-06-25 PROCEDURE — 4010F ACE/ARB THERAPY RXD/TAKEN: CPT | Performed by: INTERNAL MEDICINE

## 2025-06-25 PROCEDURE — 1036F TOBACCO NON-USER: CPT | Performed by: INTERNAL MEDICINE

## 2025-06-25 PROCEDURE — 3044F HG A1C LEVEL LT 7.0%: CPT | Performed by: INTERNAL MEDICINE

## 2025-06-25 PROCEDURE — 99214 OFFICE O/P EST MOD 30 MIN: CPT | Performed by: INTERNAL MEDICINE

## 2025-06-25 NOTE — PROGRESS NOTES
CARDIOLOGY OFFICE VISIT      CHIEF COMPLAINT  Chief Complaint   Patient presents with    Follow-up     6 month follow-up for management of CAD, hypertension, hyperlipidemia & hypertriglyceridemia        HISTORY OF PRESENT ILLNESS  The patient states that he has been having a lot of problem with his right leg.  He apparently has significant tear in the tendons to the right hamstring.  He is undergoing physical therapy which is not helping.  He states that they thought he be too high risk heart wise for surgery.  I told him that he is a very acceptable risk for surgery if they feel that the best way to treat this.  He had his last ANTOINE on 9/30/2024.  Is very safe to stop his antiplatelet agent for surgery.  He also is on Eliquis with some mild decreased renal function and evidence safe to stop his Eliquis prior to the surgery.  He does not have any history of thromboembolic events.  He denies chest discomfort or symptoms of myocardial ischemia.  He does have some dyspnea with activities which I believe is due to his decreased physical activity due to his right leg problem.  I told him we will get echocardiogram to further evaluate.  He has had normal left ventricular systolic function in the past.  He denies prolonged palpitations or syncope.  He denies any problem with his current medications.    IMPRESSION:   1. Chronic kidney disease, stage III, followed by Dr Weller.   2. Coronary artery disease, no angina.  3. Multivessel PCI, most recently ANTOINE of mid LAD 9/30/2024  4. Essential hypertension.  5. Mixed hyperlipidemia.  6. Diabetes mellitus type 2.  7. Obesity.  8. Paroxysmal atrial fibrillation, will give patient samples of Eliquis. Will re evaluate with Clinical Pharmacy in April 2025  9. Septicemia secondary to infected IV site right forearm, October 2024. Resolved     Cardiac status is stable at this time.  The patient is a satisfactory risk from a cardiac standpoint for possible needed Hamstring surgery  "assuming his routine preop lab work and Echocardiogram are satisfactory. I discussed with patient will recommend he hold Eliquis x3 days prior to procedure.   Will refer patient back to Dr. Ayala to discuss second opinion regarding possible interventions on Hamstring complaints.     Please excuse any errors in grammar or translation related to this dictation. Voice recognition software was utilized to prepare this document.     Past Medical History  Medical History[1]    Social History  Social History[2]    Family History   Family History[3]     Allergies:  RX Allergies[4]     Outpatient Medications:  Current Outpatient Medications   Medication Instructions    amLODIPine (NORVASC) 5 mg, oral, Daily, as directed    apixaban (ELIQUIS) 5 mg, oral, 2 times daily    atorvastatin (LIPITOR) 40 mg, oral, Nightly    blood-glucose sensor (FreeStyle Maira 3 Plus Sensor) device Change every 15 days    cetirizine (ZYRTEC) 10 mg, Daily    clopidogrel (PLAVIX) 75 mg, oral, Daily    CRANBERRY ORAL 2 tablets, Daily    docusate sodium (COLACE) 100 mg, 2 times daily    finasteride (PROSCAR) 5 mg, oral, Daily    FreeStyle Maira 3 [place] misc Use as instructed    icosapent ethyL (Vascepa) 1 gram capsule oral, 2 times daily    insulin aspart (NovoLOG Flexpen U-100 Insulin) 100 unit/mL (3 mL) pen Inject 20 units before breakfast, 18 units before lunch, and 25 units before dinner plus sliding scale (MDD 93 units)    insulin degludec (Tresiba FlexTouch U-200) 200 unit/mL (3 mL) pen INJECT 108 UNITS UNDER THE SKIN DAILY AS DIRECTED    ketoconazole (NIZOral) 2 % shampoo Topical, 2 times weekly, Apply to scalp in shower. Lather, leave on 5 minutes then rinse    Lactobacillus acidophilus (PROBIOTIC ORAL) 1 tablet, Daily    metoprolol succinate XL (TOPROL-XL) 25 mg, oral, Daily    nitroglycerin (NITROSTAT) 0.4 mg, sublingual, Every 5 min PRN    pen needle, diabetic 31 gauge x 1/4\" needle Use to administer insulin up to 5 times daily    " potassium chloride CR 20 mEq ER tablet 20 mEq, oral, Daily    ranolazine (RANEXA) 500 mg, oral, Every 12 hours    semaglutide (Rybelsus) 14 mg tablet tablet TAKE 1 TABLET (14 MG) BY MOUTH ONCE DAILY.    tamsulosin (FLOMAX) 0.4 mg, oral, Daily    telmisartan (MICARDIS) 20 mg, oral, Every 24 hours    triamterene-hydrochlorothiazid (Maxzide-25) 37.5-25 mg tablet TAKE HALF TABLET PO DAILY          REVIEW OF SYSTEMS  Review of Systems   Musculoskeletal:  Positive for arthritis.   All other systems reviewed and are negative.        VITALS  Vitals:    06/25/25 1035   BP: 124/72   Pulse: 72       PHYSICAL EXAM  Vitals reviewed.   Constitutional:       Appearance: Normal and healthy appearance. Well-developed and not in distress.   Eyes:      Conjunctiva/sclera: Conjunctivae normal.      Pupils: Pupils are equal, round, and reactive to light.   Neck:      Vascular: No JVR. JVD normal.   Pulmonary:      Effort: Pulmonary effort is normal.      Breath sounds: Normal breath sounds. No wheezing. No rhonchi. No rales.   Chest:      Chest wall: Not tender to palpatation.   Cardiovascular:      PMI at left midclavicular line. Normal rate. Regular rhythm. Normal S1. Normal S2.       Murmurs: There is no murmur.      No gallop.  No click. No rub.   Pulses:     Intact distal pulses.   Edema:     Peripheral edema absent.   Abdominal:      Tenderness: There is no abdominal tenderness.   Musculoskeletal: Normal range of motion.         General: No tenderness.      Cervical back: Normal range of motion. Skin:     General: Skin is warm and dry.   Neurological:      General: No focal deficit present.      Mental Status: Alert and oriented to person, place and time.   Psychiatric:         Behavior: Behavior is cooperative.           ASSESSMENT AND PLAN  Diagnoses and all orders for this visit:  Stage 3b chronic kidney disease (Multi)  CAD S/P percutaneous coronary angioplasty  Benign essential hypertension  Essential  "hypertriglyceridemia  Mixed hyperlipidemia  Paroxysmal A-fib (Multi)  Type 2 diabetes mellitus without retinopathy (Multi)  BMI 35.0-35.9,adult  Obstructive sleep apnea syndrome  Never smoked tobacco  Pre-operative clearance          I,Fernando Martinez RN   am scribing for, and in the presence of Dr. Armando Spain MD  .    I, Dr. Armando Spain MD  , personally performed the services described in the documentation as scribed by Fernando Martinez RN   in my presence, and confirm it is both accurate and complete.      Dr. Armando Spain MD  Thank you for allowing me to participate in the care of this patient. Please do not hesitate to contact me with any further questions or concerns.         [1] History reviewed. No pertinent past medical history.  [2]   Social History  Tobacco Use    Smoking status: Never     Passive exposure: Never    Smokeless tobacco: Never   Vaping Use    Vaping status: Never Used   Substance Use Topics    Alcohol use: Not Currently    Drug use: Never   [3]   Family History  Problem Relation Name Age of Onset    Other (bone/cartilage disorder) Mother      Diabetes Mother      Gallbladder disease Mother      Diabetes Father      Other (cardiac disorder) Father      Nephrolithiasis Father      Prostate cancer Father      Other (bladder cancer) Father      Heart attack Father      Rheum arthritis Father      Skin cancer Father      Kidney nephrosis Father      Diabetes Paternal Grandfather      Skin cancer Paternal Grandfather     [4]   Allergies  Allergen Reactions    Adhesive Tape-Silicones Hives and Itching    Epinephrine Syncope    Latex Hives and Itching    Meperidine Other    Penicillins Other     Reports redness and feeling hot; Tolerated cephalosporins this admission on 10/2024    Promethazine Unknown    Sulfa (Sulfonamide Antibiotics) Other     \"Flushed\"     "

## 2025-06-25 NOTE — PATIENT INSTRUCTIONS
Patient to follow up in  3 months with Dr. Armando Castañeda MD      No changes today.   Would suggest seeing another orthopedic surgeon for second opinion. Possibly  seeing Dr. Ayala again   Will arrange Echo in near future for risk stratification. Will call with results.     Continue same medications and treatments.   Patient educated on proper medication use.   Patient educated on risk factor modification.   Please bring any lab results from other providers / physicians to your next appointment.     Please bring all medicines, vitamins, and herbal supplements with you when you come to the office.     Prescriptions will not be filled unless you are compliant with your follow up appointments or have a follow up appointment scheduled as per instruction of your physician. Refills should be requested at the time of your visit.    I, Fernando Martinez RN am scribing for and in the presence of Dr. Armando Antonio MD

## 2025-07-11 ENCOUNTER — APPOINTMENT (OUTPATIENT)
Dept: ORTHOPEDIC SURGERY | Facility: CLINIC | Age: 70
End: 2025-07-11
Payer: MEDICARE

## 2025-07-13 LAB
ALBUMIN SERPL-MCNC: 4.2 G/DL (ref 3.6–5.1)
ALBUMIN/CREAT UR: NORMAL
ALP SERPL-CCNC: 96 U/L (ref 35–144)
ALT SERPL-CCNC: 22 U/L (ref 9–46)
ANION GAP SERPL CALCULATED.4IONS-SCNC: 9 MMOL/L (CALC) (ref 7–17)
AST SERPL-CCNC: 14 U/L (ref 10–35)
BASOPHILS # BLD AUTO: 70 CELLS/UL (ref 0–200)
BASOPHILS NFR BLD AUTO: 0.8 %
BILIRUB SERPL-MCNC: 1.5 MG/DL (ref 0.2–1.2)
BUN SERPL-MCNC: 28 MG/DL (ref 7–25)
CALCIUM SERPL-MCNC: 9.2 MG/DL (ref 8.6–10.3)
CHLORIDE SERPL-SCNC: 103 MMOL/L (ref 98–110)
CHOLEST SERPL-MCNC: 160 MG/DL
CHOLEST/HDLC SERPL: 5.7 (CALC)
CO2 SERPL-SCNC: 24 MMOL/L (ref 20–32)
CREAT SERPL-MCNC: 1.57 MG/DL (ref 0.7–1.28)
CREAT UR-MCNC: NORMAL MG/DL
EGFRCR SERPLBLD CKD-EPI 2021: 47 ML/MIN/1.73M2
EOSINOPHIL # BLD AUTO: 104 CELLS/UL (ref 15–500)
EOSINOPHIL NFR BLD AUTO: 1.2 %
ERYTHROCYTE [DISTWIDTH] IN BLOOD BY AUTOMATED COUNT: 12.3 % (ref 11–15)
GLUCOSE SERPL-MCNC: 265 MG/DL (ref 65–99)
HCT VFR BLD AUTO: 41.6 % (ref 38.5–50)
HDLC SERPL-MCNC: 28 MG/DL
HGB BLD-MCNC: 13.9 G/DL (ref 13.2–17.1)
LDLC SERPL CALC-MCNC: ABNORMAL MG/DL
LYMPHOCYTES # BLD AUTO: 1714 CELLS/UL (ref 850–3900)
LYMPHOCYTES NFR BLD AUTO: 19.7 %
MCH RBC QN AUTO: 31.4 PG (ref 27–33)
MCHC RBC AUTO-ENTMCNC: 33.4 G/DL (ref 32–36)
MCV RBC AUTO: 93.9 FL (ref 80–100)
MICROALBUMIN UR-MCNC: NORMAL
MONOCYTES # BLD AUTO: 861 CELLS/UL (ref 200–950)
MONOCYTES NFR BLD AUTO: 9.9 %
NEUTROPHILS # BLD AUTO: 5951 CELLS/UL (ref 1500–7800)
NEUTROPHILS NFR BLD AUTO: 68.4 %
NONHDLC SERPL-MCNC: 132 MG/DL (CALC)
PLATELET # BLD AUTO: 154 THOUSAND/UL (ref 140–400)
PMV BLD REES-ECKER: 10.3 FL (ref 7.5–12.5)
POTASSIUM SERPL-SCNC: 4.6 MMOL/L (ref 3.5–5.3)
PROT SERPL-MCNC: 6.8 G/DL (ref 6.1–8.1)
RBC # BLD AUTO: 4.43 MILLION/UL (ref 4.2–5.8)
SODIUM SERPL-SCNC: 136 MMOL/L (ref 135–146)
TRIGL SERPL-MCNC: 590 MG/DL
TSH SERPL-ACNC: 1.82 MIU/L (ref 0.4–4.5)
WBC # BLD AUTO: 8.7 THOUSAND/UL (ref 3.8–10.8)

## 2025-07-14 ENCOUNTER — APPOINTMENT (OUTPATIENT)
Dept: ORTHOPEDIC SURGERY | Facility: CLINIC | Age: 70
End: 2025-07-14
Payer: MEDICARE

## 2025-07-14 DIAGNOSIS — S76.311A HAMSTRING TENDON RUPTURE, RIGHT, INITIAL ENCOUNTER: ICD-10-CM

## 2025-07-14 DIAGNOSIS — S76.319A STRAIN OF INSERTION OF TENDON OF HAMSTRING MUSCLE: ICD-10-CM

## 2025-07-14 LAB
ALBUMIN SERPL-MCNC: 4.2 G/DL (ref 3.6–5.1)
ALBUMIN/CREAT UR: 28 MG/G CREAT
ALP SERPL-CCNC: 96 U/L (ref 35–144)
ALT SERPL-CCNC: 22 U/L (ref 9–46)
ANION GAP SERPL CALCULATED.4IONS-SCNC: 9 MMOL/L (CALC) (ref 7–17)
AST SERPL-CCNC: 14 U/L (ref 10–35)
BASOPHILS # BLD AUTO: 70 CELLS/UL (ref 0–200)
BASOPHILS NFR BLD AUTO: 0.8 %
BILIRUB SERPL-MCNC: 1.5 MG/DL (ref 0.2–1.2)
BUN SERPL-MCNC: 28 MG/DL (ref 7–25)
CALCIUM SERPL-MCNC: 9.2 MG/DL (ref 8.6–10.3)
CHLORIDE SERPL-SCNC: 103 MMOL/L (ref 98–110)
CHOLEST SERPL-MCNC: 160 MG/DL
CHOLEST/HDLC SERPL: 5.7 (CALC)
CO2 SERPL-SCNC: 24 MMOL/L (ref 20–32)
CREAT SERPL-MCNC: 1.57 MG/DL (ref 0.7–1.28)
CREAT UR-MCNC: 113 MG/DL (ref 20–320)
EGFRCR SERPLBLD CKD-EPI 2021: 47 ML/MIN/1.73M2
EOSINOPHIL # BLD AUTO: 104 CELLS/UL (ref 15–500)
EOSINOPHIL NFR BLD AUTO: 1.2 %
ERYTHROCYTE [DISTWIDTH] IN BLOOD BY AUTOMATED COUNT: 12.3 % (ref 11–15)
GLUCOSE SERPL-MCNC: 265 MG/DL (ref 65–99)
HCT VFR BLD AUTO: 41.6 % (ref 38.5–50)
HDLC SERPL-MCNC: 28 MG/DL
HGB BLD-MCNC: 13.9 G/DL (ref 13.2–17.1)
LDLC SERPL CALC-MCNC: ABNORMAL MG/DL
LYMPHOCYTES # BLD AUTO: 1714 CELLS/UL (ref 850–3900)
LYMPHOCYTES NFR BLD AUTO: 19.7 %
MCH RBC QN AUTO: 31.4 PG (ref 27–33)
MCHC RBC AUTO-ENTMCNC: 33.4 G/DL (ref 32–36)
MCV RBC AUTO: 93.9 FL (ref 80–100)
MICROALBUMIN UR-MCNC: 3.2 MG/DL
MONOCYTES # BLD AUTO: 861 CELLS/UL (ref 200–950)
MONOCYTES NFR BLD AUTO: 9.9 %
NEUTROPHILS # BLD AUTO: 5951 CELLS/UL (ref 1500–7800)
NEUTROPHILS NFR BLD AUTO: 68.4 %
NONHDLC SERPL-MCNC: 132 MG/DL (CALC)
PLATELET # BLD AUTO: 154 THOUSAND/UL (ref 140–400)
PMV BLD REES-ECKER: 10.3 FL (ref 7.5–12.5)
POTASSIUM SERPL-SCNC: 4.6 MMOL/L (ref 3.5–5.3)
PROT SERPL-MCNC: 6.8 G/DL (ref 6.1–8.1)
RBC # BLD AUTO: 4.43 MILLION/UL (ref 4.2–5.8)
SODIUM SERPL-SCNC: 136 MMOL/L (ref 135–146)
TRIGL SERPL-MCNC: 590 MG/DL
TSH SERPL-ACNC: 1.82 MIU/L (ref 0.4–4.5)
WBC # BLD AUTO: 8.7 THOUSAND/UL (ref 3.8–10.8)

## 2025-07-14 PROCEDURE — 99212 OFFICE O/P EST SF 10 MIN: CPT | Performed by: ORTHOPAEDIC SURGERY

## 2025-07-14 PROCEDURE — 4010F ACE/ARB THERAPY RXD/TAKEN: CPT | Performed by: ORTHOPAEDIC SURGERY

## 2025-07-14 PROCEDURE — 99213 OFFICE O/P EST LOW 20 MIN: CPT | Performed by: ORTHOPAEDIC SURGERY

## 2025-07-14 PROCEDURE — 3044F HG A1C LEVEL LT 7.0%: CPT | Performed by: ORTHOPAEDIC SURGERY

## 2025-07-14 RX ORDER — PREDNISONE 10 MG/1
TABLET ORAL
Qty: 30 TABLET | Refills: 0 | Status: SHIPPED | OUTPATIENT
Start: 2025-07-14

## 2025-07-14 NOTE — PROGRESS NOTES
History of present illness: Patient with what appears to be a complete hamstring rupture back in early June    He has been doing rehab at [facility] and then lifted a tire and aggravated now has more pain and some discomfort when he sits stands and gets up and down        Physical exam:    General: No acute distress or breathing difficulty or discomfort, pleasant and cooperative with the examination.    Extremities: Right leg moves freely and well there is no palpable defect    There is no gross ecchymosis bruising or swelling that you typically see with an acute rupture    He does have persistent pain when he sits and gets up and down over the hamstring insertion    There is no pain with logroll flexion extension and rotation of the leg  She can straight leg raise he has no radicular symptoms there is no pain over the trochanteric bursa there is no weakness of the abductors when he stands    Diagnostic studies: Right hip MRI shows near full-thickness rupture hamstring tendon but about 2 and half centimeters of retraction    Impression: Near complete hamstring rupture with about 2 and half centimeters retraction    The patient has multiple medical comorbidities including fairly significant renal disease cardiac disease and he is on Eliquis and Plavix    Plan: Mainstay of treatment with his medical comorbidities and only a 2-1/2 cm retracted tear would be nonsurgical management certainly surgical could be considered an option    Each treatment pathway has its own unique risk    He was doing pretty well with therapy and nonsurgical management till he aggravated it this weekend picking up a tire    Put him on a prednisone Dosepak order more therapy see him back 3 to 4 weeks       I did recommend that he talk to Dr. Santana Anna about a second opinion about what would be involved in surgical repair at his age and with his medical comorbidities

## 2025-07-17 ENCOUNTER — APPOINTMENT (OUTPATIENT)
Dept: PRIMARY CARE | Facility: CLINIC | Age: 70
End: 2025-07-17
Payer: MEDICARE

## 2025-07-17 ENCOUNTER — OFFICE VISIT (OUTPATIENT)
Dept: PRIMARY CARE | Facility: CLINIC | Age: 70
End: 2025-07-17
Payer: MEDICARE

## 2025-07-17 VITALS
WEIGHT: 273 LBS | SYSTOLIC BLOOD PRESSURE: 127 MMHG | HEIGHT: 73 IN | DIASTOLIC BLOOD PRESSURE: 78 MMHG | BODY MASS INDEX: 36.18 KG/M2 | TEMPERATURE: 97.3 F | HEART RATE: 69 BPM

## 2025-07-17 DIAGNOSIS — E78.1 ESSENTIAL HYPERTRIGLYCERIDEMIA: Primary | ICD-10-CM

## 2025-07-17 DIAGNOSIS — N18.31 STAGE 3A CHRONIC KIDNEY DISEASE (MULTI): ICD-10-CM

## 2025-07-17 DIAGNOSIS — I25.10 CAD S/P PERCUTANEOUS CORONARY ANGIOPLASTY: ICD-10-CM

## 2025-07-17 DIAGNOSIS — G47.33 OBSTRUCTIVE SLEEP APNEA SYNDROME: ICD-10-CM

## 2025-07-17 DIAGNOSIS — Z12.11 COLON CANCER SCREENING: ICD-10-CM

## 2025-07-17 DIAGNOSIS — I48.0 PAROXYSMAL A-FIB (MULTI): ICD-10-CM

## 2025-07-17 DIAGNOSIS — Z98.61 CAD S/P PERCUTANEOUS CORONARY ANGIOPLASTY: ICD-10-CM

## 2025-07-17 DIAGNOSIS — I10 BENIGN ESSENTIAL HYPERTENSION: ICD-10-CM

## 2025-07-17 PROBLEM — E66.01 CLASS 2 SEVERE OBESITY DUE TO EXCESS CALORIES WITH SERIOUS COMORBIDITY AND BODY MASS INDEX (BMI) OF 36.0 TO 36.9 IN ADULT: Status: RESOLVED | Noted: 2023-08-24 | Resolved: 2025-06-25

## 2025-07-17 PROBLEM — E66.812 CLASS 2 SEVERE OBESITY DUE TO EXCESS CALORIES WITH SERIOUS COMORBIDITY AND BODY MASS INDEX (BMI) OF 36.0 TO 36.9 IN ADULT: Status: RESOLVED | Noted: 2023-08-24 | Resolved: 2025-06-25

## 2025-07-17 PROCEDURE — 3008F BODY MASS INDEX DOCD: CPT | Performed by: INTERNAL MEDICINE

## 2025-07-17 PROCEDURE — 4010F ACE/ARB THERAPY RXD/TAKEN: CPT | Performed by: INTERNAL MEDICINE

## 2025-07-17 PROCEDURE — 3074F SYST BP LT 130 MM HG: CPT | Performed by: INTERNAL MEDICINE

## 2025-07-17 PROCEDURE — 1159F MED LIST DOCD IN RCRD: CPT | Performed by: INTERNAL MEDICINE

## 2025-07-17 PROCEDURE — 99213 OFFICE O/P EST LOW 20 MIN: CPT | Performed by: INTERNAL MEDICINE

## 2025-07-17 PROCEDURE — 3044F HG A1C LEVEL LT 7.0%: CPT | Performed by: INTERNAL MEDICINE

## 2025-07-17 PROCEDURE — 1036F TOBACCO NON-USER: CPT | Performed by: INTERNAL MEDICINE

## 2025-07-17 PROCEDURE — 3078F DIAST BP <80 MM HG: CPT | Performed by: INTERNAL MEDICINE

## 2025-07-17 ASSESSMENT — ENCOUNTER SYMPTOMS
NEUROLOGICAL NEGATIVE: 1
CARDIOVASCULAR NEGATIVE: 1
DEPRESSION: 0
CONSTITUTIONAL NEGATIVE: 1
OCCASIONAL FEELINGS OF UNSTEADINESS: 0
LOSS OF SENSATION IN FEET: 0
GASTROINTESTINAL NEGATIVE: 1
ARTHRALGIAS: 1
RESPIRATORY NEGATIVE: 1

## 2025-07-17 NOTE — PROGRESS NOTES
"Subjective   Patient ID: Manny Reveles \"Forrest" is a 70 y.o. male who presents for Follow-up (4 mo fu).  HPI  Hyperlipidemia  This is a chronic problem. The current episode started more than 1 year ago. The problem is resistant. Recent lipid tests were reviewed and are variable. Exacerbating diseases include chronic renal disease, diabetes and obesity. Current antihyperlipidemic treatment includes statins and fibric acid derivatives.     Diabetes  He presents for his follow-up diabetic visit. He has type 2 diabetes mellitus. His disease course has been stable. There are no hypoglycemic associated symptoms. Pertinent negatives for diabetes include no blurred vision, no chest pain, no fatigue and no weakness. There are no hypoglycemic complications. Symptoms are stable. Diabetic complications include nephropathy. Pertinent negatives for diabetic complications include no autonomic neuropathy or heart disease. Risk factors for coronary artery disease include diabetes mellitus, male sex, obesity, stress and sedentary lifestyle. He is compliant with treatment all of the time. He is following a diabetic and generally healthy diet. He rarely participates in exercise. His home blood glucose trend is fluctuating minimally. An ACE inhibitor/angiotensin II receptor blocker is being taken. He does not see a podiatrist.Eye exam is current. Sugars high as on steroids  Heart Problem  This is a chronic problem. The current episode started more than 1 year ago. The problem occurs rarely. Pertinent negatives include no chest pain, chills, congestion, fatigue, myalgias, numbness, vertigo or weakness. The treatment provided significant relief.   Hypertension  This is a chronic problem. The current episode started more than 1 year ago. The problem has been resolved since onset. The problem is controlled. Pertinent negatives include no blurred vision or chest pain. Risk factors for coronary artery disease include sedentary lifestyle, " "dyslipidemia and diabetes mellitus. Past treatments include angiotensin blockers, beta blockers, calcium channel blockers and diuretics. The current treatment provides significant improvement. Hypertensive end-organ damage includes kidney disease and CAD/MI. Identifiable causes of hypertension include chronic renal disease.     Past Medical History  Medical History[1]    Social History  Social History[2]    Family History   Family History[3]    Allergies:  RX Allergies[4]     Outpatient Medications:  Current Outpatient Medications   Medication Instructions    amLODIPine (NORVASC) 5 mg, oral, Daily, as directed    apixaban (ELIQUIS) 5 mg, oral, 2 times daily    atorvastatin (LIPITOR) 40 mg, oral, Nightly    blood-glucose sensor (FreeStyle Maira 3 Plus Sensor) device Change every 15 days    cetirizine (ZYRTEC) 10 mg, Daily    clopidogrel (PLAVIX) 75 mg, oral, Daily    CRANBERRY ORAL 2 tablets, Daily    docusate sodium (COLACE) 100 mg, 2 times daily    finasteride (PROSCAR) 5 mg, oral, Daily    FreeStyle Maira 3 Marion misc Use as instructed    icosapent ethyL (Vascepa) 1 gram capsule oral, 2 times daily    insulin aspart (NovoLOG Flexpen U-100 Insulin) 100 unit/mL (3 mL) pen Inject 20 units before breakfast, 18 units before lunch, and 25 units before dinner plus sliding scale (MDD 93 units)    insulin degludec (Tresiba FlexTouch U-200) 200 unit/mL (3 mL) pen INJECT 108 UNITS UNDER THE SKIN DAILY AS DIRECTED    ketoconazole (NIZOral) 2 % shampoo Topical, 2 times weekly, Apply to scalp in shower. Lather, leave on 5 minutes then rinse    Lactobacillus acidophilus (PROBIOTIC ORAL) 1 tablet, Daily    metoprolol succinate XL (TOPROL-XL) 25 mg, oral, Daily    nitroglycerin (NITROSTAT) 0.4 mg, sublingual, Every 5 min PRN    pen needle, diabetic 31 gauge x 1/4\" needle Use to administer insulin up to 5 times daily    potassium chloride CR 20 mEq ER tablet 20 mEq, oral, Daily    predniSONE (Deltasone) 10 mg tablet 50MG FOR 2 " "DAYS, 40MG FOR 2 DAYS, 30MG FOR 2 DAYS, 20MG FOR 2 DAYS, 10MG FOR 2 DAYS    ranolazine (RANEXA) 500 mg, oral, Every 12 hours    semaglutide (Rybelsus) 14 mg tablet tablet TAKE 1 TABLET (14 MG) BY MOUTH ONCE DAILY.    tamsulosin (FLOMAX) 0.4 mg, oral, Daily    telmisartan (MICARDIS) 20 mg, oral, Every 24 hours    triamterene-hydrochlorothiazid (Maxzide-25) 37.5-25 mg tablet TAKE HALF TABLET PO DAILY        Review of Systems   Constitutional: Negative.    Respiratory: Negative.     Cardiovascular: Negative.    Gastrointestinal: Negative.    Musculoskeletal:  Positive for arthralgias.   Neurological: Negative.          Objective       Physical Exam  Vitals reviewed.   Constitutional:       Appearance: Normal appearance. He is obese.   HENT:      Head: Normocephalic.     Eyes:      Conjunctiva/sclera: Conjunctivae normal.       Cardiovascular:      Rate and Rhythm: Normal rate and regular rhythm.      Pulses: Normal pulses.   Pulmonary:      Effort: Pulmonary effort is normal.      Breath sounds: Normal breath sounds.   Abdominal:      General: Abdomen is protuberant.      Palpations: Abdomen is soft.     Musculoskeletal:         General: Normal range of motion.      Cervical back: Neck supple.     Skin:     General: Skin is warm and dry.     Neurological:      General: No focal deficit present.     Psychiatric:         Mood and Affect: Mood normal.       /78 (BP Location: Left arm, Patient Position: Sitting, BP Cuff Size: Adult)   Pulse 69   Temp 36.3 °C (97.3 °F) (Temporal)   Ht 1.854 m (6' 1\")   Wt 124 kg (273 lb)   BMI 36.02 kg/m²      Assessment/Plan   Problem List Items Addressed This Visit       Stage 3 chronic kidney disease (Multi)    Obstructive sleep apnea syndrome    Essential hypertriglyceridemia - Primary    CAD S/P percutaneous coronary angioplasty    Benign essential hypertension    Paroxysmal A-fib (Multi)   It was a hamstring tear, hip joint itself was intact, gradually it is going to get " better, GFR is 46 stage IIIa CKD remains, fluctuates.  Blood sugars are high, target in range is high, was given steroids by orthopedics, it should come down, no microalbuminuria noticed, triglycerides went up again, he has a tendency to have fluctuating triglycerides, no chest pain, no angina, BP readings are stable, he is in sinus rhythm, he remains on multiple therapeutics, unfortunately these therapeutics are warranted.  Finally he is feeling better, either he could have angina or ureteric colic or kidney stone or some sort of new problems but he always goes through 8, very much compliant, very much concerned about his medical condition and care and we always go through his therapeutic profile and medical conditions properly.  He has started working part-time, colonoscopy will be done because last colonoscopy was done 2 years ago and preparation was poor, he needs a 2-day preparation at this time, he remains obese, stress of the life fluctuates, no new medications were added, follow-up in 4 months.       [1] History reviewed. No pertinent past medical history.  [2]   Social History  Tobacco Use    Smoking status: Never     Passive exposure: Never    Smokeless tobacco: Never   Vaping Use    Vaping status: Never Used   Substance Use Topics    Alcohol use: Not Currently    Drug use: Never   [3]   Family History  Problem Relation Name Age of Onset    Other (bone/cartilage disorder) Mother      Diabetes Mother      Gallbladder disease Mother      Diabetes Father      Other (cardiac disorder) Father      Nephrolithiasis Father      Prostate cancer Father      Other (bladder cancer) Father      Heart attack Father      Rheum arthritis Father      Skin cancer Father      Kidney nephrosis Father      Diabetes Paternal Grandfather      Skin cancer Paternal Grandfather     [4]   Allergies  Allergen Reactions    Adhesive Tape-Silicones Hives and Itching    Epinephrine Syncope    Latex Hives and Itching    Meperidine Other  "   Penicillins Other     Reports redness and feeling hot; Tolerated cephalosporins this admission on 10/2024    Promethazine Unknown    Sulfa (Sulfonamide Antibiotics) Other     \"Flushed\"     "

## 2025-07-21 ENCOUNTER — HOSPITAL ENCOUNTER (OUTPATIENT)
Dept: CARDIOLOGY | Facility: CLINIC | Age: 70
Discharge: HOME | End: 2025-07-21
Payer: MEDICARE

## 2025-07-21 DIAGNOSIS — I10 BENIGN ESSENTIAL HYPERTENSION: ICD-10-CM

## 2025-07-21 DIAGNOSIS — Z01.818 PRE-OPERATIVE CLEARANCE: ICD-10-CM

## 2025-07-21 DIAGNOSIS — I48.0 PAROXYSMAL A-FIB (MULTI): ICD-10-CM

## 2025-07-21 PROCEDURE — 93306 TTE W/DOPPLER COMPLETE: CPT

## 2025-07-21 PROCEDURE — 93306 TTE W/DOPPLER COMPLETE: CPT | Performed by: INTERNAL MEDICINE

## 2025-07-21 NOTE — PROGRESS NOTES
Chief Complaint   Patient presents with    Right Leg - New Patient Visit     Hamstring strain     History of Present Illness:  Manny Reveles is a 70 y.o. male presenting to clinic as a new patient  for his right hip.  He was seen by Dr. Cj Ayala on 25 for his right hamstring rupture in . It was getting better but he had a flare up which made it feel worse. Now it is starting to feel better again.     Imaging:  X-rays right hip: Shows a partial, high-grade, full-thickness tear 2.5 cm retracted.      Assessment:   Non-op management right hamstring tear    Plan:  We reviewed the role of imaging, physical therapy, injections and the time frame to healing and correlation with outcome.  Reassurance  Continue non-operative care. We discussed the risk of recurrence, incomplete healing, and need for surgery.   Follow-up in 6 weeks.      Physical Exam:  Right Hip:  Normal gait, Negative Trendelenburg sign  Pain in hamstring  Skin healthy and intact  Negative straight leg raise  Neurovascular exam normal distally      Review of Systems:  GENERAL: Negative for malaise, significant weight loss, fever  MUSCULOSKELETAL: See HPI  NEURO:  Negative     Medical History[1]    Medication Documentation Review Audit       Reviewed by Radha Maxwell MA (Medical Assistant) on 25 at 1309      Medication Order Taking? Sig Documenting Provider Last Dose Status   amLODIPine (Norvasc) 5 mg tablet [id number]  Take 1 tablet (5 mg) by mouth once daily. as directed Sherwin Mayo MD  Active   apixaban (Eliquis) 5 mg tablet [id number]  Take 1 tablet (5 mg) by mouth 2 times a day. Evan Hansen MD  Active   atorvastatin (Lipitor) 40 mg tablet [id number] No Take 1 tablet (40 mg) by mouth once daily at bedtime. Armando Antonio MD Taking  25 [phone]   blood-glucose sensor (FreeStyle Maira 3 Plus Sensor) device [id number]  Change every 15 days Stephanie Carney MD  Active   cetirizine (ZyrTEC) 10 mg tablet  "[id number] No Take 1 tablet (10 mg) by mouth once daily. Historical Provider, MD Taking Active   clopidogrel (Plavix) 75 mg tablet [id number]  Take 1 tablet (75 mg) by mouth once daily. Armando Antonio MD  Active   CRANBERRY ORAL [id number] No Take 2 tablets by mouth once daily. Historical Provider, MD Taking Active   docusate sodium (Colace) 100 mg capsule [id number] No Take 1 capsule (100 mg) by mouth 2 times a day. Historical Provider, MD Taking Active   finasteride (Proscar) 5 mg tablet [id number] No Take 1 tablet (5 mg) by mouth once daily. Brandon Perdue MD Taking Active   FreeStyle Maira 3 [place] misc [id number]  Use as instructed Stephanie Carney MD  Active   icosapent ethyL (Vascepa) 1 gram capsule [id number]  TAKE 2 CAPSULES (2 G) BY MOUTH 2 TIMES A DAY. Sherwin Mayo MD  Active   insulin aspart (NovoLOG Flexpen U-100 Insulin) 100 unit/mL (3 mL) pen [id number]  Inject 20 units before breakfast, 18 units before lunch, and 25 units before dinner plus sliding scale (MDD 93 units) Stephanie Carney MD  Active   insulin degludec (Tresiba FlexTouch U-200) 200 unit/mL (3 mL) pen [id number]  INJECT 108 UNITS UNDER THE SKIN DAILY AS DIRECTED Stephanie Carney MD  Active   ketoconazole (NIZOral) 2 % shampoo [id number]  APPLY TOPICALLY 2 TIMES A WEEK. APPLY TO SCALP IN SHOWER. LATHER, LEAVE ON 5 MINUTES THEN RINSE Tan Kahn MD  Active   Lactobacillus acidophilus (PROBIOTIC ORAL) [id number] No Take 1 tablet by mouth early in the morning.. Historical Provider, MD Taking Active   metoprolol succinate XL (Toprol-XL) 25 mg 24 hr tablet [id number]  Take 1 tablet (25 mg) by mouth once daily. Sherwin Mayo MD  Active   nitroglycerin (Nitrostat) 0.4 mg SL tablet [id number]  Place 1 tablet (0.4 mg) under the tongue every 5 minutes if needed for chest pain. Armando Antonio MD  Active   pen needle, diabetic 31 gauge x 1/4\" needle [id number]  Use to administer insulin up to 5 times daily Stephanie Carney MD  " Active   potassium chloride CR 20 mEq ER tablet [id number]  Take 1 tablet (20 mEq) by mouth once daily. Sherwin Mayo MD  Active   predniSONE (Deltasone) 10 mg tablet [id number]  50MG FOR 2 DAYS, 40MG FOR 2 DAYS, 30MG FOR 2 DAYS, 20MG FOR 2 DAYS, 10MG FOR 2 DAYS Cj Ayala MD  Active   ranolazine (Ranexa) 500 mg 12 hr tablet [id number]  TAKE 1 TABLET BY MOUTH EVERY 12 HOURS Armando Antonio MD  Active   semaglutide (Rybelsus) 14 mg tablet tablet [id number]  TAKE 1 TABLET (14 MG) BY MOUTH ONCE DAILY. Stephanie Carney MD  Active   tamsulosin (Flomax) 0.4 mg 24 hr capsule [id number] No Take 1 capsule (0.4 mg) by mouth once daily. Brandon Perdue MD Taking Active   telmisartan (MIcarDIS) 20 mg tablet [id number]  Take 1 tablet (20 mg) by mouth once every 24 hours. Sherwin Mayo MD  Active   triamterene-hydrochlorothiazid (Maxzide-25) 37.5-25 mg tablet [id number]  TAKE HALF TABLET PO DAILY Sherwin Mayo MD  Active                    RX Allergies[2]    Social History     Socioeconomic History    Marital status:      Spouse name: Not on file    Number of children: Not on file    Years of education: Not on file    Highest education level: Not on file   Occupational History    Not on file   Tobacco Use    Smoking status: Never     Passive exposure: Never    Smokeless tobacco: Never   Vaping Use    Vaping status: Never Used   Substance and Sexual Activity    Alcohol use: Not Currently    Drug use: Never    Sexual activity: Defer   Other Topics Concern    Not on file   Social History Narrative    Not on file     Social Drivers of Health     Financial Resource Strain: Low Risk  (10/2/2024)    Overall Financial Resource Strain (CARDIA)     Difficulty of Paying Living Expenses: Not hard at all   Food Insecurity: No Food Insecurity (10/2/2024)    Hunger Vital Sign     Worried About Running Out of Food in the Last Year: Never true     Ran Out of Food in the Last Year: Never true   Transportation Needs: No  Transportation Needs (11/21/2024)    OASIS : Transportation     Lack of Transportation (Medical): No     Lack of Transportation (Non-Medical): No     Patient Unable or Declines to Respond: No   Physical Activity: Inactive (10/2/2024)    Exercise Vital Sign     Days of Exercise per Week: 0 days     Minutes of Exercise per Session: 0 min   Stress: No Stress Concern Present (10/2/2024)    [employer] [place] [employer] - Occupational Stress Questionnaire     Feeling of Stress : Not at all   Social Connections: Feeling Socially Integrated (11/21/2024)    OASIS : Social Isolation     Frequency of experiencing loneliness or isolation: Never   Intimate Partner Violence: Not At Risk (10/2/2024)    Humiliation, Afraid, Rape, and Kick questionnaire     Fear of Current or Ex-Partner: No     Emotionally Abused: No     Physically Abused: No     Sexually Abused: No   Housing Stability: Low Risk  (10/2/2024)    Housing Stability Vital Sign     Unable to Pay for Housing in the Last Year: No     Number of Times Moved in the Last Year: 0     Homeless in the Last Year: No       Surgical History[3]    Imaging  No results found.    Cardiology, Vascular, and Other Imaging  Transthoracic echo (TTE) complete  Result Date: 7/22/2025              [address], [address], [place] [address]          Tel [phone] Fax [phone] TRANSTHORACIC ECHOCARDIOGRAM REPORT Patient Name:       HILARIO Roper Physician:    [id number] Romain Pedroza DO Study Date:         7/21/2025           Ordering Provider:    [id number] SHREE LINARES MRN/PID:            [id number]            Fellow: Accession#:         [id number]        Nurse: Date of Birth/Age:  1955 / 70 years Sonographer:          Romain Peralta Gender Assigned at  M                    Additional Staff: Birth: Height:             185.42 cm           Admit Date:           7/21/2025 Weight:             121.56 kg           Admission Status:     Outpatient BSA / BMI:          2.44 m2 / 35.36     Department Location:  [facility]                     kg/m2                                     Ilene Treadwell Blood Pressure: 140 /60 mmHg Study Type:    TRANSTHORACIC ECHO (TTE) COMPLETE Diagnosis/ICD: Paroxysmal atrial fibrillation-I48.0; Essential (primary)                hypertension-I10; Encounter for other preprocedural                examination-Z01.818 Indication:    AF, HTN, Pre-op eval CPT Codes:     Echo Complete w Full Doppler-44502 Patient History: Diabetes:          Yes Pertinent History: A-Fib, CAD, HTN, Hyperlipidemia and PTCA. Study Detail: The following Echo studies were performed: M-Mode, 2D, Doppler and               color flow. The patient was awake.  PHYSICIAN INTERPRETATION: Left Ventricle: The left ventricular systolic function is normal with a visually estimated ejection fraction of 60-65%. There is moderate concentric left ventricular hypertrophy. There are no regional wall motion abnormalities. The left ventricular cavity size is normal. There is mild increased septal and moderately increased posterior left ventricular wall thickness. Spectral Doppler shows a Grade I (impaired relaxation pattern) of left ventricular diastolic filling with normal left atrial filling pressure. LV Wall Scoring: All segments are normal. Left Atrium: The left atrial size is normal. Right Ventricle: The right ventricle is normal in size. There is normal right ventricular global systolic function. Right Atrium: The right atrial size is normal. Aortic Valve: The aortic valve appears structurally normal. The aortic valve area by VTI is 1.81 cmï¿½ with a peak velocity of 1.93 m/s. The peak and mean gradients are 15 mmHg and 8 mmHg, respectively, with a dimensionless index of 0.64. There is mild aortic  valve cusp calcification. There is no evidence of aortic valve regurgitation. Mitral Valve: The mitral valve is normal in structure. The doppler estimated peak and mean diastolic gradients are 5 mmHg and 2 mmHg, respectively. There is no evidence of mitral valve stenosis. There is normal mitral valve leaflet mobility. There is mild mitral valve regurgitation. The E Vmax is 0.76 m/s. Tricuspid Valve: The tricuspid valve is structurally normal. There is normal tricuspid valve leaflet mobility. There is mild tricuspid regurgitation. The Doppler estimated right ventricular systolic pressure (RVSP) is within normal limits at 27 mmHg. Pulmonic Valve: The pulmonic valve is structurally normal. There is mild pulmonic valve regurgitation. Pericardium: No pericardial effusion noted. Aorta: The aortic root is normal. There is mild dilatation of the ascending aorta. Pulmonary Artery: The main pulmonary artery is normal in size, and position, with normal bifurcation into the left and right pulmonary arteries. Systemic Veins: The inferior vena cava appears normal in size, with IVC inspiratory collapse greater than 50%. In comparison to the previous echocardiogram(s): The left ventricular hypertrophy is unchanged. The left ventricular diastolic function is worse.  CONCLUSIONS:  1. The left ventricular systolic function is normal with a visually estimated ejection fraction of 60-65%.  2. No regional wall motion abnormalities.  3. Spectral Doppler shows a Grade I (impaired relaxation pattern) of left ventricular diastolic filling with normal left atrial filling pressure.  4. There is normal right ventricular global systolic function.  5. Normal sized right ventricle.  6. There is no evidence of mitral valve stenosis with a doppler estimated mean diastolic gradient of 2 mmHg.  7. Mild mitral valve regurgitation.  8. Mild tricuspid regurgitation is visualized.  9. The Doppler estimated RVSP is within normal limits at 27 mmHg. 10. The  main pulmonary artery is normal in size, and position, with normal bifurcation into the left and right pulmonary arteries. 11. There is moderate concentric left ventricular hypertrophy. 12. There is moderately increased posterior left ventricular wall thickness. QUANTITATIVE DATA SUMMARY:  2D MEASUREMENTS:             Normal Ranges: Ao Root d:       3.20 cm     (2.0-3.7cm) LAs:             3.40 cm     (2.7-4.0cm) RVIDd:           4.25 cm     (0.9-3.6cm) IVSd:            1.30 cm     (0.6-1.1cm) LVPWd:           1.35 cm     (0.6-1.1cm) LVIDd:           4.30 cm     (3.9-5.9cm) LVIDs:           3.00 cm LV Mass Index:   87.7 g/m2 LVEDV Index:     59.83 ml/m2 LV % FS          30.2 %  LEFT ATRIUM:                 Normal Ranges: LA Area A4C:      23.5 cm2 LA Area A2C:      29.2 cm2 LA Vol A4C:       69.0 ml LA Vol A2C:       113.0 ml LA Vol Index BSA: 37.3 ml/m2 LA Vol BP:        91.0 ml  RIGHT ATRIUM:                 Normal Ranges: RA Vol A4C:        46.5 ml    (8.3-19.5ml) RA Vol Index A4C:  19.1 ml/m2 RA Area A4C:       16.7 cm2 RA Major Axis A4C: 5.1 cm  AORTA MEASUREMENTS:         Normal Ranges: Asc Ao, d:          3.90 cm (2.1-3.4cm)  LV SYSTOLIC FUNCTION:                      Normal Ranges: EF-A4C View:    65 % (>=55%) EF-A2C View:    61 % EF-Biplane:     63 % EF-Visual:      63 % LV EF Reported: 63 %  LV DIASTOLIC FUNCTION:             Normal Ranges: MV Peak E:             0.76 m/s    (0.7-1.2 m/s) MV Peak A:             0.90 m/s    (0.42-0.7 m/s) E/A Ratio:             0.85        (1.0-2.2) MV e'                  0.066 m/s   (>8.0) MV lateral e'          0.07 m/s MV medial e'           0.06 m/s MV A Dur:              197.00 msec E/e' Ratio:            11.59       (<8.0) PulmV Sys Jorge:         64.10 cm/s PulmV Roe Jorge:        45.90 cm/s PulmV S/D Jorge:         1.40 PulmV A Revs Jorge:      28.60 cm/s PulmV A Revs Dur:      109.00 msec  MITRAL VALVE:          Normal Ranges: MV Vmax:      1.11 m/s (<=1.3m/s) MV peak PG:    4.9 mmHg (<5mmHg) MV mean P.0 mmHg (<48mmHg) MV DT:        342 msec (150-240msec)  AORTIC VALVE:                      Normal Ranges: AoV Vmax:                1.93 m/s  (<=1.7m/s) AoV Peak P.9 mmHg (<20mmHg) AoV Mean P.0 mmHg  (1.7-11.5mmHg) LVOT Max Jorge:            1.12 m/s  (<=1.1m/s) AoV VTI:                 47.10 cm  (18-25cm) LVOT VTI:                30.10 cm LVOT Diameter:           1.90 cm   (1.8-2.4cm) AoV Area, VTI:           1.81 cm2  (2.5-5.5cm2) AoV Area,Vmax:           1.65 cm2  (2.5-4.5cm2) AoV Dimensionless Index: 0.64  RIGHT VENTRICLE: RV Basal 4.47 cm RV Mid   2.97 cm RV Major 7.8 cm TAPSE:   32.8 mm RV s'    0.19 m/s  TRICUSPID VALVE/RVSP:          Normal Ranges: Peak TR Velocity:     2.47 m/s Est. RA Pressure:     3 mmHg RV Syst Pressure:     27 mmHg  (< 30mmHg) IVC Diam:             1.30 cm  PULMONIC VALVE:          Normal Ranges: PV Accel Time:  169 msec (>120ms) PV Max Jorge:     1.0 m/s  (0.6-0.9m/s) PV Max PG:      3.9 mmHg  PULMONARY VEINS: PulmV A Revs Dur: 109.00 msec PulmV A Revs Jorge: 28.60 cm/s PulmV Roe Jorge:   45.90 cm/s PulmV S/D Jorge:    1.40 PulmV Sys Jorge:    64.10 cm/s  [id number] Romain Yovany DO Electronically signed on 2025 at 10:43:34 AM  Wall Scoring  ** Final **          Scribe Attestation:  By signing my name below, IMorenita Scribe attest that this documentation has been prepared under the direction and in the presence of Santana Anna MD.    Provider Attestation - Scribe documentation:  All medical record entries made by Morenita Miguel were at my direction and personally dictated by me, Santana Anna MD. I have reviewed the chart and agree that the record is accurate and I confirmed that it reflects my personal performance of the history, physical exam, discussion, and plan.              [1] History reviewed. No pertinent past medical history.  [2]   Allergies  Allergen Reactions    Adhesive Tape-Silicones Hives and Itching     "Epinephrine Syncope    Latex Hives and Itching    Meperidine Other    Penicillins Other     Reports redness and feeling hot; Tolerated cephalosporins this admission on 10/2024    Promethazine Unknown    Sulfa (Sulfonamide Antibiotics) Other     \"Flushed\"   [3]   Past Surgical History:  Procedure Laterality Date    CARDIAC CATHETERIZATION N/A 9/30/2024    Procedure: Left Heart Cath with Coronary Angiography and LV;  Surgeon: Armando Antonio MD;  Location: [facility];  Service: Cardiovascular;  Laterality: N/A;    CARDIAC CATHETERIZATION N/A 9/30/2024    Procedure: PCI ANTOINE Stent- Coronary;  Surgeon: Armando Antonio MD;  Location: [facility];  Service: Cardiovascular;  Laterality: N/A;    OTHER SURGICAL HISTORY  12/12/2018    Cardiac catheterization with stent placement    OTHER SURGICAL HISTORY  12/12/2018    Renal lithotripsy    OTHER SURGICAL HISTORY  12/12/2018    Shoulder surgery    OTHER SURGICAL HISTORY  12/12/2018    Knee reconstruction    OTHER SURGICAL HISTORY  12/12/2018    Hernia repair    OTHER SURGICAL HISTORY  02/28/2022    Colonoscopy    OTHER SURGICAL HISTORY  02/28/2022    Dental surgery    OTHER SURGICAL HISTORY  02/28/2022    Inguinal hernia repair    OTHER SURGICAL HISTORY  02/28/2022    Percutaneous transluminal coronary angioplasty    OTHER SURGICAL HISTORY  03/09/2022    Dermatological surgery     "

## 2025-07-22 ENCOUNTER — APPOINTMENT (OUTPATIENT)
Dept: ORTHOPEDIC SURGERY | Facility: CLINIC | Age: 70
End: 2025-07-22
Payer: MEDICARE

## 2025-07-22 ENCOUNTER — OFFICE VISIT (OUTPATIENT)
Dept: ORTHOPEDIC SURGERY | Facility: CLINIC | Age: 70
End: 2025-07-22
Payer: MEDICARE

## 2025-07-22 ENCOUNTER — RESULTS FOLLOW-UP (OUTPATIENT)
Dept: CARDIOLOGY | Facility: CLINIC | Age: 70
End: 2025-07-22

## 2025-07-22 DIAGNOSIS — S76.311A HAMSTRING TENDON RUPTURE, RIGHT, INITIAL ENCOUNTER: Primary | ICD-10-CM

## 2025-07-22 LAB
AORTIC VALVE MEAN GRADIENT: 8 MMHG
AORTIC VALVE PEAK VELOCITY: 1.93 M/S
AV PEAK GRADIENT: 15 MMHG
AVA (PEAK VEL): 1.65 CM2
AVA (VTI): 1.81 CM2
EJECTION FRACTION APICAL 4 CHAMBER: 65
EJECTION FRACTION: 63 %
LEFT VENTRICLE INTERNAL DIMENSION DIASTOLE: 4.3 CM (ref 3.5–6)
LEFT VENTRICULAR OUTFLOW TRACT DIAMETER: 1.9 CM
LV EJECTION FRACTION BIPLANE: 63 %
MITRAL VALVE E/A RATIO: 0.85
MITRAL VALVE E/E' RATIO: 10.8
RIGHT VENTRICLE FREE WALL PEAK S': 19.2 CM/S
RIGHT VENTRICLE PEAK SYSTOLIC PRESSURE: 34 MMHG
TRICUSPID ANNULAR PLANE SYSTOLIC EXCURSION: 3.3 CM

## 2025-07-22 PROCEDURE — 4010F ACE/ARB THERAPY RXD/TAKEN: CPT | Performed by: ORTHOPAEDIC SURGERY

## 2025-07-22 PROCEDURE — 1036F TOBACCO NON-USER: CPT | Performed by: ORTHOPAEDIC SURGERY

## 2025-07-22 PROCEDURE — 3044F HG A1C LEVEL LT 7.0%: CPT | Performed by: ORTHOPAEDIC SURGERY

## 2025-07-22 PROCEDURE — 1159F MED LIST DOCD IN RCRD: CPT | Performed by: ORTHOPAEDIC SURGERY

## 2025-07-22 PROCEDURE — 99212 OFFICE O/P EST SF 10 MIN: CPT | Performed by: ORTHOPAEDIC SURGERY

## 2025-07-22 PROCEDURE — 99213 OFFICE O/P EST LOW 20 MIN: CPT | Performed by: ORTHOPAEDIC SURGERY

## 2025-07-22 NOTE — TELEPHONE ENCOUNTER
----- Message from Nurse Melani PERALES sent at 7/22/2025 11:23 AM EDT -----    ----- Message -----  From: Armando Antonio MD  Sent: 7/22/2025  10:53 AM EDT  To: Melani Quinn LPN    Echocardiogram demonstrates normal left ventricular systolic function, no significant valvular abnormalities  ----- Message -----  From: Capri, Syngo - Cardiology Results In  Sent: 7/22/2025  10:43 AM EDT  To: Armando Antonio MD

## 2025-07-24 ENCOUNTER — OFFICE VISIT (OUTPATIENT)
Dept: PRIMARY CARE | Facility: CLINIC | Age: 70
End: 2025-07-24
Payer: MEDICARE

## 2025-07-24 VITALS
SYSTOLIC BLOOD PRESSURE: 120 MMHG | HEIGHT: 73 IN | TEMPERATURE: 96.2 F | BODY MASS INDEX: 35.65 KG/M2 | DIASTOLIC BLOOD PRESSURE: 77 MMHG | HEART RATE: 69 BPM | WEIGHT: 269 LBS

## 2025-07-24 DIAGNOSIS — H90.11 CONDUCTIVE HEARING LOSS OF RIGHT EAR, UNSPECIFIED HEARING STATUS ON CONTRALATERAL SIDE: Primary | ICD-10-CM

## 2025-07-24 DIAGNOSIS — H61.21 IMPACTED CERUMEN OF RIGHT EAR: ICD-10-CM

## 2025-07-24 PROCEDURE — 1159F MED LIST DOCD IN RCRD: CPT | Performed by: INTERNAL MEDICINE

## 2025-07-24 PROCEDURE — G8433 SCR FOR DEP NOT CPT DOC RSN: HCPCS | Performed by: INTERNAL MEDICINE

## 2025-07-24 PROCEDURE — 99213 OFFICE O/P EST LOW 20 MIN: CPT | Performed by: INTERNAL MEDICINE

## 2025-07-24 PROCEDURE — 3044F HG A1C LEVEL LT 7.0%: CPT | Performed by: INTERNAL MEDICINE

## 2025-07-24 PROCEDURE — 1036F TOBACCO NON-USER: CPT | Performed by: INTERNAL MEDICINE

## 2025-07-24 PROCEDURE — 3078F DIAST BP <80 MM HG: CPT | Performed by: INTERNAL MEDICINE

## 2025-07-24 PROCEDURE — 3008F BODY MASS INDEX DOCD: CPT | Performed by: INTERNAL MEDICINE

## 2025-07-24 PROCEDURE — 4010F ACE/ARB THERAPY RXD/TAKEN: CPT | Performed by: INTERNAL MEDICINE

## 2025-07-24 PROCEDURE — 3074F SYST BP LT 130 MM HG: CPT | Performed by: INTERNAL MEDICINE

## 2025-07-24 ASSESSMENT — ENCOUNTER SYMPTOMS
RESPIRATORY NEGATIVE: 1
DEPRESSION: 0
WEAKNESS: 0
NECK PAIN: 0
CARDIOVASCULAR NEGATIVE: 1
LOSS OF SENSATION IN FEET: 0
NUMBNESS: 0
NAUSEA: 0
OCCASIONAL FEELINGS OF UNSTEADINESS: 0
GASTROINTESTINAL NEGATIVE: 1

## 2025-07-24 ASSESSMENT — COLUMBIA-SUICIDE SEVERITY RATING SCALE - C-SSRS
2. HAVE YOU ACTUALLY HAD ANY THOUGHTS OF KILLING YOURSELF?: NO
1. IN THE PAST MONTH, HAVE YOU WISHED YOU WERE DEAD OR WISHED YOU COULD GO TO SLEEP AND NOT WAKE UP?: NO
6. HAVE YOU EVER DONE ANYTHING, STARTED TO DO ANYTHING, OR PREPARED TO DO ANYTHING TO END YOUR LIFE?: NO

## 2025-07-24 NOTE — PROGRESS NOTES
"Subjective   Patient ID: Manny Reveles \"Forrest" is a 70 y.o. male who presents for Hearing Problem (Rt ear unable to hear).  Hearing Problem  This is a new problem. The current episode started in the past 7 days. The problem occurs constantly. The problem has been unchanged. Pertinent negatives include no congestion, nausea, neck pain, numbness or weakness. He has tried nothing for the symptoms. The treatment provided no relief.       Past Medical History  Medical History[1]    Social History  Social History[2]    Family History   Family History[3]    Allergies:  RX Allergies[4]     Outpatient Medications:  Current Outpatient Medications   Medication Instructions    amLODIPine (NORVASC) 5 mg, oral, Daily, as directed    apixaban (ELIQUIS) 5 mg, oral, 2 times daily    atorvastatin (LIPITOR) 40 mg, oral, Nightly    blood-glucose sensor (FreeStyle Maira 3 Plus Sensor) device Change every 15 days    cetirizine (ZYRTEC) 10 mg, Daily    clopidogrel (PLAVIX) 75 mg, oral, Daily    CRANBERRY ORAL 2 tablets, Daily    docusate sodium (COLACE) 100 mg, 2 times daily    finasteride (PROSCAR) 5 mg, oral, Daily    FreeStyle Maira 3 [place] misc Use as instructed    icosapent ethyL (Vascepa) 1 gram capsule oral, 2 times daily    insulin aspart (NovoLOG Flexpen U-100 Insulin) 100 unit/mL (3 mL) pen Inject 20 units before breakfast, 18 units before lunch, and 25 units before dinner plus sliding scale (MDD 93 units)    insulin degludec (Tresiba FlexTouch U-200) 200 unit/mL (3 mL) pen INJECT 108 UNITS UNDER THE SKIN DAILY AS DIRECTED    ketoconazole (NIZOral) 2 % shampoo Topical, 2 times weekly, Apply to scalp in shower. Lather, leave on 5 minutes then rinse    Lactobacillus acidophilus (PROBIOTIC ORAL) 1 tablet, Daily    metoprolol succinate XL (TOPROL-XL) 25 mg, oral, Daily    nitroglycerin (NITROSTAT) 0.4 mg, sublingual, Every 5 min PRN    pen needle, diabetic 31 gauge x 1/4\" needle Use to administer insulin up to 5 times daily    " "potassium chloride CR 20 mEq ER tablet 20 mEq, oral, Daily    predniSONE (Deltasone) 10 mg tablet 50MG FOR 2 DAYS, 40MG FOR 2 DAYS, 30MG FOR 2 DAYS, 20MG FOR 2 DAYS, 10MG FOR 2 DAYS    ranolazine (RANEXA) 500 mg, oral, Every 12 hours    semaglutide (Rybelsus) 14 mg tablet tablet TAKE 1 TABLET (14 MG) BY MOUTH ONCE DAILY.    tamsulosin (FLOMAX) 0.4 mg, oral, Daily    telmisartan (MICARDIS) 20 mg, oral, Every 24 hours    triamterene-hydrochlorothiazid (Maxzide-25) 37.5-25 mg tablet TAKE HALF TABLET PO DAILY        Review of Systems   HENT:  Positive for hearing loss. Negative for congestion and ear pain.    Respiratory: Negative.     Cardiovascular: Negative.    Gastrointestinal: Negative.  Negative for nausea.   Musculoskeletal:  Negative for neck pain.   Neurological:  Negative for weakness and numbness.         Objective       Physical Exam  Vitals reviewed.   Constitutional:       Appearance: Normal appearance. He is obese.   HENT:      Head: Normocephalic.      Right Ear: There is impacted cerumen.     Eyes:      Conjunctiva/sclera: Conjunctivae normal.       Cardiovascular:      Rate and Rhythm: Normal rate and regular rhythm.   Pulmonary:      Effort: Pulmonary effort is normal.      Breath sounds: Normal breath sounds.   Abdominal:      Palpations: Abdomen is soft.     Musculoskeletal:         General: Normal range of motion.      Cervical back: Normal range of motion.       /77 (BP Location: Right arm, Patient Position: Sitting, BP Cuff Size: Adult)   Pulse 69   Temp 35.7 °C (96.2 °F) (Temporal)   Ht 1.854 m (6' 1\")   Wt 122 kg (269 lb)   BMI 35.49 kg/m²      Assessment/Plan   Problem List Items Addressed This Visit    None  Visit Diagnoses         Conductive hearing loss of right ear, unspecified hearing status on contralateral side    -  Primary      Impacted cerumen of right ear            Came because since last Sunday he cannot hear in the right ear, he was wearing earplugs, it is acute " "hearing loss, there is a cerumen impaction in right ear, this is a conductive hearing loss, this cerumen needs to be disimpacted, he works part-time and he needs to hear, left ear is completely normal, patient was referred to ENT for disimpaction of cerumen.  That will instantly help him to improve his hearing.  It is predominantly conductive hearing loss of acute onset.       [1] History reviewed. No pertinent past medical history.  [2]   Social History  Tobacco Use    Smoking status: Never     Passive exposure: Never    Smokeless tobacco: Never   Vaping Use    Vaping status: Never Used   Substance Use Topics    Alcohol use: Not Currently    Drug use: Never   [3]   Family History  Problem Relation Name Age of Onset    Other (bone/cartilage disorder) Mother      Diabetes Mother      Gallbladder disease Mother      Diabetes Father      Other (cardiac disorder) Father      Nephrolithiasis Father      Prostate cancer Father      Other (bladder cancer) Father      Heart attack Father      Rheum arthritis Father      Skin cancer Father      Kidney nephrosis Father      Diabetes Paternal Grandfather      Skin cancer Paternal Grandfather     [4]   Allergies  Allergen Reactions    Adhesive Tape-Silicones Hives and Itching    Epinephrine Syncope    Latex Hives and Itching    Meperidine Other    Penicillins Other     Reports redness and feeling hot; Tolerated cephalosporins this admission on 10/2024    Promethazine Unknown    Sulfa (Sulfonamide Antibiotics) Other     \"Flushed\"     "

## 2025-08-11 ENCOUNTER — APPOINTMENT (OUTPATIENT)
Dept: ORTHOPEDIC SURGERY | Facility: CLINIC | Age: 70
End: 2025-08-11
Payer: MEDICARE

## 2025-08-26 ENCOUNTER — OFFICE VISIT (OUTPATIENT)
Dept: ORTHOPEDIC SURGERY | Facility: CLINIC | Age: 70
End: 2025-08-26
Payer: MEDICARE

## 2025-08-26 DIAGNOSIS — S76.311A HAMSTRING TENDON RUPTURE, RIGHT, INITIAL ENCOUNTER: ICD-10-CM

## 2025-08-26 PROCEDURE — 99214 OFFICE O/P EST MOD 30 MIN: CPT | Performed by: ORTHOPAEDIC SURGERY

## 2025-08-26 PROCEDURE — 4010F ACE/ARB THERAPY RXD/TAKEN: CPT | Performed by: ORTHOPAEDIC SURGERY

## 2025-08-26 PROCEDURE — 99212 OFFICE O/P EST SF 10 MIN: CPT | Performed by: ORTHOPAEDIC SURGERY

## 2025-08-26 PROCEDURE — 3044F HG A1C LEVEL LT 7.0%: CPT | Performed by: ORTHOPAEDIC SURGERY

## 2025-08-26 RX ORDER — METHYLPREDNISOLONE 4 MG/1
TABLET ORAL
Qty: 21 TABLET | Refills: 0 | Status: SHIPPED | OUTPATIENT
Start: 2025-08-26

## 2025-08-30 DIAGNOSIS — E78.1 PURE HYPERGLYCERIDEMIA: ICD-10-CM

## 2025-09-02 ENCOUNTER — APPOINTMENT (OUTPATIENT)
Dept: DERMATOLOGY | Facility: CLINIC | Age: 70
End: 2025-09-02
Payer: MEDICARE

## 2025-09-02 RX ORDER — ATORVASTATIN CALCIUM 40 MG/1
40 TABLET, FILM COATED ORAL NIGHTLY
Qty: 90 TABLET | Refills: 3 | Status: SHIPPED | OUTPATIENT
Start: 2025-09-02 | End: 2026-09-02

## 2025-09-09 ENCOUNTER — APPOINTMENT (OUTPATIENT)
Dept: ENDOCRINOLOGY | Facility: CLINIC | Age: 70
End: 2025-09-09
Payer: MEDICARE

## 2025-09-17 ENCOUNTER — APPOINTMENT (OUTPATIENT)
Dept: CARDIOLOGY | Facility: CLINIC | Age: 70
End: 2025-09-17
Payer: MEDICARE

## 2025-09-18 ENCOUNTER — APPOINTMENT (OUTPATIENT)
Dept: PRIMARY CARE | Facility: CLINIC | Age: 70
End: 2025-09-18
Payer: MEDICARE

## 2025-10-22 ENCOUNTER — APPOINTMENT (OUTPATIENT)
Dept: CARDIOLOGY | Facility: CLINIC | Age: 70
End: 2025-10-22
Payer: MEDICARE

## 2025-11-14 ENCOUNTER — APPOINTMENT (OUTPATIENT)
Dept: PRIMARY CARE | Facility: CLINIC | Age: 70
End: 2025-11-14
Payer: MEDICARE

## 2025-11-17 ENCOUNTER — APPOINTMENT (OUTPATIENT)
Dept: PRIMARY CARE | Facility: CLINIC | Age: 70
End: 2025-11-17
Payer: MEDICARE

## 2026-01-06 ENCOUNTER — APPOINTMENT (OUTPATIENT)
Dept: ENDOCRINOLOGY | Facility: CLINIC | Age: 71
End: 2026-01-06
Payer: MEDICARE

## 2026-05-05 ENCOUNTER — APPOINTMENT (OUTPATIENT)
Dept: ENDOCRINOLOGY | Facility: CLINIC | Age: 71
End: 2026-05-05
Payer: MEDICARE

## (undated) DEVICE — SYRINGE, 10 CC, LUER LOCK

## (undated) DEVICE — DRAPE, SHEET, THREE QUARTER, FAN FOLD, 57 X 77 IN

## (undated) DEVICE — CATHETER, DIAGNOSTIC, AMPLATZ, 5 FR, AL 1

## (undated) DEVICE — BANDAGE, GAUZE, CONFORMING, KERLIX, 6 PLY, 4.5 IN X 4.1 YD

## (undated) DEVICE — GLOVE, SURGICAL, PROTEXIS PI BLUE W/NEUTHERA, 7.0, PF, LF

## (undated) DEVICE — HEMOSTAT, SURGICEL POWDER 3.0GRAMS

## (undated) DEVICE — CATHETER, DIAGNOSTIC, JUDKINS, LEFT, 5 FR-JL 4.0

## (undated) DEVICE — GLOVE, SURGICAL, PROTEXIS PI , 7.0, PF, LF

## (undated) DEVICE — SHEATH, PINNACLE, W/.038 GW 10CM, 5FR INTRODUCER, 2.5 CM DIALATOR

## (undated) DEVICE — Device

## (undated) DEVICE — BANDAGE, QUIKCLOT, INTERVENTIONAL HEMO, W/O SLIT

## (undated) DEVICE — TOWEL PACK, STERILE, 16X24, XRAY DETECTABLE, BLUE, 4/PK

## (undated) DEVICE — DRESSING, ABDOMINAL PAD, CURITY, 7.5 X 8 IN

## (undated) DEVICE — GLOVE, SURGICAL, PROTEXIS PI , 6.0, PF, LF

## (undated) DEVICE — PAD, GROUNDING, ELECTROSURGICAL, W/9 FT CABLE, POLYHESIVE II, ADULT, LF

## (undated) DEVICE — COLLECTION/DELIVERY SYSTEM, COPAN ESWAB, REG SIZE SWAB

## (undated) DEVICE — GLOVE, SURGICAL, PROTEXIS PI BLUE W/NEUTHERA, 6.5, PF, LF

## (undated) DEVICE — CUP, MEDICINE, GRADUATED, 2 OZ, PLASTIC, DISP, LF

## (undated) DEVICE — CATHETER, BALLOON DILATION, EUPHORA SEMICOMPLIANT 2.50  X 15 MM X 142CM

## (undated) DEVICE — NEEDLE, SAFETY, 25 GA X 1.5 IN

## (undated) DEVICE — GUIDEWIRE, UNIVERSAL BALANCE MID WEIGHT, 190CM, STR

## (undated) DEVICE — DRESSING, GAUZE, SPONGE, 12 PLY, 4 X 4 IN, PLASTIC POUCH, STRL 10PK

## (undated) DEVICE — GLOVE, SURGICAL, PROTEXIS NEOPRENE, 8.0, PF, LF

## (undated) DEVICE — GOWN, SURGICAL, ROYAL SILK, LG, STERILE

## (undated) DEVICE — CATHETER, GUIDING, VISTA BRITE 6FR, XB LAD 3.5 100CM

## (undated) DEVICE — INFLATION DEVICE, EVEREST 3-WAY STOPCOCK, 30ATM, DISP

## (undated) DEVICE — SHIELD, RADPAD, ELECTROPHYSIOLOGY, ORANGE, ABSORBENT

## (undated) DEVICE — DRAPE, SHEET, LAPAROTOMY, W/ISO-BAC, W/ARMBOARD COVERS, 98 X 122 IN, DISPOSABLE, LF, STERILE

## (undated) DEVICE — TUBING, MANIFOLD, LOW PRESSURE

## (undated) DEVICE — CO-PILOT ***MFR REPORTS INVALID, PLEASE CONTACT QSIGHT SUPPORT W/ PROPER CAT #

## (undated) DEVICE — SOLUTION, IRRIGATION, SODIUM CHLORIDE 0.9%, 1000 ML, POUR BOTTLE

## (undated) DEVICE — SHEATH, PINNACLE, W/.038 GUIDEWIRE, 10 CM,  6FR INTRODUCER, 6FR DIA, 2.5 CM DIALATOR

## (undated) DEVICE — CLOSURE SYSTEM, VASCULAR, VASCADE 6/7F VCS

## (undated) DEVICE — GOWN, SURGICAL, ROYAL SILK, XL, STERILE

## (undated) DEVICE — CATHETER, DIAGNOSTIC, 5FR,  PIG-145, 110CM, 6SH ANGLED

## (undated) DEVICE — APPLICATOR, CHLORAPREP, W/ORANGE TINT, 26ML